# Patient Record
Sex: MALE | Race: ASIAN | NOT HISPANIC OR LATINO | Employment: UNEMPLOYED | URBAN - METROPOLITAN AREA
[De-identification: names, ages, dates, MRNs, and addresses within clinical notes are randomized per-mention and may not be internally consistent; named-entity substitution may affect disease eponyms.]

---

## 2017-01-20 ENCOUNTER — OFFICE VISIT - HEALTHEAST (OUTPATIENT)
Dept: FAMILY MEDICINE | Facility: CLINIC | Age: 67
End: 2017-01-20

## 2017-01-20 DIAGNOSIS — K59.00 CONSTIPATION, UNSPECIFIED CONSTIPATION TYPE: ICD-10-CM

## 2017-01-20 DIAGNOSIS — M54.16 RADICULOPATHY, LUMBAR REGION: ICD-10-CM

## 2017-01-20 DIAGNOSIS — M48.061 SPINAL STENOSIS, LUMBAR REGION, WITHOUT NEUROGENIC CLAUDICATION: ICD-10-CM

## 2017-01-20 DIAGNOSIS — K59.00 UNSPECIFIED CONSTIPATION: ICD-10-CM

## 2017-01-20 DIAGNOSIS — M10.9 GOUT, UNSPECIFIED: ICD-10-CM

## 2017-02-16 ENCOUNTER — COMMUNICATION - HEALTHEAST (OUTPATIENT)
Dept: FAMILY MEDICINE | Facility: CLINIC | Age: 67
End: 2017-02-16

## 2017-02-16 DIAGNOSIS — F33.1 MAJOR DEPRESSIVE DISORDER, RECURRENT EPISODE, MODERATE (H): ICD-10-CM

## 2017-03-03 ENCOUNTER — OFFICE VISIT - HEALTHEAST (OUTPATIENT)
Dept: FAMILY MEDICINE | Facility: CLINIC | Age: 67
End: 2017-03-03

## 2017-03-03 DIAGNOSIS — R05.9 COUGH: ICD-10-CM

## 2017-03-03 DIAGNOSIS — M54.2 NECK PAIN: ICD-10-CM

## 2017-03-03 DIAGNOSIS — Z83.6 FAMILY HISTORY OF INFLUENZA: ICD-10-CM

## 2017-04-04 ENCOUNTER — OFFICE VISIT - HEALTHEAST (OUTPATIENT)
Dept: FAMILY MEDICINE | Facility: CLINIC | Age: 67
End: 2017-04-04

## 2017-04-04 ENCOUNTER — RECORDS - HEALTHEAST (OUTPATIENT)
Dept: ADMINISTRATIVE | Facility: OTHER | Age: 67
End: 2017-04-04

## 2017-04-04 DIAGNOSIS — M79.2 NEURALGIA OF UPPER EXTREMITY: ICD-10-CM

## 2017-04-04 DIAGNOSIS — L29.9 PRURITIC DISORDER: ICD-10-CM

## 2017-04-04 DIAGNOSIS — F33.1 MAJOR DEPRESSIVE DISORDER, RECURRENT EPISODE, MODERATE (H): ICD-10-CM

## 2017-04-04 DIAGNOSIS — L30.9 ECZEMA: ICD-10-CM

## 2017-04-04 DIAGNOSIS — M54.16 RADICULOPATHY, LUMBAR REGION: ICD-10-CM

## 2017-04-04 DIAGNOSIS — K59.00 UNSPECIFIED CONSTIPATION: ICD-10-CM

## 2017-04-04 DIAGNOSIS — M54.41 LOW BACK PAIN WITH RIGHT-SIDED SCIATICA: ICD-10-CM

## 2017-04-27 ENCOUNTER — HOSPITAL ENCOUNTER (OUTPATIENT)
Dept: INTERVENTIONAL RADIOLOGY/VASCULAR | Facility: CLINIC | Age: 67
Discharge: HOME | End: 2017-04-27
Attending: RADIOLOGY

## 2017-04-27 DIAGNOSIS — I63.9 STROKE (H): ICD-10-CM

## 2017-04-30 ENCOUNTER — COMMUNICATION - HEALTHEAST (OUTPATIENT)
Dept: FAMILY MEDICINE | Facility: CLINIC | Age: 67
End: 2017-04-30

## 2017-04-30 DIAGNOSIS — M10.9 GOUT, UNSPECIFIED: ICD-10-CM

## 2017-05-04 ENCOUNTER — OFFICE VISIT - HEALTHEAST (OUTPATIENT)
Dept: GERIATRICS | Facility: CLINIC | Age: 67
End: 2017-05-04

## 2017-05-04 DIAGNOSIS — I63.02 CEREBROVASCULAR ACCIDENT (CVA) DUE TO THROMBOSIS OF BASILAR ARTERY (H): ICD-10-CM

## 2017-05-04 DIAGNOSIS — R09.02 HYPOXIA: ICD-10-CM

## 2017-05-04 DIAGNOSIS — M10.9 GOUT: ICD-10-CM

## 2017-05-04 DIAGNOSIS — D64.9 ANEMIA: ICD-10-CM

## 2017-05-04 DIAGNOSIS — K21.9 GERD (GASTROESOPHAGEAL REFLUX DISEASE): ICD-10-CM

## 2017-05-04 DIAGNOSIS — I72.9 PSEUDOANEURYSM (H): ICD-10-CM

## 2017-05-08 ENCOUNTER — OFFICE VISIT - HEALTHEAST (OUTPATIENT)
Dept: GERIATRICS | Facility: CLINIC | Age: 67
End: 2017-05-08

## 2017-05-08 ENCOUNTER — HOME CARE/HOSPICE - HEALTHEAST (OUTPATIENT)
Dept: HOME HEALTH SERVICES | Facility: HOME HEALTH | Age: 67
End: 2017-05-08

## 2017-05-08 ENCOUNTER — COMMUNICATION - HEALTHEAST (OUTPATIENT)
Dept: FAMILY MEDICINE | Facility: CLINIC | Age: 67
End: 2017-05-08

## 2017-05-08 DIAGNOSIS — I72.9 PSEUDOANEURYSM (H): ICD-10-CM

## 2017-05-08 DIAGNOSIS — D64.9 ANEMIA: ICD-10-CM

## 2017-05-08 DIAGNOSIS — K59.00 CONSTIPATION: ICD-10-CM

## 2017-05-08 DIAGNOSIS — I63.02 CEREBROVASCULAR ACCIDENT (CVA) DUE TO THROMBOSIS OF BASILAR ARTERY (H): ICD-10-CM

## 2017-05-08 DIAGNOSIS — K21.9 GERD (GASTROESOPHAGEAL REFLUX DISEASE): ICD-10-CM

## 2017-05-09 ENCOUNTER — COMMUNICATION - HEALTHEAST (OUTPATIENT)
Dept: GERIATRICS | Facility: CLINIC | Age: 67
End: 2017-05-09

## 2017-05-09 ENCOUNTER — AMBULATORY - HEALTHEAST (OUTPATIENT)
Dept: GERIATRICS | Facility: CLINIC | Age: 67
End: 2017-05-09

## 2017-05-09 ENCOUNTER — COMMUNICATION - HEALTHEAST (OUTPATIENT)
Dept: HOME HEALTH SERVICES | Facility: HOME HEALTH | Age: 67
End: 2017-05-09

## 2017-05-09 ENCOUNTER — COMMUNICATION - HEALTHEAST (OUTPATIENT)
Dept: FAMILY MEDICINE | Facility: CLINIC | Age: 67
End: 2017-05-09

## 2017-05-11 ENCOUNTER — OFFICE VISIT - HEALTHEAST (OUTPATIENT)
Dept: FAMILY MEDICINE | Facility: CLINIC | Age: 67
End: 2017-05-11

## 2017-05-11 DIAGNOSIS — K29.70 GASTRITIS AND GASTRODUODENITIS: ICD-10-CM

## 2017-05-11 DIAGNOSIS — G47.00 INSOMNIA, UNSPECIFIED: ICD-10-CM

## 2017-05-11 DIAGNOSIS — H91.90 HEARING LOSS: ICD-10-CM

## 2017-05-11 DIAGNOSIS — K29.90 GASTRITIS AND GASTRODUODENITIS: ICD-10-CM

## 2017-05-11 DIAGNOSIS — I63.9 CVA (CEREBRAL VASCULAR ACCIDENT) (H): ICD-10-CM

## 2017-05-11 DIAGNOSIS — M79.2 NEURALGIA OF UPPER EXTREMITY: ICD-10-CM

## 2017-05-17 ENCOUNTER — RECORDS - HEALTHEAST (OUTPATIENT)
Dept: ADMINISTRATIVE | Facility: OTHER | Age: 67
End: 2017-05-17

## 2017-05-18 ENCOUNTER — RECORDS - HEALTHEAST (OUTPATIENT)
Dept: ADMINISTRATIVE | Facility: OTHER | Age: 67
End: 2017-05-18

## 2017-05-25 ENCOUNTER — COMMUNICATION - HEALTHEAST (OUTPATIENT)
Dept: FAMILY MEDICINE | Facility: CLINIC | Age: 67
End: 2017-05-25

## 2017-05-28 ENCOUNTER — COMMUNICATION - HEALTHEAST (OUTPATIENT)
Dept: FAMILY MEDICINE | Facility: CLINIC | Age: 67
End: 2017-05-28

## 2017-05-28 DIAGNOSIS — I63.02 CEREBROVASCULAR ACCIDENT (CVA) DUE TO THROMBOSIS OF BASILAR ARTERY (H): ICD-10-CM

## 2017-05-30 ENCOUNTER — RECORDS - HEALTHEAST (OUTPATIENT)
Dept: ADMINISTRATIVE | Facility: OTHER | Age: 67
End: 2017-05-30

## 2017-06-01 ENCOUNTER — OFFICE VISIT - HEALTHEAST (OUTPATIENT)
Dept: FAMILY MEDICINE | Facility: CLINIC | Age: 67
End: 2017-06-01

## 2017-06-01 ENCOUNTER — COMMUNICATION - HEALTHEAST (OUTPATIENT)
Dept: FAMILY MEDICINE | Facility: CLINIC | Age: 67
End: 2017-06-01

## 2017-06-01 DIAGNOSIS — K59.00 UNSPECIFIED CONSTIPATION: ICD-10-CM

## 2017-06-01 DIAGNOSIS — I63.421 CEREBRAL INFARCTION DUE TO EMBOLISM OF RIGHT ANTERIOR CEREBRAL ARTERY (H): ICD-10-CM

## 2017-06-01 DIAGNOSIS — I63.02 CEREBROVASCULAR ACCIDENT (CVA) DUE TO THROMBOSIS OF BASILAR ARTERY (H): ICD-10-CM

## 2017-06-01 DIAGNOSIS — M54.16 RADICULOPATHY, LUMBAR REGION: ICD-10-CM

## 2017-06-01 DIAGNOSIS — M10.9 GOUT, UNSPECIFIED: ICD-10-CM

## 2017-06-01 DIAGNOSIS — G47.00 INSOMNIA: ICD-10-CM

## 2017-06-01 DIAGNOSIS — M54.50 LUMBAGO: ICD-10-CM

## 2017-06-01 DIAGNOSIS — M48.061 SPINAL STENOSIS, LUMBAR REGION, WITHOUT NEUROGENIC CLAUDICATION: ICD-10-CM

## 2017-06-04 ENCOUNTER — COMMUNICATION - HEALTHEAST (OUTPATIENT)
Dept: FAMILY MEDICINE | Facility: CLINIC | Age: 67
End: 2017-06-04

## 2017-06-04 DIAGNOSIS — M10.9 GOUT: ICD-10-CM

## 2017-06-11 ENCOUNTER — COMMUNICATION - HEALTHEAST (OUTPATIENT)
Dept: FAMILY MEDICINE | Facility: CLINIC | Age: 67
End: 2017-06-11

## 2017-06-13 ENCOUNTER — OFFICE VISIT - HEALTHEAST (OUTPATIENT)
Dept: AUDIOLOGY | Facility: CLINIC | Age: 67
End: 2017-06-13

## 2017-06-13 DIAGNOSIS — H90.3 SENSORINEURAL HEARING LOSS, ASYMMETRICAL: ICD-10-CM

## 2017-06-15 ENCOUNTER — RECORDS - HEALTHEAST (OUTPATIENT)
Dept: ADMINISTRATIVE | Facility: OTHER | Age: 67
End: 2017-06-15

## 2017-06-20 ENCOUNTER — COMMUNICATION - HEALTHEAST (OUTPATIENT)
Dept: FAMILY MEDICINE | Facility: CLINIC | Age: 67
End: 2017-06-20

## 2017-06-26 ENCOUNTER — COMMUNICATION - HEALTHEAST (OUTPATIENT)
Dept: FAMILY MEDICINE | Facility: CLINIC | Age: 67
End: 2017-06-26

## 2017-06-27 ENCOUNTER — COMMUNICATION - HEALTHEAST (OUTPATIENT)
Dept: FAMILY MEDICINE | Facility: CLINIC | Age: 67
End: 2017-06-27

## 2017-06-29 ENCOUNTER — COMMUNICATION - HEALTHEAST (OUTPATIENT)
Dept: FAMILY MEDICINE | Facility: CLINIC | Age: 67
End: 2017-06-29

## 2017-06-29 ENCOUNTER — RECORDS - HEALTHEAST (OUTPATIENT)
Dept: ADMINISTRATIVE | Facility: OTHER | Age: 67
End: 2017-06-29

## 2017-06-29 DIAGNOSIS — M10.9 GOUT: ICD-10-CM

## 2017-07-07 ENCOUNTER — COMMUNICATION - HEALTHEAST (OUTPATIENT)
Dept: FAMILY MEDICINE | Facility: CLINIC | Age: 67
End: 2017-07-07

## 2017-07-07 DIAGNOSIS — M10.9 GOUT: ICD-10-CM

## 2017-07-11 ENCOUNTER — COMMUNICATION - HEALTHEAST (OUTPATIENT)
Dept: FAMILY MEDICINE | Facility: CLINIC | Age: 67
End: 2017-07-11

## 2017-07-19 ENCOUNTER — RECORDS - HEALTHEAST (OUTPATIENT)
Dept: ADMINISTRATIVE | Facility: OTHER | Age: 67
End: 2017-07-19

## 2017-07-20 ENCOUNTER — COMMUNICATION - HEALTHEAST (OUTPATIENT)
Dept: FAMILY MEDICINE | Facility: CLINIC | Age: 67
End: 2017-07-20

## 2017-07-20 DIAGNOSIS — M10.9 GOUT: ICD-10-CM

## 2017-07-30 ENCOUNTER — COMMUNICATION - HEALTHEAST (OUTPATIENT)
Dept: FAMILY MEDICINE | Facility: CLINIC | Age: 67
End: 2017-07-30

## 2017-08-01 ENCOUNTER — OFFICE VISIT - HEALTHEAST (OUTPATIENT)
Dept: FAMILY MEDICINE | Facility: CLINIC | Age: 67
End: 2017-08-01

## 2017-08-01 DIAGNOSIS — R41.3 MEMORY LOSS: ICD-10-CM

## 2017-08-01 DIAGNOSIS — K59.00 UNSPECIFIED CONSTIPATION: ICD-10-CM

## 2017-08-01 DIAGNOSIS — I63.02 CEREBROVASCULAR ACCIDENT (CVA) DUE TO THROMBOSIS OF BASILAR ARTERY (H): ICD-10-CM

## 2017-08-01 DIAGNOSIS — F33.1 MAJOR DEPRESSIVE DISORDER, RECURRENT EPISODE, MODERATE (H): ICD-10-CM

## 2017-08-01 DIAGNOSIS — L30.9 DERMATITIS: ICD-10-CM

## 2017-08-01 DIAGNOSIS — F34.1 DYSTHYMIC DISORDER: ICD-10-CM

## 2017-08-01 DIAGNOSIS — M54.16 RADICULOPATHY, LUMBAR REGION: ICD-10-CM

## 2017-08-08 ENCOUNTER — RECORDS - HEALTHEAST (OUTPATIENT)
Dept: ADMINISTRATIVE | Facility: OTHER | Age: 67
End: 2017-08-08

## 2017-08-13 ENCOUNTER — COMMUNICATION - HEALTHEAST (OUTPATIENT)
Dept: FAMILY MEDICINE | Facility: CLINIC | Age: 67
End: 2017-08-13

## 2017-08-24 ENCOUNTER — COMMUNICATION - HEALTHEAST (OUTPATIENT)
Dept: FAMILY MEDICINE | Facility: CLINIC | Age: 67
End: 2017-08-24

## 2017-08-24 DIAGNOSIS — M10.9 GOUT: ICD-10-CM

## 2017-08-29 ENCOUNTER — COMMUNICATION - HEALTHEAST (OUTPATIENT)
Dept: FAMILY MEDICINE | Facility: CLINIC | Age: 67
End: 2017-08-29

## 2017-08-30 ENCOUNTER — COMMUNICATION - HEALTHEAST (OUTPATIENT)
Dept: FAMILY MEDICINE | Facility: CLINIC | Age: 67
End: 2017-08-30

## 2017-09-06 ENCOUNTER — RECORDS - HEALTHEAST (OUTPATIENT)
Dept: ADMINISTRATIVE | Facility: OTHER | Age: 67
End: 2017-09-06

## 2017-09-11 ENCOUNTER — COMMUNICATION - HEALTHEAST (OUTPATIENT)
Dept: FAMILY MEDICINE | Facility: CLINIC | Age: 67
End: 2017-09-11

## 2017-09-12 ENCOUNTER — OFFICE VISIT - HEALTHEAST (OUTPATIENT)
Dept: FAMILY MEDICINE | Facility: CLINIC | Age: 67
End: 2017-09-12

## 2017-09-12 DIAGNOSIS — M48.061 SPINAL STENOSIS, LUMBAR REGION, WITHOUT NEUROGENIC CLAUDICATION: ICD-10-CM

## 2017-09-12 DIAGNOSIS — K29.70 GASTRITIS AND GASTRODUODENITIS: ICD-10-CM

## 2017-09-12 DIAGNOSIS — E78.5 HYPERLIPIDEMIA: ICD-10-CM

## 2017-09-12 DIAGNOSIS — L30.9 DERMATITIS: ICD-10-CM

## 2017-09-12 DIAGNOSIS — K29.90 GASTRITIS AND GASTRODUODENITIS: ICD-10-CM

## 2017-09-13 ENCOUNTER — RECORDS - HEALTHEAST (OUTPATIENT)
Dept: ADMINISTRATIVE | Facility: OTHER | Age: 67
End: 2017-09-13

## 2017-09-25 ENCOUNTER — COMMUNICATION - HEALTHEAST (OUTPATIENT)
Dept: FAMILY MEDICINE | Facility: CLINIC | Age: 67
End: 2017-09-25

## 2017-09-26 ENCOUNTER — COMMUNICATION - HEALTHEAST (OUTPATIENT)
Dept: FAMILY MEDICINE | Facility: CLINIC | Age: 67
End: 2017-09-26

## 2017-09-26 ENCOUNTER — RECORDS - HEALTHEAST (OUTPATIENT)
Dept: ADMINISTRATIVE | Facility: OTHER | Age: 67
End: 2017-09-26

## 2017-09-26 DIAGNOSIS — G89.29 CHRONIC PAIN: ICD-10-CM

## 2017-09-26 DIAGNOSIS — F32.A DEPRESSED: ICD-10-CM

## 2017-09-27 ENCOUNTER — COMMUNICATION - HEALTHEAST (OUTPATIENT)
Dept: FAMILY MEDICINE | Facility: CLINIC | Age: 67
End: 2017-09-27

## 2017-09-28 ENCOUNTER — RECORDS - HEALTHEAST (OUTPATIENT)
Dept: ADMINISTRATIVE | Facility: OTHER | Age: 67
End: 2017-09-28

## 2017-10-05 ENCOUNTER — RECORDS - HEALTHEAST (OUTPATIENT)
Dept: ADMINISTRATIVE | Facility: OTHER | Age: 67
End: 2017-10-05

## 2017-10-16 ENCOUNTER — COMMUNICATION - HEALTHEAST (OUTPATIENT)
Dept: FAMILY MEDICINE | Facility: CLINIC | Age: 67
End: 2017-10-16

## 2017-10-23 ENCOUNTER — COMMUNICATION - HEALTHEAST (OUTPATIENT)
Dept: FAMILY MEDICINE | Facility: CLINIC | Age: 67
End: 2017-10-23

## 2017-10-23 DIAGNOSIS — E55.9 VITAMIN D DEFICIENCY: ICD-10-CM

## 2017-10-25 ENCOUNTER — RECORDS - HEALTHEAST (OUTPATIENT)
Dept: ADMINISTRATIVE | Facility: OTHER | Age: 67
End: 2017-10-25

## 2017-10-25 ENCOUNTER — COMMUNICATION - HEALTHEAST (OUTPATIENT)
Dept: FAMILY MEDICINE | Facility: CLINIC | Age: 67
End: 2017-10-25

## 2017-10-25 DIAGNOSIS — K21.9 GASTROESOPHAGEAL REFLUX DISEASE WITHOUT ESOPHAGITIS: ICD-10-CM

## 2017-11-16 ENCOUNTER — RECORDS - HEALTHEAST (OUTPATIENT)
Dept: ADMINISTRATIVE | Facility: OTHER | Age: 67
End: 2017-11-16

## 2017-11-16 ENCOUNTER — COMMUNICATION - HEALTHEAST (OUTPATIENT)
Dept: FAMILY MEDICINE | Facility: CLINIC | Age: 67
End: 2017-11-16

## 2017-12-12 ENCOUNTER — OFFICE VISIT - HEALTHEAST (OUTPATIENT)
Dept: FAMILY MEDICINE | Facility: CLINIC | Age: 67
End: 2017-12-12

## 2017-12-12 DIAGNOSIS — G89.29 CHRONIC PAIN: ICD-10-CM

## 2017-12-12 DIAGNOSIS — L30.9 DERMATITIS: ICD-10-CM

## 2017-12-12 DIAGNOSIS — M48.061 SPINAL STENOSIS, LUMBAR REGION, WITHOUT NEUROGENIC CLAUDICATION: ICD-10-CM

## 2017-12-12 DIAGNOSIS — F32.A DEPRESSED: ICD-10-CM

## 2017-12-12 DIAGNOSIS — K59.00 CONSTIPATION: ICD-10-CM

## 2018-01-11 ENCOUNTER — COMMUNICATION - HEALTHEAST (OUTPATIENT)
Dept: FAMILY MEDICINE | Facility: CLINIC | Age: 68
End: 2018-01-11

## 2018-01-11 DIAGNOSIS — G89.29 CHRONIC PAIN: ICD-10-CM

## 2018-01-15 ENCOUNTER — COMMUNICATION - HEALTHEAST (OUTPATIENT)
Dept: AUDIOLOGY | Facility: CLINIC | Age: 68
End: 2018-01-15

## 2018-01-16 ENCOUNTER — COMMUNICATION - HEALTHEAST (OUTPATIENT)
Dept: ADMINISTRATIVE | Facility: CLINIC | Age: 68
End: 2018-01-16

## 2018-01-16 ENCOUNTER — RECORDS - HEALTHEAST (OUTPATIENT)
Dept: ADMINISTRATIVE | Facility: OTHER | Age: 68
End: 2018-01-16

## 2018-01-17 ENCOUNTER — OFFICE VISIT - HEALTHEAST (OUTPATIENT)
Dept: AUDIOLOGY | Facility: CLINIC | Age: 68
End: 2018-01-17

## 2018-01-17 DIAGNOSIS — H90.3 SENSORINEURAL HEARING LOSS, ASYMMETRICAL: ICD-10-CM

## 2018-01-29 ENCOUNTER — COMMUNICATION - HEALTHEAST (OUTPATIENT)
Dept: FAMILY MEDICINE | Facility: CLINIC | Age: 68
End: 2018-01-29

## 2018-01-29 DIAGNOSIS — E55.9 VITAMIN D DEFICIENCY: ICD-10-CM

## 2018-02-07 ENCOUNTER — COMMUNICATION - HEALTHEAST (OUTPATIENT)
Dept: FAMILY MEDICINE | Facility: CLINIC | Age: 68
End: 2018-02-07

## 2018-02-15 ENCOUNTER — RECORDS - HEALTHEAST (OUTPATIENT)
Dept: ADMINISTRATIVE | Facility: OTHER | Age: 68
End: 2018-02-15

## 2018-02-25 ENCOUNTER — COMMUNICATION - HEALTHEAST (OUTPATIENT)
Dept: FAMILY MEDICINE | Facility: CLINIC | Age: 68
End: 2018-02-25

## 2018-03-11 ENCOUNTER — COMMUNICATION - HEALTHEAST (OUTPATIENT)
Dept: FAMILY MEDICINE | Facility: CLINIC | Age: 68
End: 2018-03-11

## 2018-03-12 ENCOUNTER — COMMUNICATION - HEALTHEAST (OUTPATIENT)
Dept: FAMILY MEDICINE | Facility: CLINIC | Age: 68
End: 2018-03-12

## 2018-03-12 DIAGNOSIS — F33.1 MAJOR DEPRESSIVE DISORDER, RECURRENT EPISODE, MODERATE (H): ICD-10-CM

## 2018-03-12 DIAGNOSIS — E78.5 HYPERLIPIDEMIA: ICD-10-CM

## 2018-03-14 ENCOUNTER — OFFICE VISIT - HEALTHEAST (OUTPATIENT)
Dept: FAMILY MEDICINE | Facility: CLINIC | Age: 68
End: 2018-03-14

## 2018-03-14 DIAGNOSIS — R41.3 MEMORY LOSS: ICD-10-CM

## 2018-03-14 DIAGNOSIS — M48.061 SPINAL STENOSIS, LUMBAR REGION, WITHOUT NEUROGENIC CLAUDICATION: ICD-10-CM

## 2018-03-14 DIAGNOSIS — K59.00 CONSTIPATION: ICD-10-CM

## 2018-03-14 DIAGNOSIS — I65.8 OCCLUSION AND STENOSIS OF MULTIPLE AND BILATERAL PRECEREBRAL ARTERIES: ICD-10-CM

## 2018-03-14 DIAGNOSIS — F33.1 MAJOR DEPRESSIVE DISORDER, RECURRENT EPISODE, MODERATE (H): ICD-10-CM

## 2018-03-14 DIAGNOSIS — M54.16 RADICULOPATHY, LUMBAR REGION: ICD-10-CM

## 2018-03-26 ENCOUNTER — COMMUNICATION - HEALTHEAST (OUTPATIENT)
Dept: FAMILY MEDICINE | Facility: CLINIC | Age: 68
End: 2018-03-26

## 2018-03-27 ENCOUNTER — OFFICE VISIT - HEALTHEAST (OUTPATIENT)
Dept: AUDIOLOGY | Facility: CLINIC | Age: 68
End: 2018-03-27

## 2018-03-27 DIAGNOSIS — H90.3 SENSORINEURAL HEARING LOSS, BILATERAL: ICD-10-CM

## 2018-04-08 ENCOUNTER — RECORDS - HEALTHEAST (OUTPATIENT)
Dept: ADMINISTRATIVE | Facility: OTHER | Age: 68
End: 2018-04-08

## 2018-04-14 ENCOUNTER — COMMUNICATION - HEALTHEAST (OUTPATIENT)
Dept: FAMILY MEDICINE | Facility: CLINIC | Age: 68
End: 2018-04-14

## 2018-04-18 ENCOUNTER — COMMUNICATION - HEALTHEAST (OUTPATIENT)
Dept: FAMILY MEDICINE | Facility: CLINIC | Age: 68
End: 2018-04-18

## 2018-04-18 DIAGNOSIS — F32.A DEPRESSED: ICD-10-CM

## 2018-04-20 ENCOUNTER — OFFICE VISIT - HEALTHEAST (OUTPATIENT)
Dept: OTOLARYNGOLOGY | Facility: CLINIC | Age: 68
End: 2018-04-20

## 2018-04-20 DIAGNOSIS — H90.3 ASNHL (ASYMMETRICAL SENSORINEURAL HEARING LOSS): ICD-10-CM

## 2018-04-24 ENCOUNTER — OFFICE VISIT - HEALTHEAST (OUTPATIENT)
Dept: AUDIOLOGY | Facility: CLINIC | Age: 68
End: 2018-04-24

## 2018-04-24 DIAGNOSIS — H90.3 SENSORINEURAL HEARING LOSS, BILATERAL: ICD-10-CM

## 2018-04-25 ENCOUNTER — OFFICE VISIT - HEALTHEAST (OUTPATIENT)
Dept: FAMILY MEDICINE | Facility: CLINIC | Age: 68
End: 2018-04-25

## 2018-04-25 ENCOUNTER — AMBULATORY - HEALTHEAST (OUTPATIENT)
Dept: FAMILY MEDICINE | Facility: CLINIC | Age: 68
End: 2018-04-25

## 2018-04-25 DIAGNOSIS — F32.A DEPRESSED: ICD-10-CM

## 2018-04-25 DIAGNOSIS — E78.5 HYPERLIPIDEMIA: ICD-10-CM

## 2018-04-25 DIAGNOSIS — K29.90 GASTRITIS AND GASTRODUODENITIS: ICD-10-CM

## 2018-04-25 DIAGNOSIS — M10.9 GOUT: ICD-10-CM

## 2018-04-25 DIAGNOSIS — K29.70 GASTRITIS AND GASTRODUODENITIS: ICD-10-CM

## 2018-04-25 DIAGNOSIS — F33.1 MAJOR DEPRESSIVE DISORDER, RECURRENT EPISODE, MODERATE (H): ICD-10-CM

## 2018-04-25 LAB
ALBUMIN SERPL-MCNC: 3.5 G/DL (ref 3.5–5)
ALP SERPL-CCNC: 125 U/L (ref 45–120)
ALT SERPL W P-5'-P-CCNC: 99 U/L (ref 0–45)
ANION GAP SERPL CALCULATED.3IONS-SCNC: 11 MMOL/L (ref 5–18)
AST SERPL W P-5'-P-CCNC: 64 U/L (ref 0–40)
BILIRUB SERPL-MCNC: 0.3 MG/DL (ref 0–1)
BUN SERPL-MCNC: 12 MG/DL (ref 8–22)
CALCIUM SERPL-MCNC: 9.3 MG/DL (ref 8.5–10.5)
CHLORIDE BLD-SCNC: 106 MMOL/L (ref 98–107)
CHOLEST SERPL-MCNC: 228 MG/DL
CO2 SERPL-SCNC: 23 MMOL/L (ref 22–31)
CREAT SERPL-MCNC: 0.82 MG/DL (ref 0.7–1.3)
FASTING STATUS PATIENT QL REPORTED: YES
GFR SERPL CREATININE-BSD FRML MDRD: >60 ML/MIN/1.73M2
GLUCOSE BLD-MCNC: 90 MG/DL (ref 70–125)
HDLC SERPL-MCNC: 26 MG/DL
LDLC SERPL CALC-MCNC: 139 MG/DL
POTASSIUM BLD-SCNC: 4.6 MMOL/L (ref 3.5–5)
PROT SERPL-MCNC: 7.5 G/DL (ref 6–8)
SODIUM SERPL-SCNC: 140 MMOL/L (ref 136–145)
TRIGL SERPL-MCNC: 314 MG/DL

## 2018-04-25 ASSESSMENT — MIFFLIN-ST. JEOR: SCORE: 1200.2

## 2018-04-27 ENCOUNTER — COMMUNICATION - HEALTHEAST (OUTPATIENT)
Dept: FAMILY MEDICINE | Facility: CLINIC | Age: 68
End: 2018-04-27

## 2018-04-27 DIAGNOSIS — E55.9 VITAMIN D DEFICIENCY: ICD-10-CM

## 2018-05-07 ENCOUNTER — COMMUNICATION - HEALTHEAST (OUTPATIENT)
Dept: FAMILY MEDICINE | Facility: CLINIC | Age: 68
End: 2018-05-07

## 2018-05-10 ENCOUNTER — RECORDS - HEALTHEAST (OUTPATIENT)
Dept: ADMINISTRATIVE | Facility: OTHER | Age: 68
End: 2018-05-10

## 2018-05-14 ENCOUNTER — COMMUNICATION - HEALTHEAST (OUTPATIENT)
Dept: FAMILY MEDICINE | Facility: CLINIC | Age: 68
End: 2018-05-14

## 2018-05-24 ENCOUNTER — OFFICE VISIT - HEALTHEAST (OUTPATIENT)
Dept: FAMILY MEDICINE | Facility: CLINIC | Age: 68
End: 2018-05-24

## 2018-05-24 DIAGNOSIS — E78.5 HYPERLIPIDEMIA: ICD-10-CM

## 2018-05-24 DIAGNOSIS — F33.1 MAJOR DEPRESSIVE DISORDER, RECURRENT EPISODE, MODERATE (H): ICD-10-CM

## 2018-05-24 DIAGNOSIS — R05.9 COUGH: ICD-10-CM

## 2018-05-24 ASSESSMENT — MIFFLIN-ST. JEOR: SCORE: 1195.38

## 2018-06-07 ENCOUNTER — COMMUNICATION - HEALTHEAST (OUTPATIENT)
Dept: SCHEDULING | Facility: CLINIC | Age: 68
End: 2018-06-07

## 2018-06-07 ENCOUNTER — OFFICE VISIT - HEALTHEAST (OUTPATIENT)
Dept: FAMILY MEDICINE | Facility: CLINIC | Age: 68
End: 2018-06-07

## 2018-06-07 DIAGNOSIS — J40 BRONCHITIS: ICD-10-CM

## 2018-06-07 DIAGNOSIS — R07.89 CHEST WALL PAIN: ICD-10-CM

## 2018-06-07 ASSESSMENT — MIFFLIN-ST. JEOR: SCORE: 1179.5

## 2018-06-13 ENCOUNTER — RECORDS - HEALTHEAST (OUTPATIENT)
Dept: ADMINISTRATIVE | Facility: OTHER | Age: 68
End: 2018-06-13

## 2018-06-15 ENCOUNTER — COMMUNICATION - HEALTHEAST (OUTPATIENT)
Dept: FAMILY MEDICINE | Facility: CLINIC | Age: 68
End: 2018-06-15

## 2018-06-15 DIAGNOSIS — J96.01 ACUTE RESPIRATORY FAILURE WITH HYPOXIA (H): ICD-10-CM

## 2018-06-20 ENCOUNTER — COMMUNICATION - HEALTHEAST (OUTPATIENT)
Dept: FAMILY MEDICINE | Facility: CLINIC | Age: 68
End: 2018-06-20

## 2018-06-20 DIAGNOSIS — M10.9 GOUT: ICD-10-CM

## 2018-06-20 DIAGNOSIS — G47.00 INSOMNIA: ICD-10-CM

## 2018-06-20 RX ORDER — LANOLIN ALCOHOL/MO/W.PET/CERES
CREAM (GRAM) TOPICAL
Qty: 100 TABLET | Refills: 2 | Status: SHIPPED | OUTPATIENT
Start: 2018-06-20 | End: 2022-11-25

## 2018-07-03 ENCOUNTER — COMMUNICATION - HEALTHEAST (OUTPATIENT)
Dept: FAMILY MEDICINE | Facility: CLINIC | Age: 68
End: 2018-07-03

## 2018-07-03 ENCOUNTER — RECORDS - HEALTHEAST (OUTPATIENT)
Dept: ADMINISTRATIVE | Facility: OTHER | Age: 68
End: 2018-07-03

## 2018-07-03 DIAGNOSIS — I63.02 CEREBROVASCULAR ACCIDENT (CVA) DUE TO THROMBOSIS OF BASILAR ARTERY (H): ICD-10-CM

## 2018-07-31 ENCOUNTER — COMMUNICATION - HEALTHEAST (OUTPATIENT)
Dept: FAMILY MEDICINE | Facility: CLINIC | Age: 68
End: 2018-07-31

## 2018-07-31 DIAGNOSIS — E55.9 VITAMIN D DEFICIENCY: ICD-10-CM

## 2018-08-14 ENCOUNTER — OFFICE VISIT - HEALTHEAST (OUTPATIENT)
Dept: FAMILY MEDICINE | Facility: CLINIC | Age: 68
End: 2018-08-14

## 2018-08-14 DIAGNOSIS — M54.16 RADICULOPATHY, LUMBAR REGION: ICD-10-CM

## 2018-08-14 DIAGNOSIS — K59.00 CONSTIPATION, UNSPECIFIED CONSTIPATION TYPE: ICD-10-CM

## 2018-08-14 DIAGNOSIS — M48.061 SPINAL STENOSIS, LUMBAR REGION, WITHOUT NEUROGENIC CLAUDICATION: ICD-10-CM

## 2018-08-14 DIAGNOSIS — M54.40 CHRONIC BILATERAL LOW BACK PAIN WITH SCIATICA, SCIATICA LATERALITY UNSPECIFIED: ICD-10-CM

## 2018-08-14 DIAGNOSIS — G89.29 CHRONIC BILATERAL LOW BACK PAIN WITH SCIATICA, SCIATICA LATERALITY UNSPECIFIED: ICD-10-CM

## 2018-08-14 DIAGNOSIS — E78.00 PURE HYPERCHOLESTEROLEMIA: ICD-10-CM

## 2018-08-14 DIAGNOSIS — E78.1 PURE HYPERGLYCERIDEMIA: ICD-10-CM

## 2018-08-14 DIAGNOSIS — M54.2 CERVICALGIA: ICD-10-CM

## 2018-08-14 LAB
CHOLEST SERPL-MCNC: 238 MG/DL
FASTING STATUS PATIENT QL REPORTED: NO
HDLC SERPL-MCNC: 30 MG/DL
LDLC SERPL CALC-MCNC: 134 MG/DL
TRIGL SERPL-MCNC: 368 MG/DL

## 2018-08-14 ASSESSMENT — MIFFLIN-ST. JEOR: SCORE: 1205.58

## 2018-08-28 ENCOUNTER — COMMUNICATION - HEALTHEAST (OUTPATIENT)
Dept: FAMILY MEDICINE | Facility: CLINIC | Age: 68
End: 2018-08-28

## 2018-08-28 DIAGNOSIS — E55.9 VITAMIN D DEFICIENCY: ICD-10-CM

## 2018-09-05 ENCOUNTER — RECORDS - HEALTHEAST (OUTPATIENT)
Dept: ADMINISTRATIVE | Facility: OTHER | Age: 68
End: 2018-09-05

## 2018-09-11 ENCOUNTER — RECORDS - HEALTHEAST (OUTPATIENT)
Dept: ADMINISTRATIVE | Facility: OTHER | Age: 68
End: 2018-09-11

## 2018-09-23 ENCOUNTER — COMMUNICATION - HEALTHEAST (OUTPATIENT)
Dept: FAMILY MEDICINE | Facility: CLINIC | Age: 68
End: 2018-09-23

## 2018-09-23 DIAGNOSIS — E55.9 VITAMIN D DEFICIENCY: ICD-10-CM

## 2018-09-26 ENCOUNTER — COMMUNICATION - HEALTHEAST (OUTPATIENT)
Dept: FAMILY MEDICINE | Facility: CLINIC | Age: 68
End: 2018-09-26

## 2018-09-26 DIAGNOSIS — I63.02 CEREBROVASCULAR ACCIDENT (CVA) DUE TO THROMBOSIS OF BASILAR ARTERY (H): ICD-10-CM

## 2018-10-04 ENCOUNTER — COMMUNICATION - HEALTHEAST (OUTPATIENT)
Dept: FAMILY MEDICINE | Facility: CLINIC | Age: 68
End: 2018-10-04

## 2018-10-04 DIAGNOSIS — J96.01 ACUTE RESPIRATORY FAILURE WITH HYPOXIA (H): ICD-10-CM

## 2018-10-17 ENCOUNTER — OFFICE VISIT - HEALTHEAST (OUTPATIENT)
Dept: FAMILY MEDICINE | Facility: CLINIC | Age: 68
End: 2018-10-17

## 2018-10-17 DIAGNOSIS — R05.9 COUGH: ICD-10-CM

## 2018-10-17 DIAGNOSIS — H92.09 EAR PAIN: ICD-10-CM

## 2018-10-17 DIAGNOSIS — Z23 IMMUNIZATION DUE: ICD-10-CM

## 2018-10-17 DIAGNOSIS — H72.93 RUPTURED TYMPANIC MEMBRANE, BILATERAL: ICD-10-CM

## 2018-10-17 ASSESSMENT — MIFFLIN-ST. JEOR: SCORE: 1215.79

## 2018-10-19 ENCOUNTER — COMMUNICATION - HEALTHEAST (OUTPATIENT)
Dept: FAMILY MEDICINE | Facility: CLINIC | Age: 68
End: 2018-10-19

## 2018-10-19 DIAGNOSIS — E55.9 VITAMIN D DEFICIENCY: ICD-10-CM

## 2018-10-23 ENCOUNTER — COMMUNICATION - HEALTHEAST (OUTPATIENT)
Dept: FAMILY MEDICINE | Facility: CLINIC | Age: 68
End: 2018-10-23

## 2018-10-23 DIAGNOSIS — E78.5 HYPERLIPIDEMIA: ICD-10-CM

## 2018-10-24 ENCOUNTER — OFFICE VISIT - HEALTHEAST (OUTPATIENT)
Dept: FAMILY MEDICINE | Facility: CLINIC | Age: 68
End: 2018-10-24

## 2018-10-24 DIAGNOSIS — K76.0 HEPATIC STEATOSIS: ICD-10-CM

## 2018-10-24 DIAGNOSIS — K21.9 GASTROESOPHAGEAL REFLUX DISEASE WITHOUT ESOPHAGITIS: ICD-10-CM

## 2018-10-24 DIAGNOSIS — K29.70 GASTRITIS AND GASTRODUODENITIS: ICD-10-CM

## 2018-10-24 DIAGNOSIS — F33.1 MAJOR DEPRESSIVE DISORDER, RECURRENT EPISODE, MODERATE (H): ICD-10-CM

## 2018-10-24 DIAGNOSIS — Z46.1 HEARING AID FITTING OR ADJUSTMENT: ICD-10-CM

## 2018-10-24 DIAGNOSIS — M48.061 SPINAL STENOSIS, LUMBAR REGION, WITHOUT NEUROGENIC CLAUDICATION: ICD-10-CM

## 2018-10-24 DIAGNOSIS — K29.90 GASTRITIS AND GASTRODUODENITIS: ICD-10-CM

## 2018-10-24 DIAGNOSIS — R05.9 COUGH: ICD-10-CM

## 2018-10-24 DIAGNOSIS — Z23 NEED FOR IMMUNIZATION AGAINST INFLUENZA: ICD-10-CM

## 2018-10-24 DIAGNOSIS — M54.16 RADICULOPATHY, LUMBAR REGION: ICD-10-CM

## 2018-10-24 DIAGNOSIS — R91.8 PULMONARY NODULES: ICD-10-CM

## 2018-10-24 ASSESSMENT — MIFFLIN-ST. JEOR: SCORE: 1192.26

## 2018-10-29 ENCOUNTER — OFFICE VISIT - HEALTHEAST (OUTPATIENT)
Dept: AUDIOLOGY | Facility: CLINIC | Age: 68
End: 2018-10-29

## 2018-10-29 DIAGNOSIS — H90.A21 SENSORINEURAL HEARING LOSS (SNHL) OF RIGHT EAR WITH RESTRICTED HEARING OF LEFT EAR: ICD-10-CM

## 2018-10-30 ENCOUNTER — AMBULATORY - HEALTHEAST (OUTPATIENT)
Dept: AUDIOLOGY | Facility: CLINIC | Age: 68
End: 2018-10-30

## 2018-10-30 ENCOUNTER — OFFICE VISIT - HEALTHEAST (OUTPATIENT)
Dept: FAMILY MEDICINE | Facility: CLINIC | Age: 68
End: 2018-10-30

## 2018-10-30 DIAGNOSIS — H72.90 PERFORATED TYMPANIC MEMBRANE: ICD-10-CM

## 2018-10-30 DIAGNOSIS — M48.061 SPINAL STENOSIS, LUMBAR REGION, WITHOUT NEUROGENIC CLAUDICATION: ICD-10-CM

## 2018-10-30 DIAGNOSIS — H92.01 OTALGIA, RIGHT EAR: ICD-10-CM

## 2018-10-30 DIAGNOSIS — H66.91 RIGHT OTITIS MEDIA: ICD-10-CM

## 2018-10-30 DIAGNOSIS — K21.9 GASTROESOPHAGEAL REFLUX DISEASE WITHOUT ESOPHAGITIS: ICD-10-CM

## 2018-10-30 ASSESSMENT — MIFFLIN-ST. JEOR: SCORE: 1200.82

## 2018-11-13 ENCOUNTER — COMMUNICATION - HEALTHEAST (OUTPATIENT)
Dept: FAMILY MEDICINE | Facility: CLINIC | Age: 68
End: 2018-11-13

## 2018-11-13 DIAGNOSIS — E55.9 VITAMIN D DEFICIENCY: ICD-10-CM

## 2018-11-14 ENCOUNTER — COMMUNICATION - HEALTHEAST (OUTPATIENT)
Dept: FAMILY MEDICINE | Facility: CLINIC | Age: 68
End: 2018-11-14

## 2018-11-14 ENCOUNTER — COMMUNICATION - HEALTHEAST (OUTPATIENT)
Dept: ADMINISTRATIVE | Facility: CLINIC | Age: 68
End: 2018-11-14

## 2018-11-14 DIAGNOSIS — I63.02 CEREBROVASCULAR ACCIDENT (CVA) DUE TO THROMBOSIS OF BASILAR ARTERY (H): ICD-10-CM

## 2018-12-14 ENCOUNTER — COMMUNICATION - HEALTHEAST (OUTPATIENT)
Dept: FAMILY MEDICINE | Facility: CLINIC | Age: 68
End: 2018-12-14

## 2018-12-14 DIAGNOSIS — E55.9 VITAMIN D DEFICIENCY: ICD-10-CM

## 2018-12-17 ENCOUNTER — COMMUNICATION - HEALTHEAST (OUTPATIENT)
Dept: FAMILY MEDICINE | Facility: CLINIC | Age: 68
End: 2018-12-17

## 2018-12-17 DIAGNOSIS — F33.1 MAJOR DEPRESSIVE DISORDER, RECURRENT EPISODE, MODERATE (H): ICD-10-CM

## 2019-01-10 ENCOUNTER — COMMUNICATION - HEALTHEAST (OUTPATIENT)
Dept: FAMILY MEDICINE | Facility: CLINIC | Age: 69
End: 2019-01-10

## 2019-01-10 DIAGNOSIS — G47.9 SLEEP DISORDER: ICD-10-CM

## 2019-01-22 ENCOUNTER — RECORDS - HEALTHEAST (OUTPATIENT)
Dept: ADMINISTRATIVE | Facility: OTHER | Age: 69
End: 2019-01-22

## 2019-01-24 ENCOUNTER — OFFICE VISIT - HEALTHEAST (OUTPATIENT)
Dept: FAMILY MEDICINE | Facility: CLINIC | Age: 69
End: 2019-01-24

## 2019-01-24 DIAGNOSIS — R05.9 COUGH: ICD-10-CM

## 2019-01-24 DIAGNOSIS — G47.9 SLEEP DISORDER: ICD-10-CM

## 2019-01-24 DIAGNOSIS — I63.02 CEREBROVASCULAR ACCIDENT (CVA) DUE TO THROMBOSIS OF BASILAR ARTERY (H): ICD-10-CM

## 2019-01-24 DIAGNOSIS — J02.9 SORE THROAT: ICD-10-CM

## 2019-01-24 DIAGNOSIS — L30.9 DERMATITIS: ICD-10-CM

## 2019-01-24 LAB — DEPRECATED S PYO AG THROAT QL EIA: NORMAL

## 2019-01-24 ASSESSMENT — MIFFLIN-ST. JEOR: SCORE: 1198.55

## 2019-01-25 LAB — GROUP A STREP BY PCR: NORMAL

## 2019-02-06 ENCOUNTER — COMMUNICATION - HEALTHEAST (OUTPATIENT)
Dept: FAMILY MEDICINE | Facility: CLINIC | Age: 69
End: 2019-02-06

## 2019-02-06 DIAGNOSIS — G89.29 CHRONIC PAIN: ICD-10-CM

## 2019-02-21 ENCOUNTER — OFFICE VISIT - HEALTHEAST (OUTPATIENT)
Dept: FAMILY MEDICINE | Facility: CLINIC | Age: 69
End: 2019-02-21

## 2019-02-21 DIAGNOSIS — M48.061 SPINAL STENOSIS, LUMBAR REGION, WITHOUT NEUROGENIC CLAUDICATION: ICD-10-CM

## 2019-02-21 DIAGNOSIS — R03.0 ELEVATED BLOOD PRESSURE READING WITHOUT DIAGNOSIS OF HYPERTENSION: ICD-10-CM

## 2019-02-21 DIAGNOSIS — R05.9 COUGH: ICD-10-CM

## 2019-02-21 DIAGNOSIS — G47.9 SLEEP DISORDER: ICD-10-CM

## 2019-02-21 LAB
ANION GAP SERPL CALCULATED.3IONS-SCNC: 12 MMOL/L (ref 5–18)
BASOPHILS # BLD AUTO: 0 THOU/UL (ref 0–0.2)
BASOPHILS NFR BLD AUTO: 1 % (ref 0–2)
BUN SERPL-MCNC: 12 MG/DL (ref 8–22)
CALCIUM SERPL-MCNC: 9.5 MG/DL (ref 8.5–10.5)
CHLORIDE BLD-SCNC: 103 MMOL/L (ref 98–107)
CO2 SERPL-SCNC: 24 MMOL/L (ref 22–31)
CREAT SERPL-MCNC: 0.85 MG/DL (ref 0.7–1.3)
EOSINOPHIL # BLD AUTO: 0.4 THOU/UL (ref 0–0.4)
EOSINOPHIL NFR BLD AUTO: 6 % (ref 0–6)
ERYTHROCYTE [DISTWIDTH] IN BLOOD BY AUTOMATED COUNT: 13.4 % (ref 11–14.5)
GFR SERPL CREATININE-BSD FRML MDRD: >60 ML/MIN/1.73M2
GLUCOSE BLD-MCNC: 114 MG/DL (ref 70–125)
HCT VFR BLD AUTO: 46.5 % (ref 40–54)
HGB BLD-MCNC: 14.7 G/DL (ref 14–18)
LYMPHOCYTES # BLD AUTO: 1.9 THOU/UL (ref 0.8–4.4)
LYMPHOCYTES NFR BLD AUTO: 30 % (ref 20–40)
MCH RBC QN AUTO: 26.1 PG (ref 27–34)
MCHC RBC AUTO-ENTMCNC: 31.6 G/DL (ref 32–36)
MCV RBC AUTO: 83 FL (ref 80–100)
MONOCYTES # BLD AUTO: 0.8 THOU/UL (ref 0–0.9)
MONOCYTES NFR BLD AUTO: 13 % (ref 2–10)
NEUTROPHILS # BLD AUTO: 3.2 THOU/UL (ref 2–7.7)
NEUTROPHILS NFR BLD AUTO: 51 % (ref 50–70)
PLATELET # BLD AUTO: 291 THOU/UL (ref 140–440)
PMV BLD AUTO: 10 FL (ref 8.5–12.5)
POTASSIUM BLD-SCNC: 3.8 MMOL/L (ref 3.5–5)
RBC # BLD AUTO: 5.63 MILL/UL (ref 4.4–6.2)
SODIUM SERPL-SCNC: 139 MMOL/L (ref 136–145)
WBC: 6.3 THOU/UL (ref 4–11)

## 2019-02-21 ASSESSMENT — MIFFLIN-ST. JEOR: SCORE: 1191.97

## 2019-02-28 ENCOUNTER — OFFICE VISIT - HEALTHEAST (OUTPATIENT)
Dept: ALLERGY | Facility: CLINIC | Age: 69
End: 2019-02-28

## 2019-02-28 DIAGNOSIS — J30.89 ALLERGIC RHINITIS DUE TO DUST MITE: ICD-10-CM

## 2019-02-28 DIAGNOSIS — R05.9 COUGH: ICD-10-CM

## 2019-02-28 ASSESSMENT — MIFFLIN-ST. JEOR: SCORE: 1191.97

## 2019-03-13 ENCOUNTER — RECORDS - HEALTHEAST (OUTPATIENT)
Dept: ADMINISTRATIVE | Facility: OTHER | Age: 69
End: 2019-03-13

## 2019-03-14 ENCOUNTER — RECORDS - HEALTHEAST (OUTPATIENT)
Dept: ADMINISTRATIVE | Facility: OTHER | Age: 69
End: 2019-03-14

## 2019-04-23 ENCOUNTER — COMMUNICATION - HEALTHEAST (OUTPATIENT)
Dept: GERIATRIC MEDICINE | Facility: CLINIC | Age: 69
End: 2019-04-23

## 2019-04-29 ENCOUNTER — COMMUNICATION - HEALTHEAST (OUTPATIENT)
Dept: FAMILY MEDICINE | Facility: CLINIC | Age: 69
End: 2019-04-29

## 2019-04-29 DIAGNOSIS — M48.061 SPINAL STENOSIS, LUMBAR REGION, WITHOUT NEUROGENIC CLAUDICATION: ICD-10-CM

## 2019-04-29 DIAGNOSIS — M10.9 GOUT: ICD-10-CM

## 2019-04-29 DIAGNOSIS — F32.A DEPRESSED: ICD-10-CM

## 2019-05-09 ENCOUNTER — COMMUNICATION - HEALTHEAST (OUTPATIENT)
Dept: ALLERGY | Facility: CLINIC | Age: 69
End: 2019-05-09

## 2019-05-09 DIAGNOSIS — R05.9 COUGH: ICD-10-CM

## 2019-05-13 ENCOUNTER — COMMUNICATION - HEALTHEAST (OUTPATIENT)
Dept: ALLERGY | Facility: CLINIC | Age: 69
End: 2019-05-13

## 2019-05-13 DIAGNOSIS — R05.9 COUGH: ICD-10-CM

## 2019-05-20 ENCOUNTER — COMMUNICATION - HEALTHEAST (OUTPATIENT)
Dept: AUDIOLOGY | Facility: CLINIC | Age: 69
End: 2019-05-20

## 2019-05-22 ENCOUNTER — AMBULATORY - HEALTHEAST (OUTPATIENT)
Dept: AUDIOLOGY | Facility: CLINIC | Age: 69
End: 2019-05-22

## 2019-05-22 ENCOUNTER — COMMUNICATION - HEALTHEAST (OUTPATIENT)
Dept: ADMINISTRATIVE | Facility: CLINIC | Age: 69
End: 2019-05-22

## 2019-05-22 DIAGNOSIS — Z97.4 WEARS HEARING AID: ICD-10-CM

## 2019-05-23 ENCOUNTER — RECORDS - HEALTHEAST (OUTPATIENT)
Dept: ADMINISTRATIVE | Facility: OTHER | Age: 69
End: 2019-05-23

## 2019-05-31 ENCOUNTER — COMMUNICATION - HEALTHEAST (OUTPATIENT)
Dept: FAMILY MEDICINE | Facility: CLINIC | Age: 69
End: 2019-05-31

## 2019-05-31 DIAGNOSIS — M48.061 SPINAL STENOSIS, LUMBAR REGION, WITHOUT NEUROGENIC CLAUDICATION: ICD-10-CM

## 2019-06-13 ENCOUNTER — RECORDS - HEALTHEAST (OUTPATIENT)
Dept: ADMINISTRATIVE | Facility: OTHER | Age: 69
End: 2019-06-13

## 2019-06-27 ENCOUNTER — COMMUNICATION - HEALTHEAST (OUTPATIENT)
Dept: FAMILY MEDICINE | Facility: CLINIC | Age: 69
End: 2019-06-27

## 2019-07-02 ENCOUNTER — RECORDS - HEALTHEAST (OUTPATIENT)
Dept: ADMINISTRATIVE | Facility: OTHER | Age: 69
End: 2019-07-02

## 2019-07-02 ENCOUNTER — OFFICE VISIT - HEALTHEAST (OUTPATIENT)
Dept: FAMILY MEDICINE | Facility: CLINIC | Age: 69
End: 2019-07-02

## 2019-07-02 DIAGNOSIS — L30.9 DERMATITIS: ICD-10-CM

## 2019-07-02 DIAGNOSIS — M48.061 SPINAL STENOSIS, LUMBAR REGION, WITHOUT NEUROGENIC CLAUDICATION: ICD-10-CM

## 2019-07-02 DIAGNOSIS — E78.00 PURE HYPERCHOLESTEROLEMIA: ICD-10-CM

## 2019-07-02 DIAGNOSIS — M54.16 RADICULOPATHY, LUMBAR REGION: ICD-10-CM

## 2019-07-02 DIAGNOSIS — Z12.11 SCREEN FOR COLON CANCER: ICD-10-CM

## 2019-07-02 DIAGNOSIS — F33.1 MAJOR DEPRESSIVE DISORDER, RECURRENT EPISODE, MODERATE (H): ICD-10-CM

## 2019-07-02 LAB
CHOLEST SERPL-MCNC: 222 MG/DL
FASTING STATUS PATIENT QL REPORTED: YES
HDLC SERPL-MCNC: 33 MG/DL
LDLC SERPL CALC-MCNC: 155 MG/DL
TRIGL SERPL-MCNC: 172 MG/DL

## 2019-07-02 ASSESSMENT — MIFFLIN-ST. JEOR: SCORE: 1204.19

## 2019-07-03 ENCOUNTER — RECORDS - HEALTHEAST (OUTPATIENT)
Dept: ADMINISTRATIVE | Facility: OTHER | Age: 69
End: 2019-07-03

## 2019-07-10 ENCOUNTER — RECORDS - HEALTHEAST (OUTPATIENT)
Dept: ADMINISTRATIVE | Facility: OTHER | Age: 69
End: 2019-07-10

## 2019-07-11 ENCOUNTER — COMMUNICATION - HEALTHEAST (OUTPATIENT)
Dept: FAMILY MEDICINE | Facility: CLINIC | Age: 69
End: 2019-07-11

## 2019-07-11 DIAGNOSIS — I63.02 CEREBROVASCULAR ACCIDENT (CVA) DUE TO THROMBOSIS OF BASILAR ARTERY (H): ICD-10-CM

## 2019-07-14 ENCOUNTER — COMMUNICATION - HEALTHEAST (OUTPATIENT)
Dept: FAMILY MEDICINE | Facility: CLINIC | Age: 69
End: 2019-07-14

## 2019-07-16 ENCOUNTER — RECORDS - HEALTHEAST (OUTPATIENT)
Dept: ADMINISTRATIVE | Facility: OTHER | Age: 69
End: 2019-07-16

## 2019-07-25 ENCOUNTER — COMMUNICATION - HEALTHEAST (OUTPATIENT)
Dept: ADMINISTRATIVE | Facility: CLINIC | Age: 69
End: 2019-07-25

## 2019-07-31 ENCOUNTER — AMBULATORY - HEALTHEAST (OUTPATIENT)
Dept: NURSING | Facility: CLINIC | Age: 69
End: 2019-07-31

## 2019-07-31 ENCOUNTER — COMMUNICATION - HEALTHEAST (OUTPATIENT)
Dept: FAMILY MEDICINE | Facility: CLINIC | Age: 69
End: 2019-07-31

## 2019-08-01 ENCOUNTER — OFFICE VISIT - HEALTHEAST (OUTPATIENT)
Dept: FAMILY MEDICINE | Facility: CLINIC | Age: 69
End: 2019-08-01

## 2019-08-01 DIAGNOSIS — Z23 IMMUNIZATION DUE: ICD-10-CM

## 2019-08-01 DIAGNOSIS — M48.061 SPINAL STENOSIS, LUMBAR REGION, WITHOUT NEUROGENIC CLAUDICATION: ICD-10-CM

## 2019-08-01 DIAGNOSIS — Z00.00 MEDICARE ANNUAL WELLNESS VISIT, SUBSEQUENT: ICD-10-CM

## 2019-08-01 DIAGNOSIS — F32.A DEPRESSED: ICD-10-CM

## 2019-08-01 DIAGNOSIS — M54.16 RADICULOPATHY, LUMBAR REGION: ICD-10-CM

## 2019-08-01 DIAGNOSIS — E78.00 PURE HYPERCHOLESTEROLEMIA: ICD-10-CM

## 2019-08-01 ASSESSMENT — MIFFLIN-ST. JEOR: SCORE: 1161.36

## 2019-08-08 ENCOUNTER — RECORDS - HEALTHEAST (OUTPATIENT)
Dept: ADMINISTRATIVE | Facility: OTHER | Age: 69
End: 2019-08-08

## 2019-08-12 ENCOUNTER — COMMUNICATION - HEALTHEAST (OUTPATIENT)
Dept: FAMILY MEDICINE | Facility: CLINIC | Age: 69
End: 2019-08-12

## 2019-08-12 DIAGNOSIS — E78.5 HYPERLIPIDEMIA: ICD-10-CM

## 2019-08-12 DIAGNOSIS — M48.061 SPINAL STENOSIS, LUMBAR REGION, WITHOUT NEUROGENIC CLAUDICATION: ICD-10-CM

## 2019-09-03 ENCOUNTER — RECORDS - HEALTHEAST (OUTPATIENT)
Dept: ADMINISTRATIVE | Facility: OTHER | Age: 69
End: 2019-09-03

## 2019-09-12 ENCOUNTER — COMMUNICATION - HEALTHEAST (OUTPATIENT)
Dept: FAMILY MEDICINE | Facility: CLINIC | Age: 69
End: 2019-09-12

## 2019-09-12 DIAGNOSIS — I63.02 CEREBROVASCULAR ACCIDENT (CVA) DUE TO THROMBOSIS OF BASILAR ARTERY (H): ICD-10-CM

## 2019-09-12 DIAGNOSIS — M48.061 SPINAL STENOSIS, LUMBAR REGION, WITHOUT NEUROGENIC CLAUDICATION: ICD-10-CM

## 2019-09-18 ENCOUNTER — COMMUNICATION - HEALTHEAST (OUTPATIENT)
Dept: FAMILY MEDICINE | Facility: CLINIC | Age: 69
End: 2019-09-18

## 2019-09-18 DIAGNOSIS — I63.02 CEREBROVASCULAR ACCIDENT (CVA) DUE TO THROMBOSIS OF BASILAR ARTERY (H): ICD-10-CM

## 2019-09-24 ENCOUNTER — OFFICE VISIT - HEALTHEAST (OUTPATIENT)
Dept: AUDIOLOGY | Facility: CLINIC | Age: 69
End: 2019-09-24

## 2019-09-24 DIAGNOSIS — H90.A21 SENSORINEURAL HEARING LOSS (SNHL) OF RIGHT EAR WITH RESTRICTED HEARING OF LEFT EAR: ICD-10-CM

## 2019-10-08 ENCOUNTER — OFFICE VISIT - HEALTHEAST (OUTPATIENT)
Dept: AUDIOLOGY | Facility: CLINIC | Age: 69
End: 2019-10-08

## 2019-10-08 DIAGNOSIS — H90.3 SENSORINEURAL HEARING LOSS, ASYMMETRICAL: ICD-10-CM

## 2019-10-14 ENCOUNTER — COMMUNICATION - HEALTHEAST (OUTPATIENT)
Dept: FAMILY MEDICINE | Facility: CLINIC | Age: 69
End: 2019-10-14

## 2019-10-14 DIAGNOSIS — M48.061 SPINAL STENOSIS, LUMBAR REGION, WITHOUT NEUROGENIC CLAUDICATION: ICD-10-CM

## 2019-10-23 ENCOUNTER — COMMUNICATION - HEALTHEAST (OUTPATIENT)
Dept: GERIATRIC MEDICINE | Facility: CLINIC | Age: 69
End: 2019-10-23

## 2019-10-25 ENCOUNTER — COMMUNICATION - HEALTHEAST (OUTPATIENT)
Dept: FAMILY MEDICINE | Facility: CLINIC | Age: 69
End: 2019-10-25

## 2019-10-29 ENCOUNTER — COMMUNICATION - HEALTHEAST (OUTPATIENT)
Dept: FAMILY MEDICINE | Facility: CLINIC | Age: 69
End: 2019-10-29

## 2019-10-29 ENCOUNTER — COMMUNICATION - HEALTHEAST (OUTPATIENT)
Dept: PHARMACY | Facility: CLINIC | Age: 69
End: 2019-10-29

## 2019-10-29 ENCOUNTER — COMMUNICATION - HEALTHEAST (OUTPATIENT)
Dept: CARE COORDINATION | Facility: CLINIC | Age: 69
End: 2019-10-29

## 2019-10-29 ENCOUNTER — AMBULATORY - HEALTHEAST (OUTPATIENT)
Dept: FAMILY MEDICINE | Facility: CLINIC | Age: 69
End: 2019-10-29

## 2019-10-30 ENCOUNTER — COMMUNICATION - HEALTHEAST (OUTPATIENT)
Dept: GERIATRIC MEDICINE | Facility: CLINIC | Age: 69
End: 2019-10-30

## 2019-11-02 ENCOUNTER — COMMUNICATION - HEALTHEAST (OUTPATIENT)
Dept: FAMILY MEDICINE | Facility: CLINIC | Age: 69
End: 2019-11-02

## 2019-11-02 DIAGNOSIS — E55.9 VITAMIN D DEFICIENCY: ICD-10-CM

## 2019-11-08 ENCOUNTER — OFFICE VISIT - HEALTHEAST (OUTPATIENT)
Dept: FAMILY MEDICINE | Facility: CLINIC | Age: 69
End: 2019-11-08

## 2019-11-08 DIAGNOSIS — M10.00 IDIOPATHIC GOUT, UNSPECIFIED CHRONICITY, UNSPECIFIED SITE: ICD-10-CM

## 2019-11-08 DIAGNOSIS — F33.1 MAJOR DEPRESSIVE DISORDER, RECURRENT EPISODE, MODERATE (H): ICD-10-CM

## 2019-11-08 DIAGNOSIS — R91.8 RIGHT MIDDLE LOBE PULMONARY INFILTRATE: ICD-10-CM

## 2019-11-08 DIAGNOSIS — I10 BENIGN ESSENTIAL HYPERTENSION: ICD-10-CM

## 2019-11-08 ASSESSMENT — MIFFLIN-ST. JEOR: SCORE: 1200.76

## 2019-11-13 ENCOUNTER — COMMUNICATION - HEALTHEAST (OUTPATIENT)
Dept: FAMILY MEDICINE | Facility: CLINIC | Age: 69
End: 2019-11-13

## 2019-11-13 DIAGNOSIS — M48.061 SPINAL STENOSIS, LUMBAR REGION, WITHOUT NEUROGENIC CLAUDICATION: ICD-10-CM

## 2019-11-13 DIAGNOSIS — K21.9 GASTROESOPHAGEAL REFLUX DISEASE WITHOUT ESOPHAGITIS: ICD-10-CM

## 2019-12-02 ENCOUNTER — COMMUNICATION - HEALTHEAST (OUTPATIENT)
Dept: FAMILY MEDICINE | Facility: CLINIC | Age: 69
End: 2019-12-02

## 2019-12-02 DIAGNOSIS — E55.9 VITAMIN D DEFICIENCY: ICD-10-CM

## 2019-12-03 ENCOUNTER — OFFICE VISIT - HEALTHEAST (OUTPATIENT)
Dept: FAMILY MEDICINE | Facility: CLINIC | Age: 69
End: 2019-12-03

## 2019-12-03 DIAGNOSIS — M10.00 IDIOPATHIC GOUT, UNSPECIFIED CHRONICITY, UNSPECIFIED SITE: ICD-10-CM

## 2019-12-03 DIAGNOSIS — F51.01 PRIMARY INSOMNIA: ICD-10-CM

## 2019-12-03 DIAGNOSIS — M48.061 SPINAL STENOSIS, LUMBAR REGION, WITHOUT NEUROGENIC CLAUDICATION: ICD-10-CM

## 2019-12-03 DIAGNOSIS — F33.1 MAJOR DEPRESSIVE DISORDER, RECURRENT EPISODE, MODERATE (H): ICD-10-CM

## 2019-12-03 ASSESSMENT — MIFFLIN-ST. JEOR: SCORE: 1204.45

## 2019-12-03 ASSESSMENT — PATIENT HEALTH QUESTIONNAIRE - PHQ9: SUM OF ALL RESPONSES TO PHQ QUESTIONS 1-9: 0

## 2019-12-13 ENCOUNTER — COMMUNICATION - HEALTHEAST (OUTPATIENT)
Dept: FAMILY MEDICINE | Facility: CLINIC | Age: 69
End: 2019-12-13

## 2019-12-13 DIAGNOSIS — M48.061 SPINAL STENOSIS, LUMBAR REGION, WITHOUT NEUROGENIC CLAUDICATION: ICD-10-CM

## 2019-12-17 ENCOUNTER — COMMUNICATION - HEALTHEAST (OUTPATIENT)
Dept: GERIATRIC MEDICINE | Facility: CLINIC | Age: 69
End: 2019-12-17

## 2019-12-18 ENCOUNTER — COMMUNICATION - HEALTHEAST (OUTPATIENT)
Dept: AUDIOLOGY | Facility: CLINIC | Age: 69
End: 2019-12-18

## 2019-12-19 ENCOUNTER — COMMUNICATION - HEALTHEAST (OUTPATIENT)
Dept: FAMILY MEDICINE | Facility: CLINIC | Age: 69
End: 2019-12-19

## 2019-12-19 DIAGNOSIS — M10.9 GOUT: ICD-10-CM

## 2020-01-10 ENCOUNTER — COMMUNICATION - HEALTHEAST (OUTPATIENT)
Dept: FAMILY MEDICINE | Facility: CLINIC | Age: 70
End: 2020-01-10

## 2020-01-10 DIAGNOSIS — M48.061 SPINAL STENOSIS, LUMBAR REGION, WITHOUT NEUROGENIC CLAUDICATION: ICD-10-CM

## 2020-01-10 DIAGNOSIS — F33.1 MAJOR DEPRESSIVE DISORDER, RECURRENT EPISODE, MODERATE (H): ICD-10-CM

## 2020-01-15 ENCOUNTER — COMMUNICATION - HEALTHEAST (OUTPATIENT)
Dept: FAMILY MEDICINE | Facility: CLINIC | Age: 70
End: 2020-01-15

## 2020-01-15 DIAGNOSIS — K59.00 CONSTIPATION, UNSPECIFIED CONSTIPATION TYPE: ICD-10-CM

## 2020-01-15 DIAGNOSIS — E55.9 VITAMIN D DEFICIENCY: ICD-10-CM

## 2020-01-15 DIAGNOSIS — I63.02 CEREBROVASCULAR ACCIDENT (CVA) DUE TO THROMBOSIS OF BASILAR ARTERY (H): ICD-10-CM

## 2020-01-16 ENCOUNTER — COMMUNICATION - HEALTHEAST (OUTPATIENT)
Dept: MEDSURG UNIT | Facility: HOSPITAL | Age: 70
End: 2020-01-16

## 2020-01-16 DIAGNOSIS — I10 BENIGN ESSENTIAL HYPERTENSION: ICD-10-CM

## 2020-01-16 RX ORDER — ERGOCALCIFEROL 1.25 MG/1
CAPSULE ORAL
Qty: 12 CAPSULE | Refills: 3 | Status: SHIPPED | OUTPATIENT
Start: 2020-01-16 | End: 2021-09-16

## 2020-01-17 ENCOUNTER — AMBULATORY - HEALTHEAST (OUTPATIENT)
Dept: AUDIOLOGY | Facility: CLINIC | Age: 70
End: 2020-01-17

## 2020-01-17 DIAGNOSIS — Z97.4 WEARS HEARING AID: ICD-10-CM

## 2020-01-20 ENCOUNTER — COMMUNICATION - HEALTHEAST (OUTPATIENT)
Dept: ADMINISTRATIVE | Facility: CLINIC | Age: 70
End: 2020-01-20

## 2020-02-09 ENCOUNTER — COMMUNICATION - HEALTHEAST (OUTPATIENT)
Dept: FAMILY MEDICINE | Facility: CLINIC | Age: 70
End: 2020-02-09

## 2020-02-09 DIAGNOSIS — G89.29 CHRONIC PAIN: ICD-10-CM

## 2020-02-09 DIAGNOSIS — M48.061 SPINAL STENOSIS, LUMBAR REGION, WITHOUT NEUROGENIC CLAUDICATION: ICD-10-CM

## 2020-02-28 ENCOUNTER — COMMUNICATION - HEALTHEAST (OUTPATIENT)
Dept: FAMILY MEDICINE | Facility: CLINIC | Age: 70
End: 2020-02-28

## 2020-02-28 DIAGNOSIS — G47.9 SLEEP DISORDER: ICD-10-CM

## 2020-03-02 RX ORDER — HYDROXYZINE PAMOATE 50 MG/1
CAPSULE ORAL
Qty: 30 CAPSULE | Refills: 10 | Status: SHIPPED | OUTPATIENT
Start: 2020-03-02 | End: 2022-11-25

## 2020-03-03 ENCOUNTER — OFFICE VISIT - HEALTHEAST (OUTPATIENT)
Dept: FAMILY MEDICINE | Facility: CLINIC | Age: 70
End: 2020-03-03

## 2020-03-03 DIAGNOSIS — K21.9 GASTROESOPHAGEAL REFLUX DISEASE WITHOUT ESOPHAGITIS: ICD-10-CM

## 2020-03-03 DIAGNOSIS — E78.5 HYPERLIPIDEMIA: ICD-10-CM

## 2020-03-03 DIAGNOSIS — K59.00 CONSTIPATION, UNSPECIFIED CONSTIPATION TYPE: ICD-10-CM

## 2020-03-03 DIAGNOSIS — I63.02 CEREBROVASCULAR ACCIDENT (CVA) DUE TO THROMBOSIS OF BASILAR ARTERY (H): ICD-10-CM

## 2020-03-03 DIAGNOSIS — F33.1 MAJOR DEPRESSIVE DISORDER, RECURRENT EPISODE, MODERATE (H): ICD-10-CM

## 2020-03-03 DIAGNOSIS — I10 BENIGN ESSENTIAL HYPERTENSION: ICD-10-CM

## 2020-03-03 RX ORDER — POLYETHYLENE GLYCOL 3350 17 G/17G
POWDER, FOR SOLUTION ORAL
Qty: 100 EACH | Refills: 3 | Status: SHIPPED | OUTPATIENT
Start: 2020-03-03 | End: 2022-08-19

## 2020-03-03 ASSESSMENT — MIFFLIN-ST. JEOR: SCORE: 1169.01

## 2020-03-07 ENCOUNTER — COMMUNICATION - HEALTHEAST (OUTPATIENT)
Dept: FAMILY MEDICINE | Facility: CLINIC | Age: 70
End: 2020-03-07

## 2020-03-07 DIAGNOSIS — M10.00 IDIOPATHIC GOUT, UNSPECIFIED CHRONICITY, UNSPECIFIED SITE: ICD-10-CM

## 2020-03-26 ENCOUNTER — COMMUNICATION - HEALTHEAST (OUTPATIENT)
Dept: GERIATRIC MEDICINE | Facility: CLINIC | Age: 70
End: 2020-03-26

## 2020-03-26 ASSESSMENT — ACTIVITIES OF DAILY LIVING (ADL)
DEPENDENT_IADLS:: CLEANING;COOKING;LAUNDRY;SHOPPING;MEAL PREPARATION;MEDICATION MANAGEMENT;MONEY MANAGEMENT;TRANSPORTATION;INCONTINENCE

## 2020-03-30 ENCOUNTER — COMMUNICATION - HEALTHEAST (OUTPATIENT)
Dept: GERIATRIC MEDICINE | Facility: CLINIC | Age: 70
End: 2020-03-30

## 2020-04-06 ENCOUNTER — RECORDS - HEALTHEAST (OUTPATIENT)
Dept: ADMINISTRATIVE | Facility: OTHER | Age: 70
End: 2020-04-06

## 2020-04-09 ENCOUNTER — COMMUNICATION - HEALTHEAST (OUTPATIENT)
Dept: MEDSURG UNIT | Facility: HOSPITAL | Age: 70
End: 2020-04-09

## 2020-04-09 DIAGNOSIS — I10 BENIGN ESSENTIAL HYPERTENSION: ICD-10-CM

## 2020-04-21 ENCOUNTER — RECORDS - HEALTHEAST (OUTPATIENT)
Dept: ADMINISTRATIVE | Facility: OTHER | Age: 70
End: 2020-04-21

## 2020-05-04 ENCOUNTER — RECORDS - HEALTHEAST (OUTPATIENT)
Dept: ADMINISTRATIVE | Facility: OTHER | Age: 70
End: 2020-05-04

## 2020-05-06 ENCOUNTER — COMMUNICATION - HEALTHEAST (OUTPATIENT)
Dept: FAMILY MEDICINE | Facility: CLINIC | Age: 70
End: 2020-05-06

## 2020-05-06 DIAGNOSIS — F32.A DEPRESSED: ICD-10-CM

## 2020-05-06 DIAGNOSIS — M10.00 IDIOPATHIC GOUT, UNSPECIFIED CHRONICITY, UNSPECIFIED SITE: ICD-10-CM

## 2020-05-07 RX ORDER — MIRTAZAPINE 15 MG/1
TABLET, FILM COATED ORAL
Qty: 90 TABLET | Refills: 3 | Status: SHIPPED | OUTPATIENT
Start: 2020-05-07 | End: 2022-11-25

## 2020-06-03 ENCOUNTER — OFFICE VISIT - HEALTHEAST (OUTPATIENT)
Dept: FAMILY MEDICINE | Facility: CLINIC | Age: 70
End: 2020-06-03

## 2020-06-03 DIAGNOSIS — M54.16 RADICULOPATHY, LUMBAR REGION: ICD-10-CM

## 2020-06-03 DIAGNOSIS — I63.02 CEREBROVASCULAR ACCIDENT (CVA) DUE TO THROMBOSIS OF BASILAR ARTERY (H): ICD-10-CM

## 2020-06-03 DIAGNOSIS — K59.04 CHRONIC IDIOPATHIC CONSTIPATION: ICD-10-CM

## 2020-06-03 DIAGNOSIS — G89.29 CHRONIC BILATERAL LOW BACK PAIN WITH LEFT-SIDED SCIATICA: ICD-10-CM

## 2020-06-03 DIAGNOSIS — I10 BENIGN ESSENTIAL HYPERTENSION: ICD-10-CM

## 2020-06-03 DIAGNOSIS — E78.5 HYPERLIPIDEMIA: ICD-10-CM

## 2020-06-03 DIAGNOSIS — M54.42 CHRONIC BILATERAL LOW BACK PAIN WITH LEFT-SIDED SCIATICA: ICD-10-CM

## 2020-06-03 DIAGNOSIS — M10.9 GOUT: ICD-10-CM

## 2020-06-03 DIAGNOSIS — I63.421 CEREBRAL INFARCTION DUE TO EMBOLISM OF RIGHT ANTERIOR CEREBRAL ARTERY (H): ICD-10-CM

## 2020-06-03 DIAGNOSIS — F33.1 MAJOR DEPRESSIVE DISORDER, RECURRENT EPISODE, MODERATE (H): ICD-10-CM

## 2020-06-03 DIAGNOSIS — K21.9 GASTROESOPHAGEAL REFLUX DISEASE WITHOUT ESOPHAGITIS: ICD-10-CM

## 2020-06-03 RX ORDER — ALLOPURINOL 300 MG/1
300 TABLET ORAL DAILY
Qty: 90 TABLET | Refills: 3 | Status: SHIPPED | OUTPATIENT
Start: 2020-06-03 | End: 2022-11-25

## 2020-06-03 RX ORDER — METOPROLOL TARTRATE 25 MG/1
TABLET, FILM COATED ORAL
Qty: 180 TABLET | Refills: 3 | Status: SHIPPED | OUTPATIENT
Start: 2020-06-03 | End: 2023-10-03

## 2020-06-18 ENCOUNTER — RECORDS - HEALTHEAST (OUTPATIENT)
Dept: ADMINISTRATIVE | Facility: OTHER | Age: 70
End: 2020-06-18

## 2020-06-23 ENCOUNTER — COMMUNICATION - HEALTHEAST (OUTPATIENT)
Dept: ADMINISTRATIVE | Facility: CLINIC | Age: 70
End: 2020-06-23

## 2020-06-24 ENCOUNTER — AMBULATORY - HEALTHEAST (OUTPATIENT)
Dept: AUDIOLOGY | Facility: CLINIC | Age: 70
End: 2020-06-24

## 2020-06-24 DIAGNOSIS — Z97.4 WEARS HEARING AID: ICD-10-CM

## 2020-07-05 ENCOUNTER — COMMUNICATION - HEALTHEAST (OUTPATIENT)
Dept: FAMILY MEDICINE | Facility: CLINIC | Age: 70
End: 2020-07-05

## 2020-07-05 DIAGNOSIS — M10.00 IDIOPATHIC GOUT, UNSPECIFIED CHRONICITY, UNSPECIFIED SITE: ICD-10-CM

## 2020-07-07 RX ORDER — DICLOFENAC SODIUM 75 MG/1
TABLET, DELAYED RELEASE ORAL
Qty: 60 TABLET | Refills: 0 | Status: SHIPPED | OUTPATIENT
Start: 2020-07-07 | End: 2022-11-25 | Stop reason: ALTCHOICE

## 2020-09-30 ENCOUNTER — COMMUNICATION - HEALTHEAST (OUTPATIENT)
Dept: GERIATRIC MEDICINE | Facility: CLINIC | Age: 70
End: 2020-09-30

## 2020-10-03 ENCOUNTER — COMMUNICATION - HEALTHEAST (OUTPATIENT)
Dept: FAMILY MEDICINE | Facility: CLINIC | Age: 70
End: 2020-10-03

## 2020-10-03 DIAGNOSIS — I63.02 CEREBROVASCULAR ACCIDENT (CVA) DUE TO THROMBOSIS OF BASILAR ARTERY (H): ICD-10-CM

## 2020-10-08 ENCOUNTER — OFFICE VISIT - HEALTHEAST (OUTPATIENT)
Dept: AUDIOLOGY | Facility: CLINIC | Age: 70
End: 2020-10-08

## 2020-10-08 DIAGNOSIS — H90.3 SENSORINEURAL HEARING LOSS, BILATERAL: ICD-10-CM

## 2020-12-02 ENCOUNTER — COMMUNICATION - HEALTHEAST (OUTPATIENT)
Dept: FAMILY MEDICINE | Facility: CLINIC | Age: 70
End: 2020-12-02

## 2020-12-02 DIAGNOSIS — K21.9 GASTROESOPHAGEAL REFLUX DISEASE WITHOUT ESOPHAGITIS: ICD-10-CM

## 2020-12-14 ENCOUNTER — COMMUNICATION - HEALTHEAST (OUTPATIENT)
Dept: GERIATRIC MEDICINE | Facility: CLINIC | Age: 70
End: 2020-12-14

## 2021-01-01 ENCOUNTER — COMMUNICATION - HEALTHEAST (OUTPATIENT)
Dept: FAMILY MEDICINE | Facility: CLINIC | Age: 71
End: 2021-01-01

## 2021-01-01 DIAGNOSIS — I63.02 CEREBROVASCULAR ACCIDENT (CVA) DUE TO THROMBOSIS OF BASILAR ARTERY (H): ICD-10-CM

## 2021-01-31 ENCOUNTER — COMMUNICATION - HEALTHEAST (OUTPATIENT)
Dept: FAMILY MEDICINE | Facility: CLINIC | Age: 71
End: 2021-01-31

## 2021-01-31 DIAGNOSIS — I63.02 CEREBROVASCULAR ACCIDENT (CVA) DUE TO THROMBOSIS OF BASILAR ARTERY (H): ICD-10-CM

## 2021-02-11 ENCOUNTER — COMMUNICATION - HEALTHEAST (OUTPATIENT)
Dept: GERIATRIC MEDICINE | Facility: CLINIC | Age: 71
End: 2021-02-11

## 2021-02-23 ENCOUNTER — COMMUNICATION - HEALTHEAST (OUTPATIENT)
Dept: GERIATRIC MEDICINE | Facility: CLINIC | Age: 71
End: 2021-02-23

## 2021-03-02 ENCOUNTER — COMMUNICATION - HEALTHEAST (OUTPATIENT)
Dept: FAMILY MEDICINE | Facility: CLINIC | Age: 71
End: 2021-03-02

## 2021-03-02 DIAGNOSIS — G89.29 CHRONIC PAIN: ICD-10-CM

## 2021-03-02 RX ORDER — GABAPENTIN 300 MG/1
CAPSULE ORAL
Qty: 270 CAPSULE | Refills: 1 | Status: SHIPPED | OUTPATIENT
Start: 2021-03-02 | End: 2021-09-16

## 2021-04-01 ENCOUNTER — OFFICE VISIT - HEALTHEAST (OUTPATIENT)
Dept: FAMILY MEDICINE | Facility: CLINIC | Age: 71
End: 2021-04-01

## 2021-04-01 DIAGNOSIS — E78.1 PURE HYPERGLYCERIDEMIA: ICD-10-CM

## 2021-04-01 DIAGNOSIS — I10 BENIGN ESSENTIAL HYPERTENSION: ICD-10-CM

## 2021-04-01 DIAGNOSIS — H90.3 ASNHL (ASYMMETRICAL SENSORINEURAL HEARING LOSS): ICD-10-CM

## 2021-04-01 DIAGNOSIS — Z00.00 WELLNESS EXAMINATION: ICD-10-CM

## 2021-04-01 LAB
ANION GAP SERPL CALCULATED.3IONS-SCNC: 8 MMOL/L (ref 5–18)
BUN SERPL-MCNC: 14 MG/DL (ref 8–22)
CALCIUM SERPL-MCNC: 9.3 MG/DL (ref 8.5–10.5)
CHLORIDE BLD-SCNC: 101 MMOL/L (ref 98–107)
CHOLEST SERPL-MCNC: 264 MG/DL
CO2 SERPL-SCNC: 28 MMOL/L (ref 22–31)
CREAT SERPL-MCNC: 0.87 MG/DL (ref 0.7–1.3)
FASTING STATUS PATIENT QL REPORTED: NO
GFR SERPL CREATININE-BSD FRML MDRD: >60 ML/MIN/1.73M2
GLUCOSE BLD-MCNC: 128 MG/DL (ref 70–125)
HDLC SERPL-MCNC: 32 MG/DL
LDLC SERPL CALC-MCNC: 168 MG/DL
POTASSIUM BLD-SCNC: 4 MMOL/L (ref 3.5–5)
SODIUM SERPL-SCNC: 137 MMOL/L (ref 136–145)
TRIGL SERPL-MCNC: 320 MG/DL

## 2021-04-01 ASSESSMENT — MIFFLIN-ST. JEOR: SCORE: 1159.66

## 2021-04-09 ENCOUNTER — COMMUNICATION - HEALTHEAST (OUTPATIENT)
Dept: FAMILY MEDICINE | Facility: CLINIC | Age: 71
End: 2021-04-09

## 2021-04-09 DIAGNOSIS — E78.5 HYPERLIPIDEMIA: ICD-10-CM

## 2021-04-09 RX ORDER — ATORVASTATIN CALCIUM 40 MG/1
TABLET, FILM COATED ORAL
Qty: 90 TABLET | Refills: 3 | Status: SHIPPED | OUTPATIENT
Start: 2021-04-09 | End: 2023-01-25

## 2021-04-13 ENCOUNTER — COMMUNICATION - HEALTHEAST (OUTPATIENT)
Dept: ADMINISTRATIVE | Facility: CLINIC | Age: 71
End: 2021-04-13

## 2021-04-15 ENCOUNTER — COMMUNICATION - HEALTHEAST (OUTPATIENT)
Dept: ADMINISTRATIVE | Facility: CLINIC | Age: 71
End: 2021-04-15

## 2021-04-16 ENCOUNTER — AMBULATORY - HEALTHEAST (OUTPATIENT)
Dept: AUDIOLOGY | Facility: CLINIC | Age: 71
End: 2021-04-16

## 2021-04-16 DIAGNOSIS — Z97.4 WEARS HEARING AID: ICD-10-CM

## 2021-04-29 ENCOUNTER — COMMUNICATION - HEALTHEAST (OUTPATIENT)
Dept: FAMILY MEDICINE | Facility: CLINIC | Age: 71
End: 2021-04-29

## 2021-04-29 DIAGNOSIS — I63.02 CEREBROVASCULAR ACCIDENT (CVA) DUE TO THROMBOSIS OF BASILAR ARTERY (H): ICD-10-CM

## 2021-04-30 RX ORDER — CLOPIDOGREL BISULFATE 75 MG/1
TABLET ORAL
Qty: 90 TABLET | Refills: 1 | Status: SHIPPED | OUTPATIENT
Start: 2021-04-30 | End: 2023-03-01

## 2021-04-30 RX ORDER — MAGNESIUM OXIDE 400 MG/1
TABLET ORAL
Qty: 100 TABLET | Refills: 0 | Status: SHIPPED | OUTPATIENT
Start: 2021-04-30 | End: 2023-02-28

## 2021-05-04 ENCOUNTER — COMMUNICATION - HEALTHEAST (OUTPATIENT)
Dept: FAMILY MEDICINE | Facility: CLINIC | Age: 71
End: 2021-05-04

## 2021-05-04 DIAGNOSIS — F33.1 MAJOR DEPRESSIVE DISORDER, RECURRENT EPISODE, MODERATE (H): ICD-10-CM

## 2021-05-04 RX ORDER — DULOXETIN HYDROCHLORIDE 60 MG/1
CAPSULE, DELAYED RELEASE ORAL
Qty: 90 CAPSULE | Refills: 3 | Status: SHIPPED | OUTPATIENT
Start: 2021-05-04 | End: 2022-04-19

## 2021-05-26 ASSESSMENT — PATIENT HEALTH QUESTIONNAIRE - PHQ9: SUM OF ALL RESPONSES TO PHQ QUESTIONS 1-9: 0

## 2021-05-28 NOTE — PROGRESS NOTES
[facility] Care Coordination Contact    Received after visit chart from care coordinator.  Completed following tasks: Mailed copy of care plan to client and Updated services in access    Van Brenner  Care Management Specialist  [facility]  [phone]

## 2021-05-28 NOTE — TELEPHONE ENCOUNTER
RN cannot approve Refill Request    RN can NOT refill this medication med is not covered by policy/route to provider.       Linnea Arauz, [facility] Triage/Med Refill 4/29/2019    Requested Prescriptions   Pending Prescriptions Disp Refills     diclofenac (VOLTAREN) 75 MG EC tablet [Pharmacy Med Name: DICLOFENAC SODIUM 75MG DR TABLETS] 60 tablet 0     Sig: TAKE 1 TABLET(75 MG) BY MOUTH TWICE DAILY       There is no refill protocol information for this order      Signed Prescriptions Disp Refills    mirtazapine (REMERON) 15 MG tablet 90 tablet 2     Sig: TAKE 1 TABLET(15 MG) BY MOUTH AT BEDTIME       Tricyclics/Misc Antidepressant/Antianxiety Meds Refill Protocol Passed - 4/29/2019  3:50 AM        Passed - PCP or prescribing provider visit in last year     Last office visit with prescriber/PCP: 2/21/2019 David Fang MD OR same dept: 2/21/2019 David Fang MD OR same specialty: 2/21/2019 David Fang MD  Last physical: Visit date not found Last MTM visit: Visit date not found   Next visit within 3 mo: Visit date not found  Next physical within 3 mo: Visit date not found  Prescriber OR PCP: David Fang MD  Last diagnosis associated with med order: 1. Depressed  - mirtazapine (REMERON) 15 MG tablet; TAKE 1 TABLET(15 MG) BY MOUTH AT BEDTIME  Dispense: 90 tablet; Refill: 2    2. Gout  - allopurinol (ZYLOPRIM) 300 MG tablet; TAKE 1 TABLET BY MOUTH DAILY  Dispense: 90 tablet; Refill: 1    If protocol passes may refill for 12 months if within 3 months of last provider visit (or a total of 15 months).            allopurinol (ZYLOPRIM) 300 MG tablet 90 tablet 1     Sig: TAKE 1 TABLET BY MOUTH DAILY       Allopurinol/Febuxostat Refill Protocol  Passed - 4/29/2019  3:50 AM        Passed - LFT or AST or ALT in last 12 months     Albumin   Date Value Ref Range Status   10/19/2018 3.4 (L) 3.5 - 5.0 g/dL Final     Bilirubin, Total   Date Value Ref Range Status   10/19/2018 0.9 0.0 - 1.0 mg/dL Final     Bilirubin, Direct    Date Value Ref Range Status   10/19/2018 0.3 <=0.5 mg/dL Final     Alkaline Phosphatase   Date Value Ref Range Status   10/19/2018 126 (H) 45 - 120 U/L Final     AST   Date Value Ref Range Status   10/19/2018 94 (H) 0 - 40 U/L Final     ALT   Date Value Ref Range Status   10/19/2018 121 (H) 0 - 45 U/L Final     Protein, Total   Date Value Ref Range Status   10/19/2018 8.0 6.0 - 8.0 g/dL Final                Passed - Visit with PCP or prescribing provider visit in past 12 months     Last office visit with prescriber/PCP: 2/21/2019 David Fang MD OR same dept: 2/21/2019 David Fang MD OR same specialty: 2/21/2019 David Fang MD  Last physical: Visit date not found Last MTM visit: Visit date not found   Next visit within 3 mo: Visit date not found  Next physical within 3 mo: Visit date not found  Prescriber OR PCP: David Fang MD  Last diagnosis associated with med order: 1. Depressed  - mirtazapine (REMERON) 15 MG tablet; TAKE 1 TABLET(15 MG) BY MOUTH AT BEDTIME  Dispense: 90 tablet; Refill: 2    2. Gout  - allopurinol (ZYLOPRIM) 300 MG tablet; TAKE 1 TABLET BY MOUTH DAILY  Dispense: 90 tablet; Refill: 1    If protocol passes may refill for 12 months if within 3 months of last provider visit (or a total of 15 months).

## 2021-05-28 NOTE — TELEPHONE ENCOUNTER
Pts Care Provider: Audiology Team    Caller: Collins     Phone Number: [phone]  OK to leave message: Yes    Reason for Call: Pt's daughter requesting hearing aid batteries to be mailed to the patient;    [address] [place] [address]

## 2021-05-29 NOTE — PROGRESS NOTES
A 90 day supply of hearing aid batteries were sent to this patient today. He/She should contact the clinic with concerns. The patient was not seen by a provider/staff member today.  Size 675  # of Packages = 3    Tracking Number  [id number]    Kristie Williamson [facility]/Audiology  [address] [place] [address]  W [phone]  F [phone]

## 2021-05-29 NOTE — TELEPHONE ENCOUNTER
RN cannot approve Refill Request    RN can NOT refill this medication med is not covered by policy/route to provider. Last office visit: 2/21/2019 David Fang MD Last Physical: Visit date not found Last MTM visit: Visit date not found Last visit same specialty: 2/21/2019 David Fang MD.  Next visit within 3 mo: Visit date not found  Next physical within 3 mo: Visit date not found      Arlette Tineo, [facility] Triage/Med Refill 5/31/2019    Requested Prescriptions   Pending Prescriptions Disp Refills     diclofenac (VOLTAREN) 75 MG EC tablet [Pharmacy Med Name: DICLOFENAC SODIUM 75MG DR TABLETS] 60 tablet 0     Sig: TAKE 1 TABLET(75 MG) BY MOUTH TWICE DAILY       There is no refill protocol information for this order

## 2021-05-30 VITALS — WEIGHT: 112.06 LBS | BODY MASS INDEX: 21.17 KG/M2

## 2021-05-30 VITALS — WEIGHT: 114.31 LBS | BODY MASS INDEX: 22.95 KG/M2

## 2021-05-30 VITALS — BODY MASS INDEX: 22.33 KG/M2 | WEIGHT: 118.19 LBS

## 2021-05-30 VITALS — BODY MASS INDEX: 22.52 KG/M2 | WEIGHT: 119.19 LBS

## 2021-05-30 VITALS — WEIGHT: 115.25 LBS | BODY MASS INDEX: 21.78 KG/M2

## 2021-05-30 NOTE — PROGRESS NOTES
ASSESSMENT AND PLAN:  1. Pure hypercholesterolemia  Patient cholesterols been elevated in the past recheck that value the next visit he is not fasting today.  - atorvastatin (LIPITOR) 40 MG tablet; Take 1 tablet (40 mg total) by mouth at bedtime.  Dispense: 30 tablet; Refill: 5  - Lipid Cascade    2. Screen for colon cancer    - Ambulatory referral for Colonoscopy    3. Moderate Recurrent Major Depression    Symptoms controlled    4. Lumbar Canal Stenosis    No back pain noted    5. Radiculopathy, lumbar region      - ranitidine (ZANTAC) 150 MG tablet; Take 1 tablet (150 mg total) by mouth 2 (two) times a day.  Dispense: 180 tablet; Refill: 3    6. Dermatitis    The rash has reoccurred over the right shoulder.  He is responded well to steroid injections I am sending him back to dermatology.  - Ambulatory referral to Dermatology            Orders Placed This Encounter   Procedures     Lipid Cascade     Order Specific Question:   Fasting is required?     Answer:   Yes     Ambulatory referral for Colonoscopy     Referral Priority:   Routine     Referral Type:   Colonoscopy     Referral Reason:   Evaluation and Treatment     Requested Specialty:   Gastroenterology     Number of Visits Requested:   1     Ambulatory referral to Dermatology     Referral Priority:   Routine     Referral Type:   Consultation     Referral Reason:   Evaluation and Treatment     Requested Specialty:   Dermatology     Number of Visits Requested:   1     Medications Discontinued During This Encounter   Medication Reason     ranitidine (ZANTAC) 150 MG tablet Reorder     atorvastatin (LIPITOR) 40 MG tablet Reorder       No follow-ups on file.    CHIEF COMPLAINT:  Chief Complaint   Patient presents with     Hypertension       HISTORY OF PRESENT ILLNESS:  Chasity is a 69 y.o. male with hyperlipidemia, history of CVA with left hemiparesis, lumbar spinal stenosis and radiculopathy, cervicalgia, dysthymia, gout, GERD,  hiatal hernia, constipation,  "asymmetrical sensorineural hearing loss, dermatitis, and vitamin D deficiency, who presents to the clinic today for follow up on elevated blood pressure. Chasity is present with a Jessica . His blood pressure readings have been elevated lately whenever the home nurse checks it.    His lumbar back pain has improved with the warmer weather, and he is able to ambulate more, both indoors and outdoors. There is no pain with sitting at rest.    The patient is able to sleep through the night, but then wakes in the early morning and is unable to fall back asleep.    He follows with [facility] for lichen simplex chronicus, and last received Kenalog injection in March 2018 which worked well. However, it has now flared again with scaling and thickening over his right shoulder.    REVIEW OF SYSTEMS:   Musculoskeletal: Lumbar back pain, improved.  Neuro: Sleep disorder.  Skin: Scaling and thickening over right shoulder.   All other systems are negative.    PFSH:  He is , on disability. Reviewed as below.     TOBACCO USE:  Social History     Tobacco Use   Smoking Status Never Smoker   Smokeless Tobacco Former User       VITALS:  Vitals:    07/02/19 0903   BP: 110/60   Pulse: 81   Resp: 14   Temp: 97.8  F (36.6  C)   TempSrc: Oral   SpO2: 98%   Weight: 127 lb (57.6 kg)   Height: 5' 0.98\" (1.549 m)     Wt Readings from Last 3 Encounters:   07/02/19 127 lb (57.6 kg)   02/28/19 124 lb 4 oz (56.4 kg)   02/21/19 124 lb 4 oz (56.4 kg)     Body mass index is 24.01 kg/m .    QUALITY MEASURES:  The following are part of a depression follow up plan for the patient:  mental health care assessment, mental health care management and patient follow-up to return when and if necessary      PHYSICAL EXAM:  General: Alert, cooperative, no distress, appears stated age  HEENT: Normocephalic, without obvious abnormality, atraumatic, moist mucous membranes  Eyes: PERRL, conjunctiva/cornea clear, EOM's intact  Lungs: Clear to " auscultation bilaterally, respirations unlabored  Heart: Regular rate and rhythm, S1 and S2 normal, no murmur, rub, or gallop  Back: No tenderness to palpation down the lumbar spine or SI joints  Neurologic:  A & O x 3.  No tremor, no focal findings. Uses cane for assistance with ambulation.  Skin: A 2 x 1 cm area of lichenified, pink scaling plaques noted over his right shoulder    DATA REVIEWED:  Additional History from Old Records Summarized (2): Reviewed 02/28/2019 Allergy consult note regarding evaluation for cough and allergic rhinitis.  Decision to Obtain Records (1): none  Radiology Tests Summarized or Ordered (1): none  Labs Reviewed or Ordered (1): Lipid cascade ordered.  Medicine Test Summarized or Ordered (1): none  Independent Review of EKG or X-RAY(2 each): none      Louisa SAUNDERS, am scribing for and in the presence of, Dr. Fang.    I, Dr. Fang, personally performed the services described in this documentation, as scribed by Louisa Otto in my presence, and it is both accurate and complete.      MEDICATIONS:  Current Outpatient Medications   Medication Sig Dispense Refill     acetaminophen 500 mg coapsule Take 2 tablets by mouth 3 (three) times a day as needed for fever or pain.       allopurinol (ZYLOPRIM) 300 MG tablet TAKE 1 TABLET BY MOUTH DAILY 90 tablet 1     atorvastatin (LIPITOR) 40 MG tablet TAKE 1 TABLET(40 MG) BY MOUTH AT BEDTIME 90 tablet 2     clopidogrel (PLAVIX) 75 mg tablet TAKE 1 TABLET BY MOUTH EVERY DAY 30 tablet 10     diclofenac (VOLTAREN) 75 MG EC tablet TAKE 1 TABLET(75 MG) BY MOUTH TWICE DAILY 60 tablet 0     DULoxetine (CYMBALTA) 60 MG capsule TAKE 1 CAPSULE(60 MG) BY MOUTH DAILY 90 capsule 2     ergocalciferol (ERGOCALCIFEROL) 50,000 unit capsule TAKE 1 CAPSULE BY MOUTH WEEKLY 4 capsule 0     gabapentin (NEURONTIN) 300 MG capsule TAKE 1 CAPSULE(300 MG) BY MOUTH THREE TIMES DAILY 270 capsule 3     hydrOXYzine pamoate (VISTARIL) 50 MG capsule takea at bedtime 30 capsule 10      magnesium oxide (MAG-OX) 400 mg (241.3 mg magnesium) tablet TAKE 1 TABLET(400 MG) BY MOUTH DAILY 30 tablet 3     omeprazole (PRILOSEC) 20 MG capsule TAKE 2 CAPSULES(40 MG) BY MOUTH DAILY 60 capsule 11     VENTOLIN HFA 90 mcg/actuation inhaler INHALE 2 PUFFS BY MOUTH FOUR TIMES DAILY 18 g 3     atorvastatin (LIPITOR) 40 MG tablet Take 1 tablet (40 mg total) by mouth at bedtime. 30 tablet 5     clobetasol (TEMOVATE) 0.05 % ointment Apply to affected area twice daily 30 g 2     diclofenac (VOLTAREN) 75 MG EC tablet TAKE 1 TABLET(75 MG) BY MOUTH TWICE DAILY 60 tablet 0     fluticasone (FLOVENT HFA) 220 mcg/actuation inhaler Inhale 1 puff 2 (two) times a day. 1 Inhaler 1     gabapentin (NEURONTIN) 300 MG capsule TAKE 1 CAPSULE(300 MG) BY MOUTH THREE TIMES DAILY 270 capsule 6     guaiFENesin (ROBITUSSIN) 100 mg/5 mL syrup Take 10 mL (200 mg total) by mouth 3 (three) times a day as needed for cough. 200 mL 0     guaiFENesin (ROBITUSSIN) 100 mg/5 mL syrup Take 10 mL (200 mg total) by mouth 3 (three) times a day as needed for cough. 200 mL 0     lidocaine (XYLOCAINE) 5 % ointment Apply to affected area twice daily 35.44 g 1     melatonin 3 mg Tab tablet TAKE 1 TABLET(3 MG) BY MOUTH AT BEDTIME AS NEEDED 100 tablet 2     mirtazapine (REMERON) 15 MG tablet TAKE 1 TABLET(15 MG) BY MOUTH AT BEDTIME 90 tablet 3     mirtazapine (REMERON) 15 MG tablet TAKE 1 TABLET(15 MG) BY MOUTH AT BEDTIME 90 tablet 2     polyethylene glycol 3350 8.5 gram PwPk Take 17 g by mouth daily. Use packet one per day 30 packet 4     probenecid-colchicine 500-0.5 mg per tablet TAKE 1 TABLET BY MOUTH DAILY AS DIRECTED 30 tablet 0     probenecid-colchicine 500-0.5 mg per tablet TAKE 1 TABLET BY MOUTH DAILY AS DIRECTED 30 tablet 0     ranitidine (ZANTAC) 150 MG tablet Take 1 tablet (150 mg total) by mouth 2 (two) times a day. 180 tablet 3     sucralfate (CARAFATE) 1 gram tablet Take 1 tablet (1 g total) by mouth 4 (four) times a day. 40 tablet 0     No  current facility-administered medications for this visit.        Total Data Points: 3    Please note that this clinical encounter uses voice recognition software, there may be typographical errors present

## 2021-05-30 NOTE — TELEPHONE ENCOUNTER
FYI - Status Update  Who is Calling: Neela  - homecare aid  Update: The patient struggles from chronic high blood pressure and his reading last week was 166/112 and today(6/27/19) it was 151/101 (triaged declined due to not being with the patient at the time of the call).  Neela was wondering if the patient should go on a different blood pressure medication due to high blood pressure and headaches.   Please call the patient back concerning this message.  Okay to leave a detailed message?:  Yes

## 2021-05-30 NOTE — TELEPHONE ENCOUNTER
Patient scheduled for follow up elevated blood pressure 07/02/2019.     AWV scheduled 08/01/19 at 1:00 PM.     Patient has own transportation.

## 2021-05-30 NOTE — TELEPHONE ENCOUNTER
Notified daughter that 4 bottles of Oralyte at patients pharmacy.  They will pick them up and bring to her appointment on 07.31 at 11:45am -done    Harleen Morales  07.25.19

## 2021-05-30 NOTE — TELEPHONE ENCOUNTER
Refill refused. Filled on 7/2/19 #180 R-3. Receipt confirmed by pharmacy @ 0922.     Brigitte Greenfield RN Triage Nurse Advisor [facility]

## 2021-05-30 NOTE — TELEPHONE ENCOUNTER
----- Message from Christie Pena MA sent at 7/24/2019  2:37 PM CDT -----  Regarding: RE: 4-32oz bottles of clear Oralyte rx request  Orders called in to [facility] [address].   ----- Message -----  From: Harleen Morales  Sent: 7/24/2019  10:27 AM  To: David Fang MD, Harleen Morales, #  Subject: 4-32oz bottles of clear Oralyte rx request       Dr. Fang    Double Prep Needed    Please send Rx for 4-32 oz bottles of clear Oralyte and 14 oz bottle of Kaley lax to patients pharmacy.     Colon Prep teaching scheduled on: 07/31 at 11:45am   Colonoscopy scheduled on: 08/08 at 10:45am     Thank you,  Harleen Morales

## 2021-05-30 NOTE — TELEPHONE ENCOUNTER
RN cannot approve Refill Request    RN can NOT refill this medication med is not covered by policy/route to provider. Last office visit: 7/2/2019 David Fang MD Last Physical: Visit date not found Last MTM visit: Visit date not found Last visit same specialty: 7/2/2019 David Fang MD.  Next visit within 3 mo: Visit date not found  Next physical within 3 mo: Visit date not found      Olive Gifford, [facility] Triage/Med Refill 7/11/2019    Requested Prescriptions   Pending Prescriptions Disp Refills     magnesium oxide (MAG-OX) 400 mg (241.3 mg magnesium) tablet [Pharmacy Med Name: MAG-OXIDE 400MG TABLETS] 30 tablet 0     Sig: TAKE 1 TABLET(400 MG) BY MOUTH DAILY       There is no refill protocol information for this order

## 2021-05-31 VITALS — BODY MASS INDEX: 23.34 KG/M2 | WEIGHT: 123.5 LBS

## 2021-05-31 VITALS — WEIGHT: 122.56 LBS | BODY MASS INDEX: 23.16 KG/M2

## 2021-05-31 VITALS — WEIGHT: 119.25 LBS | BODY MASS INDEX: 22.53 KG/M2

## 2021-05-31 VITALS — WEIGHT: 124.44 LBS | BODY MASS INDEX: 23.51 KG/M2

## 2021-05-31 NOTE — TELEPHONE ENCOUNTER
Refill Approved    Rx renewed per Medication Renewal Policy. Medication was last renewed on 8.1.19.    Nallely Lynch, [facility] Triage/Med Refill 8/13/2019     Requested Prescriptions   Pending Prescriptions Disp Refills     atorvastatin (LIPITOR) 40 MG tablet [Pharmacy Med Name: ATORVASTATIN 40MG TABLETS] 90 tablet 0     Sig: TAKE 1 TABLET(40 MG) BY MOUTH AT BEDTIME       Statins Refill Protocol (Hmg CoA Reductase Inhibitors) Passed - 8/12/2019  8:54 AM        Passed - PCP or prescribing provider visit in past 12 months      Last office visit with prescriber/PCP: 7/2/2019 David Fang MD OR same dept: 7/2/2019 David Fang MD OR same specialty: 7/2/2019 David Fang MD  Last physical: 8/1/2019 Last MTM visit: Visit date not found   Next visit within 3 mo: Visit date not found  Next physical within 3 mo: Visit date not found  Prescriber OR PCP: David Fang MD  Last diagnosis associated with med order: 1. Hyperlipidemia  - atorvastatin (LIPITOR) 40 MG tablet [Pharmacy Med Name: ATORVASTATIN 40MG TABLETS]; TAKE 1 TABLET(40 MG) BY MOUTH AT BEDTIME  Dispense: 90 tablet; Refill: 0    If protocol passes may refill for 12 months if within 3 months of last provider visit (or a total of 15 months).

## 2021-05-31 NOTE — PROGRESS NOTES
Assessment and Plan:     1. Immunization due Tdap given Td, Preservative Free (green label)   2. Depressed currently no symptoms noted.  PHQ 9 reviewed with patient today. mirtazapine (REMERON) 15 MG tablet   3. Lumbar Canal Stenosis minimal discomfort he is taking duloxetine.  He is taking gabapentin and ambulate without pain    4. Pure hypercholesterolemia cholesterol results discussed no dose change in atorvastatin    5. Radiculopathy, lumbar region     6. Medicare annual wellness visit, subsequent anticipatory guidance discussed follow-up in 5 months.          The patient's current medical problems were reviewed.    The following are part of a depression follow up plan for the patient:  mental health care assessment and mental health care management  The following health maintenance schedule was reviewed with the patient and provided in printed form in the after visit summary:   Health Maintenance   Topic Date Due     MEDICARE ANNUAL WELLNESS VISIT  01/01/1970     ZOSTER VACCINES (1 of 2) 09/29/2008     ADVANCE CARE PLANNING  12/13/2015     TD 18+ HE  05/29/2019     INFLUENZA VACCINE RULE BASED (1) 08/01/2019     COLONOSCOPY  08/25/2019     DEPRESSION FOLLOW UP  01/02/2020     FALL RISK ASSESSMENT  07/02/2020     HEPATITIS C SCREENING  Completed     PNEUMOCOCCAL POLYSACCHARIDE VACCINE AGE 65 AND OVER  Completed     PNEUMOCOCCAL CONJUGATE VACCINE FOR ADULTS (PCV13 OR PREVNAR)  Completed        Subjective:   Chief Complaint: Chasity Gaspar is an 69 y.o. male with hyperlipidemia, history of CVA with left hemiparesis, memory loss, lumbar spinal stenosis and radiculopathy, cervicalgia, dysthymia, gout, GERD,  hiatal hernia, GERD, gastritis, constipation, asymmetrical sensorineural hearing loss, dermatitis, and vitamin D deficiency, here for an Annual Wellness visit.     HPI:  Chasity states to be doing well overall. His breathing has been well-controlled. He has been sleeping well. His appetite has been good. His glasses are  two years old. Eating spicy food causes burning abdominal pain as well as pain with urination and bowel movements. His mood has been good. His back pain today is well-controlled.     His last lipids on 07/02/2019 were significant for elevated LDL and triglycerides.     No visual changes, hearing loss, sinus symptoms, dysphagia, sore throat, cough, shortness of breath, wheezing, chest pain/pressure/discomfort/heaviness, reflux, nausea, vomiting, other abdominal pain, constipation, diarrhea, blood in stools or urine, dysuria, other urinary issues, back/neck pain, muscle/joint pain, numbness, weakness, headaches, dizziness, lightheadedness, rash, skin lesions, or other skin changes.     Review of Systems:  Back pain controlled. Burning abdominal pain, dysuria, and painful bowel movements with eating spicy food. Please see above.  The rest of the review of systems are negative for all systems.    Patient Care Team:  David Fang MD as PCP - General  Savanna Landaverde RN as Lead Care Coordinator (Primary Care - CC)     Patient Active Problem List   Diagnosis     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     Abdominal Pain In The Right Lower Belly (RLQ)     Abdominal Pain In The Right Upper Belly (RUQ)     Memory Lapses Or Loss     Hyperlipidemia     Hearing Loss     Lacunar Stroke     Spinal Stenosis     Radiculopathy, lumbar region     Cervicalgia     Dysthymia (Depressive Neurosis), Primary     Essential Hypertriglyceridemia     Hiatal Hernia     Lumbar Canal Stenosis     Lower Back Pain     Vitamin D Deficiency     Moderate Recurrent Major Depression     Esophageal Reflux     Gastritis     Constipation     Gout     Dermatitis     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)     Acute respiratory failure with hypoxia (H)     Left hemiparesis (H)     ASNHL (asymmetrical sensorineural hearing loss)     Past Medical History:   Diagnosis Date     Cervicalgia      Depression      Dyslipidemia       Dysthymia      Gastritis      GERD (gastroesophageal reflux disease)      Hearing loss      Hiatal hernia      Hypertriglyceridemia      Insomnia      Lacunar stroke (H)      Lumbar canal stenosis      Lumbar radiculopathy      Myalgia and myositis       Past Surgical History:   Procedure Laterality Date     [facility]  4/28/2017          TN APPENDECTOMY      Description: Appendectomy;  Recorded: 07/12/2013;     TN ARTHRODESIS ANT INTERBODY MIN DISCECTOMY,LUMBAR      Description: Lumbar Vertebral Fusion;  Recorded: 07/16/2013;  Comments: 3/17/11 spinal decompression and fusion L4-5, L5-S1 for spinal stenosis, DDD, spondylolysis; Dr. Casillas [facility]     TN ESOPHAGOGASTRODUODENOSCOPY TRANSORAL DIAGNOSTIC      Description: Esophagogastroduodenoscopy;  Proc Date: 04/02/2012;  Comments: biosies:   reactive gastropathy with chronic superimposed nonspecific chronic inflammation (likely secondary to ASA, NSAIDS, EtOH or other irritants), NEG for h. pylori; small hiatal hernia     TN INJECT ANES/STEROID FORAMEN LUMBAR/SACRAL W IMG GUIDE ,1 LEVEL      Description: Nerve Block Transforaminal Epidural Lumbar L3 - L4;  Recorded: 07/10/2012;  Comments: 7/2/2012 for severe low back pain, spinal stenosis and radiculopathy     THROMBECTOMY  04/24/2017    Rt CELIA      No family history on file.   Social History     Socioeconomic History     Marital status:      Spouse name: Not on file     Number of children: Not on file     Years of education: Not on file     Highest education level: Not on file   Occupational History     Not on file   Social Needs     Financial resource strain: Not on file     Food insecurity:     Worry: Not on file     Inability: Not on file     Transportation needs:     Medical: Not on file     Non-medical: Not on file   Tobacco Use     Smoking status: Never Smoker     Smokeless tobacco: Former User   Substance and Sexual Activity     Alcohol use: No     Drug use: No     Sexual activity: Not on file    Lifestyle     Physical activity:     Days per week: Not on file     Minutes per session: Not on file     Stress: Not on file   Relationships     Social connections:     Talks on phone: Not on file     Gets together: Not on file     Attends Mosque service: Not on file     Active member of club or organization: Not on file     Attends meetings of clubs or organizations: Not on file     Relationship status: Not on file     Intimate partner violence:     Fear of current or ex partner: Not on file     Emotionally abused: Not on file     Physically abused: Not on file     Forced sexual activity: Not on file   Other Topics Concern     Not on file   Social History Narrative     Not on file      Current Outpatient Medications   Medication Sig Dispense Refill     allopurinol (ZYLOPRIM) 300 MG tablet TAKE 1 TABLET BY MOUTH DAILY 90 tablet 1     atorvastatin (LIPITOR) 40 MG tablet TAKE 1 TABLET(40 MG) BY MOUTH AT BEDTIME 90 tablet 2     clobetasol (TEMOVATE) 0.05 % ointment Apply to affected area twice daily 30 g 2     clopidogrel (PLAVIX) 75 mg tablet TAKE 1 TABLET BY MOUTH EVERY DAY 30 tablet 10     diclofenac (VOLTAREN) 75 MG EC tablet TAKE 1 TABLET(75 MG) BY MOUTH TWICE DAILY 60 tablet 0     DULoxetine (CYMBALTA) 60 MG capsule TAKE 1 CAPSULE(60 MG) BY MOUTH DAILY 90 capsule 2     ergocalciferol (ERGOCALCIFEROL) 50,000 unit capsule TAKE 1 CAPSULE BY MOUTH WEEKLY 4 capsule 0     gabapentin (NEURONTIN) 300 MG capsule TAKE 1 CAPSULE(300 MG) BY MOUTH THREE TIMES DAILY 270 capsule 3     hydrOXYzine pamoate (VISTARIL) 50 MG capsule takea at bedtime 30 capsule 10     mirtazapine (REMERON) 15 MG tablet Take one tablet   At niight 90 tablet 4     omeprazole (PRILOSEC) 20 MG capsule TAKE 2 CAPSULES(40 MG) BY MOUTH DAILY 60 capsule 11     ranitidine (ZANTAC) 150 MG tablet Take 1 tablet (150 mg total) by mouth 2 (two) times a day. 180 tablet 3     acetaminophen 500 mg coapsule Take 2 tablets by mouth 3 (three) times a day as needed  "for fever or pain.       fluticasone (FLOVENT HFA) 220 mcg/actuation inhaler Inhale 1 puff 2 (two) times a day. 1 Inhaler 1     guaiFENesin (ROBITUSSIN) 100 mg/5 mL syrup Take 10 mL (200 mg total) by mouth 3 (three) times a day as needed for cough. 200 mL 0     lidocaine (XYLOCAINE) 5 % ointment Apply to affected area twice daily 35.44 g 1     magnesium oxide (MAG-OX) 400 mg (241.3 mg magnesium) tablet TAKE 1 TABLET(400 MG) BY MOUTH DAILY 30 tablet 3     melatonin 3 mg Tab tablet TAKE 1 TABLET(3 MG) BY MOUTH AT BEDTIME AS NEEDED 100 tablet 2     polyethylene glycol 3350 8.5 gram PwPk Take 17 g by mouth daily. Use packet one per day 30 packet 4     probenecid-colchicine 500-0.5 mg per tablet TAKE 1 TABLET BY MOUTH DAILY AS DIRECTED 30 tablet 0     sucralfate (CARAFATE) 1 gram tablet Take 1 tablet (1 g total) by mouth 4 (four) times a day. 40 tablet 0     VENTOLIN HFA 90 mcg/actuation inhaler INHALE 2 PUFFS BY MOUTH FOUR TIMES DAILY 18 g 3     No current facility-administered medications for this visit.       Objective:   Vital Signs:   Visit Vitals  /82   Pulse 92   Temp 97.4  F (36.3  C) (Oral)   Resp 20   Ht 4' 11\" (1.499 m)   Wt 124 lb 8 oz (56.5 kg)   SpO2 97%   BMI 25.15 kg/m         VisionScreening:  No exam data present     PHYSICAL EXAM  General: Alert, cooperative, no distress, appears stated age.  Head: Normocephalic, without obvious abnormality, atraumatic.  Eyes: PERRL, conjunctiva/cornea clear, EOM's intact, fundi benign, both eyes.  Ears: Normal TM's and external ear canals, both ears.  Nose: Nares normal, septum midline, mucosa normal, no drainage or sinus tenderness.  Throat: Lips, mucosa, and tongue normal; nine teeth are missing; teeth and gums otherwise normal  Back: Symmetric, no curvature, ROM normal, no CVA tenderness, negative sitting straight leg raises  Lungs: Clear to auscultation bilaterally, respirations unlabored.  Chest wall: No tenderness or deformity.  Heart: Regular rate and " rhythm, S1 and S2 normal, no murmur, rub, or gallop.  Abdomen: Soft, non tender, bowel sounds active all four quadrants, no masses, no organomegaly.  : No penile lesions or discharge, no testicular masses or tenderness, no hernias.  Neurologic:  A & O x 3.  No tremor, no focal findings.   DTRs are normal and symmetric both proximally and distally BUE and BLE.  Strength testing is normal and symetric both proximally and distally BUE and BLE.  Uses cane for assistance with ambulation.     Assessment Results 8/1/2019   Activities of Daily Living No help needed   Instrumental Activities of Daily Living 2-4 - Moderate impairment   Mini Cog Total Score 1   Some recent data might be hidden     A Mini-Cog score of 0-2 suggests the possibility of dementia, score of 3-5 suggests no dementia    Identified Health Risks:

## 2021-05-31 NOTE — PROGRESS NOTES
Patient received instruction in double Miralax coloscopy prep instruction.  He and daughter had questions answered and indicated understanding.  See telephone encounter to pharmacy today.

## 2021-05-31 NOTE — PATIENT INSTRUCTIONS - HE
Patient Education   Personalized Prevention Plan  You are due for the preventive services outlined below.  Your care team is available to assist you in scheduling these services.  If you have already completed any of these items, please share that information with your care team to update in your medical record.  Health Maintenance   Topic Date Due     MEDICARE ANNUAL WELLNESS VISIT  01/01/1970     ZOSTER VACCINES (1 of 2) 09/29/2008     ADVANCE CARE PLANNING  12/13/2015     TD 18+ HE  05/29/2019     INFLUENZA VACCINE RULE BASED (1) 08/01/2019     COLONOSCOPY  08/25/2019     DEPRESSION FOLLOW UP  01/02/2020     FALL RISK ASSESSMENT  07/02/2020     HEPATITIS C SCREENING  Completed     PNEUMOCOCCAL POLYSACCHARIDE VACCINE AGE 65 AND OVER  Completed     PNEUMOCOCCAL CONJUGATE VACCINE FOR ADULTS (PCV13 OR PREVNAR)  Completed

## 2021-05-31 NOTE — TELEPHONE ENCOUNTER
RN cannot approve Refill Request    RN can NOT refill this medication med is not covered by policy/route to provider. Last office visit: 7/2/2019 David Fang MD Last Physical: 8/1/2019 Last MTM visit: Visit date not found Last visit same specialty: 7/2/2019 David Fang MD.  Next visit within 3 mo: Visit date not found  Next physical within 3 mo: Visit date not found      Brigitte Greenfield, [facility] Triage/Med Refill 8/12/2019    Requested Prescriptions   Pending Prescriptions Disp Refills     diclofenac (VOLTAREN) 75 MG EC tablet [Pharmacy Med Name: DICLOFENAC SODIUM 75MG DR TABLETS] 60 tablet 0     Sig: TAKE 1 TABLET(75 MG) BY MOUTH TWICE DAILY       There is no refill protocol information for this order

## 2021-05-31 NOTE — TELEPHONE ENCOUNTER
Called Pharmacy re: Oralyte  Patient received one bottle instead of the four needed for double miralax prep.  Also, the Oralyte is pink.    Instructed patient to return to pharmacy tomorrow  8/1 with unopened bottle.

## 2021-06-01 VITALS — BODY MASS INDEX: 23.8 KG/M2 | WEIGHT: 126.06 LBS | HEIGHT: 61 IN

## 2021-06-01 VITALS — WEIGHT: 121.5 LBS | HEIGHT: 61 IN | BODY MASS INDEX: 22.94 KG/M2

## 2021-06-01 VITALS — BODY MASS INDEX: 24.45 KG/M2 | HEIGHT: 61 IN | WEIGHT: 129.5 LBS

## 2021-06-01 VITALS — WEIGHT: 127.25 LBS | BODY MASS INDEX: 24.02 KG/M2 | HEIGHT: 61 IN

## 2021-06-01 VITALS — WEIGHT: 125 LBS | HEIGHT: 61 IN | BODY MASS INDEX: 23.6 KG/M2

## 2021-06-01 NOTE — TELEPHONE ENCOUNTER
RN cannot approve Refill Request    RN can NOT refill this medication med is not covered by policy/route to provider. Last office visit: Visit date not found Last Physical: Visit date not found Last MTM visit: Visit date not found Last visit same specialty: 7/2/2019 David Fang MD.  Next visit within 3 mo: Visit date not found  Next physical within 3 mo: Visit date not found      Jessica Reinoso, [facility] Triage/Med Refill 9/12/2019    Requested Prescriptions   Pending Prescriptions Disp Refills     diclofenac (VOLTAREN) 75 MG EC tablet [Pharmacy Med Name: DICLOFENAC SODIUM 75MG DR TABLETS] 60 tablet 0     Sig: TAKE 1 TABLET(75 MG) BY MOUTH TWICE DAILY       There is no refill protocol information for this order

## 2021-06-01 NOTE — PROGRESS NOTES
Hearing Aid Check    Jessicaw Chasity returns today for a hearing aid check.  He reports his right Unitron silicone skeleton earmold has broken.     Otoscopy:  clear canals in both ears  Visual inspection:  Earmold is broken in upper helix portion of earmold. Tubing is hard.   Action:  A new impression of the right ear is taken without incident. A new Unitron silicone earmold is ordered for patient.     He will follow up with an earmold fitting on 10/8/19 with Sylvia Mcadams.      Dante Marte, CCC-A  [place] Licensed Audiologist #[id number]

## 2021-06-01 NOTE — TELEPHONE ENCOUNTER
Refill Approved    Rx renewed per Medication Renewal Policy. Medication was last renewed on 9/26/18.    Linnea Arauz, [facility] Triage/Med Refill 9/12/2019     Requested Prescriptions   Pending Prescriptions Disp Refills     clopidogrel (PLAVIX) 75 mg tablet [Pharmacy Med Name: CLOPIDOGREL 75MG TABLETS] 30 tablet 0     Sig: TAKE 1 TABLET BY MOUTH EVERY DAY       Clopidogrel/Prasugrel/Ticagrelor Refill Protocol Passed - 9/12/2019  3:51 AM        Passed - PCP or prescribing provider visit in past 6 months       Last office visit with prescriber/PCP: 7/2/2019 OR same dept: 7/2/2019 David Fang MD OR same specialty: 7/2/2019 David Fang MD Last physical: 8/1/2019 Last MTM visit: Visit date not found     Next appt within 3 mo: Visit date not found  Next physical within 3 mo: Visit date not found  Prescriber OR PCP: David Fang MD  Last diagnosis associated with med order: 1. 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              Passed - Hemoglobin in past 12 months     Hemoglobin   Date Value Ref Range Status   02/21/2019 14.7 14.0 - 18.0 g/dL Final

## 2021-06-01 NOTE — TELEPHONE ENCOUNTER
RN cannot approve Refill Request    RN can NOT refill this medication med is not covered by policy/route to provider     . Last office visit: 7/2/2019 David Fang MD Last Physical: 8/1/2019 Last MTM visit: Visit date not found Last visit same specialty: 7/2/2019 David Fang MD.  Next visit within 3 mo: Visit date not found  Next physical within 3 mo: Visit date not found      Linnea Arauz, [facility] Triage/Med Refill 9/18/2019    Requested Prescriptions   Pending Prescriptions Disp Refills     magnesium oxide (MAG-OX) 400 mg (241.3 mg magnesium) tablet [Pharmacy Med Name: MAG-OXIDE 400MG TABLETS] 100 tablet 3     Sig: TAKE 1 TABLET(400 MG) BY MOUTH DAILY       There is no refill protocol information for this order

## 2021-06-02 ENCOUNTER — RECORDS - HEALTHEAST (OUTPATIENT)
Dept: ADMINISTRATIVE | Facility: CLINIC | Age: 71
End: 2021-06-02

## 2021-06-02 VITALS — HEIGHT: 61 IN | BODY MASS INDEX: 23.47 KG/M2 | WEIGHT: 124.31 LBS

## 2021-06-02 VITALS — HEIGHT: 61 IN | WEIGHT: 125.7 LBS | BODY MASS INDEX: 23.73 KG/M2

## 2021-06-02 VITALS — WEIGHT: 124.25 LBS | BODY MASS INDEX: 23.46 KG/M2 | HEIGHT: 61 IN

## 2021-06-02 VITALS — WEIGHT: 126.2 LBS | HEIGHT: 61 IN | BODY MASS INDEX: 23.83 KG/M2

## 2021-06-02 VITALS — WEIGHT: 124.25 LBS | HEIGHT: 61 IN | BODY MASS INDEX: 23.46 KG/M2

## 2021-06-02 NOTE — TELEPHONE ENCOUNTER
Received MTM referral from [facility]     Patient was not reachable after several attempts, will route to MTM Pharmacist/Provider as an FYI. Left MTM scheduling information on patients voicemail.    Thank you for the referral,    See Reynal[facility] Coordinator

## 2021-06-02 NOTE — TELEPHONE ENCOUNTER
RN cannot approve Refill Request    RN can NOT refill this medication med is not covered by policy/route to provider     . Last office visit: 7/2/2019 David Fang MD Last Physical: 8/1/2019 Last MTM visit: Visit date not found Last visit same specialty: 7/2/2019 David Fang MD.  Next visit within 3 mo: Visit date not found  Next physical within 3 mo: Visit date not found      Linnea Arauz, [facility] Triage/Med Refill 10/15/2019    Requested Prescriptions   Pending Prescriptions Disp Refills     diclofenac (VOLTAREN) 75 MG EC tablet [Pharmacy Med Name: DICLOFENAC SODIUM 75MG DR TABLETS] 60 tablet 0     Sig: TAKE 1 TABLET(75 MG) BY MOUTH TWICE DAILY       There is no refill protocol information for this order

## 2021-06-02 NOTE — PROGRESS NOTES
Hearing Aid Check    Chasity Gaspar returns today for a hearing aid check. He's accompanied by a Jessica speaking . He's here today to receive his new right earmold. The earmold is connected to a new tone hook and secured to his device. He reports good comfort and sound clarity.     Batteries: 3 packs of size 675 batteries are dispensed    He reports subjective improvement with today s changes. He will follow up in 3 months or as needed.    Ward Briceño, [facility], [place]-A  [place] Licensed Audiologist #[id number]

## 2021-06-02 NOTE — PROGRESS NOTES
"TCM DISCHARGE FOLLOW UP CALL    Discharge Date:  10/25/2019  Reason for hospital stay (discharge diagnosis)::  Right elbow swelling and pain  Are you feeling better, the same or worse since your discharge?:  Patient is feeling better (Elbow pain improved.  Denies cough.  States father \"has a little fever, no thermometer, I feel it.\")  Do you feel like you have a plan in the event of a health emergency?: Yes (Daughter, ER)    As part of your discharge plan, were  home care services ordered for you?: No (Daughter reports father has a nurse who comes out once a week to set up medications; in place prior to hospitalization)    Did you receive any new medications, or was there a change to your medications?: Yes    Are you taking those medications, or do you have any established regiment?:  Daughter states she picked up new meds, and makes sure her father takes his medication.  States a nurse comes out once a week to set up her father's medications, the nurse was out yesterday and updated med box.  Do you have any follow up visits scheduled with your PCP or Specialist?:  Yes, with PCP  (RN) Is PCP appt scheduled soon enough (within 14 days of discharge date)?: Yes (Dr. Fang 11/5/19)    RN NOTES::  Advised daughter (who confirmed she will be attending f/u appt w/father 11/5/19) to have her father update the consent to communicate form to include daughter, so we won't need verbal permission to speak w/her in the future.  Recommended buying a thermometer.  Daughter verbalized understanding & agrees w/plan.      Annetta Collado RN Care Manager, Population Health    "

## 2021-06-02 NOTE — TELEPHONE ENCOUNTER
RN cannot approve Refill Request    RN can NOT refill this medication med is not covered by policy/route to provider. Last office visit: 10/11/2016 Wade Hinson MD Last Physical: Visit date not found Last MTM visit: Visit date not found Last visit same specialty: 7/2/2019 David Fang MD.  Next visit within 3 mo: Visit date not found  Next physical within 3 mo: Visit date not found      Annetta Hutton, [facility] Triage/Med Refill 11/2/2019    Requested Prescriptions   Pending Prescriptions Disp Refills     ergocalciferol (ERGOCALCIFEROL) 50,000 unit capsule [Pharmacy Med Name: VITAMIN D2 50,000IU (ERGO) CAP RX] 4 capsule 0     Sig: TAKE 1 CAPSULE BY MOUTH WEEKLY       There is no refill protocol information for this order

## 2021-06-02 NOTE — PROGRESS NOTES
[facility] Care Coordination Contact    CC received notification of Hospital admission.  Hospital admission occurred on 10/22/19 at [facility] with Dx of Pneumonia, Sepsis    CC contacted Hospital /discharge planner, Bessie.  Bessie reported that Chasity would not be discharging today; shared POC information.  Bessie will contact writer when Chasity is being discharged.    CC reached out to adult da RYAN to return call. regarding transition.  Reviewed and updated care plan as needed.    Transition log initiated.   PCP notified of hospitalization via EMR.    TRANSITIONS OF CARE (MINO) LOG   MINO tasks should be completed by the CC within one (1) business day of notification of each transition. Follow up contact with member is required after return to their usual care setting.  Note:  If CC finds out about the transitions fifteen (15) days or more after the member has returned to their usual care setting, no MINO log is needed. However, the CC should check in with the member to discuss the transition process, any changes needed to the care plan and document it in a case note.    Member Name:  Chasity Gaspar [facility] [id number]   Product: [employer]+ Care Coordinator Contact:  Savanna Landaverde RN Agency/[facility]/Care System: [facility]   Transition Communication Actions from Care Management Contact   Transition #1   Notification Date: 10/23/19 Transition Date:   10/22/19 Transition From: Home     Is this the member s usual care setting?               yes Transition To: [facility]   Transition Type:  Unplanned  Reason for Admission/Comments:  Chasity Gaspar was admitted from ED with pneumonia and Sepsis.  Call placed to [facility] to share POC.   Left message with [facility] care management with services Chasity Gaspar is receiving at home.  Asked for CB.  Bessie returned call; shared information; member will not be discharging today.  She will contact writer with updates.      Shared CC contact info, care plan/services with receiving setting--Date completed: 10/23/19    Notified PCP of transition--Date completed:  10/23/19     via  EMR   Transition #2   Transition #3  (if applicable)   Notification Date: 10/28/19        Transition To:  Home  Transition Date: 10/25/19     Transition Type:  Planned   Notified PCP -- Date completed: 10/28/19              Shared CC contact info, care plan/services with receiving setting or, if applicable, home care agency--Date completed:  N/A    *Complete additional tasks below, if this transition is a return to usual care setting.      Comments:   Bessie agreed to contact writer with discharge information.          *Complete tasks below when the member is discharging TO their usual care setting within one (1) business day of notification.  For situations where the Care Coordinator is notified of the discharge prior to the date of discharge, the Care Coordinator must follow up with the member or designated representative to confirm that discharge actually occurred and discuss required MINO tasks as outlined in the MINO Instructions.  (This includes situations where it may be a  new  usual care setting for the member. (i.e., a community member who decides upon permanent nursing home placement following hospitalization and rehab).    Date completed: 10/28/19  Communicated with member or their designated representative about the following:  care transition process; about changes to the member s health status; plan of care updates; education about transitions and how to prevent unplanned transitions/readmissions  Four Pillars for Optimal Transition:    Check  Yes  - if the member, family member and/or SNF/facility staff manages the following:    If  No  provide explanation in the comments section.          [x]  Yes     []  No     Does the member have a follow-up appointment scheduled with primary care or specialist? (Mental health hospitalizations--the appt.  should be w/in 7 days)   [x]  Yes     []  No     Can the member manage their medications or is there a system in place to manage medications (e.g. home care set-up)?         [x]  Yes     []  No     Can the member verbalize warning signs and symptoms to watch for and how to respond?         [x]  Yes     []  No     Does the member use a Personal Health Care Record?  Check  Yes  if visit summary, discharge summary, and/or healthcare summary are being used as a PHR.                                                                                                                                                                                    [x] Yes      [] No      Have you updated the member s care plan?  If  No  provide explanation in comments.   Comments:  He currently has SNV s and complains of burning pain with urination.  Daughter was advised to seek medical treatment if doesn t improve.

## 2021-06-02 NOTE — TELEPHONE ENCOUNTER
New Appointment Needed  What is the reason for the visit:    Inpatient/ED Follow Up Appt Request  At what hospital or facility were you seen?: [facility]  What is the reason you were seen?: swollen elbow  What date were you admitted?: date: 10/23/19  What date were you discharged?: date: 10/25/19  What was the recommended timeframe for your follow up appointment?: 7 days  Provider Preference: Any available  How soon do you need to be seen?: next week  Waitlist offered?: No  Okay to leave a detailed message:  Yes

## 2021-06-02 NOTE — PROGRESS NOTES
[facility] Care Coordination Contact      [facility] Six-Month Telephone Assessment    6 month telephone assessment completed on 10/28/19.    ER visits: Yes -  [facility]  Hospitalizations: Yes -  [facility]  TCU stays: No  Significant health status changes: No  Falls/Injuries: No  ADL/IADL changes: No  Changes in services: No    Caregiver Assessment follow up:  Na    Goals: See POC in chart for goal progress documentation.      Will see member in 6 months for an annual health risk assessment.   Encouraged member to call CC with any questions or concerns in the meantime.     Savanna Landaverde RN  [facility]  [phone]

## 2021-06-03 ENCOUNTER — RECORDS - HEALTHEAST (OUTPATIENT)
Dept: GENERAL RADIOLOGY | Facility: CLINIC | Age: 71
End: 2021-06-03

## 2021-06-03 ENCOUNTER — OFFICE VISIT - HEALTHEAST (OUTPATIENT)
Dept: FAMILY MEDICINE | Facility: CLINIC | Age: 71
End: 2021-06-03

## 2021-06-03 VITALS
TEMPERATURE: 97.5 F | SYSTOLIC BLOOD PRESSURE: 110 MMHG | HEIGHT: 61 IN | OXYGEN SATURATION: 98 % | WEIGHT: 126.19 LBS | DIASTOLIC BLOOD PRESSURE: 74 MMHG | BODY MASS INDEX: 23.83 KG/M2 | HEART RATE: 78 BPM | RESPIRATION RATE: 18 BRPM

## 2021-06-03 VITALS — WEIGHT: 124.5 LBS | BODY MASS INDEX: 24.44 KG/M2 | HEIGHT: 60 IN

## 2021-06-03 VITALS
WEIGHT: 127 LBS | TEMPERATURE: 97.4 F | DIASTOLIC BLOOD PRESSURE: 86 MMHG | BODY MASS INDEX: 23.98 KG/M2 | HEIGHT: 61 IN | SYSTOLIC BLOOD PRESSURE: 124 MMHG | RESPIRATION RATE: 20 BRPM | OXYGEN SATURATION: 99 % | HEART RATE: 76 BPM

## 2021-06-03 VITALS — BODY MASS INDEX: 23.98 KG/M2 | HEIGHT: 61 IN | WEIGHT: 127 LBS

## 2021-06-03 DIAGNOSIS — F33.1 MAJOR DEPRESSIVE DISORDER, RECURRENT EPISODE, MODERATE (H): ICD-10-CM

## 2021-06-03 DIAGNOSIS — E78.1 PURE HYPERGLYCERIDEMIA: ICD-10-CM

## 2021-06-03 DIAGNOSIS — M25.531 RIGHT WRIST PAIN: ICD-10-CM

## 2021-06-03 DIAGNOSIS — M48.061 SPINAL STENOSIS, LUMBAR REGION, WITHOUT NEUROGENIC CLAUDICATION: ICD-10-CM

## 2021-06-03 DIAGNOSIS — M25.531 PAIN IN RIGHT WRIST: ICD-10-CM

## 2021-06-03 ASSESSMENT — PATIENT HEALTH QUESTIONNAIRE - PHQ9: SUM OF ALL RESPONSES TO PHQ QUESTIONS 1-9: 0

## 2021-06-03 ASSESSMENT — MIFFLIN-ST. JEOR: SCORE: 1146.9

## 2021-06-03 NOTE — TELEPHONE ENCOUNTER
RN cannot approve Refill Request    RN can NOT refill this medication med is not covered by policy/route to provider. Last office visit: Visit date not found Last Physical: Visit date not found Last MTM visit: Visit date not found Last visit same specialty: 11/8/2019 David Fang MD.  Next visit within 3 mo: Visit date not found  Next physical within 3 mo: Visit date not found      Annetta Hutton, [facility] Connection Triage/Med Refill 12/2/2019    Requested Prescriptions   Pending Prescriptions Disp Refills     ergocalciferol (ERGOCALCIFEROL) 50,000 unit capsule [Pharmacy Med Name: VITAMIN D2 50,000IU (ERGO) CAP RX] 4 capsule 0     Sig: TAKE 1 CAPSULE BY MOUTH WEEKLY       There is no refill protocol information for this order

## 2021-06-03 NOTE — PROGRESS NOTES
ASSESSMENT AND PLAN:      1. Idiopathic gout, unspecified chronicity, unspecified site no joint pain noted.  No gouty arthritis noted.  Take allopurinol.    2. Moderate Recurrent Major Depression according to the family is doing well he is active.    3. Primary insomnia no sleep disturbance noted at this time taking mirtazapine.    4. Lumbar Canal Stenosis back pains well controlled on gabapentin.  Follow-up in 3 months.              No orders of the defined types were placed in this encounter.    Medications Discontinued During This Encounter   Medication Reason     clopidogrel (PLAVIX) 75 mg tablet Duplicate order     diclofenac (VOLTAREN) 75 MG EC tablet Duplicate order       No follow-ups on file.    CHIEF COMPLAINT:  Chief Complaint   Patient presents with     Follow Up     Neuralgia and HTN       HISTORY OF PRESENT ILLNESS:  Chasity is a 69 y.o. male with newly diagnosed hypertension, hyperlipidemia, history of CVA with left hemiparesis, memory loss, lumbar spinal stenosis and radiculopathy, cervicalgia, dysthymia, gout, GERD,  hiatal hernia, GERD, gastritis, constipation, asymmetrical sensorineural hearing loss, dermatitis, and vitamin D deficiency, who presents to the clinic today for follow up on hypertension and neuralgia. Chasity is present with a Jessica . I last saw him about a month ago for follow up after hospitalization for pneumonia and gout as well as new diagnosis of hypertension. He reports to be doing well without fever, chills, cough, or other breathing difficulty. His back and joint pain have been well-controlled. His mood has been good overall. His PHQ-9 score today is 0. There has been no chest pain. The patient is walking regularly.     REVIEW OF SYSTEMS:   General: No fever or chills.  Respiratory: No cough or difficulty breathing.  CV: No chest pain.   Musculoskeletal: Improved back and joint pain.   Psychiatric: No depression.  All other 10 point review of systems are  "negative.    PFSH:  He walks regularly. Reviewed as below.     TOBACCO USE:  Social History     Tobacco Use   Smoking Status Never Smoker   Smokeless Tobacco Former User       VITALS:  Vitals:    12/03/19 1059   BP: 124/86   Pulse: 76   Resp: 20   Temp: 97.4  F (36.3  C)   TempSrc: Oral   SpO2: 99%   Weight: 127 lb (57.6 kg)   Height: 5' 1\" (1.549 m)     Wt Readings from Last 3 Encounters:   12/03/19 127 lb (57.6 kg)   11/08/19 126 lb 3 oz (57.2 kg)   11/07/19 100 lb (45.4 kg)     Body mass index is 24 kg/m .    QUALITY MEASURES:  The following are part of a depression follow up plan for the patient:  mental health care assessment and mental health care management      PHYSICAL EXAM:  General: Alert, cooperative, no distress, appears stated age  Head: Normocephalic, without obvious abnormality, atraumatic. Moist mucous membranes  Eyes: PERRL, conjunctiva/cornea clear, EOM's intact  Neck: Supple, no cervical lymph node enlargement   Back: Symmetric, no curvature, nontender to palpation down lumbar spine  Lungs: Clear to auscultation bilaterally, respirations unlabored  Heart: Regular rate and rhythm, S1 and S2 normal, no murmur, rub, or gallop  Neurologic:  A & O x 3.  No tremor, no focal findings.   Normal gait.    Psychiatric: Normal affect, good eye contact, well-groomed  Skin: No rash or suspicious lesions noted on exposed skin, non-diaphoretic    DATA REVIEWED:  Additional History from Old Records Summarized (2): none  Decision to Obtain Records (1): none  Radiology Tests Summarized or Ordered (1): none  Labs Reviewed or Ordered (1): none  Medicine Test Summarized or Ordered (1): none  Independent Review of EKG or X-RAY(2 each): none      ILouisa, am scribing for and in the presence of, Dr. Fang.    IDr. Fang, personally performed the services described in this documentation, as scribed by Louisa Otto in my presence, and it is both accurate and complete.      MEDICATIONS:  Current Outpatient " Medications   Medication Sig Dispense Refill     albuterol (PROAIR HFA;PROVENTIL HFA;VENTOLIN HFA) 90 mcg/actuation inhaler Inhale 2 puffs 4 (four) times a day as needed for wheezing.       allopurinol (ZYLOPRIM) 300 MG tablet TAKE 1 TABLET BY MOUTH DAILY 90 tablet 1     atorvastatin (LIPITOR) 40 MG tablet TAKE 1 TABLET(40 MG) BY MOUTH AT BEDTIME 90 tablet 3     diclofenac (VOLTAREN) 75 MG EC tablet TAKE 1 TABLET(75 MG) BY MOUTH TWICE DAILY 60 tablet 0     DULoxetine (CYMBALTA) 60 MG capsule TAKE 1 CAPSULE(60 MG) BY MOUTH DAILY 90 capsule 2     ergocalciferol (ERGOCALCIFEROL) 50,000 unit capsule TAKE 1 CAPSULE BY MOUTH WEEKLY 4 capsule 0     gabapentin (NEURONTIN) 300 MG capsule TAKE 1 CAPSULE(300 MG) BY MOUTH THREE TIMES DAILY 270 capsule 3     hydrOXYzine pamoate (VISTARIL) 50 MG capsule takea at bedtime (Patient taking differently: Take 50 mg by mouth at bedtime. takea at bedtime      ) 30 capsule 10     metoprolol tartrate (LOPRESSOR) 25 MG tablet Take 1 tablet (25 mg total) by mouth 2 (two) times a day. 60 tablet 1     mirtazapine (REMERON) 15 MG tablet Take one tablet   At niight (Patient taking differently: Take 15 mg by mouth at bedtime. Take one tablet   At niight      ) 90 tablet 4     omeprazole (PRILOSEC) 20 MG capsule TAKE 2 CAPSULES(40 MG) BY MOUTH DAILY 180 capsule 3     ranitidine (ZANTAC) 150 MG tablet Take 150 mg by mouth 2 (two) times a day.  3     acetaminophen 500 mg coapsule Take 2 tablets by mouth 3 (three) times a day as needed for fever or pain.       clobetasol (TEMOVATE) 0.05 % ointment Apply 1 application topically 2 (two) times a day as needed.       fluticasone (FLOVENT HFA) 220 mcg/actuation inhaler Inhale 1 puff 2 (two) times a day. 1 Inhaler 1     melatonin 3 mg Tab tablet TAKE 1 TABLET(3 MG) BY MOUTH AT BEDTIME AS NEEDED 100 tablet 2     polyethylene glycol (MIRALAX) 17 gram packet Take 17 g by mouth daily as needed.       No current facility-administered medications for this visit.         Total Data Points: 0    Please note that this clinical encounter uses voice recognition software, there may be typographical errors present

## 2021-06-03 NOTE — TELEPHONE ENCOUNTER
RN cannot approve Refill Request    RN can NOT refill diclofenac because the med is not covered by policy/route to provider. Last office visit: 11/8/2019 David Fang MD Last Physical: 8/1/2019 Last MTM visit: Visit date not found Last visit same specialty: 11/8/2019 David Fang MD.  Next visit within 3 mo: Visit date not found  Next physical within 3 mo: Visit date not found      Lyndsey Aldridge, [facility] Triage/Med Refill 11/13/2019    Requested Prescriptions   Pending Prescriptions Disp Refills     omeprazole (PRILOSEC) 20 MG capsule [Pharmacy Med Name: OMEPRAZOLE 20MG CAPSULES] 60 capsule 0     Sig: TAKE 2 CAPSULES(40 MG) BY MOUTH DAILY       GI Medications Refill Protocol Passed - 11/13/2019  5:02 AM        Passed - PCP or prescribing provider visit in last 12 or next 3 months.     Last office visit with prescriber/PCP: 11/8/2019 David Fang MD OR same dept: 11/8/2019 David Fang MD OR same specialty: 11/8/2019 David Fang MD  Last physical: 8/1/2019 Last MTM visit: Visit date not found   Next visit within 3 mo: Visit date not found  Next physical within 3 mo: Visit date not found  Prescriber OR PCP: David Fang MD  Last diagnosis associated with med order: 1. Gastroesophageal reflux disease without esophagitis  - omeprazole (PRILOSEC) 20 MG capsule [Pharmacy Med Name: OMEPRAZOLE 20MG CAPSULES]; TAKE 2 CAPSULES(40 MG) BY MOUTH DAILY  Dispense: 60 capsule; Refill: 0    2. Lumbar Canal Stenosis  - diclofenac (VOLTAREN) 75 MG EC tablet [Pharmacy Med Name: DICLOFENAC SODIUM 75MG DR TABLETS]; TAKE 1 TABLET(75 MG) BY MOUTH TWICE DAILY  Dispense: 60 tablet; Refill: 0    If protocol passes may refill for 12 months if within 3 months of last provider visit (or a total of 15 months).             diclofenac (VOLTAREN) 75 MG EC tablet [Pharmacy Med Name: DICLOFENAC SODIUM 75MG DR TABLETS] 60 tablet 0     Sig: TAKE 1 TABLET(75 MG) BY MOUTH TWICE DAILY       There is no refill protocol information for this  order

## 2021-06-03 NOTE — PATIENT INSTRUCTIONS - HE
Gouty arthritis:     Finish Medrol Dose pack.    Follow up if left foot pain/swelling does not improve.    Continue scheduled diclofenac for pain.     Newly diagnosed hypertension:     Continue metoprolol 25 mg twice a day.    Follow up:    Return in 4 weeks.

## 2021-06-03 NOTE — PROGRESS NOTES
ASSESSMENT AND PLAN:    1. Moderate Recurrent Major Depression patient has no symptoms suggestive of depression is taking his medications faithfully.  Follow-up in 4 weeks.    2. Idiopathic gout, unspecified chronicity, unspecified site patient was seen in walk-in clinic his right elbow pain is better however he still has pain in the left ankle he is been placed on a burst pack of prednisone    3. Benign essential hypertension patient is taking metoprolol he did not bring it in today to clinic.  I reviewed his lab tests which were done in walk-in clinic labs and repeated in 4 weeks.    4. Right middle lobe pulmonary infiltrate he finished his antibiotics, he has no shortness of breath, no chest pain.  He is afebrile.  No cough is present              No orders of the defined types were placed in this encounter.    There are no discontinued medications.    No follow-ups on file.    CHIEF COMPLAINT:  Chief Complaint   Patient presents with     INF     gout arthritis       HISTORY OF PRESENT ILLNESS:  Chasity is a 69 y.o. male  with newly diagnosed hypertension, hyperlipidemia, history of CVA with left hemiparesis, memory loss, lumbar spinal stenosis and radiculopathy, cervicalgia, dysthymia, gout, GERD,  hiatal hernia, GERD, gastritis, constipation, asymmetrical sensorineural hearing loss, dermatitis, and vitamin D deficiency, who presents to the clinic today for inpatient follow up. Chasity is present with a Jessica . He was admitted to [facility] from 10/22/2019 - 10/25/2019 for 2 day history of right elbow pain, fever, and cough. CT of right elbow showed no fracture, dislocation, or organized fluid collection. On exam, erythema and swelling was noted around the posterior lateral aspect of the right elbow. There was slight heterogenicity of soft tissue on posterior lateral aspect of the distal humerus and elbow region. Uric acid level and CRP were elevated. There was no evidence of leukocytosis on CBC. US  guided aspiration on 10/23/2019 with fluid analysis reporting uric acid crystals, WBC of 22,692, RBC less than 50K, and +4 polymorphonuclear leukocytes. Gram stain reported no organisms seen. His pain was managed with 1 dose of IV steroid and scheduled Motrin. Outpatient follow up was recommended to continue scheduled Diclofenac. In terms of his fever, CT of abdomen/pelvis showed mild bibasilar atelectasis. Chest x-rays reported density overlaying the lower thoracic spine compatiable with infiltrates probably in medial aspect of the right lower lobe. WBC was normal. Procalcitonin was normal. Fever was attributed to acute gout of right elbow versus right upper extremity cellulitis versus viral fever. The patient was started on azithromcyin and Rocephin on admission and was changed to Ancef. This was ultimately discontinued since right elbow fluid aspiration showed no crystals and there was no evidence of microorganisms. During admission, his blood pressure was intermittently elevated. EKG showed sinus tachycardia. He was started on metoprolol 25 mg twice a day was started for new diagnosis of hypertension.     Chasity was then evaluated again in [facility] ED just yesterday for left foot and left leg pain which was attributed to gouty arthritis. BMP and CBC were unremarkable. X-rays showed mild degenerative change first MTP joint with spur formation laterally; no erosions. Medrol dose pack was added with recommendations to follow up as outpatient.    Today, he reports persistent pain, swelling, and redness in his left foot, and still has a bit of a cough. The patient denies any chest pain or shortness of breath. He had fever 2-3 days ago which has since resolved. His appetite and oral intake have been okay. There is no pain in his right elbow. There is a bit of pain in his back. Chasity denies eating a lot of red meat or drinking alcohol.     REVIEW OF SYSTEMS:   General: No fever. Normal appetite.  Respiratory: Cough. No  "shortness of breath.    CV: No chest pain.   Musculoskeletal: Left foot pain, swelling, and erythema. No right elbow pain. Some back pain.   All other 10 point review of systems are negative.    PFSH:  He does not eat much red meat or drink any alcohol. Reviewed as below.     TOBACCO USE:  Social History     Tobacco Use   Smoking Status Never Smoker   Smokeless Tobacco Former User       VITALS:  Vitals:    11/08/19 0925   BP: 110/74   Pulse: 78   Resp: 18   Temp: 97.5  F (36.4  C)   TempSrc: Oral   SpO2: 98%   Weight: 126 lb 3 oz (57.2 kg)   Height: 5' 1\" (1.549 m)     Wt Readings from Last 3 Encounters:   11/08/19 126 lb 3 oz (57.2 kg)   11/07/19 100 lb (45.4 kg)   10/23/19 131 lb 14.4 oz (59.8 kg)     Body mass index is 23.84 kg/m .    PHYSICAL EXAM:  General: Alert, cooperative, no distress, appears stated age  Head: Normocephalic, without obvious abnormality, atraumatic, moist mucous membranes  Eyes: PERRL, conjunctiva/cornea clear, EOM's intact  Lungs: Clear to auscultation bilaterally, respirations unlabored  Heart: Regular rate and rhythm, S1 and S2 normal, no murmur, rub, or gallop  Musculoskeletal: Tenderness and mild erythema over first MTP joint of the left foot.   Neurologic:  A & O x 3.  No tremor, no focal findings.     DATA REVIEWED:  Additional History from Old Records Summarized (2): Reviewed [facility] 10/25/2019 discharge summary regarding admission for acute gouty arthritis in right elbow with fever. Reviewed [facility] 11/07/2019 ED note regarding left foot pain.  Decision to Obtain Records (1): none  Radiology Tests Summarized or Ordered (1): 10/22/2019 CXR showed density overlaying the lower thoracic spine compatible with infiltrate likely in the medial aspect of the RLL. Right elbow XR showed no fracture, dislocation, or organized fluid collection. CT abdomen/pelvis was unremarkable. CT right elbow showed slight heterogeneity of the soft tissues posterolateral aspect of the distal humerus and " elbow region, nonspecific. 11/07/2019 left foot x-rays showed mild degenerative change first MTP joint with spur formation laterally; no erosions.  Labs Reviewed or Ordered (1): Reviewed [facility] labs significant for elevated uric acid, no evidence of leukocytosis, and elevated CRP.  Medicine Test Summarized or Ordered (1): 10/22/2019 EKG showed sinus tachycardia.  Independent Review of EKG or X-RAY(2 each): none    I, Louisa Otto, am scribing for and in the presence of, Dr. Fang.    I, Dr. Fang, personally performed the services described in this documentation, as scribed by Louisa Otto in my presence, and it is both accurate and complete.      MEDICATIONS:  Current Outpatient Medications   Medication Sig Dispense Refill     albuterol (PROAIR HFA;PROVENTIL HFA;VENTOLIN HFA) 90 mcg/actuation inhaler Inhale 2 puffs 4 (four) times a day as needed for wheezing.       clobetasol (TEMOVATE) 0.05 % ointment Apply 1 application topically 2 (two) times a day as needed.       clopidogrel (PLAVIX) 75 mg tablet TAKE 1 TABLET BY MOUTH EVERY DAY 90 tablet 1     diclofenac (VOLTAREN) 75 MG EC tablet TAKE 1 TABLET(75 MG) BY MOUTH TWICE DAILY 60 tablet 0     DULoxetine (CYMBALTA) 60 MG capsule TAKE 1 CAPSULE(60 MG) BY MOUTH DAILY 90 capsule 2     ergocalciferol (ERGOCALCIFEROL) 50,000 unit capsule TAKE 1 CAPSULE BY MOUTH WEEKLY 4 capsule 0     gabapentin (NEURONTIN) 300 MG capsule TAKE 1 CAPSULE(300 MG) BY MOUTH THREE TIMES DAILY 270 capsule 3     hydrOXYzine pamoate (VISTARIL) 50 MG capsule takea at bedtime (Patient taking differently: Take 50 mg by mouth at bedtime. takea at bedtime      ) 30 capsule 10     methylPREDNISolone (MEDROL, CRYSTAL,) 4 mg tablet Follow package directions 21 tablet 0     mirtazapine (REMERON) 15 MG tablet Take one tablet   At niight (Patient taking differently: Take 15 mg by mouth at bedtime. Take one tablet   At niight      ) 90 tablet 4     omeprazole (PRILOSEC) 20 MG capsule TAKE 2  CAPSULES(40 MG) BY MOUTH DAILY 60 capsule 11     ranitidine (ZANTAC) 150 MG tablet Take 150 mg by mouth 2 (two) times a day.  3     acetaminophen 500 mg coapsule Take 2 tablets by mouth 3 (three) times a day as needed for fever or pain.       allopurinol (ZYLOPRIM) 300 MG tablet TAKE 1 TABLET BY MOUTH DAILY 90 tablet 1     atorvastatin (LIPITOR) 40 MG tablet TAKE 1 TABLET(40 MG) BY MOUTH AT BEDTIME 90 tablet 3     fluticasone (FLOVENT HFA) 220 mcg/actuation inhaler Inhale 1 puff 2 (two) times a day. 1 Inhaler 1     melatonin 3 mg Tab tablet TAKE 1 TABLET(3 MG) BY MOUTH AT BEDTIME AS NEEDED 100 tablet 2     metoprolol tartrate (LOPRESSOR) 25 MG tablet Take 1 tablet (25 mg total) by mouth 2 (two) times a day. 60 tablet 1     polyethylene glycol (MIRALAX) 17 gram packet Take 17 g by mouth daily as needed.       No current facility-administered medications for this visit.        Total Data Points: 5    Please note that this clinical encounter uses voice recognition software, there may be typographical errors present

## 2021-06-04 VITALS
TEMPERATURE: 97.4 F | WEIGHT: 127.06 LBS | RESPIRATION RATE: 18 BRPM | DIASTOLIC BLOOD PRESSURE: 66 MMHG | HEIGHT: 60 IN | HEART RATE: 85 BPM | SYSTOLIC BLOOD PRESSURE: 132 MMHG | BODY MASS INDEX: 24.94 KG/M2 | OXYGEN SATURATION: 96 %

## 2021-06-04 NOTE — TELEPHONE ENCOUNTER
RN cannot approve Refill Request    RN can NOT refill this medication med is not covered by policy/route to provider. Last office visit: 12/3/2019 David Fang MD Last Physical: 8/1/2019 Last MTM visit: Visit date not found Last visit same specialty: 12/3/2019 David Fang MD.  Next visit within 3 mo: Visit date not found  Next physical within 3 mo: Visit date not found      Annetta Hutton, [facility] Triage/Med Refill 12/13/2019    Requested Prescriptions   Pending Prescriptions Disp Refills     diclofenac (VOLTAREN) 75 MG EC tablet [Pharmacy Med Name: DICLOFENAC SODIUM 75MG DR TABLETS] 60 tablet 0     Sig: TAKE 1 TABLET(75 MG) BY MOUTH TWICE DAILY       There is no refill protocol information for this order

## 2021-06-04 NOTE — PROGRESS NOTES
[facility] Care Coordination Contact    Phone call to daughter that received notification from [facility] that member has AVS renewals due by 1/20/20.  If county doesn t receive form, may result in MA closure. She reports that unsure if has it, requests for form.   AVS form mail out to member address.    Savanna Landaverde RN  [facility]  [phone]

## 2021-06-04 NOTE — TELEPHONE ENCOUNTER
Letter is in the chart for 2019 but patient just received batteries on 10/8.  Not eligible for more batteries until 1/8/20 and a new letter will be required.

## 2021-06-04 NOTE — TELEPHONE ENCOUNTER
Refill Approved    Rx renewed per Medication Renewal Policy. Medication was last renewed on 4/29/19.    Diamond AYOUB Timmy, [facility] Triage/Med Refill 12/23/2019     Requested Prescriptions   Pending Prescriptions Disp Refills     allopurinol (ZYLOPRIM) 300 MG tablet [Pharmacy Med Name: ALLOPURINOL 300MG TABLETS] 90 tablet 1     Sig: TAKE 1 TABLET BY MOUTH DAILY       Allopurinol/Febuxostat Refill Protocol  Passed - 12/19/2019 11:09 AM        Passed - LFT or AST or ALT in last 12 months     Albumin   Date Value Ref Range Status   10/24/2019 3.2 (L) 3.5 - 5.0 g/dL Final     Bilirubin, Total   Date Value Ref Range Status   10/24/2019 0.4 0.0 - 1.0 mg/dL Final     Bilirubin, Direct   Date Value Ref Range Status   10/22/2019 0.3 <=0.5 mg/dL Final     Alkaline Phosphatase   Date Value Ref Range Status   10/24/2019 130 (H) 45 - 120 U/L Final     AST   Date Value Ref Range Status   10/24/2019 57 (H) 0 - 40 U/L Final     ALT   Date Value Ref Range Status   10/24/2019 102 (H) 0 - 45 U/L Final     Protein, Total   Date Value Ref Range Status   10/24/2019 7.3 6.0 - 8.0 g/dL Final                Passed - Visit with PCP or prescribing provider visit in past 12 months     Last office visit with prescriber/PCP: 12/3/2019 David Fang MD OR same dept: 12/3/2019 David Fang MD OR same specialty: 12/3/2019 David Fang MD  Last physical: 8/1/2019 Last MTM visit: Visit date not found   Next visit within 3 mo: Visit date not found  Next physical within 3 mo: Visit date not found  Prescriber OR PCP: David Fang MD  Last diagnosis associated with med order: 1. Gout  - allopurinol (ZYLOPRIM) 300 MG tablet [Pharmacy Med Name: ALLOPURINOL 300MG TABLETS]; TAKE 1 TABLET BY MOUTH DAILY  Dispense: 90 tablet; Refill: 1    If protocol passes may refill for 12 months if within 3 months of last provider visit (or a total of 15 months).

## 2021-06-04 NOTE — TELEPHONE ENCOUNTER
Pts Care Provider: Audiology Team     Caller: Collins      Phone Number: [phone]   OK to leave message: Yes    Reason for Call: Pt's  is calling to order hearing aid batteries for the pt. They would like them mailed to     [address] [place] [address]

## 2021-06-05 VITALS
HEIGHT: 60 IN | BODY MASS INDEX: 24.54 KG/M2 | TEMPERATURE: 97.8 F | WEIGHT: 125 LBS | HEART RATE: 80 BPM | RESPIRATION RATE: 18 BRPM | SYSTOLIC BLOOD PRESSURE: 150 MMHG | DIASTOLIC BLOOD PRESSURE: 90 MMHG

## 2021-06-05 NOTE — TELEPHONE ENCOUNTER
Refill Approved    Rx renewed per Medication Renewal Policy. Medication was last renewed on 12/18/18.    Linnea Arauz, [facility] Triage/Med Refill 1/13/2020     Requested Prescriptions   Pending Prescriptions Disp Refills     DULoxetine (CYMBALTA) 60 MG capsule [Pharmacy Med Name: DULOXETINE DR 60MG CAPSULES] 90 capsule 2     Sig: TAKE 1 CAPSULE(60 MG) BY MOUTH DAILY       Tricyclics/Misc Antidepressant/Antianxiety Meds Refill Protocol Passed - 1/10/2020  5:03 AM        Passed - PCP or prescribing provider visit in last year     Last office visit with prescriber/PCP: 12/3/2019 David Fang MD OR same dept: 12/3/2019 David Fang MD OR same specialty: 12/3/2019 David Fang MD  Last physical: 8/1/2019 Last MTM visit: Visit date not found   Next visit within 3 mo: Visit date not found  Next physical within 3 mo: Visit date not found  Prescriber OR PCP: David Fang MD  Last diagnosis associated with med order: 1. Moderate Recurrent Major Depression  - DULoxetine (CYMBALTA) 60 MG capsule [Pharmacy Med Name: DULOXETINE DR 60MG CAPSULES]; TAKE 1 CAPSULE(60 MG) BY MOUTH DAILY  Dispense: 90 capsule; Refill: 2    2. Lumbar Canal Stenosis  - diclofenac (VOLTAREN) 75 MG EC tablet [Pharmacy Med Name: DICLOFENAC SODIUM 75MG DR TABLETS]; TAKE 1 TABLET(75 MG) BY MOUTH TWICE DAILY  Dispense: 60 tablet; Refill: 0    If protocol passes may refill for 12 months if within 3 months of last provider visit (or a total of 15 months).             diclofenac (VOLTAREN) 75 MG EC tablet [Pharmacy Med Name: DICLOFENAC SODIUM 75MG DR TABLETS] 60 tablet 0     Sig: TAKE 1 TABLET(75 MG) BY MOUTH TWICE DAILY       There is no refill protocol information for this order

## 2021-06-05 NOTE — PROGRESS NOTES
A 90 day supply of hearing aid batteries were sent to this patient today. He/She should contact the clinic with concerns. The patient was not seen by a provider/staff member today.  Size 675  # of Packages = 3    Tracking Number  [id number]    Kristie Crowe CMA/MEGA  [facility] ENT/Audiology  [address] [place] [id number]  W [phone]  F [phone]

## 2021-06-05 NOTE — TELEPHONE ENCOUNTER
Refill Approved    Rx renewed per Medication Renewal Policy. Medication was last renewed on 10/23/19.    Linnea Arauz, [facility] Triage/Med Refill 1/17/2020     Requested Prescriptions   Pending Prescriptions Disp Refills     magnesium oxide (MAG-OX) 400 mg (241.3 mg magnesium) tablet [Pharmacy Med Name: MAGNESIUM OXIDE 400MG TABLETS] 30 tablet 0     Sig: TAKE 1 TABLET(400 MG) BY MOUTH DAILY       There is no refill protocol information for this order        polyethylene glycol (MIRALAX) 17 gram packet [Pharmacy Med Name: PEG 3350 POWDER PACKETS] 28 each 0     Sig: USE 1 PACKET MIXED AS DIRECTED PER DAY       GI Medications Refill Protocol Passed - 1/15/2020  7:43 PM        Passed - PCP or prescribing provider visit in last 12 or next 3 months.     Last office visit with prescriber/PCP: 12/3/2019 David Fang MD OR same dept: 12/3/2019 aDvid Fang MD OR same specialty: 12/3/2019 David Fang MD  Last physical: 8/1/2019 Last MTM visit: Visit date not found   Next visit within 3 mo: Visit date not found  Next physical within 3 mo: Visit date not found  Prescriber OR PCP: David Fang MD  Last diagnosis associated with med order: 1. 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

## 2021-06-05 NOTE — TELEPHONE ENCOUNTER
RN cannot approve Refill Request    RN can NOT refill this medication med is not covered by policy/route to provider     . Last office visit: Visit date not found Last Physical: Visit date not found Last MTM visit: Visit date not found Last visit same specialty: 12/3/2019 David Fang MD.  Next visit within 3 mo: Visit date not found  Next physical within 3 mo: Visit date not found      Linnea Arauz, [facility] Triage/Med Refill 1/16/2020    Requested Prescriptions   Pending Prescriptions Disp Refills     ergocalciferol (ERGOCALCIFEROL) 1,250 mcg (50,000 unit) capsule [Pharmacy Med Name: VITAMIN D2 50,000IU (ERGO) CAP RX] 12 capsule 3     Sig: TAKE 1 CAPSULE BY MOUTH WEEKLY       There is no refill protocol information for this order

## 2021-06-05 NOTE — TELEPHONE ENCOUNTER
RN cannot approve Refill Request    RN can NOT refill this medication med is not covered by policy/route to provider. Last office visit: 12/3/2019 David Fang MD Last Physical: 8/1/2019 Last MTM visit: Visit date not found Last visit same specialty: Visit date not found.  Next visit within 3 mo: Visit date not found  Next physical within 3 mo: Visit date not found      Brigitte Greenfield, [facility] Triage/Med Refill 1/17/2020    Requested Prescriptions   Pending Prescriptions Disp Refills     metoprolol tartrate (LOPRESSOR) 25 MG tablet [Pharmacy Med Name: METOPROLOL TARTRATE 25MG TABLETS] 60 tablet 1     Sig: TK1 TABLET BY MOUTH TWICE DAILY       There is no refill protocol information for this order

## 2021-06-05 NOTE — TELEPHONE ENCOUNTER
RN cannot approve Refill Request    RN can NOT refill this medication medication not on med list.       Linnea Arauz, [facility] Triage/Med Refill 1/17/2020    Requested Prescriptions   Pending Prescriptions Disp Refills     magnesium oxide (MAG-OX) 400 mg (241.3 mg magnesium) tablet [Pharmacy Med Name: MAGNESIUM OXIDE 400MG TABLETS] 30 tablet 0     Sig: TAKE 1 TABLET(400 MG) BY MOUTH DAILY       There is no refill protocol information for this order      Signed Prescriptions Disp Refills    polyethylene glycol (MIRALAX) 17 gram packet 100 each 3     Sig: USE 1 PACKET MIXED AS DIRECTED PER DAY       GI Medications Refill Protocol Passed - 1/15/2020  7:43 PM        Passed - PCP or prescribing provider visit in last 12 or next 3 months.     Last office visit with prescriber/PCP: 12/3/2019 David Fang MD OR same dept: 12/3/2019 David Fang MD OR same specialty: 12/3/2019 David Fang MD  Last physical: 8/1/2019 Last MTM visit: Visit date not found   Next visit within 3 mo: Visit date not found  Next physical within 3 mo: Visit date not found  Prescriber OR PCP: David Fang MD  Last diagnosis associated with med order: 1. 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 Constipation, unspecified constipation type  - polyethylene glycol (MIRALAX) 17 gram packet; USE 1 PACKET MIXED AS DIRECTED PER DAY  Dispense: 100 each; Refill: 3    If protocol passes may refill for 12 months if within 3 months of last provider visit (or a total of 15 months).

## 2021-06-05 NOTE — TELEPHONE ENCOUNTER
RN cannot approve Refill Request    RN can NOT refill this medication Protocol failed and NO refill given.     Linnea Arauz, [facility] Triage/Med Refill 1/13/2020    Requested Prescriptions   Pending Prescriptions Disp Refills     diclofenac (VOLTAREN) 75 MG EC tablet [Pharmacy Med Name: DICLOFENAC SODIUM 75MG DR TABLETS] 60 tablet 0     Sig: TAKE 1 TABLET(75 MG) BY MOUTH TWICE DAILY       There is no refill protocol information for this order      Signed Prescriptions Disp Refills    DULoxetine (CYMBALTA) 60 MG capsule 90 capsule 3     Sig: TAKE 1 CAPSULE(60 MG) BY MOUTH DAILY       Tricyclics/Misc Antidepressant/Antianxiety Meds Refill Protocol Passed - 1/10/2020  5:03 AM        Passed - PCP or prescribing provider visit in last year     Last office visit with prescriber/PCP: 12/3/2019 David Fang MD OR same dept: 12/3/2019 David Fang MD OR same specialty: 12/3/2019 David Fang MD  Last physical: 8/1/2019 Last MTM visit: Visit date not found   Next visit within 3 mo: Visit date not found  Next physical within 3 mo: Visit date not found  Prescriber OR PCP: David Fang MD  Last diagnosis associated with med order: 1. Moderate Recurrent Major Depression  - DULoxetine (CYMBALTA) 60 MG capsule; TAKE 1 CAPSULE(60 MG) BY MOUTH DAILY  Dispense: 90 capsule; Refill: 3    2. Lumbar Canal Stenosis  - diclofenac (VOLTAREN) 75 MG EC tablet [Pharmacy Med Name: DICLOFENAC SODIUM 75MG DR TABLETS]; TAKE 1 TABLET(75 MG) BY MOUTH TWICE DAILY  Dispense: 60 tablet; Refill: 0    If protocol passes may refill for 12 months if within 3 months of last provider visit (or a total of 15 months).

## 2021-06-06 NOTE — TELEPHONE ENCOUNTER
RN cannot approve Refill Request    RN can NOT refill this medication med is not covered by policy/route to provider. Last office visit: 3/3/2020 David Fang MD Last Physical: 8/1/2019 Last MTM visit: Visit date not found Last visit same specialty: 3/3/2020 David Fang MD.  Next visit within 3 mo: Visit date not found  Next physical within 3 mo: Visit date not found      Jessica Reinoso, [facility] Triage/Med Refill 3/7/2020    Requested Prescriptions   Pending Prescriptions Disp Refills     diclofenac (VOLTAREN) 75 MG EC tablet [Pharmacy Med Name: DICLOFENAC SODIUM 75MG DR TABLETS] 60 tablet 0     Sig: TAKE 1 TABLET(75 MG) BY MOUTH TWICE DAILY       There is no refill protocol information for this order

## 2021-06-06 NOTE — PROGRESS NOTES
ASSESSMENT AND PLAN:  1. Gastroesophageal reflux disease without esophagitis  Discontinue ranitidine and only take omeprazole.  Denies any abdominal burning today.  - omeprazole (PRILOSEC) 20 MG capsule; Take 1 capsule daily in the morning  Dispense: 180 capsule; Refill: 3    2. Benign essential hypertension    Blood pressure well controlled continue Lopressor follow-up in 3 months  - metoprolol tartrate (LOPRESSOR) 25 MG tablet; TK1 TABLET BY MOUTH TWICE DAILY  Dispense: 60 tablet; Refill: 7    3. Hyperlipidemia    Recheck his cholesterol at next visit.  - atorvastatin (LIPITOR) 40 MG tablet; TAKE 1 TABLET(40 MG) BY MOUTH AT BEDTIME  Dispense: 90 tablet; Refill: 3    4. Moderate Recurrent Major Depression    Active at home interactive, no signs or symptoms suggestive of depression  - DULoxetine (CYMBALTA) 60 MG capsule; TAKE 1 CAPSULE(60 MG) BY MOUTH DAILY  Dispense: 90 capsule; Refill: 3    5. 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  - clopidogreL (PLAVIX) 75 mg tablet; Take 1 tablet (75 mg total) by mouth daily.  Dispense: 30 tablet; Refill: 4    6. Constipation, unspecified constipation type    - polyethylene glycol (MIRALAX) 17 gram packet; USE 1 PACKET MIXED AS DIRECTED PER DAY  Dispense: 100 each; Refill: 3            No orders of the defined types were placed in this encounter.    Medications Discontinued During This Encounter   Medication Reason     diclofenac (VOLTAREN) 75 MG EC tablet      omeprazole (PRILOSEC) 20 MG capsule Reorder     albuterol (PROAIR HFA;PROVENTIL HFA;VENTOLIN HFA) 90 mcg/actuation inhaler Alternate therapy     fluticasone (FLOVENT HFA) 220 mcg/actuation inhaler Alternate therapy     ranitidine (ZANTAC) 150 MG tablet Alternate therapy     metoprolol tartrate (LOPRESSOR) 25 MG tablet Reorder     clopidogreL (PLAVIX) 75 mg tablet Reorder     atorvastatin (LIPITOR) 40 MG tablet Reorder     DULoxetine (CYMBALTA) 60 MG capsule Reorder  "    polyethylene glycol (MIRALAX) 17 gram packet Reorder       No follow-ups on file.    CHIEF COMPLAINT:  Chief Complaint   Patient presents with     Gout       HISTORY OF PRESENT ILLNESS:  Chasity is a 70 y.o. male  with hypertension, hyperlipidemia, history of CVA with left hemiparesis, memory loss, lumbar spinal stenosis and radiculopathy, cervicalgia, dysthymia, gout, GERD,  hiatal hernia, GERD, gastritis, constipation, asymmetrical sensorineural hearing loss, dermatitis, and vitamin D deficiency, who presents to the clinic today for follow up on gout. Chasity is present with a Jessica . He reports to be doing well overall with a bit of a cough. There has been no fever. His appetite has been normal. No one else at home has been sick.    His back and joint pain have been well-controlled. The patient has been able to walk.    He continues on MiraLAX for his constipation. His omeprazole has run out so his burning abdominal pain has recurred.    Chasity is only using his Flovent inhaler as needed instead of twice a day as instructed. He has been using it as needed for about a year now.     The patient is taking his metoprolol faithfully.     REVIEW OF SYSTEMS:   General: No fever.   Respiratory: Cough.    GI: Constipation and burning abdominal pain.   Musculoskeletal: Back and joint pain well-controlled.   All other 10 point review of systems are negative.    PFSH:  He is walking for exercise. Reviewed as below.     TOBACCO USE:  Social History     Tobacco Use   Smoking Status Never Smoker   Smokeless Tobacco Former User       VITALS:  Vitals:    03/03/20 1005   BP: 132/66   Pulse: 85   Resp: 18   Temp: 97.4  F (36.3  C)   TempSrc: Oral   SpO2: 96%   Weight: 127 lb 1 oz (57.6 kg)   Height: 4' 10.75\" (1.492 m)     Wt Readings from Last 3 Encounters:   03/03/20 127 lb 1 oz (57.6 kg)   12/03/19 127 lb (57.6 kg)   11/08/19 126 lb 3 oz (57.2 kg)     Body mass index is 25.88 kg/m .      PHYSICAL EXAM:  General: Alert, " cooperative, no distress, appears stated age  Head: Normocephalic, without obvious abnormality, atraumatic, moist mucous membranes  Eyes: PERRL, conjunctiva/cornea clear, EOM's intact  Neck: Supple, no cervical lymph node enlargement   Lungs: Clear to auscultation bilaterally, respirations unlabored  Heart: Regular rate and rhythm, S1 and S2 normal, no murmur, rub, or gallop  Abdomen: Soft, non tender, bowel sounds active all four quadrants, no masses, no organomegaly.  Neurologic:  A & O x 3.  No tremor, no focal findings.  Uses cane for assistance with ambulation.   Psychiatric: Normal affect, good eye contact, well-groomed  Skin: No rash or suspicious lesions noted on exposed skin, non-diaphoretic    DATA REVIEWED:  Additional History from Old Records Summarized (2): none  Decision to Obtain Records (1): none  Radiology Tests Summarized or Ordered (1): none  Labs Reviewed or Ordered (1): none  Medicine Test Summarized or Ordered (1): none  Independent Review of EKG or X-RAY(2 each): none    ILouisa, am scribing for and in the presence of, Dr. Fang.    IDr. Fang, personally performed the services described in this documentation, as scribed by Louisa Otto in my presence, and it is both accurate and complete.      MEDICATIONS:  Current Outpatient Medications   Medication Sig Dispense Refill     acetaminophen 500 mg coapsule Take 2 tablets by mouth 3 (three) times a day as needed for fever or pain.       allopurinol (ZYLOPRIM) 300 MG tablet TAKE 1 TABLET BY MOUTH DAILY 90 tablet 3     atorvastatin (LIPITOR) 40 MG tablet TAKE 1 TABLET(40 MG) BY MOUTH AT BEDTIME 90 tablet 3     clobetasol (TEMOVATE) 0.05 % ointment Apply 1 application topically 2 (two) times a day as needed.       clopidogreL (PLAVIX) 75 mg tablet Take 1 tablet (75 mg total) by mouth daily. 30 tablet 4     diclofenac (VOLTAREN) 75 MG EC tablet TAKE 1 TABLET(75 MG) BY MOUTH TWICE DAILY 60 tablet 0     DULoxetine (CYMBALTA) 60 MG capsule TAKE  1 CAPSULE(60 MG) BY MOUTH DAILY 90 capsule 3     ergocalciferol (ERGOCALCIFEROL) 1,250 mcg (50,000 unit) capsule TAKE 1 CAPSULE BY MOUTH WEEKLY 12 capsule 3     gabapentin (NEURONTIN) 300 MG capsule TAKE 1 CAPSULE(300 MG) BY MOUTH THREE TIMES DAILY 270 capsule 3     hydrOXYzine pamoate (VISTARIL) 50 MG capsule TAKE 1 CAPSULE BY MOUTH AT BEDTIME 30 capsule 10     metoprolol tartrate (LOPRESSOR) 25 MG tablet TK1 TABLET BY MOUTH TWICE DAILY 60 tablet 7     mirtazapine (REMERON) 15 MG tablet Take one tablet   At niight (Patient taking differently: Take 15 mg by mouth at bedtime. Take one tablet   At niight      ) 90 tablet 4     polyethylene glycol (MIRALAX) 17 gram packet USE 1 PACKET MIXED AS DIRECTED PER  each 3     magnesium oxide (MAG-OX) 400 mg (241.3 mg magnesium) tablet TAKE 1 TABLET(400 MG) BY MOUTH DAILY (Patient not taking: Reported on 3/3/2020) 30 tablet 0     melatonin 3 mg Tab tablet TAKE 1 TABLET(3 MG) BY MOUTH AT BEDTIME AS NEEDED (Patient not taking: Reported on 3/3/2020) 100 tablet 2     omeprazole (PRILOSEC) 20 MG capsule Take 1 capsule daily in the morning 180 capsule 3     No current facility-administered medications for this visit.        Total Data Points: 0    Please note that this clinical encounter uses voice recognition software, there may be typographical errors present

## 2021-06-06 NOTE — TELEPHONE ENCOUNTER
RN cannot approve Refill Request    RN can NOT refill this medication med is not covered by policy/route to provider and allergy/contraindication. Last office visit: 12/3/2019 David Fang MD Last Physical: 8/1/2019 Last MTM visit: Visit date not found Last visit same specialty: 12/3/2019 David Fang MD.  Next visit within 3 mo: Visit date not found  Next physical within 3 mo: Visit date not found      Cheli Alejandra, [facility] Triage/Med Refill 2/12/2020    Requested Prescriptions   Pending Prescriptions Disp Refills     gabapentin (NEURONTIN) 300 MG capsule [Pharmacy Med Name: GABAPENTIN 300MG CAPSULES] 270 capsule 3     Sig: TAKE 1 CAPSULE(300 MG) BY MOUTH THREE TIMES DAILY       Gabapentin/Levetiracetam/Tiagabine Refill Protocol  Passed - 2/9/2020  5:03 AM        Passed - PCP or prescribing provider visit in past 12 months or next 3 months     Last office visit with prescriber/PCP: 12/3/2019 David Fang MD OR same dept: 12/3/2019 David Fang MD OR same specialty: 12/3/2019 David Fang MD  Last physical: 8/1/2019 Last MTM visit: Visit date not found   Next visit within 3 mo: Visit date not found  Next physical within 3 mo: Visit date not found  Prescriber OR PCP: David Fang MD  Last diagnosis associated with med order: 1. Chronic pain  - gabapentin (NEURONTIN) 300 MG capsule [Pharmacy Med Name: GABAPENTIN 300MG CAPSULES]; TAKE 1 CAPSULE(300 MG) BY MOUTH THREE TIMES DAILY  Dispense: 270 capsule; Refill: 3    2. Lumbar Canal Stenosis  - diclofenac (VOLTAREN) 75 MG EC tablet [Pharmacy Med Name: DICLOFENAC SODIUM 75MG DR TABLETS]; TAKE 1 TABLET(75 MG) BY MOUTH TWICE DAILY  Dispense: 60 tablet; Refill: 0    If protocol passes may refill for 12 months if within 3 months of last provider visit (or a total of 15 months).             diclofenac (VOLTAREN) 75 MG EC tablet [Pharmacy Med Name: DICLOFENAC SODIUM 75MG DR TABLETS] 60 tablet 0     Sig: TAKE 1 TABLET(75 MG) BY MOUTH TWICE DAILY       There is no  refill protocol information for this order

## 2021-06-06 NOTE — PATIENT INSTRUCTIONS - HE
Gout:     Continue allopurinol.    Lumbar back pain:     Continue current pain medications.     Hypertension:     Continue metoprolol. Refill given.    Hyperlipidemia:     Continue atorvastatin.    History of stroke:     Continue Plavix.    GERD:     Refill given for omeprazole.    Stop ranitidine.     Constipation:     Continue MiraLAX.    Depression:     Continue duloxetine.    History of acute hypoxic respiratory failure:     Try stopping use of Flovent inhaler.

## 2021-06-06 NOTE — TELEPHONE ENCOUNTER
Refill Approved    Rx renewed per Medication Renewal Policy. Medication was last renewed on 2/21/19.    Annetta Hutton, [facility] Triage/Med Refill 2/28/2020     Requested Prescriptions   Pending Prescriptions Disp Refills     hydrOXYzine pamoate (VISTARIL) 50 MG capsule [Pharmacy Med Name: HYDROXYZINE PAMOATE 50MG CAPSULES] 30 capsule 10     Sig: TAKE 1 CAPSULE BY MOUTH AT BEDTIME       Antihistamine Refill Protocol Passed - 2/28/2020  3:10 PM        Passed - Patient has had office visit/physical in last year     Last office visit with prescriber/PCP: 12/3/2019 David Fang MD OR same dept: 12/3/2019 David Fang MD OR same specialty: 12/3/2019 David Fang MD  Last physical: 8/1/2019 Last MTM visit: Visit date not found   Next visit within 3 mo: Visit date not found  Next physical within 3 mo: Visit date not found  Prescriber OR PCP: David Fang MD  Last diagnosis associated with med order: 1. Sleep disorder  - hydrOXYzine pamoate (VISTARIL) 50 MG capsule [Pharmacy Med Name: HYDROXYZINE PAMOATE 50MG CAPSULES]; TAKE 1 CAPSULE BY MOUTH AT BEDTIME  Dispense: 30 capsule; Refill: 10    If protocol passes may refill for 12 months if within 3 months of last provider visit (or a total of 15 months).

## 2021-06-07 NOTE — PROGRESS NOTES
Received: PCA sig, POC sig, SKYE signatures are in member's folder.     Van Brenner  Care Management Specialist  [facility]  [phone]

## 2021-06-07 NOTE — PROGRESS NOTES
[facility] Care Coordination Contact  [facility] Home Visit Assessment     Home visit for Health Risk Assessment with Chasity Gaspar completed on 3/26/2020    Type of residence:: Private home - stairs  Current living arrangement:: I live in a private home     Assessment completed with:: Children, Family    Current Care Plan  Member currently receiving the following home care services:     Member currently receiving the following community resources: PCA      Medication Review  Medication reconciliation completed in Epic: IF NO, PLEASE EXPLAIN COVID-19 Restrictions- telephonic visit  Medication set-up & administration: Family/informal caregiver sets up daily  Family caregiver administers medications  Medication Risk Assessment Medication (1 or more, place referral to MTM):  N/A: No risk factors identified  MTM Referral Placed: No: No risk factors idenified    Mental/Behavioral Health   Depression Screening: See PHQ assessment flowsheet.   Mental health DX:: Yes(F33.1)   Mental health DX how managed:: Medication  Mental Health Diagnosis: Yes:  F33.1  Mental Health Services: None: No further intervention needed at this time.    Falls Assessment:   Fallen 2 or more times in the past year?: No   Any fall with injury in the past year?: No    ADL/IADL Dependencies:   Dependent ADLs:: Ambulation-cane, Ambulation-walker, Wheelchair-with assist, Bathing, Dressing, Eating, Grooming, Incontinence, Positioning, Transfers, Toileting  Dependent IADLs:: Cleaning, Cooking, Laundry, Shopping, Meal Preparation, Medication Management, Money Management, Transportation, Incontinence    [facility] Health Plan sponsored benefits: Shared information re: Silver Sneakers/gym memberships, ASA, Calcium +D.    PCA Assessment completed at visit: Yes    Elderly Waiver Eligibility: Yes, but member declines EW service; will not open to EW    Care Plan & Recommendations:     See CC for detailed assessment information.    Follow-Up Plan: Member  informed of future contact, plan to f/u with member with a 6 month telephone assessment.  Contact information shared with member and family, encouraged member to call with any questions or concerns at any time.    Savanna Landaverde RN  [facility]  [phone]      [facility] care continuum providers: Please refer to Health Care Home on the Epic Problem List to view this patient's [facility] Care Plan Summary

## 2021-06-07 NOTE — TELEPHONE ENCOUNTER
RN cannot approve Refill Request    RN can NOT refill this medication med is not covered by policy/route to provider     . Last office visit: 3/3/2020 David Fang MD Last Physical: 8/1/2019 Last MTM visit: Visit date not found Last visit same specialty: Visit date not found.  Next visit within 3 mo: Visit date not found  Next physical within 3 mo: Visit date not found      Linnea Arauz, [facility] Triage/Med Refill 4/9/2020    Requested Prescriptions   Pending Prescriptions Disp Refills     metoprolol tartrate (LOPRESSOR) 25 MG tablet [Pharmacy Med Name: METOPROLOL TARTRATE 25MG TABLETS] 180 tablet 3     Sig: TAKE 1 TABLET BY MOUTH TWICE DAILY       There is no refill protocol information for this order

## 2021-06-07 NOTE — PROGRESS NOTES
[facility] Care Coordination Contact    [facility]:  Emailed completed PCA assessment to [facility].  Faxed copy of PCA assessment to [facility] and mailed copy to member.  Faxed MD Communication to PCP.     Mailed: PCA sig, POC sign, CEDRICK to member with an enclosed envelope to sign and return.     Van Brenner  Care Management Specialist  [facility]  [phone]

## 2021-06-07 NOTE — PROGRESS NOTES
[facility] Care Coordination Contact    Received after visit chart from care coordinator.  Completed following tasks: Mailed copy of care plan to client.    Van Brenner  Care Management Specialist  [facility]  [phone]

## 2021-06-08 NOTE — TELEPHONE ENCOUNTER
RN cannot approve Refill Request    RN can NOT refill this medication med is not covered by policy/route to provider       Linnea Arauz, [facility] Connection Triage/Med Refill 5/7/2020    Requested Prescriptions   Pending Prescriptions Disp Refills     diclofenac (VOLTAREN) 75 MG EC tablet [Pharmacy Med Name: DICLOFENAC SODIUM 75MG DR TABLETS] 60 tablet 0     Sig: TAKE 1 TABLET(75 MG) BY MOUTH TWICE DAILY       There is no refill protocol information for this order      Signed Prescriptions Disp Refills    mirtazapine (REMERON) 15 MG tablet 90 tablet 3     Sig: TAKE 1 TABLET(15 MG) BY MOUTH AT BEDTIME       Tricyclics/Misc Antidepressant/Antianxiety Meds Refill Protocol Passed - 5/6/2020  5:13 AM        Passed - PCP or prescribing provider visit in last year     Last office visit with prescriber/PCP: 3/3/2020 David Fang MD OR same dept: 3/3/2020 David Fang MD OR same specialty: 3/3/2020 David Fang MD  Last physical: 8/1/2019 Last MTM visit: Visit date not found   Next visit within 3 mo: Visit date not found  Next physical within 3 mo: Visit date not found  Prescriber OR PCP: David Fang MD  Last diagnosis associated with med order: 1. Depressed  - mirtazapine (REMERON) 15 MG tablet; TAKE 1 TABLET(15 MG) BY MOUTH AT BEDTIME  Dispense: 90 tablet; Refill: 3    2. Idiopathic gout, unspecified chronicity, unspecified site  - diclofenac (VOLTAREN) 75 MG EC tablet [Pharmacy Med Name: DICLOFENAC SODIUM 75MG DR TABLETS]; TAKE 1 TABLET(75 MG) BY MOUTH TWICE DAILY  Dispense: 60 tablet; Refill: 0    If protocol passes may refill for 12 months if within 3 months of last provider visit (or a total of 15 months).

## 2021-06-08 NOTE — PROGRESS NOTES
ASSESSMENT AND PLAN:  1. Lumbar Canal Stenosis he continues to have back pain which radiates into his left leg left side.  He's been taking his medications incorrectly I'll see him in approximately 6 weeks.      2. Radiculopathy, lumbar region I doubt whether he's been taking the duloxetine faithfully.  He was under the impression it was a stool softener I refilled the medication.      3. Constipation, unspecified constipation type     4. Constipation  polyethylene glycol 3350 8.5 gram PwPk   5. Gout, unspecified  allopurinol (ZYLOPRIM) 300 MG tablet       CHIEF COMPLAINT:  Chief Complaint   Patient presents with     Follow-up     back pain.     other     Pt complained of omeprazole not helping him like the first med that was prescribed.     Medication Refill       HISTORY OF PRESENT ILLNESS:  Chasity is a 67 y.o. male presenting for a follow-up. Chasity is present with a Jessica  and daughter.     Lumbar Stenosis: He states that he is experiencing back pain that radiates into his legs. He is taking duloxetine and gabapentin but he notes that he is taking the duloxetine at bedtime. Daughter notes that he does not move around the house as much due to the pain. He is currently using a cane to ambulate.     Abdominal Pain: He states that the omeprazole is not helping him anymore. He is experiencing burning epigastric pain.  Of note, he ran out of polyethylene glycol a few days ago.     REVIEW OF SYSTEMS:   He states that he is not sleeping well.    All other 10 point review of systems are negative.    PFSH:  Reviewed as below.     TOBACCO USE:  History   Smoking Status     Never Smoker   Smokeless Tobacco     Former User       VITALS:  Vitals:    01/20/17 1027   BP: 118/72   Patient Site: Right Arm   Patient Position: Sitting   Cuff Size: Adult Regular   Pulse: 84   Resp: 16   Weight: 114 lb 5 oz (51.9 kg)     Wt Readings from Last 3 Encounters:   01/20/17 114 lb 5 oz (51.9 kg)   10/11/16 112 lb 1.9 oz (50.9 kg)    09/26/16 119 lb 11.2 oz (54.3 kg)     Body mass index is 22.95 kg/(m^2).     PHYSICAL EXAM:  General: Alert, cooperative, no distress, appears stated age  Lungs: Clear to auscultation bilaterally, respirations unlabored  Chest wall: No tenderness or deformity  Heart: Regular rate and rhythm, S1 and S2 normal, no murmur, rub, or gallop  Abdomen: Tenderness over epigastric area.   Musculoskeletal: Straight leg test positive.   Neurologic:  A & O x 3.  No tremor, no focal findings.   .     DATA REVIEWED:  Additional History from Old Records Summarized (2): Reviewed Dr. Hinson's note from 10/19/2016 regarding anal fissure.   Decision to Obtain Records (1): None  Radiology Tests Summarized or Ordered (1): None  Labs Reviewed or Ordered (1): UA reviewed from September and October of last year abnormalities not noted  Medicine Test Summarized or Ordered (1): None  Independent Review of EKG or X-RAY(2 each): None    The visit lasted a total of 12 minutes face to face with the patient. Over 50% of the time was spent counseling and educating the patient about abdominal pain.     Maxi SAUNDERS, am scribing for and in the presence of, Dr. Fang.    Dr. Leoncio SAUNDERS, personally performed the services described in this documentation, as scribed by Maxi Hodge in my presence, and it is both accurate and complete.      MEDICATIONS:  Current Outpatient Prescriptions   Medication Sig Dispense Refill     allopurinol (ZYLOPRIM) 300 MG tablet TAKE 1 TABLET BY MOUTH DAILY 90 tablet 0     DULoxetine (CYMBALTA) 60 MG capsule Take 1 capsule (60 mg total) by mouth daily. 30 capsule 11     ergocalciferol (VITAMIN D2) 50,000 unit capsule TAKE 1 CAPSULE BY MOUTH WEEKLY 12 capsule 3     gabapentin (NEURONTIN) 300 MG capsule TAKE 1 CAPSULE BY MOUTH THREE TIMES DAILY 270 capsule 1     acetaminophen 500 mg coapsule Take 2 tablets by mouth 3 (three) times a day as needed for fever or pain.       hydrocortisone valerate (WESTCORT) 0.2 %  ointment Apply to affected area daily 45 g 1     lidocaine (XYLOCAINE) 5 % ointment Apply to affected area twice daily 35.44 g 1     mirtazapine (REMERON) 15 MG tablet Take 1 tablet by mouth bedtime.  3     omeprazole (PRILOSEC) 20 MG capsule Take 2 capsules (40 mg total) by mouth daily. 60 capsule 10     polyethylene glycol 3350 8.5 gram PwPk Use packet one per day 30 packet 12     ranitidine (ZANTAC) 150 MG tablet Take 1 tablet (150 mg total) by mouth 2 (two) times a day. 60 tablet 6     No current facility-administered medications for this visit.        Total Data Points: 3

## 2021-06-08 NOTE — PROGRESS NOTES
"Chasity Gaspar is a 70 y.o. male who is being evaluated via a billable telephone visit.      The patient has been notified of following:     \"This telephone visit will be conducted via a call between you and your physician/provider. We have found that certain health care needs can be provided without the need for a physical exam.  This service lets us provide the care you need with a short phone conversation.  If a prescription is necessary we can send it directly to your pharmacy.  If lab work is needed we can place an order for that and you can then stop by our lab to have the test done at a later time.    Telephone visits are billed at different rates depending on your insurance coverage. During this emergency period, for some insurers they may be billed the same as an in-person visit.  Please reach out to your insurance provider with any questions.    If during the course of the call the physician/provider feels a telephone visit is not appropriate, you will not be charged for this service.\"    Patient has given verbal consent to a Telephone visit? Yes    What phone number would you like to be contacted at? [phone]      Additional provider notes:       Assessment/Plan:Chasity is a 70 y.o. male  with hypertension, hyperlipidemia, history of CVA with left hemiparesis, memory loss, lumbar spinal stenosis and radiculopathy, cervicalgia, dysthymia, gout, GERD,  hiatal hernia, GERD, gastritis, constipation, asymmetrical sensorineural hearing loss, dermatitis, and vitamin D deficiency, who     Is following up today via a phone visit.  His daughter is also present.  He reports that he has been feeling well he has been taking all his medications and currently does not have any abdominal pain.  He does need to use medication daily for his constipation.  His back pain still present but he can ambulate without assistance.  He takes the Cymbalta in the morning and this seems to help him.  He reports that he has not had any " dizziness or any falls at home.  He also reports that currently his appetite is normal and he only uses a sleep medicine as needed.  He is using it at a maximum of 2 tablets/week.      He is taking his Plavix and he does not notice any chest pain or shortness of breath.  He has not noticed any fatigue or cough or fever.  1. Radiculopathy, lumbar region  Currently he has been on gabapentin and is been taking this medication long-term it does give him constipation however his back pain is relatively controlled.    2. Chronic bilateral low back pain with left-sided sciatica  Occasional sciatica still noted but no numbness noted in his left leg and left hip.    3. Gastroesophageal reflux disease without esophagitis  He is continuing to take the omeprazole ranitidine has been removed from his list.    4. Chronic idiopathic constipation  As long as he takes the polyethylene glycol he is not constipated.    5. 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  New memory issues noted    6. 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  Dispense: 30 tablet; Refill: 4    7. Benign essential hypertension  At the next visit she would like to have his blood pressure checked we will try to see him in 3 months.  - metoprolol tartrate (LOPRESSOR) 25 MG tablet; TAKE 1 TABLET BY MOUTH TWICE DAILY  Dispense: 180 tablet; Refill: 3    8. Moderate Recurrent Major Depression  Affect is normal he is engaged with his family.  - DULoxetine (CYMBALTA) 60 MG capsule; TAKE 1 CAPSULE(60 MG) BY MOUTH DAILY  Dispense: 90 capsule; Refill: 3    9. Hyperlipidemia  He has been on atorvastatin over 2 years he notes no side effects from this medication  - atorvastatin (LIPITOR) 40 MG tablet; TAKE 1 TABLET(40 MG) BY MOUTH AT BEDTIME  Dispense: 90 tablet; Refill: 3    10. Gout  He has not noticed any gouty pain  or swelling on his joints at this time  - allopurinoL (ZYLOPRIM) 300 MG tablet; Take 1 tablet (300 mg total) by mouth daily.  Dispense: 90 tablet; Refill: 3        Phone call duration:  25 minutes    Lindsey BHAKTA

## 2021-06-08 NOTE — TELEPHONE ENCOUNTER
Refill Approved    Rx renewed per Medication Renewal Policy. Medication was last renewed on 8/1/19.    Linnea Arauz, [facility] Triage/Med Refill 5/7/2020     Requested Prescriptions   Pending Prescriptions Disp Refills     mirtazapine (REMERON) 15 MG tablet [Pharmacy Med Name: MIRTAZAPINE 15MG TABLETS] 90 tablet 4     Sig: TAKE 1 TABLET(15 MG) BY MOUTH AT BEDTIME       Tricyclics/Misc Antidepressant/Antianxiety Meds Refill Protocol Passed - 5/6/2020  5:13 AM        Passed - PCP or prescribing provider visit in last year     Last office visit with prescriber/PCP: 3/3/2020 David Fang MD OR same dept: 3/3/2020 David Fang MD OR same specialty: 3/3/2020 David Fang MD  Last physical: 8/1/2019 Last MTM visit: Visit date not found   Next visit within 3 mo: Visit date not found  Next physical within 3 mo: Visit date not found  Prescriber OR PCP: David Fang MD  Last diagnosis associated with med order: 1. Depressed  - mirtazapine (REMERON) 15 MG tablet [Pharmacy Med Name: MIRTAZAPINE 15MG TABLETS]; TAKE 1 TABLET(15 MG) BY MOUTH AT BEDTIME  Dispense: 90 tablet; Refill: 4    2. Idiopathic gout, unspecified chronicity, unspecified site  - diclofenac (VOLTAREN) 75 MG EC tablet [Pharmacy Med Name: DICLOFENAC SODIUM 75MG DR TABLETS]; TAKE 1 TABLET(75 MG) BY MOUTH TWICE DAILY  Dispense: 60 tablet; Refill: 0    If protocol passes may refill for 12 months if within 3 months of last provider visit (or a total of 15 months).                diclofenac (VOLTAREN) 75 MG EC tablet [Pharmacy Med Name: DICLOFENAC SODIUM 75MG DR TABLETS] 60 tablet 0     Sig: TAKE 1 TABLET(75 MG) BY MOUTH TWICE DAILY       There is no refill protocol information for this order

## 2021-06-09 NOTE — PROGRESS NOTES
A 90 day supply of hearing aid batteries were sent to this patient today. He/She should contact the clinic with concerns. The patient was not seen by a provider/staff member today.  Size 675  3# of Packages     Tracking #[id number]

## 2021-06-09 NOTE — PROGRESS NOTES
ASSESSMENT AND PLAN:  1. Neuralgia of upper extremity currently pain controlled adequate.  Describes no increased pain from his right upper extremity and right shoulder.      2. Radiculopathy, lumbar region Pain Back Gabapentin Is Currently Dosed at 2 Capsules per Day.  He Is to Sitting Duloxetine in the Morning.      3. Moderate Recurrent Major Depression not depressed active not withdrawn  DULoxetine (CYMBALTA) 60 MG capsule   4. Constipation medication refilled  polyethylene glycol 3350 8.5 gram PwPk   5. Low back pain with right-sided sciatica  gabapentin (NEURONTIN) 300 MG capsule   6. Eczema patch of eczema on his right shoulder measuring 2 x 2 centimeters the areas correlated he  also has a scaly patch on the top of his forehead.  Re-prescribing steroid cream     7. Pruritic disorder I suspect is from scratching both above-mentioned areas frequently Atarax added to regimen follow-up in 4 weeks recommended         CHIEF COMPLAINT:  Chief Complaint   Patient presents with     Follow-up     Medication Refill       HISTORY OF PRESENT ILLNESS:  Chasity is a 67 y.o. male presenting for a follow-up. Chasity is present with a Jessica .     Lumbar Stenosis: He is taking gabapentin 600 mg instead of the prescribed 900 mg. He notes that his pain has not worsened with the dose decrease. He is not very active during the day and is intermittently drowsy during the day.     Constipation: He is taking polyethylene glycol powder faithfully.     REVIEW OF SYSTEMS:  His breathing has improved since last visit.   He states he currently has a headache.   He notes that he is experiencing itching over his right shoulder and back.   All other 10 point review of systems are negative.    PFSH:  Reviewed as below.     TOBACCO USE:  History   Smoking Status     Never Smoker   Smokeless Tobacco     Former User       VITALS:  Vitals:    04/04/17 1312   BP: 100/74   Patient Site: Left Arm   Patient Position: Sitting   Cuff Size: Adult  Regular   Pulse: 64   Resp: 16   Temp: 97.4  F (36.3  C)   TempSrc: Oral   Weight: 118 lb 3 oz (53.6 kg)     Wt Readings from Last 3 Encounters:   04/04/17 118 lb 3 oz (53.6 kg)   03/03/17 119 lb 3 oz (54.1 kg)   02/28/17 100 lb (45.4 kg)     Body mass index is 22.33 kg/(m^2).      PHYSICAL EXAM:  General: Alert, cooperative, no distress, appears stated age  Head: Normocephalic, without obvious abnormality, atraumatic  Throat: Lips, mucosa, and tongue normal; teeth and gums normal  Back: Symmetric, no curvature, ROM normal, no CVA tenderness  Skin: Excoriated-thickened plaque noted on skin in the interscapular area and scalp measuring 2 x 1 cm  Neurologic:  A & O x 3.  No tremor, no focal findings.     Psych appears comfortable not agitated well groomed oriented ×3    DATA REVIEWED:  Additional History from Old Records Summarized (2): None  Decision to Obtain Records (1): none  Radiology Tests Summarized or Ordered (1): None  Labs Reviewed or Ordered (1): None  Medicine Test Summarized or Ordered (1): None  Independent Review of EKG or X-RAY(2 each): None    The visit lasted a total of 12 minutes face to face with the patient. Over 50% of the time was spent counseling and educating the patient about back pain.     IMaxi, am scribing for and in the presence of, Dr. Fang.    I, Dr. Fang, personally performed the services described in this documentation, as scribed by Maxi Hodge in my presence, and it is both accurate and complete.      MEDICATIONS:  Current Outpatient Prescriptions   Medication Sig Dispense Refill     albuterol (PROVENTIL HFA;VENTOLIN HFA) 90 mcg/actuation inhaler Inhale 2 puffs 4 (four) times a day. 1 Inhaler 0     allopurinol (ZYLOPRIM) 300 MG tablet TAKE 1 TABLET BY MOUTH DAILY 90 tablet 0     DICLOFENAC SODIUM ORAL Take 75 mg by mouth 2 (two) times a day.       DULoxetine (CYMBALTA) 60 MG capsule TAKE 1 CAPSULE BY MOUTH DAILY 30 capsule 11     gabapentin (NEURONTIN) 300  MG capsule TAKE 1 CAPSULE BY MOUTH THREE TIMES DAILY 270 capsule 1     hydrocortisone valerate (WESTCORT) 0.2 % ointment Apply to affected area daily 45 g 1     omeprazole (PRILOSEC) 20 MG capsule Take 2 capsules (40 mg total) by mouth daily. 60 capsule 10     ranitidine (ZANTAC) 150 MG tablet Take 1 tablet (150 mg total) by mouth 2 (two) times a day. 60 tablet 6     acetaminophen 500 mg coapsule Take 2 tablets by mouth 3 (three) times a day as needed for fever or pain.       ergocalciferol (VITAMIN D2) 50,000 unit capsule TAKE 1 CAPSULE BY MOUTH WEEKLY 12 capsule 3     lidocaine (XYLOCAINE) 5 % ointment Apply to affected area twice daily 35.44 g 1     mirtazapine (REMERON) 15 MG tablet Take 1 tablet by mouth bedtime.  3     polyethylene glycol 3350 8.5 gram PwPk Use packet one per day 30 packet 12     No current facility-administered medications for this visit.        Total Data Points: 0

## 2021-06-09 NOTE — TELEPHONE ENCOUNTER
RN cannot approve Refill Request    RN can NOT refill this medication med is not covered by policy/route to provider. Last office visit: 3/3/2020 David Fang MD Last Physical: 8/1/2019 Last MTM visit: Visit date not found Last visit same specialty: 3/3/2020 David Fang MD.  Next visit within 3 mo: Visit date not found  Next physical within 3 mo: Visit date not found      Libertad Ritter, [facility] Triage/Med Refill 7/5/2020    Requested Prescriptions   Pending Prescriptions Disp Refills     diclofenac (VOLTAREN) 75 MG EC tablet [Pharmacy Med Name: DICLOFENAC SODIUM 75MG DR TABLETS] 60 tablet 0     Sig: TAKE 1 TABLET(75 MG) BY MOUTH TWICE DAILY       There is no refill protocol information for this order

## 2021-06-09 NOTE — PROGRESS NOTES
ASSESSMENT AND PLAN:  1. Neck pain is neck pain which is located in the right digastric muscle relaxer referred pain from his oral cavity.  Should be noted his oral hygiene is reported suspect he may have a dental abscess.  He needs to see a dentist.      2. Cough he continues to cough when I reviewed his medication she's only taken 2 tablets of the prednisone.  He has not used his Tessalon Perles correctly I've asked his niece to help with dosing     3. Family history of influenza no fever no chills reviewed ED notes from his visit there positive family history of new influenza noted his appetite is normal         Medications Discontinued During This Encounter   Medication Reason     DULoxetine (CYMBALTA) 60 MG capsule Duplicate order       No Follow-up on file.    CHIEF COMPLAINT:  Chief Complaint   Patient presents with     Follow-up     back pain       HISTORY OF PRESENT ILLNESS:  Chasity is a 67 y.o. male presenting for a cough. Chasity is present with a Jessica . He was seen at the [facility] ED on 2/28/2017 for an evaluation of evaluation of cough and sore throat. While in the ED, he noted sick contacts - two granddaughters had the onset of flu. He had a chest x-ray done which was unremarkable. He was diagnosed with influenza and was prescribed prednisone, Tessalon, and albuterol inhaler and Tylenol #3. He has not been taking the medications correctly. He has used 2 tablets of prednisone over the past 5 days. He states his breathing has improved but he is still coughing.    REVIEW OF SYSTEMS:   He states that his appetite is poor.    All other 10 point review of systems are negative.    PFSH:  Reviewed as below.     TOBACCO USE:  History   Smoking Status     Never Smoker   Smokeless Tobacco     Former User       VITALS:  Vitals:    03/03/17 1039   BP: 138/74   Patient Site: Left Arm   Patient Position: Sitting   Cuff Size: Adult Regular   Pulse: 80   Resp: 20   Temp: 97.4  F (36.3  C)   TempSrc: Oral    Weight: 119 lb 3 oz (54.1 kg)     Wt Readings from Last 3 Encounters:   03/03/17 119 lb 3 oz (54.1 kg)   02/28/17 100 lb (45.4 kg)   01/20/17 114 lb 5 oz (51.9 kg)     Body mass index is 22.52 kg/(m^2).    PHYSICAL EXAM:  General: Alert, cooperative, no distress, appears stated age  Head: Normocephalic, without obvious abnormality, atraumatic  Throat: Lips, mucosa, and tongue normal; Poor dental hygiene. Premolar over left side is rotted.   Back: Symmetric, no curvature, ROM normal, no CVA tenderness  Lungs: Clear to auscultation bilaterally, respirations unlabored  Chest wall: No tenderness or deformity  Heart: Regular rate and rhythm, S1 and S2 normal, no murmur, rub, or gallop  Abdomen: Soft, non tender, bowel sounds active all four quadrants, no masses, no organomegaly.  Neurologic:  A & O x 3.  No tremor, no focal findings.       DATA REVIEWED:  Additional History from Old Records Summarized (2): Reviewed Wes Casarez PA-C note from 2/28/2017 regarding influenza.   Decision to Obtain Records (1): None  Radiology Tests Summarized or Ordered (1): Reviewed chest x-ray report from 2/28/2017  Labs Reviewed or Ordered (1): none  Medicine Test Summarized or Ordered (1): None  Independent Review of EKG or X-RAY(2 each): None    The visit lasted a total of 12 minutes face to face with the patient. Over 50% of the time was spent counseling and educating the patient about cough.     IMaxi, am scribing for and in the presence of, Dr. Fang.    IDr. Fang, personally performed the services described in this documentation, as scribed by Maxi Hodge in my presence, and it is both accurate and complete.      MEDICATIONS:  Current Outpatient Prescriptions   Medication Sig Dispense Refill     acetaminophen-codeine (TYLENOL #3) 300-30 mg per tablet Take 1-2 tablets by mouth every 6 (six) hours as needed for pain. 6 tablet 0     albuterol (PROVENTIL HFA;VENTOLIN HFA) 90 mcg/actuation inhaler  Inhale 2 puffs 4 (four) times a day. 1 Inhaler 0     allopurinol (ZYLOPRIM) 300 MG tablet TAKE 1 TABLET BY MOUTH DAILY 90 tablet 0     benzonatate (TESSALON PERLES) 100 MG capsule Take 1 capsule (100 mg total) by mouth 3 (three) times a day as needed for cough. 20 capsule 0     DICLOFENAC SODIUM ORAL Take 75 mg by mouth 2 (two) times a day.       DULoxetine (CYMBALTA) 60 MG capsule TAKE 1 CAPSULE BY MOUTH DAILY 30 capsule 11     ergocalciferol (VITAMIN D2) 50,000 unit capsule TAKE 1 CAPSULE BY MOUTH WEEKLY 12 capsule 3     gabapentin (NEURONTIN) 300 MG capsule TAKE 1 CAPSULE BY MOUTH THREE TIMES DAILY 270 capsule 1     hydrocortisone valerate (WESTCORT) 0.2 % ointment Apply to affected area daily 45 g 1     omeprazole (PRILOSEC) 20 MG capsule Take 2 capsules (40 mg total) by mouth daily. 60 capsule 10     predniSONE (DELTASONE) 20 MG tablet Take 2 tablets (40 mg total) by mouth daily for 4 days. 8 tablet 0     ranitidine (ZANTAC) 150 MG tablet Take 1 tablet (150 mg total) by mouth 2 (two) times a day. 60 tablet 6     acetaminophen 500 mg coapsule Take 2 tablets by mouth 3 (three) times a day as needed for fever or pain.       lidocaine (XYLOCAINE) 5 % ointment Apply to affected area twice daily 35.44 g 1     mirtazapine (REMERON) 15 MG tablet Take 1 tablet by mouth bedtime.  3     polyethylene glycol 3350 8.5 gram PwPk Use packet one per day 30 packet 12     No current facility-administered medications for this visit.        Total Data Points: 3

## 2021-06-10 NOTE — PROGRESS NOTES
[facility] Patient Outreach:     Discharge Date::  5/8/17      Reason for TCU stay (discharge diagnosis)::  CVA, pseudoaneurysm, anemia, GERD      Are you feeling better, the same or worse since your discharge?:  Patient is feeling better          As part of your discharge plan, did they discuss home care with you?: Yes        Have your seen them yet, or are they scheduled to visit?: Yes
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
## 2021-06-10 NOTE — PROGRESS NOTES
[facility] Patient Outreach:     Discharge Date::  5/8/17      Reason for TCU stay (discharge diagnosis)::  CVA, pseudoaneurysm, anemia, GERD      Are you feeling better, the same or worse since your discharge?:  Patient is feeling better          As part of your discharge plan, did they discuss home care with you?: Yes        Have your seen them yet, or are they scheduled to visit?: Yes                Do you have any follow up visits scheduled with your PCP or Specialist?:  Yes, with PCP      (RN) Is it scheduled soon enough (3-5 days)?: No        (RN) Is the patient okay with moving appointment up (if RN feels appropriate)?: Yes            (RN) Patient transferred to [facility]? **If immediate concers (e.g. patient is feeling worse and/or not taking new medictations), send in basket message to PCP with quick summary of concern.: Yes

## 2021-06-10 NOTE — PROGRESS NOTES
ASSESSMENT AND PLAN:  1. Hearing loss his hearing aids are become damaged he can no longer hear properly it is hard for him to interact with the environment I will send him to audiology.  He will be reexamined refitted with new hearing aids it has been 2 years since he obtained his last set.   Ambulatory referral to Audiology   2. CVA (cerebral vascular accident) discharge summary documents reviewed with patient.  He is receiving home care.  Physical therapy and OT will start shortly.  He is received in a wheelchair.      3. Neuralgia of upper extremity on gabapentin, I will not increase the dose because in the past this is caused him to have abdominal discomfort.      4. Insomnia     5. Gastritis no abdominal pain or burning noted yet           I have reconciled all medications.   No orders of the defined types were placed in this encounter.    Medications Discontinued During This Encounter   Medication Reason     allopurinol (ZYLOPRIM) 300 MG tablet Duplicate order       No Follow-up on file.    CHIEF COMPLAINT:  Chief Complaint   Patient presents with     other     Pt not meeting goal on IVD     Follow-up     [facility] CTU 05/08/2017 for stroke       HISTORY OF PRESENT ILLNESS:  Chasity is a 67 y.o. male presenting for a hospital follow-up. Chasity is present with a Jessica  and daughter. He presented to the [facility] ED on 4/27/2017 for an evaluation of altered mental status. While in the ED, the patient s niece reported that just prior to arrival, he began to shake and then he became. He also became nauseous and started dry heaving on arrival to the ED. He became short of breath and was intubated to protect the airway. He had a head and neck CTA done which showed a completely obstructed right carotid. At this time, Dr. Ferguson evaluated the patient and based on the CTA decided to give alteplase. He was then transferred to [facility] for interventional radiology. He underwent endovascular mechanical  thrombectomy of an embolic right anterior cerebral artery occlusion. At the conclusion of the procedure, there w restored flow within the right anterior cerebral artery A2 and A3 segments. The L MCA occlusion appeared chronic and he was transferred to the ICU for ongoing management. His hospital course was complicated by an expanding hematoma of the right groin which he received a thrombin injection for. He was discharged to TCU on 5/3/2017.  He was in the TCU until 5/8/2017. It was noted in the TCU discharge summary that physically he is able to move his left upper and lower extremities but he has having a difficult time communicating.     Currently, he is using a wheelchair to ambulate. Daughter notes that if he does not use the wheelchair, he will need someone to assist him to walk. He is experiencing pain, numbness and weakness over his left leg. He notes that can use his left hand but he will often drop objects out of it. He is able to drink but will spill frequent.     Hearing Loss: The tube on his hearing aids have broken. He is inquiring about receiving a new one.     REVIEW OF SYSTEMS:   He denies difficulty breathing.   All other 10 point review of systems are negative.    PFSH:  Reviewed as below.     TOBACCO USE:  History   Smoking Status     Never Smoker   Smokeless Tobacco     Former User       VITALS:  Vitals:    05/11/17 1402   BP: 98/68   Patient Site: Left Arm   Patient Position: Sitting   Cuff Size: Adult Regular   Pulse: (!) 112   Resp: 20   Temp: 98.1  F (36.7  C)   TempSrc: Oral   Weight: 112 lb 1 oz (50.8 kg)     Wt Readings from Last 3 Encounters:   05/11/17 112 lb 1 oz (50.8 kg)   05/02/17 118 lb 6.4 oz (53.7 kg)   04/27/17 108 lb (49 kg)     Body mass index is 21.17 kg/(m^2).    PHYSICAL EXAM:  General: Alert, cooperative, no distress, appears stated age  Head: Normocephalic, without obvious abnormality, atraumatic  Eyes: PERRL, conjunctiva/cornea clear, EOM's intact, fundi benign, both  eyes  Ears: Normal TM's and external ear canals, both ears  Nose: Nares normal, septum midline, mucosa normal, no drainage or sinus tenderness  Throat: Lips, mucosa, and tongue normal; teeth and gums normal  Back: Symmetric, no curvature, ROM normal, no CVA tenderness  Lungs: Clear to auscultation bilaterally, respirations unlabored  Chest wall: No tenderness or deformity  Heart: Regular rate and rhythm, S1 and S2 normal, no murmur, rub, or gallop  Abdomen: Soft, non tender, bowel sounds active all four quadrants, no masses, no organomegaly.  : (Male) no penile lesions or discharge, no testicular masses or tenderness, no hernias  (Female)- Normal external female genitalia  Neurologic:  A & O x 3.  No tremor, no focal findings.   DTRs are normal and symmetric both proximally and distally BUE and BLE.  Strength testing is normal and symetric both proximally and distally BUE and BLE.  Toes downgoing bilaterally.  Normal gait.               DATA REVIEWED:  Additional History from Old Records Summarized (2): Reviewed Dr. Alvarez s note from 4/27/2017 regarding ED encounter. Reviewed Dr. Arzate s note from 5/3/2017 regarding discharge summary. Reviewed Dr. Hurd s note from 5/8/2017 regarding TCU discharge summary.   Decision to Obtain Records (1): None  Radiology Tests Summarized or Ordered (1): Reviewed Head and neck CTA, Cerebral angiogram and head CT from 4/27/2017. Reviewed Head MRI, Psuedoaneurysm evaluation and Swallow study from 4/28/2017 - 5/2/2017.  Labs Reviewed or Ordered (1): Reviewed labs from 4/27/2017-5/8/2017. Labs ordered.   Medicine Test Summarized or Ordered (1): Reviewed EKG from 4/28/2017.    Independent Review of EKG or X-RAY(2 each): None    The visit lasted a total of 15 minutes face to face with the patient. Over 50% of the time was spent counseling and educating the patient about stroke.     Maxi SAUNDERS, am scribing for and in the presence of, Dr. Fang.    IDr. Fang, personally  performed the services described in this documentation, as scribed by Maxi Hodge in my presence, and it is both accurate and complete.      MEDICATIONS:  Current Outpatient Prescriptions   Medication Sig Dispense Refill     allopurinol (ZYLOPRIM) 300 MG tablet TAKE 1 TABLET BY MOUTH DAILY 90 tablet 3     atorvastatin (LIPITOR) 20 MG tablet Take 20 mg by mouth at bedtime.       clopidogrel (PLAVIX) 75 mg tablet Take 1 tablet (75 mg total) by mouth daily.  0     DULoxetine (CYMBALTA) 60 MG capsule Take 60 mg by mouth daily.       ergocalciferol (VITAMIN D2) 50,000 unit capsule TAKE 1 CAPSULE BY MOUTH WEEKLY (Patient taking differently: Take 50,000 Units by mouth once a week. Day of week unknown at this time) 12 capsule 3     gabapentin (NEURONTIN) 300 MG capsule Take 300 mg by mouth 3 (three) times a day.       hydrOXYzine (ATARAX) 50 MG tablet Take 50 mg by mouth Medrol Dose Pack scheduling ONLY.       magnesium oxide (MAG-OX) 400 mg tablet Take 1 tablet (400 mg total) by mouth daily.  0     melatonin 3 mg Tab tablet Take 3 mg by mouth at bedtime as needed.       mirtazapine (REMERON) 15 MG tablet Take 1 tablet by mouth bedtime.  3     omeprazole (PRILOSEC) 20 MG capsule Take 2 capsules (40 mg total) by mouth daily. 60 capsule 10     ranitidine (ZANTAC) 150 MG tablet Take 1 tablet (150 mg total) by mouth 2 (two) times a day. (Patient taking differently: Take 150 mg by mouth 2 (two) times a day as needed. ) 60 tablet 6     acetaminophen 500 mg coapsule Take 2 tablets by mouth 3 (three) times a day as needed for fever or pain.       albuterol (PROVENTIL HFA;VENTOLIN HFA) 90 mcg/actuation inhaler Inhale 2 puffs 4 (four) times a day. 1 Inhaler 0     lidocaine (XYLOCAINE) 5 % ointment Apply to affected area twice daily 35.44 g 1     polyethylene glycol 3350 8.5 gram PwPk Use packet one per day (Patient taking differently: Take 17 g by mouth daily. Use packet one per day) 30 packet 12     simvastatin (ZOCOR) 40 MG  tablet Take 1 tablet (40 mg total) by mouth daily.  0     No current facility-administered medications for this visit.        Total Data Points: 5

## 2021-06-11 NOTE — PROGRESS NOTES
[facility] Care Coordination Contact      [facility] Six-Month Telephone Assessment    6 month telephone assessment completed on 9/30/20.    ER visits: No  Hospitalizations: No  TCU stays: No  Significant health status changes: na  Falls/Injuries: No  ADL/IADL changes: No  Changes in services: No    Caregiver Assessment follow up:  na    Goals: See POC in chart for goal progress documentation.      Will see member in 6 months for an annual health risk assessment.   Encouraged member to call CC with any questions or concerns in the meantime.     Savanna Landaverde RN  [facility]  [phone]

## 2021-06-11 NOTE — PROGRESS NOTES
ASSESSMENT AND PLAN:  1. Gout refilled allopurinol reinforced dietary restrictions she does not eat pork     2. Lumbago currently he and his daughter happens to pain control I refilled the Cymbalta no side effects no change in dosage recommended he is taking gabapentin he is managing to ambulate without distress.      3. Lumbar Canal Stenosis     4. 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 he has responded well to the physical therapy and occupational therapy at home he is able to walk independently he is only having soft food and not having meals where he has to chew vigorously.  Mental status is at baseline his mood is not checked encouraged him to continue exercising Plavix refilled     5. Radiculopathy, lumbar region     6. 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 Gout, unspecified  allopurinol (ZYLOPRIM) 300 MG tablet   8. Constipation refilled his medications for constipation  polyethylene glycol 3350 8.5 gram PwPk       CHIEF COMPLAINT:  Chief Complaint   Patient presents with     Follow-up     stroke     Medication Refill       HISTORY OF PRESENT ILLNESS:  Chasity is a 67 y.o. male presenting for a follow-up of stroke. Chasity is present with a Jessica  and daughter. He states that he is doing better. He is taking his Plavix faithfully. He is currently eating soft foods and reports no difficulty swallowing or choking. He states that his left leg is swollen and mildly painful when he walks. He is currently using a cane to ambulate but daughter notes that he is assisting him to walk as well. Daughter states that he does not walk frequently. He is doing physical therapy exercises at home.     Gastritis: He states that he is having slight burning epigastric abdominal pain. He is taking his medications faithfully.     Insomnia: He is taking mirtazapine 15 mg faithfully. He notes that  he is sleeping 4-5 hours a night. He is taking naps during the day.     REVIEW OF SYSTEMS:   He denies nausea and vomiting.    All other 10 point review of systems are negative.    PFSH:  Reviewed as below.     TOBACCO USE:  History   Smoking Status     Never Smoker   Smokeless Tobacco     Former User       VITALS:  Vitals:    06/01/17 0937   BP: 120/84   Patient Site: Right Arm   Patient Position: Sitting   Cuff Size: Adult Regular   Pulse: 96   Resp: 20   Temp: 97.8  F (36.6  C)   TempSrc: Oral   Weight: 115 lb 4 oz (52.3 kg)     Wt Readings from Last 3 Encounters:   06/01/17 115 lb 4 oz (52.3 kg)   05/11/17 112 lb 1 oz (50.8 kg)   05/02/17 118 lb 6.4 oz (53.7 kg)     Body mass index is 21.78 kg/(m^2).    PHYSICAL EXAM:  General: Alert, cooperative, no distress, appears stated age  Lungs: Clear to auscultation bilaterally, respirations unlabored  Chest wall: No tenderness or deformity  Heart: Regular rate and rhythm, S1 and S2 normal, no murmur, rub, or gallop  Musculoskeletal: No tenderness over popliteal fossa.    Neurologic:  A & O x 3.  No tremor, no focal findings.       DATA REVIEWED:  Additional History from Old Records Summarized (2): None  Decision to Obtain Records (1): None  Radiology Tests Summarized or Ordered (1): None  Labs Reviewed or Ordered (1): None  Medicine Test Summarized or Ordered (1): None  Independent Review of EKG or X-RAY(2 each): None    The visit lasted a total of 10 minutes face to face with the patient. Over 50% of the time was spent counseling and educating the patient about stroke.     Maxi SAUNDERS, am scribing for and in the presence of, Dr. Fang.    David SAUNDERS personally performed the services described in this documentation, as scribed by Maxi Hodge in my presence, and it is both accurate and complete.      MEDICATIONS:  Current Outpatient Prescriptions   Medication Sig Dispense Refill     albuterol (PROVENTIL HFA;VENTOLIN HFA) 90 mcg/actuation inhaler  Inhale 2 puffs 4 (four) times a day. 1 Inhaler 0     allopurinol (ZYLOPRIM) 300 MG tablet TAKE 1 TABLET BY MOUTH DAILY 90 tablet 3     atorvastatin (LIPITOR) 20 MG tablet Take 1 tablet (20 mg total) by mouth at bedtime. 90 tablet 2     clopidogrel (PLAVIX) 75 mg tablet Take 1 tablet (75 mg total) by mouth daily.  0     ergocalciferol (VITAMIN D2) 50,000 unit capsule TAKE 1 CAPSULE BY MOUTH WEEKLY (Patient taking differently: Take 50,000 Units by mouth once a week. Day of week unknown at this time) 12 capsule 3     gabapentin (NEURONTIN) 300 MG capsule Take 300 mg by mouth 3 (three) times a day.       hydrocortisone valerate (WEST-OJ) 0.2 % ointment Apply topically 2 (two) times a day.       hydrOXYzine (ATARAX) 50 MG tablet Take 50 mg by mouth Medrol Dose Pack scheduling ONLY.       magnesium oxide (MAG-OX) 400 mg tablet Take 1 tablet (400 mg total) by mouth daily. 30 tablet 11     melatonin 3 mg Tab tablet Take 3 mg by mouth at bedtime as needed.       mirtazapine (REMERON) 15 MG tablet Take 1 tablet by mouth bedtime.  3     omeprazole (PRILOSEC) 20 MG capsule Take 2 capsules (40 mg total) by mouth daily. 60 capsule 10     ranitidine (ZANTAC) 150 MG tablet Take 1 tablet (150 mg total) by mouth 2 (two) times a day. 60 tablet 6     acetaminophen 500 mg coapsule Take 2 tablets by mouth 3 (three) times a day as needed for fever or pain.       DULoxetine (CYMBALTA) 60 MG capsule Take 60 mg by mouth daily.       lidocaine (XYLOCAINE) 5 % ointment Apply to affected area twice daily 35.44 g 1     polyethylene glycol 3350 8.5 gram PwPk Use packet one per day (Patient taking differently: Take 17 g by mouth daily. Use packet one per day) 30 packet 12     simvastatin (ZOCOR) 40 MG tablet Take 1 tablet (40 mg total) by mouth daily.  0     No current facility-administered medications for this visit.        Total Data Points: 0

## 2021-06-11 NOTE — PROGRESS NOTES
Hearing Aid Check    Chasity Gaspar returns today for a hearing aid check. He is accompanied by his daughter and a Jessica speaking .  He reports his right hearing is broken and is in need of repair.     Visual inspection:  Tubing on right earmold is cracked.  Action:  Tubing is replaced. Microphone filters are replaced.   Batteries: 90 day supply of size 675 batteries dispensed.     He reports subjective improvement with today s changes. He will follow up in 6 months or as needed.    Ward Briceño, [facility]  [place] Licensed Audiologist #[id number]

## 2021-06-12 NOTE — PROGRESS NOTES
ASSESSMENT AND PLAN:  1. Hyperlipidemia on statin no side effects noted continue current dose of medication he has an area of a flat highly    2. Dermatitis which identified rash which is present on his back approximately 8 cm superior to the intrascapular area and measures 4 x 3 cm there is no central clearing the area looks irritated daughter is concerned that this may be ringworm I tried to convince her that this is a form of dermatitis was chronic scratching and will start him on co-trimoxazole.  The rash noted in the ER is now dissipated with the hydroxyzine Ambulatory referral to Dermatology   3. Lumbar Canal Stenosis pain control with duloxetine and gabapentin no increase in dose warranted no side effects noted presently    4. Gastritis eating well continue omeprazole no abdominal discomfort        CHIEF COMPLAINT:  Chief Complaint   Patient presents with     Follow-up     myalgia     other     Has itchy on back per pt       HISTORY OF PRESENT ILLNESS:  Chasity is a 67 y.o. male presenting for a follow-up. Chasity is present with a Jessica .     Rash: He presented to the [facility] ED on 8/16/2017 for an evaluation of a rash over his entire body. He notes that he tried taking Benadryl with little relief. He was diagnosed with diagnosed with urticaria and prescribed hydroxyzine. Currently, he states that the rash over his entire body has resolved however, there is an area over his back that is dried and itchy. He has been scratching this area frequently. Daughter is inquiring about medication and seeing dermatology.       REVIEW OF SYSTEMS:   He denies poor appetite.   All other 10 point review of systems are negative.    PFSH:  Reviewed as below.     TOBACCO USE:  History   Smoking Status     Never Smoker   Smokeless Tobacco     Former User       VITALS:  Vitals:    09/12/17 1043   BP: 112/56   Patient Site: Left Arm   Patient Position: Sitting   Cuff Size: Adult Regular   Pulse: 85   Temp: 97.5  F (36.4  C)    TempSrc: Oral   SpO2: 97%   Weight: 124 lb 7 oz (56.4 kg)     Wt Readings from Last 3 Encounters:   09/12/17 124 lb 7 oz (56.4 kg)   08/16/17 100 lb (45.4 kg)   08/01/17 119 lb 4 oz (54.1 kg)     Body mass index is 23.51 kg/(m^2).    PHYSICAL EXAM:  General: Alert, cooperative, no distress, appears stated age  Lungs: Clear to auscultation bilaterally, respirations unlabored  Chest wall: No tenderness or deformity  Heart: Regular rate and rhythm, S1 and S2 normal, no murmur, rub, or gallop  Skin: Dried flaky patch with signs of excoriation present over left trapezius.    Neurologic:  A & O x 3.  No tremor, no focal findings.      DATA REVIEWED:  Additional History from Old Records Summarized (2): Reviewed Dr. Powell's note from 8/16/2017 regarding urticaria.   Decision to Obtain Records (1): None  Radiology Tests Summarized or Ordered (1): None  Labs Reviewed or Ordered (1): None  Medicine Test Summarized or Ordered (1): None  Independent Review of EKG or X-RAY(2 each): none    The visit lasted a total of 10 minutes face to face with the patient. Over 50% of the time was spent counseling and educating the patient about rash.     Maxi SAUNDERS, am scribing for and in the presence of, Dr. Fang.    David SAUNDERS personally performed the services described in this documentation, as scribed by Maxi Hodge in my presence, and it is both accurate and complete.      MEDICATIONS:  Current Outpatient Prescriptions   Medication Sig Dispense Refill     albuterol (PROVENTIL HFA;VENTOLIN HFA) 90 mcg/actuation inhaler Inhale 2 puffs 4 (four) times a day. 1 Inhaler 0     allopurinol (ZYLOPRIM) 300 MG tablet TAKE 1 TABLET BY MOUTH DAILY 90 tablet 3     atorvastatin (LIPITOR) 20 MG tablet Take 1 tablet (20 mg total) by mouth at bedtime. 90 tablet 2     diclofenac (VOLTAREN) 75 MG EC tablet TAKE 1 TABLET(75 MG) BY MOUTH TWICE DAILY 30 tablet 0     DULoxetine (CYMBALTA) 60 MG capsule Take 1 capsule (60 mg total) by  mouth daily. 30 capsule 12     ergocalciferol (VITAMIN D2) 50,000 unit capsule TAKE 1 CAPSULE BY MOUTH WEEKLY (Patient taking differently: Take 50,000 Units by mouth once a week. Day of week unknown at this time) 12 capsule 3     gabapentin (NEURONTIN) 300 MG capsule Take 1 capsule (300 mg total) by mouth 3 (three) times a day. 90 capsule 3     hydrOXYzine (ATARAX) 25 MG tablet Take 1 tablet (25 mg total) by mouth every 6 (six) hours. 20 tablet 0     magnesium oxide (MAG-OX) 400 mg tablet Take 1 tablet (400 mg total) by mouth daily. 30 tablet 11     melatonin 3 mg Tab tablet Take 1 tablet (3 mg total) by mouth at bedtime as needed. 100 tablet 3     mirtazapine (REMERON) 15 MG tablet Take 1 tablet (15 mg total) by mouth at bedtime. 30 tablet 3     omeprazole (PRILOSEC) 20 MG capsule Take 2 capsules (40 mg total) by mouth daily. 60 capsule 10     ranitidine (ZANTAC) 150 MG tablet Take 1 tablet (150 mg total) by mouth 2 (two) times a day. 60 tablet 6     acetaminophen 500 mg coapsule Take 2 tablets by mouth 3 (three) times a day as needed for fever or pain.       clopidogrel (PLAVIX) 75 mg tablet Take 1 tablet (75 mg total) by mouth daily. 30 tablet 12     diclofenac (VOLTAREN) 75 MG EC tablet TAKE 1 TABLET(75 MG) BY MOUTH TWICE DAILY 30 tablet 0     hydrocortisone valerate (WEST-OJ) 0.2 % ointment Apply topically 2 (two) times a day. 45 g 0     hydrOXYzine (VISTARIL) 50 MG capsule TAKE 1 CAPSULE(50 MG) BY MOUTH AT BEDTIME 30 capsule 0     lidocaine (XYLOCAINE) 5 % ointment Apply to affected area twice daily 35.44 g 1     polyethylene glycol 3350 8.5 gram PwPk Take 17 g by mouth daily. Use packet one per day 30 packet 4     probenecid-colchicine 500-0.5 mg per tablet TAKE 1 TABLET BY MOUTH DAILY AS DIRECTED 30 tablet 0     probenecid-colchicine 500-0.5 mg per tablet TAKE 1 TABLET BY MOUTH DAILY AS DIRECTED 30 tablet 0     simvastatin (ZOCOR) 40 MG tablet Take 1 tablet (40 mg total) by mouth daily.  0     No current  facility-administered medications for this visit.        Total Data Points: 2

## 2021-06-12 NOTE — TELEPHONE ENCOUNTER
RN cannot approve Refill Request    RN can NOT refill this medication med is not covered by policy/route to provider. Last office visit: 3/3/2020 David Fang MD Last Physical: 8/1/2019 Last MTM visit: Visit date not found Last visit same specialty: 3/3/2020 David Fang MD.  Next visit within 3 mo: Visit date not found  Next physical within 3 mo: Visit date not found      Jessica Reinoso, [facility] Triage/Med Refill 10/3/2020    Requested Prescriptions   Pending Prescriptions Disp Refills     magnesium oxide (MAG-OX) 400 mg (241.3 mg magnesium) tablet [Pharmacy Med Name: MAG-OXIDE 400MG TABLETS] 100 tablet 0     Sig: TAKE 1 TABLET(400 MG) BY MOUTH DAILY       There is no refill protocol information for this order

## 2021-06-12 NOTE — PROGRESS NOTES
ASSESSMENT AND PLAN:  1. Dysthymia (Depressive Neurosis), Primary Currently mood is at baseline.  His daughter does not think he is depressed.  He does participate in activities.    2. Radiculopathy, lumbar region continue with current dose of gabapentin no side effects noted pain control his back is well regulated    3. Moderate Recurrent Major Depression on SSRI    4. Dermatitis continues to scratch an irritated area skin posterior aspect of his neck measuring 2 x 2 centimeters area is excoriated I have given him a prescription for Westcort to be applied to the area also they should take the Atarax to prevent him from scratching at night    5. Memory Lapses Or Loss this is mentioned by his daughter asked her to encourage him to engage in activity especially household tasks to see whether he can remember to do them he does not want to go to a senior care center    6. 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 Constipation refilled medication no current issues noted polyethylene glycol 3350 8.5 gram PwPk       Medications Discontinued During This Encounter   Medication Reason     diclofenac (VOLTAREN) 75 MG EC tablet Duplicate order     hydrOXYzine (VISTARIL) 50 MG capsule Duplicate order     probenecid-colchicine 500-0.5 mg per tablet Duplicate order       No Follow-up on file.    CHIEF COMPLAINT:  Chief Complaint   Patient presents with     Follow-up     stroke       HISTORY OF PRESENT ILLNESS:  Chasity is a 67 y.o. male presenting for a follow-up of stroke. Chasity is present with a Jessica  and daughter.  He is doing physical therapy exercises daily for a short amount of time. The pain over his left side is improving. He notes muscle tightness over his left quadricepts and hamstrings. It appears that the bottom of his lower  left pant leg is frayed as he may not  his left leg fully. He is currently using a walker to ambulate.     Insomnia: He states that he has been sleeping well with the medications.     Abdominal Pain: He notes intermittent abdominal pain. He is having a bowel movement everyday. He denies bloody stools.     Gout: His gout is currently controlled with allopurinol. He notes that he likes to eat pork but knows he cannot.     REVIEW OF SYSTEMS:   He denies vomiting.   All other 10 point review of systems are negative.    PFSH:  Reviewed as below.     TOBACCO USE:  History   Smoking Status     Never Smoker   Smokeless Tobacco     Former User       VITALS:  Vitals:    08/01/17 0913 08/01/17 0916   BP: (!) 138/100 138/80   Patient Site: Left Arm Left Arm   Patient Position: Sitting Sitting   Cuff Size: Adult Regular Adult Regular   Pulse: 76    Resp: 20    Temp: 97.6  F (36.4  C)    TempSrc: Oral    Weight: 119 lb 4 oz (54.1 kg)      Wt Readings from Last 3 Encounters:   08/01/17 119 lb 4 oz (54.1 kg)   06/01/17 115 lb 4 oz (52.3 kg)   05/11/17 112 lb 1 oz (50.8 kg)     Body mass index is 22.53 kg/(m^2).    PHYSICAL EXAM:  General: Alert, cooperative, no distress, appears stated age  Lungs: Clear to auscultation bilaterally, respirations unlabored  Chest wall: No tenderness or deformity  Heart: Regular rate and rhythm, S1 and S2 normal, no murmur, rub, or gallop  Skin: Area of erythema and excoriation present over right side of neck.   Neurologic:  A & O x 3.  No tremor, no focal findings.  Normal gait but not lifting left leg.   Psych oriented ×3 grooming is adequate not agitated fund of knowledge is good  DATA REVIEWED:  Additional History from Old Records Summarized (2): None  Decision to Obtain Records (1): None  Radiology Tests Summarized or Ordered (1): None  Labs Reviewed or Ordered (1): None  Medicine Test Summarized or Ordered (1): None  Independent Review of EKG or X-RAY(2 each): none    The visit lasted a  total of 25minutes face to face with the patient. Over 50% of the time was spent counseling and educating the patient about stroke.     I, Maxi oHdge, am scribing for and in the presence of, Dr. Edouard.    I,yvan edouard, personally performed the services described in this documentation, as scribed by Maxi Hodge in my presence, and it is both accurate and complete.      MEDICATIONS:  Current Outpatient Prescriptions   Medication Sig Dispense Refill     acetaminophen 500 mg coapsule Take 2 tablets by mouth 3 (three) times a day as needed for fever or pain.       albuterol (PROVENTIL HFA;VENTOLIN HFA) 90 mcg/actuation inhaler Inhale 2 puffs 4 (four) times a day. 1 Inhaler 0     atorvastatin (LIPITOR) 20 MG tablet Take 1 tablet (20 mg total) by mouth at bedtime. 90 tablet 2     clopidogrel (PLAVIX) 75 mg tablet Take 1 tablet (75 mg total) by mouth daily. 30 tablet 12     diclofenac (VOLTAREN) 75 MG EC tablet TAKE 1 TABLET(75 MG) BY MOUTH TWICE DAILY 30 tablet 0     DULoxetine (CYMBALTA) 60 MG capsule Take 1 capsule (60 mg total) by mouth daily. 30 capsule 12     ergocalciferol (VITAMIN D2) 50,000 unit capsule TAKE 1 CAPSULE BY MOUTH WEEKLY (Patient taking differently: Take 50,000 Units by mouth once a week. Day of week unknown at this time) 12 capsule 3     gabapentin (NEURONTIN) 300 MG capsule Take 1 capsule (300 mg total) by mouth 3 (three) times a day. 90 capsule 3     hydrOXYzine (ATARAX) 50 MG tablet Take 50 mg by mouth Medrol Dose Pack scheduling ONLY.       magnesium oxide (MAG-OX) 400 mg tablet Take 1 tablet (400 mg total) by mouth daily. 30 tablet 11     melatonin 3 mg Tab tablet Take 1 tablet (3 mg total) by mouth at bedtime as needed. 100 tablet 3     omeprazole (PRILOSEC) 20 MG capsule Take 2 capsules (40 mg total) by mouth daily. 60 capsule 10     probenecid-colchicine 500-0.5 mg per tablet TAKE 1 TABLET BY MOUTH DAILY AS DIRECTED 30 tablet 0     ranitidine (ZANTAC) 150 MG tablet Take 1 tablet  (150 mg total) by mouth 2 (two) times a day. 60 tablet 6     allopurinol (ZYLOPRIM) 300 MG tablet TAKE 1 TABLET BY MOUTH DAILY 90 tablet 3     hydrocortisone valerate (WEST-OJ) 0.2 % ointment Apply topically 2 (two) times a day.       hydrOXYzine (VISTARIL) 50 MG capsule TAKE 1 CAPSULE(50 MG) BY MOUTH AT BEDTIME 30 capsule 0     lidocaine (XYLOCAINE) 5 % ointment Apply to affected area twice daily 35.44 g 1     mirtazapine (REMERON) 15 MG tablet Take 1 tablet (15 mg total) by mouth at bedtime. 30 tablet 3     polyethylene glycol 3350 8.5 gram PwPk Take 17 g by mouth daily. Use packet one per day 30 packet 4     simvastatin (ZOCOR) 40 MG tablet Take 1 tablet (40 mg total) by mouth daily.  0     No current facility-administered medications for this visit.        Total Data Points: 0

## 2021-06-12 NOTE — PROGRESS NOTES
AUDIOLOGY REPORT    SUBJECTIVE: Chasity Gaspar, 70 y.o. year old male, was seen on 10/08/20 for a hearing aid check. He was accompanied by a family member. He has a history of severe to profound sensorineural hearing loss at the right ear and profound immesurable hearing loss at the left ear and was fit with a right Phonak Nanci V50 -UP BTE  hearing aid on 4/2018.     Today, Chasity reports that the right hearing aid is not working.     OBJECTIVE:  Tubing was hard. Tubing changed and listening check revealed clear signal. Gain was increased across all frequencies by 4 dB. Naw reported good volume and sound quality.    ASSESSMENT: Hearing aid check completed. 3 month supply of 675 batteries provided.    PLAN: It is recommended that Jessicaw return for a hearing evaluation within the next 6 months, or sooner should concerns arise. Please call our clinic at [phone] with questions or concerns.    Ward Lentz, [facility]  Clinical Audiologist  [place] #[id number]

## 2021-06-13 NOTE — TELEPHONE ENCOUNTER
Refill Approved    Rx renewed per Medication Renewal Policy. Medication was last renewed on 3/3/20.    Linnea Arauz, [facility] Triage/Med Refill 12/3/2020     Requested Prescriptions   Pending Prescriptions Disp Refills     omeprazole (PRILOSEC) 20 MG capsule [Pharmacy Med Name: OMEPRAZOLE 20MG CAPSULES] 180 capsule 3     Sig: TAKE 2 CAPSULES(40 MG) BY MOUTH DAILY       GI Medications Refill Protocol Passed - 12/2/2020  5:13 AM        Passed - PCP or prescribing provider visit in last 12 or next 3 months.     Last office visit with prescriber/PCP: 3/3/2020 David Fang MD OR same dept: 3/3/2020 David Fang MD OR same specialty: 3/3/2020 David Fang MD  Last physical: 8/1/2019 Last MTM visit: Visit date not found   Next visit within 3 mo: Visit date not found  Next physical within 3 mo: Visit date not found  Prescriber OR PCP: David Fang MD  Last diagnosis associated with med order: 1. Gastroesophageal reflux disease without esophagitis  - omeprazole (PRILOSEC) 20 MG capsule [Pharmacy Med Name: OMEPRAZOLE 20MG CAPSULES]; TAKE 2 CAPSULES(40 MG) BY MOUTH DAILY  Dispense: 180 capsule; Refill: 3    If protocol passes may refill for 12 months if within 3 months of last provider visit (or a total of 15 months).

## 2021-06-13 NOTE — PROGRESS NOTES
[facility] Care Coordination Contact  CC received notification of Emergency Room visit.  ER visit occurred on 12/12/20 at [facility] with Dx of emesis. Member risk level of readmission was low at 11%, no contact needed per guidelines.  Plan of care updated.     Savanna Landaverde RN, PHN  [facility]  [phone]

## 2021-06-14 NOTE — PROGRESS NOTES
"ASSESSMENT AND PLAN:  1. Lumbar Canal Stenosis currently he states his back pain is the same but not worse she is able to sleep is not disturbed in the morning and walks she does want to decrease the level of medication continue gabapentin at current dose.    2. Depressed his daughter mentions that his behaviors changed however he is not withdrawn no crying spells.  He is interactive with his environment continue the Loxitane. mirtazapine (REMERON) 15 MG tablet   3. Chronic pain takes gabapentin, duloxetine for chronic pain no longer on any narcotics. gabapentin (NEURONTIN) 300 MG capsule   4. Constipation refilled polyethylene glycol is constipated  but the medication relieves his symptoms patchy excoriation noted over right scapula.  Clobetasol refill polyethylene glycol 3350 8.5 gram PwPk   5. Dermatitis         CHIEF COMPLAINT:  Chief Complaint   Patient presents with     Follow-up       HISTORY OF PRESENT ILLNESS:  Chasity is a 67 y.o. male presenting for a follow-up. Chasity is present with a Jessica .     Back Pain: He is taking his medications for pain faithfully. He states that his back pain remains unchanged since last visit.     History of Stroke: Daughter states that his personality changed and he is \"not normal\" after his stroke. He sometimes does not answer or pay attention when he is being spoken to. Daughter is certain that he is able to hear her.    Rash: He has been scratching at his rash frequently. He has not been using an ointment to improve his itching.       REVIEW OF SYSTEMS:   Daughter states that he has gotten elevated blood pressure readings with his home nurse. His current blood pressure is 126/82.    All other 10 point review of systems are negative.    PFSH:  Pertinent past, family, social and medical history reviewed.     TOBACCO USE:  History   Smoking Status     Never Smoker   Smokeless Tobacco     Former User       VITALS:  Vitals:    12/12/17 1404   BP: 126/82   Patient Site: Left " Arm   Patient Position: Sitting   Cuff Size: Adult Regular   Pulse: 78   SpO2: 96%   Weight: 123 lb 8 oz (56 kg)     Wt Readings from Last 3 Encounters:   12/12/17 123 lb 8 oz (56 kg)   09/12/17 124 lb 7 oz (56.4 kg)   08/16/17 100 lb (45.4 kg)     Body mass index is 23.34 kg/(m^2).    PHYSICAL EXAM:  General: Alert, cooperative, no distress, appears stated age  Head: Normocephalic, without obvious abnormality, atraumatic  Lungs: Clear to auscultation bilaterally, respirations unlabored  Chest wall: No tenderness or deformity  Heart: Regular rate and rhythm, S1 and S2 normal, no murmur, rub, or gallop  CVS: No edema noted.   Abdomen: Soft, non tender, bowel sounds active all four quadrants, no masses, no organomegaly.  Neurologic: No tremor, no focal findings.    Skin: Area of hyperpigmentation and erythema present over right trapezius.   Psych: Oriented x3. Affect normal.     DATA REVIEWED:  Additional History from Old Records Summarized (2): None  Decision to Obtain Records (1): None  Radiology Tests Summarized or Ordered (1): None  Labs Reviewed or Ordered (1): None  Medicine Test Summarized or Ordered (1): None  Independent Review of EKG or X-RAY(2 each): None    The visit lasted a total of 12 minutes face to face with the patient. Over 50% of the time was spent counseling and educating the patient about follow-up.     IMaxi, am scribing for and in the presence of, Dr. Fang.    Iyvan, personally performed the services described in this documentation, as scribed by Maxi Hodge in my presence, and it is both accurate and complete.      MEDICATIONS:  Current Outpatient Prescriptions   Medication Sig Dispense Refill     allopurinol (ZYLOPRIM) 300 MG tablet TAKE 1 TABLET BY MOUTH DAILY 90 tablet 3     atorvastatin (LIPITOR) 20 MG tablet Take 1 tablet (20 mg total) by mouth at bedtime. 90 tablet 2     clopidogrel (PLAVIX) 75 mg tablet Take 1 tablet (75 mg total) by mouth daily. 30 tablet  12     diclofenac (VOLTAREN) 75 MG EC tablet TAKE 1 TABLET(75 MG) BY MOUTH TWICE DAILY 30 tablet 0     diclofenac (VOLTAREN) 75 MG EC tablet TAKE 1 TABLET(75 MG) BY MOUTH TWICE DAILY 30 tablet 0     DULoxetine (CYMBALTA) 60 MG capsule Take 1 capsule (60 mg total) by mouth daily. 30 capsule 12     ergocalciferol (ERGOCALCIFEROL) 50,000 unit capsule TAKE 1 CAPSULE BY MOUTH WEEKLY 12 capsule 0     gabapentin (NEURONTIN) 300 MG capsule TAKE 1 CAPSULE(300 MG) BY MOUTH THREE TIMES DAILY 270 capsule 0     hydrOXYzine (ATARAX) 25 MG tablet TAKE 1 TABLET BY MOUTH EVERY 6 HOURS AS DIRECTED 20 tablet 0     magnesium oxide (MAG-OX) 400 mg tablet Take 1 tablet (400 mg total) by mouth daily. 30 tablet 11     melatonin 3 mg Tab tablet Take 1 tablet (3 mg total) by mouth at bedtime as needed. 100 tablet 3     mirtazapine (REMERON) 15 MG tablet TAKE 1 TABLET(15 MG) BY MOUTH AT BEDTIME 90 tablet 1     omeprazole (PRILOSEC) 20 MG capsule TAKE 2 CAPSULES(40 MG) BY MOUTH DAILY 60 capsule 11     ranitidine (ZANTAC) 150 MG tablet Take 1 tablet (150 mg total) by mouth 2 (two) times a day. 60 tablet 6     acetaminophen 500 mg coapsule Take 2 tablets by mouth 3 (three) times a day as needed for fever or pain.       albuterol (PROVENTIL HFA;VENTOLIN HFA) 90 mcg/actuation inhaler Inhale 2 puffs 4 (four) times a day. 1 Inhaler 0     clobetasol (TEMOVATE) 0.05 % ointment Apply to affected area twice daily 30 g 2     hydrOXYzine (VISTARIL) 50 MG capsule TAKE 1 CAPSULE(50 MG) BY MOUTH AT BEDTIME 30 capsule 0     lidocaine (XYLOCAINE) 5 % ointment Apply to affected area twice daily 35.44 g 1     polyethylene glycol 3350 8.5 gram PwPk Take 17 g by mouth daily. Use packet one per day 30 packet 4     probenecid-colchicine 500-0.5 mg per tablet TAKE 1 TABLET BY MOUTH DAILY AS DIRECTED 30 tablet 0     probenecid-colchicine 500-0.5 mg per tablet TAKE 1 TABLET BY MOUTH DAILY AS DIRECTED 30 tablet 0     simvastatin (ZOCOR) 40 MG tablet Take 1 tablet (40 mg  total) by mouth daily.  0     No current facility-administered medications for this visit.        Total Data Points: 0

## 2021-06-14 NOTE — TELEPHONE ENCOUNTER
RN cannot approve Refill Request    RN can NOT refill this medication Protocol failed and NO refill given. Last office visit: 3/3/2020 David Fang MD Last Physical: 8/1/2019 Last MTM visit: Visit date not found Last visit same specialty: 3/3/2020 David Fang MD.  Next visit within 3 mo: Visit date not found  Next physical within 3 mo: Visit date not found      Libertad Ritter, [facility] Triage/Med Refill 1/1/2021    Requested Prescriptions   Pending Prescriptions Disp Refills     magnesium oxide (MAG-OX) 400 mg (241.3 mg magnesium) tablet [Pharmacy Med Name: MAG-OXIDE 400MG TABLETS] 100 tablet 0     Sig: TAKE 1 TABLET(400 MG) BY MOUTH DAILY       There is no refill protocol information for this order

## 2021-06-14 NOTE — TELEPHONE ENCOUNTER
RN cannot approve Refill Request    RN can NOT refill this medication PCP messaged that patient is overdue for Office Visit. Last office visit: 3/3/2020 David Fang MD Last Physical: 8/1/2019 Last MTM visit: Visit date not found Last visit same specialty: 3/3/2020 David Fang MD.  Next visit within 3 mo: Visit date not found  Next physical within 3 mo: Visit date not found      Libertad Ritter, [facility] Triage/Med Refill 1/31/2021    Requested Prescriptions   Pending Prescriptions Disp Refills     clopidogreL (PLAVIX) 75 mg tablet [Pharmacy Med Name: CLOPIDOGREL 75MG TABLETS] 30 tablet 4     Sig: TAKE 1 TABLET(75 MG) BY MOUTH DAILY       Clopidogrel/Prasugrel/Ticagrelor Refill Protocol Failed - 1/31/2021  6:05 AM        Failed - PCP or prescribing provider visit in past 6 months       Last office visit with prescriber/PCP: Visit date not found OR same dept: 3/3/2020 David Fang MD OR same specialty: 3/3/2020 David Fang MD Last physical: Visit date not found Last MTM visit: Visit date not found     Next appt within 3 mo: Visit date not found  Next physical within 3 mo: Visit date not found  Prescriber OR PCP: David Fang MD  Last diagnosis associated with med order: 1. Cerebrovascular accident (CVA) due to thrombosis of basilar artery (H)  - clopidogreL (PLAVIX) 75 mg tablet [Pharmacy Med Name: CLOPIDOGREL 75MG TABLETS]; TAKE 1 TABLET(75 MG) BY MOUTH DAILY  Dispense: 30 tablet; Refill: 4    If protocol passes may refill for 6 months if within 3 months of last provider visit (or a total of 9 months).              Passed - Hemoglobin in past 12 months     Hemoglobin   Date Value Ref Range Status   12/15/2020 15.9 14.0 - 18.0 g/dL Final

## 2021-06-15 PROBLEM — I63.421: Status: ACTIVE | Noted: 2017-04-27

## 2021-06-15 PROBLEM — G81.94 LEFT HEMIPARESIS (H): Status: ACTIVE | Noted: 2017-04-28

## 2021-06-15 NOTE — PROGRESS NOTES
[facility] Care Coordination  [facility] Home Visit Assessment     Home visit for Health Risk Assessment with Chasity Gaspar completed on 2/11/2021    Type of residence:: Private home - stairs  Current living arrangement:: I live in a private home     Assessment completed with: Children    Current Care Plan  Member currently receiving the following home care services:     Member currently receiving the following community resources: PCA        Medication Review  Medication reconciliation completed in Epic: IF NO, PLEASE EXPLAIN COVID-19 Restricitions-telephonic visit  Medication set-up & administration: Family/informal caregiver sets up weekly and RN set up weekly  Family caregiver administers medications  Medication Risk Assessment Medication (1 or more, place referral to MTM):  N/A: No risk factors identified  MTM Referral Placed: No: No risk factors idenified    Mental/Behavioral Health   Depression Screening:            Mental health DX:: Yes(F33.1)   Mental health DX how managed:: Medication    Falls Assessment:   Fallen 2 or more times in the past year?: No   Any fall with injury in the past year?: No    ADL/IADL Dependencies:   Dependent ADLs:: Ambulation-walker, Bathing, Dressing, Eating, Grooming, Incontinence, Positioning, Transfers, Wheelchair-with assist, Toileting  Dependent IADLs:: Cleaning, Cooking, Laundry, Shopping, Meal Preparation, Medication Management, Money Management, Transportation, Incontinence    [facility] Plan sponsored benefits: Shared information re: Silver Sneakers/gym memberships, ASA, Calcium +D.    PCA Assessment completed at visit: Yes Annual PCA assessment indicated 44 units per day of PCA. This is the same as the previous assessment.     Elderly Waiver Eligibility: Yes, but member declines EW service; will not open to EW    Care Plan & Recommendations:     See [facility] for detailed assessment information.    Follow-Up Plan: Member informed of future contact, plan to  f/u with member with a 6 month telephone assessment.  Contact information shared with member and family, encouraged member to call with any questions or concerns at any time.    [place] care continuum providers: Please refer to Health Care Home on the Epic Problem List to view this patient's [facility] Care Plan Summary.    Savanna Landaverde RN, PHN  [facility]  [phone]

## 2021-06-15 NOTE — PROGRESS NOTES
Hearing Aid Batteries:    Mailed a 90 day supply (3 packages) of size 675 batteries to patient. Patient not seen in office today.     Dante Marte, CCC-A  [place] Licensed Audiologist #[id number]

## 2021-06-15 NOTE — PROGRESS NOTES
[facility] Care Coordination    [facility] Care Coordination    Received after visit chart from care coordinator.  Completed following tasks:    Mailed copy of care plan to client.     [facility]:  Emailed completed PCA assessment to [facility].  Faxed copy of PCA assessment to PCA Agency and mailed copy to member.  Faxed MD Communication to PCP.     Mailed: POC Sig page, PCA Sig page, and CEDRICK form to member with a stamped  return envelope.    Mail Honoring Choices letter in EPIC (ACP Resources or ACP Review of Record  HCD Standard & Goals Worksheet  Educational Materials: general guide,    Van Jordin  Care Management Specialist  [facility]  [phone]

## 2021-06-15 NOTE — TELEPHONE ENCOUNTER
Refill Approved    Rx renewed per Medication Renewal Policy. Medication was last renewed on 2/13/20.    Deniz Arboleda, [facility] Triage/Med Refill 3/2/2021     Requested Prescriptions   Pending Prescriptions Disp Refills     gabapentin (NEURONTIN) 300 MG capsule [Pharmacy Med Name: GABAPENTIN 300MG CAPSULES] 270 capsule 3     Sig: TAKE 1 CAPSULE(300 MG) BY MOUTH THREE TIMES DAILY       Gabapentin/Levetiracetam/Tiagabine Refill Protocol  Passed - 3/2/2021  5:14 AM        Passed - PCP or prescribing provider visit in past 12 months or next 3 months     Last office visit with prescriber/PCP: 3/3/2020 David Fang MD OR same dept: 3/3/2020 David Fang MD OR same specialty: 3/3/2020 David Fang MD  Last physical: 8/1/2019 Last MTM visit: Visit date not found   Next visit within 3 mo: Visit date not found  Next physical within 3 mo: Visit date not found  Prescriber OR PCP: David Fang MD  Last diagnosis associated with med order: 1. Chronic pain  - gabapentin (NEURONTIN) 300 MG capsule [Pharmacy Med Name: GABAPENTIN 300MG CAPSULES]; TAKE 1 CAPSULE(300 MG) BY MOUTH THREE TIMES DAILY  Dispense: 270 capsule; Refill: 3    If protocol passes may refill for 12 months if within 3 months of last provider visit (or a total of 15 months).

## 2021-06-16 PROBLEM — H90.3 ASNHL (ASYMMETRICAL SENSORINEURAL HEARING LOSS): Status: ACTIVE | Noted: 2018-04-20

## 2021-06-16 NOTE — PROGRESS NOTES
ASSESSMENT AND PLAN:  1. Constipation takes polyethylene glycol when needed and cautioned family that he needs to take his medication daily as the gabapentin will cause constipation.  Medications been refilled polyethylene glycol 3350 8.5 gram PwPk   2. Radiculopathy, lumbar region back pain regular with the use of gabapentin in the NSAID no side effects noted    3. Moderate Recurrent Major Depression     4. Memory Lapses Or Loss daughter is concerned about memory changes and personality changes.  Apparently she feels that over the last 2 months she is behaving like a child.  Today he evidenced no abnormal behavior but should be noted that he is lost his hearing aids.  No other cognitive changes noted today    5. Lumbar Canal Stenosis appears to be some discrepancies with the medications and will contact the home nurse to see if there are any issues with dosing.  Follow-up in 6 weeks recommended.      6.  Past history of lacunar stroke no new symptoms noted taking Plavix.  CHIEF COMPLAINT:  Chief Complaint   Patient presents with     Follow-up       HISTORY OF PRESENT ILLNESS:  Chasity is a 68 y.o. male presenting for a follow-up. Chasity is present with a Jessica .     Medication Management: Daughter states that she has a nurse that comes weekly to set up his medications. It appears that the nurse consolidated all of the old prescription tablets into similar bottles as there are different  pills of the same medications. Daughter is not sure what company the nurse is from.     Hearing Loss: He states that his hearing aid has already broken. He has an appointment and audiology on 3/27/2018.     Gastritis: He is taking omeprazole and ranitidine. He notes that his abdominal pain is currently controlled.     Stroke: Daughter is wondering if a stroke can cause mental abnormalities. He often plays with his granddaughter's toys and watches cartoons. Daughter feels that he somewhat acts like a child which he  did not do prior to his stroke. He states he does not know his current age but knows he is a man.     REVIEW OF SYSTEMS:   He notes that he is experiencing slight back pain.   All other 10 point review of systems are negative.    PFSH:  . Pertinent past, family, social and medical history reviewed.     TOBACCO USE:  History   Smoking Status     Never Smoker   Smokeless Tobacco     Former User       VITALS:  Vitals:    03/14/18 0927 03/14/18 0929   BP: (!) 128/98 (!) 120/96   Patient Site: Right Arm Right Arm   Patient Position: Sitting Sitting   Cuff Size: Adult Regular Adult Regular   Pulse: 88    Temp: 97.4  F (36.3  C)    TempSrc: Oral    SpO2: 97%    Weight: 122 lb 9 oz (55.6 kg)      Wt Readings from Last 3 Encounters:   03/14/18 122 lb 9 oz (55.6 kg)   12/12/17 123 lb 8 oz (56 kg)   09/12/17 124 lb 7 oz (56.4 kg)     Body mass index is 23.16 kg/(m^2).    PHYSICAL EXAM:  General: Alert, cooperative, no distress, appears stated age  Head: Normocephalic, without obvious abnormality, atraumatic  Musculoskeletal: Tenderness over lumbar spine.   Lungs: Clear to auscultation bilaterally, respirations unlabored  Chest wall: No tenderness or deformity  Heart: Regular rate and rhythm, S1 and S2 normal, no murmur, rub, or gallop  CVS: No edema noted.   Neurologic: No tremor, no focal findings.     Psych: Oriented x3. Affect normal.     DATA REVIEWED:  Additional History from Old Records Summarized (2): None  Decision to Obtain Records (1): None  Radiology Tests Summarized or Ordered (1): none  Labs Reviewed or Ordered (1): None  Medicine Test Summarized or Ordered (1): None  Independent Review of EKG or X-RAY(2 each): None    The visit lasted a total of 26 minutes face to face with the patient. Over 50% of the time was spent counseling and educating the patient about follow-up.     Maxi SAUNDERS, am scribing for and in the presence of, Dr. Fang.    yvan SAUNDERS, personally performed the services described in  this documentation, as scribed by Maxi Hodge in my presence, and it is both accurate and complete.      MEDICATIONS:  Current Outpatient Prescriptions   Medication Sig Dispense Refill     acetaminophen 500 mg coapsule Take 2 tablets by mouth 3 (three) times a day as needed for fever or pain.       albuterol (PROVENTIL HFA;VENTOLIN HFA) 90 mcg/actuation inhaler Inhale 2 puffs 4 (four) times a day. 1 Inhaler 0     allopurinol (ZYLOPRIM) 300 MG tablet TAKE 1 TABLET BY MOUTH DAILY 90 tablet 3     atorvastatin (LIPITOR) 20 MG tablet Take 1 tablet (20 mg total) by mouth at bedtime. 90 tablet 2     clobetasol (TEMOVATE) 0.05 % ointment Apply to affected area twice daily 30 g 2     clopidogrel (PLAVIX) 75 mg tablet Take 1 tablet (75 mg total) by mouth daily. 30 tablet 12     diclofenac (VOLTAREN) 75 MG EC tablet TAKE 1 TABLET(75 MG) BY MOUTH TWICE DAILY 30 tablet 0     diclofenac (VOLTAREN) 75 MG EC tablet TAKE 1 TABLET(75 MG) BY MOUTH TWICE DAILY 30 tablet 0     DULoxetine (CYMBALTA) 60 MG capsule Take 1 capsule (60 mg total) by mouth daily. 30 capsule 12     DULoxetine (CYMBALTA) 60 MG capsule Take 1 capsule (60 mg total) by mouth daily. 90 capsule 2     ergocalciferol (ERGOCALCIFEROL) 50,000 unit capsule TAKE 1 CAPSULE BY MOUTH WEEKLY 12 capsule 0     gabapentin (NEURONTIN) 300 MG capsule TAKE 1 CAPSULE(300 MG) BY MOUTH THREE TIMES DAILY 270 capsule 6     gabapentin (NEURONTIN) 300 MG capsule TAKE 1 CAPSULE(300 MG) BY MOUTH THREE TIMES DAILY 270 capsule 4     hydrOXYzine (ATARAX) 25 MG tablet TAKE 1 TABLET BY MOUTH EVERY 6 HOURS AS DIRECTED 20 tablet 0     hydrOXYzine (VISTARIL) 50 MG capsule TAKE 1 CAPSULE(50 MG) BY MOUTH AT BEDTIME 30 capsule 6     lidocaine (XYLOCAINE) 5 % ointment Apply to affected area twice daily 35.44 g 1     magnesium oxide (MAG-OX) 400 mg tablet Take 1 tablet (400 mg total) by mouth daily. 30 tablet 11     melatonin 3 mg Tab tablet Take 1 tablet (3 mg total) by mouth at bedtime as  needed. 100 tablet 3     mirtazapine (REMERON) 15 MG tablet TAKE 1 TABLET(15 MG) BY MOUTH AT BEDTIME 90 tablet 5     omeprazole (PRILOSEC) 20 MG capsule TAKE 2 CAPSULES(40 MG) BY MOUTH DAILY 60 capsule 11     polyethylene glycol 3350 8.5 gram PwPk Take 17 g by mouth daily. Use packet one per day 30 packet 4     probenecid-colchicine 500-0.5 mg per tablet TAKE 1 TABLET BY MOUTH DAILY AS DIRECTED 30 tablet 0     probenecid-colchicine 500-0.5 mg per tablet TAKE 1 TABLET BY MOUTH DAILY AS DIRECTED 30 tablet 0     ranitidine (ZANTAC) 150 MG tablet Take 1 tablet (150 mg total) by mouth 2 (two) times a day. 60 tablet 6     simvastatin (ZOCOR) 40 MG tablet Take 1 tablet (40 mg total) by mouth daily.  0     No current facility-administered medications for this visit.        Total Data Points: 0

## 2021-06-16 NOTE — TELEPHONE ENCOUNTER
Batteries were sent out today.     Hiral Riggs CMA ([facility])    [facility]    Ph. [phone]  F. [phone]

## 2021-06-16 NOTE — PROGRESS NOTES
A 90 day supply of hearing aid batteries were sent to this patient today. He should contact the clinic with concerns. The patient was not seen by a provider/staff member today.    Size 675  # of Packages  5    Tracking # [id number]    Hiral Riggs CMA ([facility]    Ph. [phone]  F. [phone]

## 2021-06-16 NOTE — PROGRESS NOTES
Audiology Report:    Referring Provider:  David Fang MD    History:  Chasity Gaspar is seen today for comprehensive hearing evaluation. He has a history of profound hearing loss in his left ear and severe to profound hearing loss in his right ear. He currently uses a BTE hearing aid in his right ear only. He reports his hearing aid isn't working and would like a new hearing aid and earmold. He denies any new concerns with his ears. He does suspect that his hearing has decreased in his right ear since his last evaluation in October 2016. He is accompanied to today's appointment by a male family member and a Jessica .       Results:     Left Ear Right Ear   Otoscopy  clear canals clear canals   Pure Tone Audiometry Profound, possibly sensorineural hearing loss. No response @ limit of audiometer 250-8000 Hz   Severe to profound sensorineural hearing loss.    Word Recognition CNT due to severity of hearing loss CNT: No response @ limit of audiometer   Tympanometry normal (Type A)  normal (Type A)     Transducer: Circumaural headphones    Reliability was good  and there was good  SDT to PTA agreement via Jessica .     The right hearing aid was re-tubed and found to be working. Patient reports he is dependant on his hearing aid and would like to obtain a new device for his right ear. Discussed different styles available, and will proceed with a fitting of a Right Phonak Nanci UP BTE hearing aid coupled to a skeleton silicone earmold through his insurance per the verification letter dated 1/16/2018 in the media tab. An impression was taken of the right ear without incident. Otoscopy revealed a clear canal post impression.     Plan:  Results are discussed in detail.  Recommended that he return for an ENT appointment to obtain updated medical clearance for his new hearing aid. Scheduled patient with an ENT visit and then for a hearing aid fitting appointment.  Patient is in verbal agreement with today's plan.      Please see audiogram under  media  and  audiogram  in the patient s chart.     Dante Marte, CCC-A  [place] Licensed Audiologist #[id number]

## 2021-06-16 NOTE — TELEPHONE ENCOUNTER
Request for Hearing Aid Supplies    Caller's Name: Chasity Gaspar  Caller's Phone Number:[phone]    Supplies Needed:  Battery Size: Caller did not know size      Method of Receiving: Mailed to   [address] [place] [address]    Comments:

## 2021-06-16 NOTE — PROGRESS NOTES
Assessment and Plan:     Patient has been advised of split billing requirements and indicates understanding: Yes  1. Wellness examination    - Lipid Cascade RANDOM  - Basic Metabolic Panel    2. ASNHL (asymmetrical sensorineural hearing loss)      3. Essential Hypertriglyceridemia      4. Benign essential hypertension       The patient's current medical problems were reviewed.    The following high BMI interventions were performed this visit: encouragement to exercise  The following health maintenance schedule was reviewed with the patient and provided in printed form in the after visit summary:   Health Maintenance Due   Topic Date Due     DEPRESSION ACTION PLAN  Never done     COVID-19 Vaccine (1) Never done     HEPATITIS B VACCINES (2 of 3 - Risk 3-dose series) 09/12/2007     ZOSTER VACCINES (1 of 2) Never done     ADVANCE CARE PLANNING  12/13/2015        Subjective:   Chief Complaint: Chasity Gaspar is an 71 y.o. male here for an Annual Wellness visit.   HPI:    71-year-old gentleman here with his son for his annual wellness visit he is not fasting today.    Review of Systems:    HEENT positive for poor hearing otherwise patient states that 10 point review of systems is negative please see above.  The rest of the review of systems are negative for all systems.    Patient Care Team:  David Fang MD as PCP - General  Savanna Landaverde RN as Lead Care Coordinator (Primary Care - CC)  David Fang MD as Assigned PCP     Patient Active Problem List   Diagnosis     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     Abdominal Pain In The Right Lower Belly (RLQ)     Abdominal Pain In The Right Upper Belly (RUQ)     Memory Lapses Or Loss     Hyperlipidemia     Hearing Loss     Lacunar Stroke     Spinal Stenosis     Radiculopathy, lumbar region     Cervicalgia     Dysthymia (Depressive Neurosis), Primary     Essential Hypertriglyceridemia     Hiatal Hernia     Lumbar Canal Stenosis     Lower Back Pain     Vitamin D Deficiency      Moderate Recurrent Major Depression     Esophageal reflux     Gastritis     Constipation     Gout     Dermatitis     Elbow swelling, right     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)     Left hemiparesis (H)     ASNHL (asymmetrical sensorineural hearing loss)     Elevated liver enzymes     Past Medical History:   Diagnosis Date     Cervicalgia      Depression      Dyslipidemia      Dysthymia      Gastritis      GERD (gastroesophageal reflux disease)      Hearing loss      Hiatal hernia      Hypertriglyceridemia      Insomnia      Lacunar stroke (H)      Lumbar canal stenosis      Lumbar radiculopathy      Myalgia and myositis       Past Surgical History:   Procedure Laterality Date     [facility]  4/28/2017          ME APPENDECTOMY      Description: Appendectomy;  Recorded: 07/12/2013;     ME ARTHRODESIS ANT INTERBODY MIN DISCECTOMY,LUMBAR      Description: Lumbar Vertebral Fusion;  Recorded: 07/16/2013;  Comments: 3/17/11 spinal decompression and fusion L4-5, L5-S1 for spinal stenosis, DDD, spondylolysis; Dr. Casillas [facility]     ME ESOPHAGOGASTRODUODENOSCOPY TRANSORAL DIAGNOSTIC      Description: Esophagogastroduodenoscopy;  Proc Date: 04/02/2012;  Comments: biosies:   reactive gastropathy with chronic superimposed nonspecific chronic inflammation (likely secondary to ASA, NSAIDS, EtOH or other irritants), NEG for h. pylori; small hiatal hernia     ME NJX AA&/STRD TFRML EPI LUMBAR/SACRAL 1 LEVEL      Description: Nerve Block Transforaminal Epidural Lumbar L3 - L4;  Recorded: 07/10/2012;  Comments: 7/2/2012 for severe low back pain, spinal stenosis and radiculopathy     THROMBECTOMY  04/24/2017    Rt CELIA     US ASPIRATION OR INJECTION MAJOR JOINT  10/23/2019      No family history on file.   Social History     Socioeconomic History     Marital status:      Spouse name: Not on file     Number of children: Not on file     Years of education: Not on file     Highest education  level: Not on file   Occupational History     Not on file   Social Needs     Financial resource strain: Not on file     Food insecurity     Worry: Not on file     Inability: Not on file     Transportation needs     Medical: Not on file     Non-medical: Not on file   Tobacco Use     Smoking status: Never Smoker     Smokeless tobacco: Former User   Substance and Sexual Activity     Alcohol use: No     Drug use: No     Sexual activity: Not on file   Lifestyle     Physical activity     Days per week: Not on file     Minutes per session: Not on file     Stress: Not on file   Relationships     Social connections     Talks on phone: Not on file     Gets together: Not on file     Attends Confucianist service: Not on file     Active member of club or organization: Not on file     Attends meetings of clubs or organizations: Not on file     Relationship status: Not on file     Intimate partner violence     Fear of current or ex partner: Not on file     Emotionally abused: Not on file     Physically abused: Not on file     Forced sexual activity: Not on file   Other Topics Concern     Not on file   Social History Narrative     Not on file      Current Outpatient Medications   Medication Sig Dispense Refill     allopurinoL (ZYLOPRIM) 300 MG tablet Take 1 tablet (300 mg total) by mouth daily. 90 tablet 3     atorvastatin (LIPITOR) 40 MG tablet TAKE 1 TABLET(40 MG) BY MOUTH AT BEDTIME 90 tablet 3     clopidogreL (PLAVIX) 75 mg tablet TAKE 1 TABLET(75 MG) BY MOUTH DAILY 30 tablet 1     DULoxetine (CYMBALTA) 60 MG capsule TAKE 1 CAPSULE(60 MG) BY MOUTH DAILY 90 capsule 3     gabapentin (NEURONTIN) 300 MG capsule TAKE 1 CAPSULE(300 MG) BY MOUTH THREE TIMES DAILY 270 capsule 1     magnesium oxide (MAG-OX) 400 mg (241.3 mg magnesium) tablet TAKE 1 TABLET(400 MG) BY MOUTH DAILY 100 tablet 0     metoprolol tartrate (LOPRESSOR) 25 MG tablet TAKE 1 TABLET BY MOUTH TWICE DAILY 180 tablet 3     omeprazole (PRILOSEC) 20 MG capsule TAKE 2  "CAPSULES(40 MG) BY MOUTH DAILY 180 capsule 1     acetaminophen 500 mg coapsule Take 2 tablets by mouth 3 (three) times a day as needed for fever or pain.       clobetasol (TEMOVATE) 0.05 % ointment Apply 1 application topically 2 (two) times a day as needed.       diclofenac (VOLTAREN) 75 MG EC tablet TAKE 1 TABLET(75 MG) BY MOUTH TWICE DAILY 60 tablet 0     ergocalciferol (ERGOCALCIFEROL) 1,250 mcg (50,000 unit) capsule TAKE 1 CAPSULE BY MOUTH WEEKLY 12 capsule 3     hydrOXYzine pamoate (VISTARIL) 50 MG capsule TAKE 1 CAPSULE BY MOUTH AT BEDTIME 30 capsule 10     meclizine (ANTIVERT) 25 mg tablet Take 1 tablet (25 mg total) by mouth 3 (three) times a day as needed. 30 tablet 0     melatonin 3 mg Tab tablet TAKE 1 TABLET(3 MG) BY MOUTH AT BEDTIME AS NEEDED (Patient not taking: Reported on 3/3/2020) 100 tablet 2     mirtazapine (REMERON) 15 MG tablet TAKE 1 TABLET(15 MG) BY MOUTH AT BEDTIME 90 tablet 3     polyethylene glycol (MIRALAX) 17 gram packet USE 1 PACKET MIXED AS DIRECTED PER  each 3     No current facility-administered medications for this visit.       Objective:   Vital Signs:   Visit Vitals  /90   Pulse 80   Temp 97.8  F (36.6  C) (Oral)   Resp 18   Ht 4' 10.75\" (1.492 m)   Wt 125 lb (56.7 kg)   BMI 25.46 kg/m           VisionScreening:  No exam data present     PHYSICAL EXAM  /90   Pulse 80   Temp 97.8  F (36.6  C) (Oral)   Resp 18   Ht 4' 10.75\" (1.492 m)   Wt 125 lb (56.7 kg)   BMI 25.46 kg/m      General Appearance:    Alert, cooperative, no distress, appears stated age   Head:    Normocephalic, without obvious abnormality, atraumatic   Eyes:    PERRL, conjunctiva/corneas clear, EOM's intact, fundi     benign, both eyes        Ears:    Normal TM's and external ear canals, both ears   Nose:   Nares normal, septum midline, mucosa normal, no drainage    or sinus tenderness   Throat:   Lips, mucosa, and tongue normal; teeth and gums normal   Neck:   Supple, symmetrical, trachea " midline, no adenopathy;        thyroid:  No enlargement/tenderness/nodules; no carotid    bruit or JVD   Back:     Symmetric, no curvature, ROM normal, no CVA tenderness   Lungs:     Clear to auscultation bilaterally, respirations unlabored   Chest wall:    No tenderness or deformity   Heart:    Regular rate and rhythm, S1 and S2 normal, no murmur, rub    or gallop   Abdomen:     Soft, non-tender, bowel sounds active all four quadrants,     no masses, no organomegaly   Genitalia:    Normal male without lesion, discharge or tenderness   Rectal:    Normal tone, normal prostate, no masses or tenderness;    guaiac negative stool   Extremities:   Extremities normal, atraumatic, no cyanosis or edema   Pulses:   2+ and symmetric all extremities   Skin:   Skin color, texture, turgor normal, no rashes or lesions   Lymph nodes:   Cervical, supraclavicular, and axillary nodes normal   Neurologic:   CNII-XII intact. Normal strength, sensation and reflexes       throughout        Assessment Results 4/1/2021   Activities of Daily Living No help needed   Instrumental Activities of Daily Living No help needed   Mini Cog Total Score 1   Some recent data might be hidden     A Mini-Cog score of 0-2 suggests the possibility of dementia, score of 3-5 suggests no dementia    Identified Health Risks:     He is at risk for lack of exercise and has been provided with information to increase physical activity for the benefit of his well-being.  The patient reports that he does not have all recommended working emergency equipment available. He was provided with information about emergency preparedness, including smoke detectors.  The patient was provided with written information regarding signs of hearing loss.  Information regarding advance directives (living camara), including where he can download the appropriate form, was provided to the patient via the AVS.       The patient was provided with appropriate referrals to address his memory  problem.

## 2021-06-17 NOTE — PROGRESS NOTES
"Hearing Aid Fitting    The patient was seen today for a hearing aid fitting. He is accompanied by a male family member and a Jessica . He has a history of hearing aid use on his right ear only.   He has been medically cleared for devices from Dr. Almaraz. He was fit with a  Right Phonak Nanci V50 UP BTE hearing aid coupled to a silicone skeleton Unitron earmold.  Real ear measurements revealed a fairly good match to NAL-NL 2 targets from 250-2000 Hz with Sound Recover enabled.     Hearing aid:  : Phonak  Devices:  Nanci V50 UP  Style:  BTE  Serial numbers:  [id number]  Batteries:  675. A 90 day supply (3 packages)  of batteries were dispensed today.     Otoscopy reveals no occlusions, bilaterally.  The patient reports a noted distortion when others are talking similar to the \"sss\" and \"shh\" sound. Turned off SoundRecover which resulted in improved sound quality per [place]. Use, care, trial period and realistic expectations were reviewed in detail.  Push button is set to off and the volume control switch is active.  He is able to demonstrate independent manipulation of the instruments with battery care and insertion/removal.  He is given written instruction on use, care, and warranty.  He sets up a follow up appointment in 2 weeks.    The patient verbalized understanding and is in agreement with this plan.    Dante Marte, CCC-A  [place] Licensed Audiologist #[id number]    "

## 2021-06-17 NOTE — PROGRESS NOTES
"ASSESSMENT AND PLAN:  1. Gastritis denies complaints taking omeprazole taking ranitidine appetites normal no burning sensation noted    2. Gout currently asymptomatic no monoarticular joint pain noted    3. Hyperlipidemia rechecking cholesterol today Comprehensive Metabolic Panel    Lipid Profile   4. Moderate Recurrent Major Depression mood normal.  Denies any crying spells.  Sleeping well.  Engaged at home.    5. Depressed  mirtazapine (REMERON) 15 MG tablet       CHIEF COMPLAINT:  Chief Complaint   Patient presents with     Hyperlipidemia       HISTORY OF PRESENT ILLNESS:  Chasity is a 68 y.o. male presenting follow-up. Chasity is present with a Jessica .     Hearing Loss: He had a hearing aid fitting done with Dr. Sibley yesterday. He notes that his hearing has improved with the hearing aid.     Gastritis: He states that his epigastric abdominal pain is currently controlled with medication.     Back Pain: He states that his back pain is somewhat controlled with medication. He is currently using a cane to ambulate.     REVIEW OF SYSTEMS:   He states that since yesterday he has been feeling fatigued. His blood pressure today is 144/100. He has been normotensive at previous visits.   He endorses a cough.   All other 10 point review of systems are negative.    PFSH:  . Pertinent past, family, social and medical history reviewed.     TOBACCO USE:  History   Smoking Status     Never Smoker   Smokeless Tobacco     Former User       VITALS:  Vitals:    04/25/18 0927   BP: (!) 144/100   Patient Site: Left Arm   Patient Position: Sitting   Cuff Size: Adult Regular   Pulse: 82   Resp: 16   Temp: 98  F (36.7  C)   TempSrc: Oral   SpO2: 99%   Weight: 126 lb 1 oz (57.2 kg)   Height: 5' 1\" (1.549 m)     Wt Readings from Last 3 Encounters:   04/25/18 126 lb 1 oz (57.2 kg)   03/14/18 122 lb 9 oz (55.6 kg)   12/12/17 123 lb 8 oz (56 kg)     Body mass index is 23.82 kg/(m^2).    PHYSICAL EXAM:  General: Alert, cooperative, no " distress, appears stated age  Lungs: Clear to auscultation bilaterally, respirations unlabored  Chest wall: No tenderness or deformity  Heart: Regular rate and rhythm, S1 and S2 normal, no murmur, rub, or gallop  CVS: No edema noted.   Abdomen: Soft, non tender, bowel sounds active all four quadrants, no masses, no organomegaly.  Neurologic: No tremor, no focal findings.     Psych: Oriented x3. Affect normal.     DATA REVIEWED:  Additional History from Old Records Summarized (2): Reviewed Dr. Sibley's note from 4/24/2018 regarding hearing aid.   Decision to Obtain Records (1): none  Radiology Tests Summarized or Ordered (1): None  Labs Reviewed or Ordered (1): Labs ordered.   Medicine Test Summarized or Ordered (1): None  Independent Review of EKG or X-RAY(2 each): None    The visit lasted a total of 16 minutes face to face with the patient. Over 50% of the time was spent counseling and educating the patient about follow-up.  Regarding gastritis, depression and gout    Maxi SAUNDERS, am scribing for and in the presence of, Dr. Edouard.    I,yvan edouard, personally performed the services described in this documentation, as scribed by Maxi Hodge in my presence, and it is both accurate and complete.      MEDICATIONS:  Current Outpatient Prescriptions   Medication Sig Dispense Refill     albuterol (PROVENTIL HFA;VENTOLIN HFA) 90 mcg/actuation inhaler Inhale 2 puffs 4 (four) times a day. 1 Inhaler 0     atorvastatin (LIPITOR) 20 MG tablet Take 1 tablet (20 mg total) by mouth at bedtime. 90 tablet 2     clobetasol (TEMOVATE) 0.05 % ointment Apply to affected area twice daily 30 g 2     clopidogrel (PLAVIX) 75 mg tablet Take 1 tablet (75 mg total) by mouth daily. 30 tablet 12     diclofenac (VOLTAREN) 75 MG EC tablet TAKE 1 TABLET(75 MG) BY MOUTH TWICE DAILY 30 tablet 0     diclofenac (VOLTAREN) 75 MG EC tablet Take 1 tablet (75 mg total) by mouth 2 (two) times a day. 30 tablet 0     gabapentin (NEURONTIN)  300 MG capsule TAKE 1 CAPSULE(300 MG) BY MOUTH THREE TIMES DAILY 270 capsule 4     hydrOXYzine (VISTARIL) 50 MG capsule TAKE 1 CAPSULE(50 MG) BY MOUTH AT BEDTIME 30 capsule 6     magnesium oxide (MAG-OX) 400 mg tablet Take 1 tablet (400 mg total) by mouth daily. 30 tablet 11     melatonin 3 mg Tab tablet Take 1 tablet (3 mg total) by mouth at bedtime as needed. 100 tablet 3     mirtazapine (REMERON) 15 MG tablet TAKE 1 TABLET(15 MG) BY MOUTH AT BEDTIME 90 tablet 3     omeprazole (PRILOSEC) 20 MG capsule TAKE 2 CAPSULES(40 MG) BY MOUTH DAILY 60 capsule 11     ranitidine (ZANTAC) 150 MG tablet Take 1 tablet (150 mg total) by mouth 2 (two) times a day. 60 tablet 6     acetaminophen 500 mg coapsule Take 2 tablets by mouth 3 (three) times a day as needed for fever or pain.       allopurinol (ZYLOPRIM) 300 MG tablet TAKE 1 TABLET BY MOUTH DAILY 90 tablet 3     DULoxetine (CYMBALTA) 60 MG capsule Take 1 capsule (60 mg total) by mouth daily. 30 capsule 12     DULoxetine (CYMBALTA) 60 MG capsule Take 1 capsule (60 mg total) by mouth daily. 90 capsule 2     ergocalciferol (ERGOCALCIFEROL) 50,000 unit capsule TAKE 1 CAPSULE BY MOUTH WEEKLY 12 capsule 0     gabapentin (NEURONTIN) 300 MG capsule TAKE 1 CAPSULE(300 MG) BY MOUTH THREE TIMES DAILY 270 capsule 6     hydrOXYzine (ATARAX) 25 MG tablet TAKE 1 TABLET BY MOUTH EVERY 6 HOURS AS DIRECTED 20 tablet 0     lidocaine (XYLOCAINE) 5 % ointment Apply to affected area twice daily 35.44 g 1     mirtazapine (REMERON) 15 MG tablet TAKE 1 TABLET(15 MG) BY MOUTH AT BEDTIME 90 tablet 5     polyethylene glycol 3350 8.5 gram PwPk Take 17 g by mouth daily. Use packet one per day 30 packet 4     probenecid-colchicine 500-0.5 mg per tablet TAKE 1 TABLET BY MOUTH DAILY AS DIRECTED 30 tablet 0     probenecid-colchicine 500-0.5 mg per tablet TAKE 1 TABLET BY MOUTH DAILY AS DIRECTED 30 tablet 0     simvastatin (ZOCOR) 40 MG tablet Take 1 tablet (40 mg total) by mouth daily.  0     No current  facility-administered medications for this visit.        Total Data Points: 3

## 2021-06-17 NOTE — TELEPHONE ENCOUNTER
Refill Approved    Rx renewed per Medication Renewal Policy. Medication was last renewed on 2/1/21.    Deniz Arboleda, [facility] Connection Triage/Med Refill 4/30/2021     Requested Prescriptions   Pending Prescriptions Disp Refills     clopidogreL (PLAVIX) 75 mg tablet [Pharmacy Med Name: CLOPIDOGREL 75MG TABLETS] 30 tablet 1     Sig: TAKE 1 TABLET(75 MG) BY MOUTH DAILY       Clopidogrel/Prasugrel/Ticagrelor Refill Protocol Passed - 4/29/2021  5:13 AM        Passed - PCP or prescribing provider visit in past 6 months       Last office visit with prescriber/PCP: Visit date not found OR same dept: Visit date not found OR same specialty: 3/3/2020 David Fang MD Last physical: Visit date not found Last MTM visit: Visit date not found     Next appt within 3 mo: Visit date not found  Next physical within 3 mo: Visit date not found  Prescriber OR PCP: Heri Willson MD  Last diagnosis associated with med order: 1. 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              Passed - Hemoglobin in past 12 months     Hemoglobin   Date Value Ref Range Status   12/15/2020 15.9 14.0 - 18.0 g/dL Final

## 2021-06-17 NOTE — TELEPHONE ENCOUNTER
RN cannot approve Refill Request    RN can NOT refill this medication Protocol failed and NO refill given. Last office visit: 3/3/2020 David Fang MD Last Physical: 4/1/2021 Last MTM visit: Visit date not found Last visit same specialty: 3/3/2020 David Fang MD.  Next visit within 3 mo: Visit date not found  Next physical within 3 mo: Visit date not found      Libertad Ritter, [facility] Connection Triage/Med Refill 4/29/2021    Requested Prescriptions   Pending Prescriptions Disp Refills     magnesium oxide (MAG-OX) 400 mg (241.3 mg magnesium) tablet [Pharmacy Med Name: MAG-OXIDE 400MG TABLETS] 100 tablet 0     Sig: TAKE 1 TABLET(400 MG) BY MOUTH DAILY       There is no refill protocol information for this order

## 2021-06-17 NOTE — TELEPHONE ENCOUNTER
Refill Request  Did you contact pharmacy: No  Medication name:   Requested Prescriptions     Pending Prescriptions Disp Refills     DULoxetine (CYMBALTA) 60 MG capsule 90 capsule 3     Sig: TAKE 1 CAPSULE(60 MG) BY MOUTH DAILY     Who prescribed the medication: Dr. Fang   Requested Pharmacy: [facility]  Is patient out of medication: UNSPECIFIED  Patient notified refills processed in 3 business days:  no  Okay to leave a detailed message: no

## 2021-06-17 NOTE — PROGRESS NOTES
Chasity Gaspar is a 68 y.o. male seen in consultation at the request of Dr. Fang for hearing loss.  Patient has noticed gradual hearing loss over many years.  Previous sen in 2013 for hearing aid clearrance wth asymmetric hearing.  He had MRI which does not show any IAC issue, most recently in 2017. He has used a right BTE for 10 years.  Denies otologic history of infections or surgeries.    ALLERGY:    Allergies   Allergen Reactions     Aspirin Hives     Oxycodone Itching     Vicodin [Hydrocodone-Acetaminophen]        MEDICATIONS:     Current Outpatient Prescriptions on File Prior to Visit   Medication Sig Dispense Refill     acetaminophen 500 mg coapsule Take 2 tablets by mouth 3 (three) times a day as needed for fever or pain.       albuterol (PROVENTIL HFA;VENTOLIN HFA) 90 mcg/actuation inhaler Inhale 2 puffs 4 (four) times a day. 1 Inhaler 0     allopurinol (ZYLOPRIM) 300 MG tablet TAKE 1 TABLET BY MOUTH DAILY 90 tablet 3     atorvastatin (LIPITOR) 20 MG tablet Take 1 tablet (20 mg total) by mouth at bedtime. 90 tablet 2     clobetasol (TEMOVATE) 0.05 % ointment Apply to affected area twice daily 30 g 2     clopidogrel (PLAVIX) 75 mg tablet Take 1 tablet (75 mg total) by mouth daily. 30 tablet 12     diclofenac (VOLTAREN) 75 MG EC tablet TAKE 1 TABLET(75 MG) BY MOUTH TWICE DAILY 30 tablet 0     diclofenac (VOLTAREN) 75 MG EC tablet Take 1 tablet (75 mg total) by mouth 2 (two) times a day. 30 tablet 0     DULoxetine (CYMBALTA) 60 MG capsule Take 1 capsule (60 mg total) by mouth daily. 30 capsule 12     DULoxetine (CYMBALTA) 60 MG capsule Take 1 capsule (60 mg total) by mouth daily. 90 capsule 2     ergocalciferol (ERGOCALCIFEROL) 50,000 unit capsule TAKE 1 CAPSULE BY MOUTH WEEKLY 12 capsule 0     gabapentin (NEURONTIN) 300 MG capsule TAKE 1 CAPSULE(300 MG) BY MOUTH THREE TIMES DAILY 270 capsule 6     gabapentin (NEURONTIN) 300 MG capsule TAKE 1 CAPSULE(300 MG) BY MOUTH THREE TIMES DAILY 270 capsule 4     hydrOXYzine  (ATARAX) 25 MG tablet TAKE 1 TABLET BY MOUTH EVERY 6 HOURS AS DIRECTED 20 tablet 0     hydrOXYzine (VISTARIL) 50 MG capsule TAKE 1 CAPSULE(50 MG) BY MOUTH AT BEDTIME 30 capsule 6     lidocaine (XYLOCAINE) 5 % ointment Apply to affected area twice daily 35.44 g 1     magnesium oxide (MAG-OX) 400 mg tablet Take 1 tablet (400 mg total) by mouth daily. 30 tablet 11     melatonin 3 mg Tab tablet Take 1 tablet (3 mg total) by mouth at bedtime as needed. 100 tablet 3     mirtazapine (REMERON) 15 MG tablet TAKE 1 TABLET(15 MG) BY MOUTH AT BEDTIME 90 tablet 5     mirtazapine (REMERON) 15 MG tablet TAKE 1 TABLET(15 MG) BY MOUTH AT BEDTIME 90 tablet 3     omeprazole (PRILOSEC) 20 MG capsule TAKE 2 CAPSULES(40 MG) BY MOUTH DAILY 60 capsule 11     polyethylene glycol 3350 8.5 gram PwPk Take 17 g by mouth daily. Use packet one per day 30 packet 4     probenecid-colchicine 500-0.5 mg per tablet TAKE 1 TABLET BY MOUTH DAILY AS DIRECTED 30 tablet 0     probenecid-colchicine 500-0.5 mg per tablet TAKE 1 TABLET BY MOUTH DAILY AS DIRECTED 30 tablet 0     ranitidine (ZANTAC) 150 MG tablet Take 1 tablet (150 mg total) by mouth 2 (two) times a day. 60 tablet 6     simvastatin (ZOCOR) 40 MG tablet Take 1 tablet (40 mg total) by mouth daily.  0     No current facility-administered medications on file prior to visit.        Past Medical/Surgical History, Family History and Social History reviewed in detail and documented separately in the medical record.    Complete Review of Systems:  A 10-point review was performed.  Pertinent positives are noted in the HPI and on a separate scanned document in the chart.    EXAM:  There were no vitals filed for this visit.    Nurse documentation reviewed  and documented separately.    General Appearance: Pleasant, alert, appropriate appearance for age. No acute distress    Head Exam: Normal. Normocephalic, atraumatic.    Eye Exam: Normal external eye, conjunctiva, lids, cornea. Extra-ocular movements are  intact.    Left external ear: normal  Left otoscopic exam: Normal EAC. Normal TM     Right external ear: normal  Right otoscopic exam: Normal EAC. Normal TM    Nose Exam: Normal external nose. Septum midline. Nasal mucosa normal.  Inferior turbinates normal.    OroPharynx Exam: Dental hygiene adequate. Normal tongue. Normal buccal mucosa. Normal palate.  Normal pharynx. Normal tonsils.    Neck Exam: Supple, no masses or nodes. Trachea and larynx midline.    Thyroid Exam: No tenderness, nodules or enlargement.    Salivary Glands: nontender without masses    Neuro: Alert and oriented times 3, CN 2-12 grossly intact, no nystagmus, PERRL, EOMI, normal speech and gait    Chest/Respiratory Exam: Normal chest wall motion and respiratory effort. No audible stridor or wheezing.    Cardiovascular Exam: Regular rate and rhythm.  No cyanosis, clubbing or edema.    Pulses: carotid pulses normal    ASSESSMENT:  1. ASNHL (asymmetrical sensorineural hearing loss)        PLAN: Findings, assessment, and management options were discussed.   He is medically cleared for hearing aid in the right ear.  Recommend annual audiogram.

## 2021-06-18 NOTE — PROGRESS NOTES
ASSESMENT AND PLAN:  Diagnoses and all orders for this visit:    Bronchitis  Cough for 2 months.  X-ray deferred since patient is getting antibiotic today.  He will call if no improvement.  -     azithromycin (ZITHROMAX Z-CRYSTAL) 250 MG tablet; Take 500 mg (2 x 250 mg tablets) on day 1 followed by 250 mg (1 tablet) on days 2-5.  Dispense: 6 tablet; Refill: 0  -     guaiFENesin (ROBITUSSIN) 100 mg/5 mL syrup; Take 10 mL (200 mg total) by mouth 3 (three) times a day as needed for cough.  Dispense: 200 mL; Refill: 0    Chest wall pain  Likely musculoskeletal.  He will take Tylenol as needed.      SUBJECTIVE: Chasity Gaspar is a 68-year-old male came in with cough for 2 months.  He had initial subjective fever, now resolved.  He was seen about 2 weeks ago for the same complaint and was given 5 days course of oral steroid.  States that medication did not help much.  Still coughing, worse at night.  No shortness of breath but complaining of occasional wheezing.  Cough is productive.  Denies in the sputum.  He is also complaining of anterior chest wall pain with the cough.  No radiation to the pain.    Past Medical History:   Diagnosis Date     Cervicalgia      Depression      Dyslipidemia      Dysthymia      Gastritis      GERD (gastroesophageal reflux disease)      Hearing loss      Hiatal hernia      Hypertriglyceridemia      Insomnia      Lacunar stroke      Lumbar canal stenosis      Lumbar radiculopathy      Myalgia and myositis      Patient Active Problem List   Diagnosis     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     Abdominal Pain In The Right Lower Belly (RLQ)     Abdominal Pain In The Right Upper Belly (RUQ)     Memory Lapses Or Loss     Hyperlipidemia     Hearing Loss     Lacunar Stroke     Spinal Stenosis     Radiculopathy, lumbar region     Cervicalgia     Dysthymia (Depressive Neurosis), Primary     Essential Hypertriglyceridemia     Hiatal Hernia     Lumbar Canal Stenosis     Lower Back Pain     Vitamin D  "Deficiency     Moderate Recurrent Major Depression     Esophageal Reflux     Gastritis     Constipation     Gout     Dermatitis     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     Acute respiratory failure with hypoxia     Left hemiparesis     ASNHL (asymmetrical sensorineural hearing loss)       Allergies:    Allergies   Allergen Reactions     Aspirin Hives     Oxycodone Itching     Vicodin [Hydrocodone-Acetaminophen]        History   Smoking Status     Never Smoker   Smokeless Tobacco     Former User       Review of systems otherwise negative except as listed in HPI.   History   Smoking Status     Never Smoker   Smokeless Tobacco     Former User       OBJECTICE: BP 90/66 (Patient Site: Right Arm, Patient Position: Sitting, Cuff Size: Adult Regular)  Pulse 83  Temp 97.9  F (36.6  C) (Oral)   Resp 20  Ht 5' 1\" (1.549 m)  Wt 121 lb 8 oz (55.1 kg)  SpO2 96%  BMI 22.96 kg/m2          GEN-alert,  in no apparent distress.  HEENT-mucous membranes are moist, neck is supple.  CV-regular rate and rhythm with no murmur.   RESP-coarse breath sounds with no crackles or rhonchi.  Upper chest wall tenderness  bilaterally.  ABDOMEN- Soft , not tender.  SKIN-normal    This transcription uses voice recognition software, which may contain typographical errors.        Sreedhar Kapoor   6/7/2018     "

## 2021-06-18 NOTE — PATIENT INSTRUCTIONS - HE
Patient Instructions by David Fang MD at 4/1/2021 12:00 PM     Author: David Fang MD Service: -- Author Type: Physician    Filed: 4/1/2021 12:17 PM Encounter Date: 4/1/2021 Status: Signed    : David Fang MD (Physician)         Patient Education     Exercise for a Healthier Heart  You may wonder how you can improve the health of your heart. If youre thinking about exercise, youre on the right track. You dont need to become an athlete, but you do need a certain amount of brisk exercise to help strengthen your heart. If you have been diagnosed with a heart condition, your doctor may recommend exercise to help stabilize your condition. To help make exercise a habit, choose safe, fun activities.       Be sure to check with your health care provider before starting an exercise program.    Why exercise?  Exercising regularly offers many healthy rewards. It can help you do all of the following:    Improve your blood cholesterol levels to help prevent further heart trouble    Lower your blood pressure to help prevent a stroke or heart attack    Control diabetes, or reduce your risk of getting this disease    Improve your heart and lung function    Reach and maintain a healthy weight    Make your muscles stronger and more limber so you can stay active    Prevent falls and fractures by slowing the loss of bone mass (osteoporosis)    Manage stress better  Exercise tips  Ease into your routine. Set small goals. Then build on them.  Exercise on most days. Aim for a total of 150 or more minutes of moderate to  vigorous intensity activity each week. Consider 40 minutes, 3 to 4 times a week. For best results, activity should last for 40 minutes on average. It is OK to work up to the 40 minute period over time. Examples of moderate-intensity activity is walking one mile in 15 minutes or 30 to 45 minutes of yard work.  Step up your daily activity level. Along with your exercise program, try being more active throughout the  day. Walk instead of drive. Do more household tasks or yard work.  Choose one or more activities you enjoy. Walking is one of the easiest things you can do. You can also try swimming, riding a bike, or taking an exercise class.  Stop exercising and call your doctor if you:    Have chest pain or feel dizzy or lightheaded    Feel burning, tightness, pressure, or heaviness in your chest, neck, shoulders, back, or arms    Have unusual shortness of breath    Have increased joint or muscle pain    Have palpitations or an irregular heartbeat      8498-4882 [employer]. [address] [place] [address]28. All rights reserved. This information is not intended as a substitute for professional medical care. Always follow your healthcare professional's instructions.         Patient Education    Home Fire Safety  Each year, thousands of people, including children, are injured and killed in home fires. Children are often curious about fire, and may not understand the dangers. This makes home fire safety practices especially important. Three important things you can do to keep your home safe from fire are:    Install smoke alarms in your home and make sure they work properly.    Teach children not to play with matches, lighters, and other materials that can be used to start fires. And keep these materials out of childrens reach.    Teach children what to do in case of fire. Create a fire safety action plan and practice it.  Read on for more details about keeping your family and home safe from fire.        Being Prepared for a Fire  A home fire can happen at any time. The following can help you be prepared:    Install smoke alarms on every level of your home, including the basement and outside all sleeping areas. This simple step cuts your familys risk of dying in a fire nearly in half.    Test smoke alarms monthly, and change the batteries once a year or when the alarm chirps.    Dont disable smoke alarms,  even for a short time.    Ask your local fire department for tips on where to place smoke alarms in your home.    Replace all smoke alarms every 10 years.    Consider using voice smoke alarms. These alarms allow you to substitute your own voice for the alarm sound. They are helpful because many children dont wake up to the sound of a regular smoke alarm.    Install carbon monoxide detectors near sleeping areas.    Be aware that carbon monoxide is a byproduct of smoke that can be deadly. Its a gas that you cant see, smell, or taste.    Consider buying a combination smoke alarm / carbon monoxide detector.    Keep fire extinguishers in the home.    Keep them in accessible locations, especially in the kitchen.    Check usage dates to make sure they are not .    Use fire extinguishers only when the fire is in a contained area and is not spreading. (Otherwise, you should focus on getting out of the home.)    Train adults to use fire extinguishers. (Children should focus on getting out of the home during a fire.)    If you live in an apartment, talk to your landlord about where smoke alarms are and how often they are tested. Also ask about fire extinguisher locations and emergency exit routes.  Indoor Fire Safety  Many things in your home are potential fire hazards. Follow these steps to help keep your home safe.    Be careful in the kitchen.    Never leave food thats cooking unattended.    If a fire breaks out in a cooking pan, put a lid on it to smother it. And never throw water on a grease fire. It will make the fire worse.    Conduct a home safety inspection. Look for anything, such as frayed wires and cords, that can cause a fire. Fix or remove any fire safety hazards you find.    Keep all matches and lighters in a secured drawer or cabinet out of the reach of children. Use childproof lighters.    Check to make sure all appliances, including the stove, are turned off before leaving the home.    Know where the  gas main shut-off is located.    Make sure space heaters are stable and have protective covers. Keep them at least 3 feet from anything that can burn, such as curtains. Dont use space heaters in areas where young kids spend time alone.    Keep flammable liquids such as kerosene and gasoline locked up and safely stored away from kids and heat.    Keep all smoking materials out of reach of children. And never smoke in bed. If possible, smoke outdoors only.  Outdoor Fire Safety  Fire can be a hazard outdoors as well as indoors. When outdoors, be sure to do the following:    Always supervise kids near a barbecue grill, campfire, or portable stove.    Dont use fire pits around children. Kids can fall into them, and pits can be hot even after the fire goes out.    Keep a garden hose or fire extinguisher handy when cooking outdoors in case of fire.  Teaching Your Child About Fire Safety  One of the best ways to keep your home safe from fire is education. Make sure everyone in your family knows fire safety rules, including children.    Teach your children the dangers of matches, lighters, and other dangerous items.    Teach them to never touch these and other objects that are hot, such as candles.    Have them tell you right away whenever they find matches or lighters. Explain that these items are tools for grown-ups, not toys. And never amuse children with matches or lighters.    Round up all matches and lighters and store them safely. In case you missed some, ask your children to tell you where any are located throughout your home.    Never leave a child alone in a room with a lit candle. Dont allow teens to have candles in their rooms.    Show children what to do in case of fire.    Be sure your kids know what the fire alarm sounds like and what to do if it goes off.    Teach kids what to do if their clothes catch fire: Stop, Drop to the ground, and Roll until the fire is put out. They should also cover their face with  their hands. Practice these steps with your children. Make sure they understand that running will make the fire burn faster.    Show children how to crawl below smoke during a fire.    Make sure kids know at least two escape routes from each room in the home. These escape routes can be windows.    Teach kids to test doors for nearby fire by feeling for heat with the back of their hand. If the door is warm or hot, they should try their second exit.    Explain to children that they cant hide from a fire. Hiding in a closet or under a bed wont make them safe. Instead, they should try to escape the home. And if they cant escape, they should let others know they are trapped. They can do this by shutting the door to the room, opening a window, and turning on the lights.    Talk to your local fire department.    Introduce your children to a . Let them know that firefighters will look different when in full protective gear. Tell them to never hide from firefighters, and to follow all directions from firefighters during a fire.    Find out if the fire department has a fire safety program for kids.      Create a Fire Safety Plan  Create a plan for your family to follow in case of a fire. Try making it a family project. Important steps for the plan include leaving the home right away and having a designated meeting place.    Make sure your child understands to get out and stay out. He or she should get out of the home immediately and not go back in, even if family members or pets are still inside.    Decide on a safe meeting place away from the home for everyone to gather.    Teach children to call 911 or emergency services from a cell phone or neighbors phone. Make sure they know to do this only after they are safely out of the home.    Teach your children the fire safety plan. Practice it and make sure they understand it.    Have fire drills twice a year to keep your children prepared in case of fire.    Visit the  National Fire Protection Association web site at www.nfpa.org for more information.      9991-2333 [employer]. [address] [place] [address]21. All rights reserved. This information is not intended as a substitute for professional medical care. Always follow your healthcare professional's instructions.         Patient Education   Signs of Hearing Loss  Hearing loss is a problem shared by many people. In fact, it is one of the most common health conditions, particularly as people age. Most people over age 65 have some hearing loss, and by age 80, almost everyone does. Because hearing loss usually occurs slowly over the years, you may not realize your hearing ability has gotten worse.       Have your hearing checked  Contact your [facility] care provider if you:    Have to strain to hear normal conversation.    Have to watch other peoples faces very carefully to follow what theyre saying.    Need to ask people to repeat what theyve said.    Often misunderstand what people are saying.    Turn the volume of the television or radio up so high that others complain.    Feel that people are mumbling when theyre talking to you.    Find that the effort to hear leaves you feeling tired and irritated.    Notice, when using the phone, that you hear better with 1 ear than the other.    4126-2718 [employer]. [address] [place] [address] 18082. All rights reserved. This information is not intended as a substitute for professional medical care. Always follow your healthcare professional's instructions.         Patient Education   Understanding Advance Care Planning  Advance care planning is the process of deciding ones own future medical care. It helps ensure that if you cant speak for yourself, your wishes can still be carried out. The plan is a series of legal documents that note a persons wishes. The documents vary by state. Advance care planning may be done when a person has a serious  illness that is expected to get worse. It may be done before major surgery. And it can help you and your family be prepared in case of a major illness or injury. Advance care planning helps with making decisions at these times.       A health care proxy is a person who acts as the voice of a patient when the patient cant speak for himself or herself. The name of this role varies by state. It may be called a Durable Medical Power of  or Durable Power of  for Healthcare. It may be called an agent, surrogate, or advocate. Or it may be called a representative or decision maker. It is an official duty that is identified by a legal document. The document also varies by state.    Why Is Advance Care Planning Important?  If a person communicates their healthcare wishes:    They will be given medical care that matches their values and goals.    Their family members will not be forced to make decisions in a crisis with no guidance.  Creating a Plan  Making an advance care plan is often done in 3 steps:    Thinking about ones wishes. To create an advance care plan, you should think about what kind of medical treatment you would want if you lose the ability to communicate. Are there any situations in which you would refuse or stop treatment? Are there therapies you would want or not want? And whom do you want to make decisions for you? There are many places to learn more about how to plan for your care. Ask your doctor or  for resources.    Picking a health care proxy. This means choosing a trusted person to speak for you only when you cant speak for yourself. When you cannot make medical decisions, your proxy makes sure the instructions in your advance care plan are followed. A proxy does not make decisions based on his or her own opinions. They must put aside those opinions and values if needed, and carry out your wishes.    Filling out the legal documents. There are several kinds of legal  documents for advance care planning. Each one tells health care providers your wishes. The documents may vary by state. They must be signed and may need to be witnessed or notarized. You can cancel or change them whenever you wish. Depending on your state, the documents may include a Healthcare Proxy form, Living Will, Durable Medical Power of , Advance Directive, or others.  The Familys Role  The best help a family can give is to support their loved ones wishes. Open and honest communication is vital. Family should express any concerns they have about the patients choices while the patient can still make decisions.    0356-9403 [employer]. [address] [place] [address]. All rights reserved. This information is not intended as a substitute for professional medical care. Always follow your healthcare professional's instructions.         Also, [facility] offers a free, downloadable health care directive that allows you to share your treatment choices and personal preferences if you cannot communicate your wishes. It also allows you to appoint another person (called a health care agent) to make health care decisions if you are unable to do so. You can download an advance directive by going here: [email or web address]     Patient Education   Personalized Prevention Plan  You are due for the preventive services outlined below.  Your care team is available to assist you in scheduling these services.  If you have already completed any of these items, please share that information with your care team to update in your medical record.  Health Maintenance Due   Topic Date Due   ? DEPRESSION ACTION PLAN  Never done   ? COVID-19 Vaccine (1) Never done   ? HEPATITIS B VACCINES (2 of 3 - Risk 3-dose series) 09/12/2007   ? ZOSTER VACCINES (1 of 2) Never done   ? ADVANCE CARE PLANNING  12/13/2015

## 2021-06-18 NOTE — PROGRESS NOTES
"ASSESSMENT AND PLAN:  1. Cough patient has a cough for approximately 1 week.  Cuff multiple times during the day.  Has been coughing more at night.  He has been using his albuterol inhaler in an effort to control his cough I have asked him not to do so never given a course of prednisone to help.    2. Hyperlipidemia his cholesterol is still elevated, she had this information with him today.  I put him on atorvastatin he will stop the Zocor.    3. Moderate Recurrent Major Depression PHQ 9 reviewed with the patient his scores 8 denies any symptoms of overt depression symptoms sleeping well despite his cough appetites normal.  Affect is normal.        CHIEF COMPLAINT:  Chief Complaint   Patient presents with     Cough     Cough for a week causing headache and chest pain       HISTORY OF PRESENT ILLNESS:  Chasity is a 68 y.o. male presenting with a cough. Chasity is present with a [employer] . For the past week, he has been experiencing a cough. He notes that he also has headaches and chest pain secondary to his cough. He denies fever, sore throat and emesis. He has been able to eat well. He denies known ill contacts.  He has been using albuterol 2-3 times a day.     REVIEW OF SYSTEMS:   He denies wheezing.    All other 10 point review of systems are negative.    PFSH:  . Pertinent past, family, social and medical history reviewed.     TOBACCO USE:  History   Smoking Status     Never Smoker   Smokeless Tobacco     Former User       VITALS:  Vitals:    05/24/18 1555   BP: 124/80   Patient Site: Left Arm   Patient Position: Sitting   Cuff Size: Adult Regular   Pulse: 80   Resp: 18   Temp: 98  F (36.7  C)   TempSrc: Oral   SpO2: 98%   Weight: 125 lb (56.7 kg)   Height: 5' 1\" (1.549 m)     Wt Readings from Last 3 Encounters:   05/24/18 125 lb (56.7 kg)   04/25/18 126 lb 1 oz (57.2 kg)   03/14/18 122 lb 9 oz (55.6 kg)     Body mass index is 23.62 kg/(m^2).      PHYSICAL EXAM:  General: Alert, cooperative, no distress, appears " stated age  Head: Normocephalic, without obvious abnormality, atraumatic  Musculoskeletal: Back symmetric, no curvature, ROM normal, no CVA tenderness.  Lungs: Clear to auscultation bilaterally, respirations unlabored  Chest wall: No tenderness or deformity  Heart: Regular rate and rhythm, S1 and S2 normal, no murmur, rub, or gallop  CVS: No edema noted.   Abdomen: Soft, non tender, bowel sounds active all four quadrants, no masses, no organomegaly.  Neurologic: No tremor, no focal findings.     Psych: Oriented x3. Affect normal.     DATA REVIEWED:  Additional History from Old Records Summarized (2): None  Decision to Obtain Records (1): None  Radiology Tests Summarized or Ordered (1): None  Labs Reviewed or Ordered (1): None  Medicine Test Summarized or Ordered (1): None  Independent Review of EKG or X-RAY(2 each): None    The visit lasted a total of 25 minutes face to face with the patient. Over 50% of the time was spent counseling and educating the patient about cough.,  Depression, and cholesterol issues    IMaxi, am scribing for and in the presence of, Dr. Edouard.    I,yvan edourad, personally performed the services described in this documentation, as scribed by Maxi Hodge in my presence, and it is both accurate and complete.      MEDICATIONS:  Current Outpatient Prescriptions   Medication Sig Dispense Refill     acetaminophen 500 mg coapsule Take 2 tablets by mouth 3 (three) times a day as needed for fever or pain.       albuterol (PROVENTIL HFA;VENTOLIN HFA) 90 mcg/actuation inhaler Inhale 2 puffs 4 (four) times a day. 1 Inhaler 0     allopurinol (ZYLOPRIM) 300 MG tablet TAKE 1 TABLET BY MOUTH DAILY 90 tablet 3     atorvastatin (LIPITOR) 40 MG tablet Take 1 tablet (40 mg total) by mouth at bedtime. 30 tablet 5     clobetasol (TEMOVATE) 0.05 % ointment Apply to affected area twice daily 30 g 2     clopidogrel (PLAVIX) 75 mg tablet Take 1 tablet (75 mg total) by mouth daily. 30 tablet 12      diclofenac (VOLTAREN) 75 MG EC tablet Take 1 tablet (75 mg total) by mouth 2 (two) times a day. 30 tablet 0     DULoxetine (CYMBALTA) 60 MG capsule Take 1 capsule (60 mg total) by mouth daily. 90 capsule 2     ergocalciferol (ERGOCALCIFEROL) 50,000 unit capsule TAKE 1 CAPSULE BY MOUTH WEEKLY 12 capsule 0     gabapentin (NEURONTIN) 300 MG capsule TAKE 1 CAPSULE(300 MG) BY MOUTH THREE TIMES DAILY 270 capsule 6     gabapentin (NEURONTIN) 300 MG capsule TAKE 1 CAPSULE(300 MG) BY MOUTH THREE TIMES DAILY 270 capsule 4     hydrOXYzine (VISTARIL) 50 MG capsule TAKE 1 CAPSULE(50 MG) BY MOUTH AT BEDTIME 30 capsule 6     lidocaine (XYLOCAINE) 5 % ointment Apply to affected area twice daily 35.44 g 1     magnesium oxide (MAG-OX) 400 mg tablet Take 1 tablet (400 mg total) by mouth daily. 30 tablet 11     melatonin 3 mg Tab tablet Take 1 tablet (3 mg total) by mouth at bedtime as needed. 100 tablet 3     mirtazapine (REMERON) 15 MG tablet TAKE 1 TABLET(15 MG) BY MOUTH AT BEDTIME 90 tablet 5     omeprazole (PRILOSEC) 20 MG capsule TAKE 2 CAPSULES(40 MG) BY MOUTH DAILY 60 capsule 11     polyethylene glycol 3350 8.5 gram PwPk Take 17 g by mouth daily. Use packet one per day 30 packet 4     diclofenac (VOLTAREN) 75 MG EC tablet TAKE 1 TABLET(75 MG) BY MOUTH TWICE DAILY 60 tablet 12     DULoxetine (CYMBALTA) 60 MG capsule Take 1 capsule (60 mg total) by mouth daily. 30 capsule 12     hydrOXYzine (ATARAX) 25 MG tablet TAKE 1 TABLET BY MOUTH EVERY 6 HOURS AS DIRECTED 20 tablet 0     mirtazapine (REMERON) 15 MG tablet TAKE 1 TABLET(15 MG) BY MOUTH AT BEDTIME 90 tablet 3     probenecid-colchicine 500-0.5 mg per tablet TAKE 1 TABLET BY MOUTH DAILY AS DIRECTED 30 tablet 0     probenecid-colchicine 500-0.5 mg per tablet TAKE 1 TABLET BY MOUTH DAILY AS DIRECTED 30 tablet 0     ranitidine (ZANTAC) 150 MG tablet Take 1 tablet (150 mg total) by mouth 2 (two) times a day. 60 tablet 6     simvastatin (ZOCOR) 40 MG tablet Take 1 tablet (40 mg  total) by mouth daily.  0     No current facility-administered medications for this visit.        Total Data Points: 0

## 2021-06-18 NOTE — LETTER
Letter by Nallely Ahn MD at      Author: Nallely Ahn MD Service: -- Author Type: --    Filed:  Encounter Date: 2/28/2019 Status: (Other)       February 28, 2019     David Fang MD  [address] [place] [address]    Patient: Chasity Gaspar   MR Number: [id number]   YOB: 1950   Date of Visit: 2/28/2019     Dear Dr. Leoncio MD:    Thank you for referring Chasity Gaspar to me for evaluation. I think his cough could be secondary to asthma.  He did have positive tests to dust mite and cockroach.  I started him on a controller medication.  I have included my note for your review.      If you have questions, please do not hesitate to call me.    Sincerely,        Nallely Ahn MD        CC  No Recipients    Progress Notes:  Chief complaint: Cough    History of present illness: This is a pleasant 69-year-old gentleman I was requested to see by Dr. Fang for evaluation of cough.  He has had this cough for the past 2 months and feels that it originates in his chest.  It does wake him up from sleep.  He was given an albuterol inhaler and this provides temporary relief.  He has never been diagnosed with asthma previously.  He denies any history of reflux but he is currently on omeprazole.  When I questioned him about this, he was unaware that this medication was for reflux.  He states he does not have symptoms of reflux regularly.  Denies any history of sinus infection.  He does note some nasal congestion and drainage at times.  Does not use nasal sprays or rinses, however.  Denies any shortness of breath or wheezing.  He has never had a cough like this before and has not been evaluated for allergies previously.  He states talking for long periods of time seems to trigger his cough and the cold weather and exercise seem to trigger the cough as well.    Past medical history: Spine surgery, stroke    Social history: He is retired, has a cat at home, lives at home with central air, no exposure to  mold, non-smoker    Family history: Grandchild with asthma    Review of Systems performed as above and the remainder is negative.        Current Outpatient Medications:   ?  acetaminophen 500 mg coapsule, Take 2 tablets by mouth 3 (three) times a day as needed for fever or pain., Disp: , Rfl:   ?  allopurinol (ZYLOPRIM) 300 MG tablet, TAKE 1 TABLET BY MOUTH DAILY, Disp: 90 tablet, Rfl: 2  ?  atorvastatin (LIPITOR) 40 MG tablet, Take 1 tablet (40 mg total) by mouth at bedtime., Disp: 30 tablet, Rfl: 5  ?  atorvastatin (LIPITOR) 40 MG tablet, TAKE 1 TABLET(40 MG) BY MOUTH AT BEDTIME, Disp: 90 tablet, Rfl: 2  ?  clobetasol (TEMOVATE) 0.05 % ointment, Apply to affected area twice daily, Disp: 30 g, Rfl: 2  ?  clopidogrel (PLAVIX) 75 mg tablet, TAKE 1 TABLET BY MOUTH EVERY DAY, Disp: 30 tablet, Rfl: 10  ?  diclofenac (VOLTAREN) 75 MG EC tablet, TAKE 1 TABLET(75 MG) BY MOUTH TWICE DAILY, Disp: 60 tablet, Rfl: 0  ?  DULoxetine (CYMBALTA) 60 MG capsule, TAKE 1 CAPSULE(60 MG) BY MOUTH DAILY, Disp: 90 capsule, Rfl: 2  ?  ergocalciferol (ERGOCALCIFEROL) 50,000 unit capsule, TAKE 1 CAPSULE BY MOUTH WEEKLY, Disp: 4 capsule, Rfl: 0  ?  gabapentin (NEURONTIN) 300 MG capsule, TAKE 1 CAPSULE(300 MG) BY MOUTH THREE TIMES DAILY, Disp: 270 capsule, Rfl: 6  ?  gabapentin (NEURONTIN) 300 MG capsule, TAKE 1 CAPSULE(300 MG) BY MOUTH THREE TIMES DAILY, Disp: 270 capsule, Rfl: 3  ?  guaiFENesin (ROBITUSSIN) 100 mg/5 mL syrup, Take 10 mL (200 mg total) by mouth 3 (three) times a day as needed for cough., Disp: 200 mL, Rfl: 0  ?  guaiFENesin (ROBITUSSIN) 100 mg/5 mL syrup, Take 10 mL (200 mg total) by mouth 3 (three) times a day as needed for cough., Disp: 200 mL, Rfl: 0  ?  hydrOXYzine pamoate (VISTARIL) 50 MG capsule, takea at bedtime, Disp: 30 capsule, Rfl: 10  ?  lidocaine (XYLOCAINE) 5 % ointment, Apply to affected area twice daily, Disp: 35.44 g, Rfl: 1  ?  magnesium oxide (MAG-OX) 400 mg (241.3 mg magnesium) tablet, TAKE 1 TABLET(400  "MG) BY MOUTH DAILY, Disp: 30 tablet, Rfl: 3  ?  melatonin 3 mg Tab tablet, TAKE 1 TABLET(3 MG) BY MOUTH AT BEDTIME AS NEEDED, Disp: 100 tablet, Rfl: 2  ?  mirtazapine (REMERON) 15 MG tablet, TAKE 1 TABLET(15 MG) BY MOUTH AT BEDTIME, Disp: 90 tablet, Rfl: 3  ?  omeprazole (PRILOSEC) 20 MG capsule, TAKE 2 CAPSULES(40 MG) BY MOUTH DAILY, Disp: 60 capsule, Rfl: 11  ?  polyethylene glycol 3350 8.5 gram PwPk, Take 17 g by mouth daily. Use packet one per day, Disp: 30 packet, Rfl: 4  ?  probenecid-colchicine 500-0.5 mg per tablet, TAKE 1 TABLET BY MOUTH DAILY AS DIRECTED, Disp: 30 tablet, Rfl: 0  ?  probenecid-colchicine 500-0.5 mg per tablet, TAKE 1 TABLET BY MOUTH DAILY AS DIRECTED, Disp: 30 tablet, Rfl: 0  ?  ranitidine (ZANTAC) 150 MG tablet, TAKE 1 TABLET(150 MG) BY MOUTH TWICE DAILY, Disp: 60 tablet, Rfl: 11  ?  sucralfate (CARAFATE) 1 gram tablet, Take 1 tablet (1 g total) by mouth 4 (four) times a day., Disp: 40 tablet, Rfl: 0  ?  VENTOLIN HFA 90 mcg/actuation inhaler, INHALE 2 PUFFS BY MOUTH FOUR TIMES DAILY, Disp: 18 g, Rfl: 3  ?  fluticasone (FLOVENT HFA) 220 mcg/actuation inhaler, Inhale 1 puff 2 (two) times a day., Disp: 1 Inhaler, Rfl: 1    Allergies   Allergen Reactions   ? Aspirin Hives   ? Oxycodone Itching   ? Vicodin [Hydrocodone-Acetaminophen]        Pulse 78   Resp 18   Ht 5' 1\" (1.549 m)   Wt 124 lb 4 oz (56.4 kg)   SpO2 98%   BMI 23.48 kg/m     Gen: Pleasant male not in acute distress  HEENT: Eyes no erythema of the bulbar or palpebral conjunctiva, no edema. Ears: Hearing aids in place which were not removed nose: No congestion, mucosa normal. Mouth: Throat clear, no lip or tongue edema.   Cardiac: Regular rate and rhythm, no murmurs, rubs or gallops  Respiratory: Clear to auscultation bilaterally, no adventitious breath sounds  Lymph: No supraclavicular or cervical lymphadenopathy  Skin: No rashes or lesions  Psych: Alert and oriented times 3    Last Percutaneous Allergy Test " Results  Trees  Homero, White  1:20 H  (W/F in mm): 0-0 (02/28/19 1048)  Birch Mix 1:20 H (W/F in mm): 0-0 (02/28/19 1048)  MacArthur, Common 1:20 H (W/F in mm): 0-0 (02/28/19 1048)  Elm, American 1:20 H (W/F in mm): 0-0 (02/28/19 1048)  Palmer, Shagbark 1:20 H (W/F in mm): 0-0 (02/28/19 1048)  Maple, Hard/Sugar 1:20 H (W/F in mm): 0-0 (02/28/19 1048)  Ephrata Mix 1:20 H (W/F in mm): 0-0 (02/28/19 1048)  Oak, Red 1:20 H (W/F in mm): 0-0 (02/28/19 1048)  Keller, American 1:20 H (W/F in mm): 0-0 (02/28/19 1048)  Talpa Tree 1:20 H (W/F in mm): 0-0 (02/28/19 1048)  Dust Mites  D. Pteronyssinus Mite 30,000 AU/ML H (W/F in mm): 5/10 (02/28/19 1048)  D. Farinae Mite 30,000 AU/ML H (W/F in mm: 4/10 (02/28/19 1048)  Grasses  Grass Mix #4 10,000 BAU/ML H: 0-0 (02/28/19 1048)  Lon Grass 1:20 H (W/F in mm): 0-0 (02/28/19 1048)  Cockroach  Cockroach Mix 1:10 H (W/F in mm): 7/15 (02/28/19 1048)  Molds/Fungi  Alternaria Tenuis 1:10 H (W/F in mm): 0-0 (02/28/19 1048)  Aspergillus Fumigatus 1:10 H (W/F in mm): 0-0 (02/28/19 1048)  Homodendrum Cladosporioides 1:10 H (W/F in mm): 0-0 (02/28/19 1048)  Penicillin Notatum 1:10 H (W/F in mm): 0-0 (02/28/19 1048)  Epicoccum 1:10 H (W/F in mm): 0-0 (02/28/19 1048)  Weeds  Ragweed, Short 1:20 H (W/F in mm): 0-0 (02/28/19 1048)  Dock, Thorofare 1:20 H (W/F in mm): 0-0 (02/28/19 1048)  Lamb's Quarter 1:20 H (W/F in mm): 0-0 (02/28/19 1048)  Pigweed, Rough Red Root 1:20 H  (W/F in mm): 0-0 (02/28/19 1048)  Plantain, English 1:20 H  (W/F in mm): 0-0 (02/28/19 1048)  Sagebrush, Mugwort 1:20 H  (W/F in mm): 0-0 (02/28/19 1048)  Animal  Cat 10,000 BAU/ML H (W/F in mm): 0-0 (02/28/19 1048)  Dog 1:10 H (W/F in mm): 0-0 (02/28/19 1048)  Controls  Device Type: QUINTIP (02/28/19 1048)  Neg. control: 50% Glycerine/Saline H (W/F in mm): 0-0 (02/28/19 1048)  Pos. control: Histamine 6mg/ML (W/F in mms): 7/20 (02/28/19 1048)    Spirometry was attempted.  Unable to obtain reproducible matches due to the  patient coughing    Impression report and plan:  1.  Cough  2.  Allergic rhinitis to dust mite and cockroach    Patient denies any cockroach exposure but did go over dust mite control.  I recommended a month trial of Flovent 220 mcg 2 puffs twice daily.  Technique reviewed in detail.  Okay to use albuterol but only as needed.  He is using it scheduled.  I would like him to follow in a month.  At that time if symptoms are not improved, consider methacholine challenge.  Could consider trial of a nasal spray as well.

## 2021-06-19 NOTE — LETTER
Letter by Savanna Landaverde RN at      Author: Savanna Landaverde RN Service: -- Author Type: --    Filed:  Encounter Date: 4/23/2019 Status: (Other)       April 23, 2019    CHASITY TERRAZAS  [address] [place] [address]        Dear Chasity:    At [facility], we are dedicated to improving your health and well-being. Enclosed is the Comprehensive Care Plan that we developed with you on 4/4/19. Please review the Care Plan carefully.    As a reminder, some of the things we discussed at your visit include:    Your physical and mental health    Ways to reduce falls    Health care needs you may have    Dont forget to contact your care coordinator if you:    Have been hospitalized or plan to be hospitalized     Have had a fall     Have experienced a change in physical health    Are experiencing emotional problems     If you do not agree with your Care Plan, have questions about it, or have experienced a change in your needs, please call me at [phone]. If you are hearing impaired, please call the [place] Relay at 807 or [phone] (ipbdvk-nz-tbagcu relay service).    Sincerely,          Savanna Landaverde RN    E-mail: [email or web address]  Phone: [phone]    Care Manager  [facility]+[id number] [id number])     [id number] (11/18)

## 2021-06-19 NOTE — LETTER
Letter by Pauline Renee AuD at      Author: Pauline Renee AuD Service: -- Author Type: --    Filed:  Encounter Date: 5/22/2019 Status: (Other)       [facility]- Audiology Benefit Letter    MK TERRAZAS  1950  [address] [place] [address]    Insurance Company: Payor: [facility] / Plan: [facility] SENIOR CARE PLUS / Product Type: PMAP /      [place] Product: YES - [facility]    ID # :  [id number]    Group#:  [id number]    Estimate of Benefits  Consult Visit Benefits:  ARE [place] SENIOR CARE PLUS 05/19 MISAEL    HAE, HAF, HAC Benefits:  COVERED SERVICE. PATIENT HAS NOT RCVD ANY HEARING AID BENEFITS WITHIN THE LAST 5 YEARS PER [place]ITS.    Batteries/Accessories Coverage:  COVERED SERVICE    Representative name: VIA RTE AND [place]ITS  Call reference:   Date of contact: 05/22/2019    Insurance verified today by DONALDO MURILLO    Additional Information:      The information provided is an estimate of benefits. This does not guarantee coverage; the insurance company will make the final determination of coverage to include patient responsibility of payment by the patient.   [facility] is not responsible for the decisions made by the insurance company regarding coverage.  Any changes to coverage (new plan or new policy) or procedures may void the contents provided in this letter.     Term Definitions  Patient responsibility: Can include but not limited to: co-pays, co-insurance, deductibles, out-of-pocket and non-covered and/or policy exclusions.

## 2021-06-19 NOTE — PROGRESS NOTES
ASSESSMENT and plan  1. Essential Hypertriglyceridemia  His cholesterol is remained high despite being on statin, I will change him to atorvastatin no side effects have been noted.  We will recheck a lipid panel today.    2. Cervicalgia  With the gabapentin his neck pain is under control he denies any problems sleeping or bending or flexing his neck.    3. Lumbar Canal Stenosis  He complains of back pain which is on the central portion of his lumbar spine at L2 and L3.  I discussed the possibility of augmenting his gabapentin but in the past this is led him to have GI side effects especially constipation will continue him at 900 mg per day    4. Radiculopathy, lumbar region  No symptoms noted at today's visit    5. Chronic bilateral low back pain with sciatica, sciatica laterality unspecified  His sciatica is well controlled    6. Constipation, unspecified constipation type  As long as he uses the polyethylene glycol he does not have constipation            There are no Patient Instructions on file for this visit.    No orders of the defined types were placed in this encounter.    Medications Discontinued During This Encounter   Medication Reason     gabapentin (NEURONTIN) 300 MG capsule Duplicate order       No Follow-up on file.    CHIEF COMPLAINT:  Chief Complaint   Patient presents with     Follow-up     back pain       HISTORY OF PRESENT ILLNESS:  Chasity is a 68 y.o. male this medical history significant for lumbar canal stenosis and lumbar radiculopathy.  He is here with his daughter.  He reports that his breathing is now normal after seeing  reports that he does have back pain.  Says the back pains always present and is not worsening it is worse when he sits forward for long period of time denies any recent falls.    REVIEW OF SYSTEMS:   Musculoskeletal complains of central back pain  All other 10 point review of systems are negative.    PFSH:  Lives with his family    TOBACCO USE:  History   Smoking Status  "    Never Smoker   Smokeless Tobacco     Former User       VITALS:  Vitals:    08/14/18 1029   BP: 126/80   Patient Site: Left Arm   Patient Position: Sitting   Cuff Size: Adult Regular   Pulse: 87   Resp: 20   Temp: 97.3  F (36.3  C)   TempSrc: Oral   SpO2: 98%   Weight: 127 lb 4 oz (57.7 kg)   Height: 5' 1\" (1.549 m)     Wt Readings from Last 3 Encounters:   08/14/18 127 lb 4 oz (57.7 kg)   06/07/18 121 lb 8 oz (55.1 kg)   05/24/18 125 lb (56.7 kg)       PHYSICAL EXAM:  Interactive elderly male sitting in exam room in no acute distress  Respiratory system clear to auscultation equal breath sounds no wheeze no crackles  CVS regular rate and rhythm no murmurs rubs gallops appreciated  Musculoskeletal system tenderness elicited when I palpate the lumbar spine at L2.  Forward flexion to 15  causes no discomfort.  Neuro cranial nerves II 12 intact, straight leg raising test is negative.  GI abdomen soft focal tenderness is not present bowel sounds are present    DATA REVIEWED:  Additional History from Old Records Summarized (2): 2 reviewed notes from  regarding bronchitis  Decision to Obtain Records (1):   Radiology Tests Summarized or Ordered (1): 0  Labs Reviewed or Ordered (1): lipid  Medicine Test Summarized or Ordered (1): 0  Independent Review of EKG or X-RAY(2 each): 0    The visit lasted a total of 14 minutes face to face with the patient. Over 50% of the time was spent counseling and educating the patient about hyperlipidemia, lumbar canal stenosis and constipation..    MEDICATIONS:  Current Outpatient Prescriptions   Medication Sig Dispense Refill     acetaminophen 500 mg coapsule Take 2 tablets by mouth 3 (three) times a day as needed for fever or pain.       allopurinol (ZYLOPRIM) 300 MG tablet TAKE 1 TABLET BY MOUTH DAILY 90 tablet 2     atorvastatin (LIPITOR) 40 MG tablet Take 1 tablet (40 mg total) by mouth at bedtime. 30 tablet 5     clobetasol (TEMOVATE) 0.05 % ointment Apply to affected area " twice daily 30 g 2     clopidogrel (PLAVIX) 75 mg tablet TAKE 1 TABLET BY MOUTH EVERY DAY 30 tablet 2     DULoxetine (CYMBALTA) 60 MG capsule Take 1 capsule (60 mg total) by mouth daily. 90 capsule 2     ergocalciferol (ERGOCALCIFEROL) 50,000 unit capsule TAKE 1 CAPSULE BY MOUTH WEEKLY 4 capsule 0     gabapentin (NEURONTIN) 300 MG capsule TAKE 1 CAPSULE(300 MG) BY MOUTH THREE TIMES DAILY 270 capsule 6     hydrOXYzine (VISTARIL) 50 MG capsule TAKE 1 CAPSULE(50 MG) BY MOUTH AT BEDTIME 30 capsule 6     magnesium oxide (MAG-OX) 400 mg tablet TAKE 1 TABLET(400 MG) BY MOUTH DAILY 30 tablet 11     melatonin 3 mg Tab tablet TAKE 1 TABLET(3 MG) BY MOUTH AT BEDTIME AS NEEDED 100 tablet 2     mirtazapine (REMERON) 15 MG tablet TAKE 1 TABLET(15 MG) BY MOUTH AT BEDTIME 90 tablet 3     omeprazole (PRILOSEC) 20 MG capsule TAKE 2 CAPSULES(40 MG) BY MOUTH DAILY 60 capsule 11     ranitidine (ZANTAC) 150 MG tablet TAKE 1 TABLET(150 MG) BY MOUTH TWICE DAILY 60 tablet 11     diclofenac (VOLTAREN) 75 MG EC tablet TAKE 1 TABLET(75 MG) BY MOUTH TWICE DAILY 60 tablet 12     guaiFENesin (ROBITUSSIN) 100 mg/5 mL syrup Take 10 mL (200 mg total) by mouth 3 (three) times a day as needed for cough. 200 mL 0     lidocaine (XYLOCAINE) 5 % ointment Apply to affected area twice daily 35.44 g 1     polyethylene glycol 3350 8.5 gram PwPk Take 17 g by mouth daily. Use packet one per day 30 packet 4     probenecid-colchicine 500-0.5 mg per tablet TAKE 1 TABLET BY MOUTH DAILY AS DIRECTED 30 tablet 0     probenecid-colchicine 500-0.5 mg per tablet TAKE 1 TABLET BY MOUTH DAILY AS DIRECTED 30 tablet 0     VENTOLIN HFA 90 mcg/actuation inhaler INHALE 2 PUFFS BY MOUTH FOUR TIMES DAILY 18 g 3     No current facility-administered medications for this visit.      datapoints 3

## 2021-06-20 NOTE — LETTER
Letter by Savanna Landaverde RN at      Author: Savanna Landaverde RN Service: -- Author Type: --    Filed:  Encounter Date: 3/30/2020 Status: (Other)       April 22, 2020    CHASITY TERRAZAS  [address]  [place] [address]        Dear Chasity:    At [facility], we are dedicated to improving your health and well-being. Enclosed is the Comprehensive Care Plan that we developed with you on 3/26/20. Please review the Care Plan carefully.    As a reminder, some of the things we discussed at your visit include:    Your physical and mental health    Ways to reduce falls    Health care needs you may have    Dont forget to contact your care coordinator if you:    Have been hospitalized or plan to be hospitalized     Have had a fall     Have experienced a change in physical health    Are experiencing emotional problems     If you do not agree with your Care Plan, have questions about it, or have experienced a change in your needs, please call me at [phone]. If you are hearing impaired, please call the [place] Relay at 214 or [phone] (mmrqvm-bd-lmaxmc relay service).    Sincerely,          Savanna Landaverde RN    E-mail: [email or web address]  Phone: [phone]    Care Manager  [facility]+[id number] [phone] [id number])     [id number] (11/18)

## 2021-06-20 NOTE — LETTER
Letter by Savanna Landaverde RN at      Author: Savanna Landaverde RN Service: -- Author Type: --    Filed:  Encounter Date: 3/30/2020 Status: (Other)       [address], [address]  [place] [address]  Phone:  [phone]  Fax:  [phone]        March 30, 2020    CHASITY TERRAZAS  [address] [place] [address]    Dear Chasity,    Enclosed is a copy of your completed PCA Assessment and Service Plan.  This is for your records and no action is required by you.  If you have additional questions regarding your assessment please contact me at [phone]. If you feel that your needs are not being met, please contact the Clinical Supervisor at [phone].    Sincerely,      Savanna Landaverde RN    E-mail: [email or web address]  Phone: [phone]    Care Manager  [facility]            Enclosure:  Completed PCA assessment

## 2021-06-20 NOTE — LETTER
Letter by Kristie Crowe CMA at      Author: Kristie Crowe CMA Service: -- Author Type: --    Filed:  Encounter Date: 6/23/2020 Status: (Other)       [facility]- Audiology Benefit Letter    MK TERRAZAS  1950  [facility] [address] [place] [address]    Insurance Company: Payor: [facility] / Plan: [facility] SENIOR CARE PLUS / Product Type: PMAP /      [place] Product: YES - [facility]    ID # :  [id number]    Group#:  [id number]    Estimate of Benefits  Consult Visit Benefits:  ARE [place] SENIOR CARE PLUS 06/2020 NEHAL ANDERSON HAC Benefits:  COVERED SERVICE. PT LAST RCVD LAST [place] ON 02/02/2009.    Batteries/Accessories Coverage:  COVERED SERVICE    Representative name: VIA RTE AND [place]ITS  Call reference:   Date of contact: 6/23/2020    Insurance verified today by EVELINE LAY    Additional Information:      The information provided is an estimate of benefits. This does not guarantee coverage; the insurance company will make the final determination of coverage to include patient responsibility of payment by the patient.   [facility] is not responsible for the decisions made by the insurance company regarding coverage.  Any changes to coverage (new plan or new policy) or procedures may void the contents provided in this letter.     Term Definitions  Patient responsibility: Can include but not limited to: co-pays, co-insurance, deductibles, out-of-pocket and non-covered and/or policy exclusions.

## 2021-06-20 NOTE — LETTER
Letter by Charline Cabello AuD at      Author: Charline Cabello AuD Service: -- Author Type: --    Filed:  Encounter Date: 1/20/2020 Status: Signed       [facility]- Audiology Benefit Letter    MK TERRAZAS  1950  [facility] [address] [place] [address]    Insurance Company: Payor: [facility] / Plan: [facility] SENIOR CARE PLUS / Product Type: PMAP /      [place] Product: YES    ID # :  [id number]    Group#:  [id number]    Estimate of Benefits  Consult Visit Benefits:  [place]ARE [place] SENIOR CARE [place] PMAP 1/2020 MISAEL    HAE, HAF, HAC Benefits:   COVERED SERVICE PATIENT HAS NOT RCVD AN HEARING AID BENEFITS WITHIN THE LAST 5 YEARS PER MNITS    Batteries/Accessories Coverage:  COVERED/ COVERED    Representative name: RTE/ [place]ITS  Call reference:   Date of contact: 1/20/2020    Insurance verified today by EVELINE LAY    Additional Information:      The information provided is an estimate of benefits. This does not guarantee coverage; the insurance company will make the final determination of coverage to include patient responsibility of payment by the patient.   [facility] is not responsible for the decisions made by the insurance company regarding coverage.  Any changes to coverage (new plan or new policy) or procedures may void the contents provided in this letter.     Term Definitions  Patient responsibility: Can include but not limited to: co-pays, co-insurance, deductibles, out-of-pocket and non-covered and/or policy exclusions.

## 2021-06-21 NOTE — PROGRESS NOTES
Hearing Aid Check    Chasity Gaspar returns today for a hearing aid check. He is accompanied today by a Jessica .  He was fit with a right Phonak Nanci V50-UP in April 2018 and has not been back for any check appointments. He reports he has had some otalgia in his right ear and his doctor recommended he return for a hearing aid check. He reports he finds his hearing aid helpful. He would not like any adjustments made to the hearing aid.      Otoscopy:  clear canals in both ears  Data Logging: appropriate use  Visual inspection:  Good fit of earmold. Some debris in tubing.   Action:  Changed tubing for patient. A listening check revealed a clear signal.  Overall gain set to appropriate volume, 100% of gain. Did not adjust hearing aid settings per patient request. Sent request to verify insurance and mail out additional size 675  hearing aid batteries to address on file should Chasity Gaspar be eligible.     He reports subjective improvement with today s changes. Recommended that he follow-up with his primary care provider should he continue to experience right otalgia.  He will follow up in March 2019 for an annual hearing test, or sooner with concerns.    Dante Marte, CCC-A  [place] Licensed Audiologist #[id number]

## 2021-06-21 NOTE — PROGRESS NOTES
A 90 day supply of hearing aid batteries were sent to this patient today.   He/She should contact the clinic with concerns.   The patient was not seen by a provider/staff member today.  Size 675  # of Packages 3    Tracking Number [id number]

## 2021-06-21 NOTE — LETTER
Letter by Pauline Renee AuD at      Author: Pauline Renee AuD Service: -- Author Type: --    Filed:  Encounter Date: 4/15/2021 Status: (Other)       [facility]- Audiology Benefit Letter    MK TERRAZAS  1950  [address] [place], [place] [address] [place] [address]    Insurance Company: Payor: [facility] / Plan: [facility] SENIOR CARE PLUS / Product Type: PMAP /     [place] Product: YES     ID # :  [id number]    Group#:  [id number]    Estimate of Benefits  Consult Visit Benefits:  [place] 04/2021 MISAEL    HAE, HAF, HAC Benefits:   COVERED SERVICE. PATIENT [address] [place] [facility] BENEFITS ON 2-.     Batteries/Accessories Coverage:  COVERED SERVICE    Representative name: [employer] [place]   Call reference:   Date of contact: 4-    Insurance verified today by DONALDO MURILLO    Additional Information:      The information provided is an estimate of benefits. This does not guarantee coverage; the insurance company will make the final determination of coverage to include patient responsibility of payment by the patient.   [facility] is not responsible for the decisions made by the insurance company regarding coverage.  Any changes to coverage (new plan or new policy) or procedures may void the contents provided in this letter.     Term Definitions  Patient responsibility: Can include but not limited to: co-pays, co-insurance, deductibles, out-of-pocket and non-covered and/or policy exclusions.

## 2021-06-21 NOTE — PROGRESS NOTES
ASSESSMENT  And plan        1. Otalgia, right ear patient claims he has right ear pain now that he is wearing his hearing aid he is able to quantify that this is not ear ringing and can actually hear he just has throbbing pain in his right ear which radiates into his right jaw.    2. Lumbar Canal Stenosis patient does not complain of back pain today.    3. Gastroesophageal reflux disease without esophagitis taking Carafate I have asked him to stop this medication now he no longer has any abdominal burning.    4. Perforated tympanic membrane there is a new right TM perforation noted on examination today it was not present in April when he was last examined by ENT.  This is the source of his pain. Ambulatory referral to ENT   5. Right otitis media effusion with erythema noted at the site of the perforation amoxicillin to be started nephew will  the medication today.        There are no Patient Instructions on file for this visit.    No orders of the defined types were placed in this encounter.    There are no discontinued medications.    No Follow-up on file.    CHIEF COMPLAINT:  Chief Complaint   Patient presents with     Pain     Pt states he has right ear pain for about two weeks.         HISTORY OF PRESENT ILLNESS:  Chasity is a 68 y.o. male who is here for a follow-up    .  He saw me last week.  Today he is here with his nephew.  He did see audiology last week and had his hearing adjusted with the help of his hearing aid he states is now working well for him and he has no pain or ringing from the hearing aid but rather from the ear itself.  He says the pain is constant but sometimes it comes and goes he has had it for about 2 weeks.  Now that he can hear properly he knows that it is nothing to do with the hearing aid.    REVIEW OF SYSTEMS:   HEENT positive for right-sided ear pain.  According to the patient and his family 10 point review of  All other systems are negative.    PFSH:        Medical history  "reviewed        TOBACCO USE:  History   Smoking Status     Never Smoker   Smokeless Tobacco     Former User       VITALS:  Vitals:    10/30/18 0959   BP: 102/72   Patient Site: Right Arm   Patient Position: Sitting   Cuff Size: Adult Regular   Pulse: 80   Resp: 16   Temp: 97.8  F (36.6  C)   TempSrc: Oral   Weight: 126 lb 3.2 oz (57.2 kg)   Height: 5' 1\" (1.549 m)     Wt Readings from Last 3 Encounters:   10/30/18 126 lb 3.2 oz (57.2 kg)   10/24/18 124 lb 5 oz (56.4 kg)   10/19/18 129 lb (58.5 kg)       PHYSICAL EXAM:  Interactive elderly appearing male sitting comfortably exam room no acute distress  HEENT neck supple mucous membranes moist bilateral tympanic membrane examination shows a perfect right tympanic membrane.  CVS regular rate and rhythm no murmurs rubs gallops appreciated  Abdomen soft there is no focal tenderness bowel sounds are present      DATA REVIEWED:  Additional History from Old Records Summarized (2): 2      Reviewed note from audiology, hearing is working well reviewed last note from ENT from Dr. service from April 2018 TM was noted to be intact on the right side   Decision to Obtain Records (1): 0  Radiology Tests Summarized or Ordered (1): 0  Labs Reviewed or Ordered (1): 0  Medicine Test Summarized or Ordered (1): 0  Independent Review of EKG or X-RAY(2 each): 0    The visit lasted a total of 25 minutes face to face with the patient. Over 50% of the time was spent counseling and educating the patient about       Otitis media and tympanic membrane perforations.    MEDICATIONS:  Current Outpatient Prescriptions   Medication Sig Dispense Refill     allopurinol (ZYLOPRIM) 300 MG tablet TAKE 1 TABLET BY MOUTH DAILY 90 tablet 2     atorvastatin (LIPITOR) 40 MG tablet Take 1 tablet (40 mg total) by mouth at bedtime. 30 tablet 5     benzonatate (TESSALON) 100 MG capsule Take 1 capsule (100 mg total) by mouth 3 (three) times a day as needed for cough. 30 capsule 1     clopidogrel (PLAVIX) 75 mg " tablet TAKE 1 TABLET BY MOUTH EVERY DAY 30 tablet 10     diclofenac (VOLTAREN) 75 MG EC tablet TAKE 1 TABLET(75 MG) BY MOUTH TWICE DAILY 60 tablet 0     DULoxetine (CYMBALTA) 60 MG capsule Take 1 capsule (60 mg total) by mouth daily. 90 capsule 2     ergocalciferol (ERGOCALCIFEROL) 50,000 unit capsule TAKE 1 CAPSULE BY MOUTH WEEKLY 4 capsule 0     gabapentin (NEURONTIN) 300 MG capsule TAKE 1 CAPSULE(300 MG) BY MOUTH THREE TIMES DAILY 270 capsule 6     hydrOXYzine (VISTARIL) 50 MG capsule TAKE 1 CAPSULE(50 MG) BY MOUTH AT BEDTIME 30 capsule 6     magnesium oxide (MAG-OX) 400 mg tablet TAKE 1 TABLET(400 MG) BY MOUTH DAILY 30 tablet 11     melatonin 3 mg Tab tablet TAKE 1 TABLET(3 MG) BY MOUTH AT BEDTIME AS NEEDED 100 tablet 2     mirtazapine (REMERON) 15 MG tablet TAKE 1 TABLET(15 MG) BY MOUTH AT BEDTIME 90 tablet 3     omeprazole (PRILOSEC) 20 MG capsule TAKE 2 CAPSULES(40 MG) BY MOUTH DAILY 60 capsule 11     ranitidine (ZANTAC) 150 MG tablet TAKE 1 TABLET(150 MG) BY MOUTH TWICE DAILY 60 tablet 11     sucralfate (CARAFATE) 1 gram tablet Take 1 tablet (1 g total) by mouth 4 (four) times a day. 40 tablet 0     VENTOLIN HFA 90 mcg/actuation inhaler INHALE 2 PUFFS BY MOUTH FOUR TIMES DAILY 18 g 3     acetaminophen 500 mg coapsule Take 2 tablets by mouth 3 (three) times a day as needed for fever or pain.       atorvastatin (LIPITOR) 40 MG tablet TAKE 1 TABLET(40 MG) BY MOUTH AT BEDTIME 90 tablet 2     clobetasol (TEMOVATE) 0.05 % ointment Apply to affected area twice daily 30 g 2     guaiFENesin (ROBITUSSIN) 100 mg/5 mL syrup Take 10 mL (200 mg total) by mouth 3 (three) times a day as needed for cough. 200 mL 0     guaiFENesin (ROBITUSSIN) 100 mg/5 mL syrup Take 10 mL (200 mg total) by mouth 3 (three) times a day as needed for cough. 200 mL 0     lidocaine (XYLOCAINE) 5 % ointment Apply to affected area twice daily 35.44 g 1     polyethylene glycol 3350 8.5 gram PwPk Take 17 g by mouth daily. Use packet one per day 30  packet 4     probenecid-colchicine 500-0.5 mg per tablet TAKE 1 TABLET BY MOUTH DAILY AS DIRECTED 30 tablet 0     probenecid-colchicine 500-0.5 mg per tablet TAKE 1 TABLET BY MOUTH DAILY AS DIRECTED 30 tablet 0     No current facility-administered medications for this visit.      Please note that this clinical encounter uses voice recognition software, there may be typographical errors present

## 2021-06-21 NOTE — PROGRESS NOTES
ASSESSMENT and plan   1. Need for immunization against influenza  Agrees for immunization.    2. Lumbar Canal Stenosis    Today he is not complaining of back pain we will continue his gabapentin.  He is to continue take the Loxitane no side effects noted    3. Radiculopathy, lumbar region    Denies any radicular symptoms today.    4. Gastroesophageal reflux disease without esophagitis    He was seen in the ER complaint of some chest burning he was out of his omeprazole a refill that is been given Carafate he has not been eating regularly he denies any pain today.  - omeprazole (PRILOSEC) 20 MG capsule; TAKE 2 CAPSULES(40 MG) BY MOUTH DAILY  Dispense: 60 capsule; Refill: 11    5. Moderate Recurrent Major Depression    Initially his daughter thought he was depressed but today after we refitted his hearing aid he seemed to be very talkative and denied any mental health issues.    6. Gastritis    Granite on omeprazole and sucralfate.  Recent [facility] ER visit for chest pain proved to be negative.  In terms of a cardiac cause.  Multiple labs showed a negative troponin unremarkable blood chemistries.  EKG was read as normal.    7. Hearing aid fitting or adjustment    He has not been communicating regularly because of his hearing aid.  He had it adjusted 3 months ago but believes it is too loud and hurts his ears I will send him back to audiology.  He does hear well with the appliance.  - Ambulatory referral to Audiology    8. Pulmonary nodules    Discovered on CT done in the hospital based on criteria and no treatment is needed and rescanning may be appropriate in the year I shared this information with his daughter and the patient today    9. Hepatic steatosis    Discovered on CT of the abdomen no abdominal pain.  He denies nausea or vomiting.    10.    Cough    I had her discontinue the over-the-counter cough suppressant I have given him some Tessalon Perles side effects discussed    There are no Patient Instructions  on file for this visit.    Orders Placed This Encounter   Procedures     Ambulatory referral to Audiology     Referral Priority:   Routine     Referral Type:   Audiology     Referral Reason:   Evaluation and Treatment     Requested Specialty:   Audiology     Number of Visits Requested:   1     Medications Discontinued During This Encounter   Medication Reason     omeprazole (PRILOSEC) 20 MG capsule Reorder       No Follow-up on file.    CHIEF COMPLAINT:  Chief Complaint   Patient presents with     ED follow up     Chest pain       HISTORY OF PRESENT ILLNESS:  Chasity is a 68 y.o. male       Who is here for a follow-up after being seen in the emergency room at [facility] for chest pain.  He is accompanied by his daughter who is also his PCA.  He reports he does not have any chest pain with chest burning.  Multiple labs and studies done there showed that he had no identifiable cardiac concerns.  He however was given Carafate for her as well as deemed to be epigastric discomfort.  His daughter is concerned because he has been talking regularly and is been eating less.  She also reports that he is not been using his hearing aid regularly.  She has been giving him all of his medications but is out of several medications he is also worried that he has a cough and is been giving him over-the-counter cough medication.    REVIEW OF SYSTEMS:     Pulmonary complains of cough no shortness of breath no chest discomfort  HEENT complains that his hearing aids are too loud and he has ear pain because of the  According to the patient 10 point review of  All other systems are negative.    PFSH:      Lives with his daughter  Not working    TOBACCO USE:  History   Smoking Status     Never Smoker   Smokeless Tobacco     Former User       VITALS:  Vitals:    10/24/18 0926 10/24/18 0933   BP: 144/86 148/86   Pulse: 76    Resp: 20    Temp: 98.4  F (36.9  C)    TempSrc: Oral    SpO2: 95%    Weight: 124 lb 5 oz (56.4 kg)    Height: 5'  "1\" (1.549 m)      Wt Readings from Last 3 Encounters:   10/24/18 124 lb 5 oz (56.4 kg)   10/19/18 129 lb (58.5 kg)   10/17/18 129 lb 8 oz (58.7 kg)       PHYSICAL EXAM:  Initially quiet but later interactive elderly appearing male sitting in exam room no acute distress  HEENT neck supple mucous membranes moist there is no sinus tenderness.  There is no lymph enlargement noted in the neck  Respiratory system clear to auscultation equal breath sounds no wheezes no crackles  CVS regular rate and rhythm no murmurs rubs gallops appreciated no pedal edema  Abdomen soft there is no focal tenderness bowel sounds are absent  Musculoskeletal system no tenderness elicited when I palpate his neck lumbar spine or hips bilaterally  Psych oriented x3 well-groomed interactive not agitated      DATA REVIEWED:  Additional History from Old Records Summarized (2):   Reviewed notes from ED staff at [facility], multiple tests were done examination showed no abnormalities.  CT of the chest and abdomen showed  Decision to Obtain Records (1): 0  Radiology Tests Summarized or Ordered (1): 1    Presence of hepatic steatosis, he has pulmonary nodules present.  No evidence of atelectasis.  Labs Reviewed or Ordered (1):   CMP, UA, troponin all within normal limits no abnormalities noted EKG was read as normal.  Medicine Test Summarized or Ordered (1): 0  Independent Review of EKG or X-RAY(2 each): 0    The visit lasted a total of 30 minutes face to face with the patient. Over 50% of the time was spent counseling and educating the patient about     Multiple issues.    MEDICATIONS:  Current Outpatient Prescriptions   Medication Sig Dispense Refill     allopurinol (ZYLOPRIM) 300 MG tablet TAKE 1 TABLET BY MOUTH DAILY 90 tablet 2     atorvastatin (LIPITOR) 40 MG tablet Take 1 tablet (40 mg total) by mouth at bedtime. 30 tablet 5     clobetasol (TEMOVATE) 0.05 % ointment Apply to affected area twice daily 30 g 2     clopidogrel (PLAVIX) 75 " mg tablet TAKE 1 TABLET BY MOUTH EVERY DAY 30 tablet 10     diclofenac (VOLTAREN) 75 MG EC tablet TAKE 1 TABLET(75 MG) BY MOUTH TWICE DAILY 60 tablet 0     DULoxetine (CYMBALTA) 60 MG capsule Take 1 capsule (60 mg total) by mouth daily. 90 capsule 2     ergocalciferol (ERGOCALCIFEROL) 50,000 unit capsule TAKE 1 CAPSULE BY MOUTH WEEKLY 4 capsule 0     gabapentin (NEURONTIN) 300 MG capsule TAKE 1 CAPSULE(300 MG) BY MOUTH THREE TIMES DAILY 270 capsule 6     hydrOXYzine (VISTARIL) 50 MG capsule TAKE 1 CAPSULE(50 MG) BY MOUTH AT BEDTIME 30 capsule 6     magnesium oxide (MAG-OX) 400 mg tablet TAKE 1 TABLET(400 MG) BY MOUTH DAILY 30 tablet 11     melatonin 3 mg Tab tablet TAKE 1 TABLET(3 MG) BY MOUTH AT BEDTIME AS NEEDED 100 tablet 2     mirtazapine (REMERON) 15 MG tablet TAKE 1 TABLET(15 MG) BY MOUTH AT BEDTIME 90 tablet 3     omeprazole (PRILOSEC) 20 MG capsule TAKE 2 CAPSULES(40 MG) BY MOUTH DAILY 60 capsule 11     ranitidine (ZANTAC) 150 MG tablet TAKE 1 TABLET(150 MG) BY MOUTH TWICE DAILY 60 tablet 11     sucralfate (CARAFATE) 1 gram tablet Take 1 tablet (1 g total) by mouth 4 (four) times a day. 40 tablet 0     VENTOLIN HFA 90 mcg/actuation inhaler INHALE 2 PUFFS BY MOUTH FOUR TIMES DAILY 18 g 3     acetaminophen 500 mg coapsule Take 2 tablets by mouth 3 (three) times a day as needed for fever or pain.       atorvastatin (LIPITOR) 40 MG tablet TAKE 1 TABLET(40 MG) BY MOUTH AT BEDTIME 90 tablet 2     benzonatate (TESSALON) 100 MG capsule Take 1 capsule (100 mg total) by mouth 3 (three) times a day as needed for cough. 30 capsule 1     guaiFENesin (ROBITUSSIN) 100 mg/5 mL syrup Take 10 mL (200 mg total) by mouth 3 (three) times a day as needed for cough. 200 mL 0     guaiFENesin (ROBITUSSIN) 100 mg/5 mL syrup Take 10 mL (200 mg total) by mouth 3 (three) times a day as needed for cough. 200 mL 0     lidocaine (XYLOCAINE) 5 % ointment Apply to affected area twice daily 35.44 g 1     polyethylene glycol 3350 8.5 gram PwPk  Take 17 g by mouth daily. Use packet one per day 30 packet 4     probenecid-colchicine 500-0.5 mg per tablet TAKE 1 TABLET BY MOUTH DAILY AS DIRECTED 30 tablet 0     probenecid-colchicine 500-0.5 mg per tablet TAKE 1 TABLET BY MOUTH DAILY AS DIRECTED 30 tablet 0     No current facility-administered medications for this visit.      Data points 4      Please note that this clinical encounter uses voice recognition software, there may be typographical errors present

## 2021-06-21 NOTE — LETTER
Letter by Savanna Landaverde RN at      Author: Savanna Landaverde RN Service: -- Author Type: --    Filed:  Encounter Date: 2/23/2021 Status: (Other)       February 23, 2021    CHASITY TERRAZAS  [facility] [address] [place] [address]        Dear Chasity:    At [facility], we are dedicated to improving your health and well-being. Enclosed is the Comprehensive Care Plan that we developed with you on 2/11/21. Please review the Care Plan carefully.    As a reminder, some of the things we discussed at your visit include:    Your physical and mental health    Ways to reduce falls    Health care needs you may have    Dont forget to contact your care coordinator if you:    Have been hospitalized or plan to be hospitalized     Have had a fall     Have experienced a change in physical health    Are experiencing emotional problems     If you do not agree with your Care Plan, have questions about it, or have experienced a change in your needs, please call me at [phone]. If you are hearing impaired, please call the [place] [employer] at 950 or [phone] ([id number] relay service).    Sincerely,          Savanna Landaverde RN    E-mail: [email or web address]  Phone: [phone]    Care Manager  [facility]+[id number] [place] [id number]     [id number] (11/18)

## 2021-06-21 NOTE — PROGRESS NOTES
ASSESMENT AND PLAN:  Diagnoses and all orders for this visit:    Ear pain  No drainage, no signs of infection in the ears.  ??  TMJ.  Will try Voltaren.  ENT reevaluation if no improvement.  PCA will call if no improvement.    Ruptured tympanic membrane, bilateral  Appeared known, chronic.    Cough  Will try Robitussin.    Immunization due  -     Pneumococcal polysaccharide vaccine 23-valent 1 yo or older, subq/IM    Other orders  -     Influenza High Dose, Seasonal 65+ yrs; Future; Expected date: 10/18/18  -     guaiFENesin (ROBITUSSIN) 100 mg/5 mL syrup; Take 10 mL (200 mg total) by mouth 3 (three) times a day as needed for cough.  Dispense: 200 mL; Refill: 0  -     diclofenac (VOLTAREN) 75 MG EC tablet; TAKE 1 TABLET(75 MG) BY MOUTH TWICE DAILY  Dispense: 60 tablet; Refill: 0        SUBJECTIVE: [place] Chasity is a 68-year-old male with history of hearing loss, bilaterally on hearing aid here with the complaint of bilateral ear pain for 3 days.  Pain is worse in the right ear.  PCA states he has been refusing to use his hearing aid in the right.  No fever.  No drainage from the ears.  He is having mild cough, nonproductive.  No wheezing or shortness of breath.    Past Medical History:   Diagnosis Date     Cervicalgia      Depression      Dyslipidemia      Dysthymia      Gastritis      GERD (gastroesophageal reflux disease)      Hearing loss      Hiatal hernia      Hypertriglyceridemia      Insomnia      Lacunar stroke      Lumbar canal stenosis      Lumbar radiculopathy      Myalgia and myositis      Patient Active Problem List   Diagnosis     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     Abdominal Pain In The Right Lower Belly (RLQ)     Abdominal Pain In The Right Upper Belly (RUQ)     Memory Lapses Or Loss     Hyperlipidemia     Hearing Loss     Lacunar Stroke     Spinal Stenosis     Radiculopathy, lumbar region     Cervicalgia     Dysthymia (Depressive Neurosis), Primary     Essential Hypertriglyceridemia      "Hiatal Hernia     Lumbar Canal Stenosis     Lower Back Pain     Vitamin D Deficiency     Moderate Recurrent Major Depression     Esophageal Reflux     Gastritis     Constipation     Gout     Dermatitis     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)     Acute respiratory failure with hypoxia (H)     Left hemiparesis (H)     ASNHL (asymmetrical sensorineural hearing loss)       Allergies:    Allergies   Allergen Reactions     Aspirin Hives     Oxycodone Itching     Vicodin [Hydrocodone-Acetaminophen]        History   Smoking Status     Never Smoker   Smokeless Tobacco     Former User       Review of systems otherwise negative except as listed in HPI.   History   Smoking Status     Never Smoker   Smokeless Tobacco     Former User       OBJECTICE: /86 (Patient Site: Right Arm, Patient Position: Sitting, Cuff Size: Adult Regular)  Pulse 84  Temp 97.9  F (36.6  C) (Oral)   Resp 20  Ht 5' 1\" (1.549 m)  Wt 129 lb 8 oz (58.7 kg)  BMI 24.47 kg/m2          GEN-alert,  in no apparent distress.  HEENT- perforated TM bilaterally, no drainage, normal canal.  Tenderness right posterior mandible.  CV-regular rate and rhythm with no murmur.   RESP-lungs clear to auscultation .  ABDOMEN- Soft , not tender.  EXTREM- No edema.  SKIN-normal    This transcription uses voice recognition software, which may contain typographical errors.      Sreedhar Kapoor   10/17/2018   "

## 2021-06-23 NOTE — TELEPHONE ENCOUNTER
Refill Approved    Rx renewed per Medication Renewal Policy. Medication was last renewed on 12/12/17.    Linnea Arauz, [facility] Triage/Med Refill 1/11/2019     Requested Prescriptions   Pending Prescriptions Disp Refills     hydrOXYzine pamoate (VISTARIL) 50 MG capsule [Pharmacy Med Name: HYDROXYZINE PAMOATE 50MG CAPSULES] 30 capsule 0     Sig: TAKE 1 CAPSULE(50 MG) BY MOUTH AT BEDTIME    Antihistamine Refill Protocol Passed - 1/10/2019  9:27 AM       Passed - Patient has had office visit/physical in last year    Last office visit with prescriber/PCP: 10/30/2018 David Fang MD OR same dept: 10/30/2018 David Fang MD OR same specialty: 10/30/2018 David Fang MD  Last physical: 2/3/2015 Last MTM visit: Visit date not found   Next visit within 3 mo: Visit date not found  Next physical within 3 mo: Visit date not found  Prescriber OR PCP: David Fang MD  Last diagnosis associated with med order: There are no diagnoses linked to this encounter.  If protocol passes may refill for 12 months if within 3 months of last provider visit (or a total of 15 months).

## 2021-06-23 NOTE — PROGRESS NOTES
ASSESSMENT AND PLAN:  1. Sore throat  Patient's been complaining of a sore throat rapid strep negative awaiting culture results no infectious contacts noted at home.  - Rapid Strep A Screen-Throat  - Group A Strep, RNA Direct Detection, Throat    2. Dermatitis  Solitary rash present on the left suprascapular this is a chronic rash to been scratching it represcribed his clobetasol.    3. 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 Cough  Cough been present for a week nonproductive respiratory exams unremarkable Tessalon Perles given if the cough continues or becomes productive or he develops a fever he should be seen again.    5. Sleep disorder  Difficulty with sleep not all medications brought in today hydroxyzine refilled this will also help with his dermatitis.  - hydrOXYzine pamoate (VISTARIL) 50 MG capsule; takea at bedtime  Dispense: 30 capsule; Refill: 10            Orders Placed This Encounter   Procedures     Rapid Strep A Screen-Throat     Group A Strep, RNA Direct Detection, Throat     Medications Discontinued During This Encounter   Medication Reason     magnesium oxide (MAG-OX) 400 mg (241.3 mg magnesium) tablet Reorder     clobetasol (TEMOVATE) 0.05 % ointment Reorder     hydrOXYzine pamoate (VISTARIL) 50 MG capsule Reorder       Return in about 4 weeks (around 2/21/2019).    CHIEF COMPLAINT:  Chief Complaint   Patient presents with     Cough     x1wk, low back pain when coughing     Chest Pain     Sore Throat       HISTORY OF PRESENT ILLNESS:  Chasity is a 69 y.o. male who presents to the clinic for cough, sore throat, and chest pain. Chasity is present with a Partschannel . He has had a cough and sore throat for a week with chest tightness and low back pain with coughing. His sleep has been poor due to cough. The patient has been able to  "swallow his medications. He is able to eat without nausea or emesis. There has been no shortness of breath ,wheezing, or diarrhea. No one else at home is sick, and his grandchildren are not sick. Of note, Chasity continues to have a persistent itchy rash on his left suprascapaular area, and is wondering if he should see a dermatologist for this.    REVIEW OF SYSTEMS:   General: Positive for poor sleep secondary to cough.   ENT: Positive for sore throat.  Respiratory: Positive for cough. No shortness of breath or wheezing.  GI: Negative for nausea, emesis, or diarrhea.  Musculoskeletal: Positive for chest tightness and low back pain with cough.  Skin: Positive for rash.  All other systems are negative.    PFSH:  Reviewed as below.     TOBACCO USE:  Social History     Tobacco Use   Smoking Status Never Smoker   Smokeless Tobacco Former User       VITALS:  Vitals:    01/24/19 1153   BP: 102/60   Pulse: (!) 124   Resp: 12   Temp: (!) 101.1  F (38.4  C)   TempSrc: Oral   SpO2: 95%   Weight: 125 lb 11.2 oz (57 kg)   Height: 5' 1\" (1.549 m)     Wt Readings from Last 3 Encounters:   01/24/19 125 lb 11.2 oz (57 kg)   10/30/18 126 lb 3.2 oz (57.2 kg)   10/24/18 124 lb 5 oz (56.4 kg)     Body mass index is 23.75 kg/m .    PHYSICAL EXAM:  General: Alert, cooperative, no distress, appears stated age  Head: Normocephalic, without obvious abnormality, atraumatic  Ears: Hearing aids, normal TM's and external ear canals, both ears  Nose: Nares normal, septum midline, mucosa normal, no drainage or sinus tenderness  Throat: Lips, mucosa, and tongue normal; teeth and gums normal, postnasal drainage, posterior of pharynx is nonerythematous   Lungs: Clear to auscultation bilaterally, respirations unlabored  Heart: Regular rate and rhythm, S1 and S2 normal, no murmur, rub, or gallop  Neurologic:  A & O x 3.  No tremor, no focal findings.  Normal gait.   Skin: Singular rash on left suprascapaular area measuring 2 x 2 cm    DATA " REVIEWED:  Additional History from Old Records Summarized (2): none  Decision to Obtain Records (1): none  Radiology Tests Summarized or Ordered (1): none  Labs Reviewed or Ordered (1): Rapid Strep A screen today is negative.  Medicine Test Summarized or Ordered (1): none  Independent Review of EKG or X-RAY(2 each): none    Louisa SAUNDERS, am scribing for and in the presence of, Dr. Fang.    I, Dr. Fang, personally performed the services described in this documentation, as scribed by Louisa Otto in my presence, and it is both accurate and complete.       MEDICATIONS:  Current Outpatient Medications   Medication Sig Dispense Refill     allopurinol (ZYLOPRIM) 300 MG tablet TAKE 1 TABLET BY MOUTH DAILY 90 tablet 2     atorvastatin (LIPITOR) 40 MG tablet Take 1 tablet (40 mg total) by mouth at bedtime. 30 tablet 5     atorvastatin (LIPITOR) 40 MG tablet TAKE 1 TABLET(40 MG) BY MOUTH AT BEDTIME 90 tablet 2     clopidogrel (PLAVIX) 75 mg tablet TAKE 1 TABLET BY MOUTH EVERY DAY 30 tablet 10     diclofenac (VOLTAREN) 75 MG EC tablet TAKE 1 TABLET(75 MG) BY MOUTH TWICE DAILY 60 tablet 0     DULoxetine (CYMBALTA) 60 MG capsule TAKE 1 CAPSULE(60 MG) BY MOUTH DAILY 90 capsule 2     ergocalciferol (ERGOCALCIFEROL) 50,000 unit capsule TAKE 1 CAPSULE BY MOUTH WEEKLY 4 capsule 0     gabapentin (NEURONTIN) 300 MG capsule TAKE 1 CAPSULE(300 MG) BY MOUTH THREE TIMES DAILY 270 capsule 6     hydrOXYzine pamoate (VISTARIL) 50 MG capsule takea at bedtime 30 capsule 10     magnesium oxide (MAG-OX) 400 mg (241.3 mg magnesium) tablet TAKE 1 TABLET(400 MG) BY MOUTH DAILY 30 tablet 3     mirtazapine (REMERON) 15 MG tablet TAKE 1 TABLET(15 MG) BY MOUTH AT BEDTIME 90 tablet 3     omeprazole (PRILOSEC) 20 MG capsule TAKE 2 CAPSULES(40 MG) BY MOUTH DAILY 60 capsule 11     ranitidine (ZANTAC) 150 MG tablet TAKE 1 TABLET(150 MG) BY MOUTH TWICE DAILY 60 tablet 11     acetaminophen 500 mg coapsule Take 2 tablets by mouth 3 (three) times a day as  needed for fever or pain.       benzonatate (TESSALON PERLES) 100 MG capsule Take 1 capsule (100 mg total) by mouth 3 (three) times a day as needed for cough. 30 capsule 0     benzonatate (TESSALON) 100 MG capsule Take 1 capsule (100 mg total) by mouth 3 (three) times a day as needed for cough. 30 capsule 1     clobetasol (TEMOVATE) 0.05 % ointment Apply to affected area twice daily 30 g 2     guaiFENesin (ROBITUSSIN) 100 mg/5 mL syrup Take 10 mL (200 mg total) by mouth 3 (three) times a day as needed for cough. 200 mL 0     guaiFENesin (ROBITUSSIN) 100 mg/5 mL syrup Take 10 mL (200 mg total) by mouth 3 (three) times a day as needed for cough. 200 mL 0     lidocaine (XYLOCAINE) 5 % ointment Apply to affected area twice daily 35.44 g 1     melatonin 3 mg Tab tablet TAKE 1 TABLET(3 MG) BY MOUTH AT BEDTIME AS NEEDED 100 tablet 2     polyethylene glycol 3350 8.5 gram PwPk Take 17 g by mouth daily. Use packet one per day 30 packet 4     probenecid-colchicine 500-0.5 mg per tablet TAKE 1 TABLET BY MOUTH DAILY AS DIRECTED 30 tablet 0     probenecid-colchicine 500-0.5 mg per tablet TAKE 1 TABLET BY MOUTH DAILY AS DIRECTED 30 tablet 0     sucralfate (CARAFATE) 1 gram tablet Take 1 tablet (1 g total) by mouth 4 (four) times a day. 40 tablet 0     VENTOLIN HFA 90 mcg/actuation inhaler INHALE 2 PUFFS BY MOUTH FOUR TIMES DAILY 18 g 3     No current facility-administered medications for this visit.    Total amount of time spent discussing patient's problems including cough, dermatitis, sleep issues and pharyngitis was 25 minutes.    Total Data Points: 1    Please note that this clinical encounter uses voice recognition software, there may be typographical errors present

## 2021-06-23 NOTE — TELEPHONE ENCOUNTER
Refill Approved    Rx renewed per Medication Renewal Policy. Medication was last renewed on 12/1217.    Linnea Arauz, [facility] Triage/Med Refill 2/7/2019     Requested Prescriptions   Pending Prescriptions Disp Refills     gabapentin (NEURONTIN) 300 MG capsule [Pharmacy Med Name: GABAPENTIN 300MG CAPSULES] 270 capsule 0     Sig: TAKE 1 CAPSULE(300 MG) BY MOUTH THREE TIMES DAILY    Gabapentin/Levetiracetam/Tiagabine Refill Protocol  Passed - 2/6/2019  3:49 AM       Passed - PCP or prescribing provider visit in past 12 months or next 3 months    Last office visit with prescriber/PCP: 1/24/2019 David Fang MD OR same dept: 1/24/2019 David Fang MD OR same specialty: 1/24/2019 David Fang MD  Last physical: 2/3/2015 Last MTM visit: Visit date not found   Next visit within 3 mo: Visit date not found  Next physical within 3 mo: Visit date not found  Prescriber OR PCP: David Fang MD  Last diagnosis associated with med order: 1. Chronic pain  - gabapentin (NEURONTIN) 300 MG capsule [Pharmacy Med Name: GABAPENTIN 300MG CAPSULES]; TAKE 1 CAPSULE(300 MG) BY MOUTH THREE TIMES DAILY  Dispense: 270 capsule; Refill: 0    If protocol passes may refill for 12 months if within 3 months of last provider visit (or a total of 15 months).

## 2021-06-24 ENCOUNTER — COMMUNICATION - HEALTHEAST (OUTPATIENT)
Dept: FAMILY MEDICINE | Facility: CLINIC | Age: 71
End: 2021-06-24

## 2021-06-24 DIAGNOSIS — E78.5 HYPERLIPIDEMIA: ICD-10-CM

## 2021-06-24 DIAGNOSIS — I63.02 CEREBROVASCULAR ACCIDENT (CVA) DUE TO THROMBOSIS OF BASILAR ARTERY (H): ICD-10-CM

## 2021-06-24 DIAGNOSIS — M10.9 GOUT: ICD-10-CM

## 2021-06-24 RX ORDER — ALLOPURINOL 300 MG/1
TABLET ORAL
Qty: 90 TABLET | Refills: 3 | Status: SHIPPED | OUTPATIENT
Start: 2021-06-24 | End: 2022-11-25

## 2021-06-24 RX ORDER — ATORVASTATIN CALCIUM 40 MG/1
TABLET, FILM COATED ORAL
Qty: 90 TABLET | Refills: 3 | Status: SHIPPED | OUTPATIENT
Start: 2021-06-24 | End: 2023-02-28

## 2021-06-24 RX ORDER — CLOPIDOGREL BISULFATE 75 MG/1
75 TABLET ORAL DAILY
Qty: 90 TABLET | Refills: 3 | Status: SHIPPED | OUTPATIENT
Start: 2021-06-24 | End: 2022-07-26

## 2021-06-24 NOTE — PROGRESS NOTES
ASSESSMENT AND PLAN:  1. Cough  Patient has a persistent cough for more than 1 month.  Cough occurs multiple times during the day.  Associate with minor amount of sputum.  Today I did personally had him undergo a chest x-ray and reviewed the results no evidence of atelectasis no perihilar infiltrates results were shared with the patient today her hemogram shows believe his cough is due to an allergy no new medications started Tessalon Perles given for cough  - XR Chest 2 Views  - HM1(CBC and Differential)  - HM1 (CBC with Diff)  - Ambulatory referral to Allergy    2. Lumbar Canal Stenosis  Continue with duloxetine gabapentin symptoms are slightly worse because he is been coughing frequently.    3. Elevated blood pressure reading without diagnosis of hypertension  Rechecking a BMP today his blood pressure was elevated.  He denies taking any over-the-counter medication.  - Basic Metabolic Panel  - HM1(CBC and Differential)  - HM1 (CBC with Diff)    4. Sleep disorder  I refilled this medication for him I am not going to increase the dosage because it will make him excessively somnolent.  - hydrOXYzine pamoate (VISTARIL) 50 MG capsule; takea at bedtime  Dispense: 30 capsule; Refill: 10            Orders Placed This Encounter   Procedures     XR Chest 2 Views     Order Specific Question:   Reason for Exam (Describe Symptoms):     Answer:   cough     Order Specific Question:   Can the procedure be changed per Radiologist protocol?     Answer:   Yes     Basic Metabolic Panel     HM1 (CBC with Diff)     Ambulatory referral to Allergy     Referral Priority:   Routine     Referral Type:   Allergy, Asthma, and Immunology     Referral Reason:   Evaluation and Treatment     Requested Specialty:   Allergy     Number of Visits Requested:   1     Medications Discontinued During This Encounter   Medication Reason     benzonatate (TESSALON) 100 MG capsule      benzonatate (TESSALON PERLES) 100 MG capsule      benzonatate (TESSALON)  "200 MG capsule      hydrOXYzine pamoate (VISTARIL) 50 MG capsule Reorder       No Follow-up on file.    CHIEF COMPLAINT:  Chief Complaint   Patient presents with     Back Pain       HISTORY OF PRESENT ILLNESS:  Chasity is a 69 y.o. male with hypertension, hyperlipidemia, history of CVA, dermatitis, and lumbar spinal stenosis, who presents to the clinic today with his daughter for follow up on back pain. Chasity is present with a Jessica . I last saw him on 01/24/2019 for sore throat and dermatitis. Strep screening was negative. He still has a cough at this point, ongoing for more than a month now. The Tessalon Perles have not helped. His cough is triggered with talking, but not swallowing. His cough interferes with sleep, and is worse in the morning. It is not productive. The patient continues on hydroxyzine for his dermatitis. No on else is sick at home.    Chasity continues to have ongoing back pain. His blood pressure is elevated today. He is not taking any OTC or herbal medications.    REVIEW OF SYSTEMS:   General: Poor sleep secondary to cough. No fever.  ENT: No sore throat.  Respiratory: Nonproductive cough. No wheezing.  Musculoskeletal: Back pain.  All other systems are negative.    PFSH:  Reviewed as below.     TOBACCO USE:  Social History     Tobacco Use   Smoking Status Never Smoker   Smokeless Tobacco Former User       VITALS:  Vitals:    02/21/19 1102 02/21/19 1207   BP: (!) 144/92 132/84   Patient Site: Left Arm Left Arm   Patient Position: Sitting Sitting   Cuff Size: Adult Regular Adult Regular   Pulse: 76    Resp: 16    Temp: 97.4  F (36.3  C)    TempSrc: Oral    SpO2: 97%    Weight: 124 lb 4 oz (56.4 kg)    Height: 5' 1\" (1.549 m)      Wt Readings from Last 3 Encounters:   02/21/19 124 lb 4 oz (56.4 kg)   01/24/19 125 lb 11.2 oz (57 kg)   10/30/18 126 lb 3.2 oz (57.2 kg)     Body mass index is 23.48 kg/m .    PHYSICAL EXAM:  General: Alert, cooperative, no distress, appears stated age  Head: " Normocephalic, without obvious abnormality, atraumatic  Eyes: PERRL, conjunctiva/cornea clear, EOM's intact  Neck: No cervical lymph node enlargement  Lungs: Cough noted, clear to auscultation bilaterally, no wheezing, respirations unlabored  Heart: Regular rate and rhythm, S1 and S2 normal, no murmur, rub, or gallop  Neurologic:  A & O x 3.  No tremor, no focal findings.  Normal gait.     DATA REVIEWED:  Additional History from Old Records Summarized (2): none  Decision to Obtain Records (1): none  Radiology Tests Summarized or Ordered (1): Chest x-rays ordered today.   Labs Reviewed or Ordered (1): BMP and CBC with differential ordered today.   Medicine Test Summarized or Ordered (1): none  Independent Review of EKG or X-RAY(2 each): On personal review of chest x-rays today, no perihilar opacities, bases are clear, no evidence of atelectasis or infiltrates, normal hilar lymph nodes.    The visit lasted a total of 25 minutes face to face with the patient. Over 50% of the time was spent counseling and educating the patient about possible allergy etiology for his cough.    I, Louisa Otto, am scribing for and in the presence of, Dr. Fang.    I, Dr. Fang, personally performed the services described in this documentation, as scribed by Louisa Otto in my presence, and it is both accurate and complete.      MEDICATIONS:  Current Outpatient Medications   Medication Sig Dispense Refill     allopurinol (ZYLOPRIM) 300 MG tablet TAKE 1 TABLET BY MOUTH DAILY 90 tablet 2     atorvastatin (LIPITOR) 40 MG tablet Take 1 tablet (40 mg total) by mouth at bedtime. 30 tablet 5     clopidogrel (PLAVIX) 75 mg tablet TAKE 1 TABLET BY MOUTH EVERY DAY 30 tablet 10     DULoxetine (CYMBALTA) 60 MG capsule TAKE 1 CAPSULE(60 MG) BY MOUTH DAILY 90 capsule 2     ergocalciferol (ERGOCALCIFEROL) 50,000 unit capsule TAKE 1 CAPSULE BY MOUTH WEEKLY 4 capsule 0     gabapentin (NEURONTIN) 300 MG capsule TAKE 1 CAPSULE(300 MG) BY MOUTH THREE TIMES  DAILY 270 capsule 3     hydrOXYzine pamoate (VISTARIL) 50 MG capsule takea at bedtime 30 capsule 10     magnesium oxide (MAG-OX) 400 mg (241.3 mg magnesium) tablet TAKE 1 TABLET(400 MG) BY MOUTH DAILY 30 tablet 3     mirtazapine (REMERON) 15 MG tablet TAKE 1 TABLET(15 MG) BY MOUTH AT BEDTIME 90 tablet 3     omeprazole (PRILOSEC) 20 MG capsule TAKE 2 CAPSULES(40 MG) BY MOUTH DAILY 60 capsule 11     ranitidine (ZANTAC) 150 MG tablet TAKE 1 TABLET(150 MG) BY MOUTH TWICE DAILY 60 tablet 11     acetaminophen 500 mg coapsule Take 2 tablets by mouth 3 (three) times a day as needed for fever or pain.       atorvastatin (LIPITOR) 40 MG tablet TAKE 1 TABLET(40 MG) BY MOUTH AT BEDTIME 90 tablet 2     clobetasol (TEMOVATE) 0.05 % ointment Apply to affected area twice daily 30 g 2     diclofenac (VOLTAREN) 75 MG EC tablet TAKE 1 TABLET(75 MG) BY MOUTH TWICE DAILY 60 tablet 0     gabapentin (NEURONTIN) 300 MG capsule TAKE 1 CAPSULE(300 MG) BY MOUTH THREE TIMES DAILY 270 capsule 6     guaiFENesin (ROBITUSSIN) 100 mg/5 mL syrup Take 10 mL (200 mg total) by mouth 3 (three) times a day as needed for cough. 200 mL 0     guaiFENesin (ROBITUSSIN) 100 mg/5 mL syrup Take 10 mL (200 mg total) by mouth 3 (three) times a day as needed for cough. 200 mL 0     lidocaine (XYLOCAINE) 5 % ointment Apply to affected area twice daily 35.44 g 1     melatonin 3 mg Tab tablet TAKE 1 TABLET(3 MG) BY MOUTH AT BEDTIME AS NEEDED 100 tablet 2     polyethylene glycol 3350 8.5 gram PwPk Take 17 g by mouth daily. Use packet one per day 30 packet 4     probenecid-colchicine 500-0.5 mg per tablet TAKE 1 TABLET BY MOUTH DAILY AS DIRECTED 30 tablet 0     probenecid-colchicine 500-0.5 mg per tablet TAKE 1 TABLET BY MOUTH DAILY AS DIRECTED 30 tablet 0     sucralfate (CARAFATE) 1 gram tablet Take 1 tablet (1 g total) by mouth 4 (four) times a day. 40 tablet 0     VENTOLIN HFA 90 mcg/actuation inhaler INHALE 2 PUFFS BY MOUTH FOUR TIMES DAILY 18 g 3     No current  facility-administered medications for this visit.        Total Data Points: 4    Please note that this clinical encounter uses voice recognition software, there may be typographical errors present

## 2021-06-24 NOTE — PATIENT INSTRUCTIONS - HE
Dust mite control    Wash bedding weekly, covers, keep humidity <50%    Flovent 220 mcg 2 puffs twice daily     Albuterol only as needed (Ventolin)    Follow in 1 month

## 2021-06-24 NOTE — PROGRESS NOTES
Chief complaint: Cough    History of present illness: This is a pleasant 69-year-old gentleman I was requested to see by Dr. Fang for evaluation of cough.  He has had this cough for the past 2 months and feels that it originates in his chest.  It does wake him up from sleep.  He was given an albuterol inhaler and this provides temporary relief.  He has never been diagnosed with asthma previously.  He denies any history of reflux but he is currently on omeprazole.  When I questioned him about this, he was unaware that this medication was for reflux.  He states he does not have symptoms of reflux regularly.  Denies any history of sinus infection.  He does note some nasal congestion and drainage at times.  Does not use nasal sprays or rinses, however.  Denies any shortness of breath or wheezing.  He has never had a cough like this before and has not been evaluated for allergies previously.  He states talking for long periods of time seems to trigger his cough and the cold weather and exercise seem to trigger the cough as well.    Past medical history: Spine surgery, stroke    Social history: He is retired, has a cat at home, lives at home with central air, no exposure to mold, non-smoker    Family history: Grandchild with asthma    Review of Systems performed as above and the remainder is negative.        Current Outpatient Medications:      acetaminophen 500 mg coapsule, Take 2 tablets by mouth 3 (three) times a day as needed for fever or pain., Disp: , Rfl:      allopurinol (ZYLOPRIM) 300 MG tablet, TAKE 1 TABLET BY MOUTH DAILY, Disp: 90 tablet, Rfl: 2     atorvastatin (LIPITOR) 40 MG tablet, Take 1 tablet (40 mg total) by mouth at bedtime., Disp: 30 tablet, Rfl: 5     atorvastatin (LIPITOR) 40 MG tablet, TAKE 1 TABLET(40 MG) BY MOUTH AT BEDTIME, Disp: 90 tablet, Rfl: 2     clobetasol (TEMOVATE) 0.05 % ointment, Apply to affected area twice daily, Disp: 30 g, Rfl: 2     clopidogrel (PLAVIX) 75 mg tablet, TAKE 1  TABLET BY MOUTH EVERY DAY, Disp: 30 tablet, Rfl: 10     diclofenac (VOLTAREN) 75 MG EC tablet, TAKE 1 TABLET(75 MG) BY MOUTH TWICE DAILY, Disp: 60 tablet, Rfl: 0     DULoxetine (CYMBALTA) 60 MG capsule, TAKE 1 CAPSULE(60 MG) BY MOUTH DAILY, Disp: 90 capsule, Rfl: 2     ergocalciferol (ERGOCALCIFEROL) 50,000 unit capsule, TAKE 1 CAPSULE BY MOUTH WEEKLY, Disp: 4 capsule, Rfl: 0     gabapentin (NEURONTIN) 300 MG capsule, TAKE 1 CAPSULE(300 MG) BY MOUTH THREE TIMES DAILY, Disp: 270 capsule, Rfl: 6     gabapentin (NEURONTIN) 300 MG capsule, TAKE 1 CAPSULE(300 MG) BY MOUTH THREE TIMES DAILY, Disp: 270 capsule, Rfl: 3     guaiFENesin (ROBITUSSIN) 100 mg/5 mL syrup, Take 10 mL (200 mg total) by mouth 3 (three) times a day as needed for cough., Disp: 200 mL, Rfl: 0     guaiFENesin (ROBITUSSIN) 100 mg/5 mL syrup, Take 10 mL (200 mg total) by mouth 3 (three) times a day as needed for cough., Disp: 200 mL, Rfl: 0     hydrOXYzine pamoate (VISTARIL) 50 MG capsule, takea at bedtime, Disp: 30 capsule, Rfl: 10     lidocaine (XYLOCAINE) 5 % ointment, Apply to affected area twice daily, Disp: 35.44 g, Rfl: 1     magnesium oxide (MAG-OX) 400 mg (241.3 mg magnesium) tablet, TAKE 1 TABLET(400 MG) BY MOUTH DAILY, Disp: 30 tablet, Rfl: 3     melatonin 3 mg Tab tablet, TAKE 1 TABLET(3 MG) BY MOUTH AT BEDTIME AS NEEDED, Disp: 100 tablet, Rfl: 2     mirtazapine (REMERON) 15 MG tablet, TAKE 1 TABLET(15 MG) BY MOUTH AT BEDTIME, Disp: 90 tablet, Rfl: 3     omeprazole (PRILOSEC) 20 MG capsule, TAKE 2 CAPSULES(40 MG) BY MOUTH DAILY, Disp: 60 capsule, Rfl: 11     polyethylene glycol 3350 8.5 gram PwPk, Take 17 g by mouth daily. Use packet one per day, Disp: 30 packet, Rfl: 4     probenecid-colchicine 500-0.5 mg per tablet, TAKE 1 TABLET BY MOUTH DAILY AS DIRECTED, Disp: 30 tablet, Rfl: 0     probenecid-colchicine 500-0.5 mg per tablet, TAKE 1 TABLET BY MOUTH DAILY AS DIRECTED, Disp: 30 tablet, Rfl: 0     ranitidine (ZANTAC) 150 MG tablet, TAKE 1  "TABLET(150 MG) BY MOUTH TWICE DAILY, Disp: 60 tablet, Rfl: 11     sucralfate (CARAFATE) 1 gram tablet, Take 1 tablet (1 g total) by mouth 4 (four) times a day., Disp: 40 tablet, Rfl: 0     VENTOLIN HFA 90 mcg/actuation inhaler, INHALE 2 PUFFS BY MOUTH FOUR TIMES DAILY, Disp: 18 g, Rfl: 3     fluticasone (FLOVENT HFA) 220 mcg/actuation inhaler, Inhale 1 puff 2 (two) times a day., Disp: 1 Inhaler, Rfl: 1    Allergies   Allergen Reactions     Aspirin Hives     Oxycodone Itching     Vicodin [Hydrocodone-Acetaminophen]        Pulse 78   Resp 18   Ht 5' 1\" (1.549 m)   Wt 124 lb 4 oz (56.4 kg)   SpO2 98%   BMI 23.48 kg/m    Gen: Pleasant male not in acute distress  HEENT: Eyes no erythema of the bulbar or palpebral conjunctiva, no edema. Ears: Hearing aids in place which were not removed nose: No congestion, mucosa normal. Mouth: Throat clear, no lip or tongue edema.   Cardiac: Regular rate and rhythm, no murmurs, rubs or gallops  Respiratory: Clear to auscultation bilaterally, no adventitious breath sounds  Lymph: No supraclavicular or cervical lymphadenopathy  Skin: No rashes or lesions  Psych: Alert and oriented times 3    Last Percutaneous Allergy Test Results  Trees  Homero, White  1:20 H  (W/F in mm): 0-0 (02/28/19 1048)  Birch Mix 1:20 H (W/F in mm): 0-0 (02/28/19 1048)  Cotter, Common 1:20 H (W/F in mm): 0-0 (02/28/19 1048)  Elm, American 1:20 H (W/F in mm): 0-0 (02/28/19 1048)  [place], Shagbark 1:20 H (W/F in mm): 0-0 (02/28/19 1048)  Maple, Hard/Sugar 1:20 H (W/F in mm): 0-0 (02/28/19 1048)  [place] Mix 1:20 H (W/F in mm): 0-0 (02/28/19 1048)  Oak, Red 1:20 H (W/F in mm): 0-0 (02/28/19 1048)  [place], American 1:20 H (W/F in mm): 0-0 (02/28/19 1048)  [place] Tree 1:20 H (W/F in mm): 0-0 (02/28/19 1048)  Dust Mites  D. Pteronyssinus Mite 30,000 AU/ML H (W/F in mm): 5/10 (02/28/19 1048)  D. Farinae Mite 30,000 AU/ML H (W/F in mm: 4/10 (02/28/19 1048)  Grasses  Grass Mix #4 10,000 BAU/ML H: 0-0 (02/28/19 " 1048)  Lon Grass 1:20 H (W/F in mm): 0-0 (02/28/19 1048)  Cockroach  Cockroach Mix 1:10 H (W/F in mm): 7/15 (02/28/19 1048)  Molds/Fungi  Alternaria Tenuis 1:10 H (W/F in mm): 0-0 (02/28/19 1048)  Aspergillus Fumigatus 1:10 H (W/F in mm): 0-0 (02/28/19 1048)  Homodendrum Cladosporioides 1:10 H (W/F in mm): 0-0 (02/28/19 1048)  Penicillin Notatum 1:10 H (W/F in mm): 0-0 (02/28/19 1048)  Epicoccum 1:10 H (W/F in mm): 0-0 (02/28/19 1048)  Weeds  Ragweed, Short 1:20 H (W/F in mm): 0-0 (02/28/19 1048)  Dock, [place] 1:20 H (W/F in mm): 0-0 (02/28/19 1048)  Lamb's Quarter 1:20 H (W/F in mm): 0-0 (02/28/19 1048)  Pigweed, Rough Red Root 1:20 H  (W/F in mm): 0-0 (02/28/19 1048)  Plantain, English 1:20 H  (W/F in mm): 0-0 (02/28/19 1048)  Sagebrush, Mugwort 1:20 H  (W/F in mm): 0-0 (02/28/19 1048)  Animal  Cat 10,000 BAU/ML H (W/F in mm): 0-0 (02/28/19 1048)  Dog 1:10 H (W/F in mm): 0-0 (02/28/19 1048)  Controls  Device Type: QUINTIP (02/28/19 1048)  Neg. control: 50% Glycerine/Saline H (W/F in mm): 0-0 (02/28/19 1048)  Pos. control: Histamine 6mg/ML (W/F in mms): 7/20 (02/28/19 1048)    Spirometry was attempted.  Unable to obtain reproducible matches due to the patient coughing    Impression report and plan:  1.  Cough  2.  Allergic rhinitis to dust mite and cockroach    Patient denies any cockroach exposure but did go over dust mite control.  I recommended a month trial of Flovent 220 mcg 2 puffs twice daily.  Technique reviewed in detail.  Okay to use albuterol but only as needed.  He is using it scheduled.  I would like him to follow in a month.  At that time if symptoms are not improved, consider methacholine challenge.  Could consider trial of a nasal spray as well.

## 2021-06-25 NOTE — PROGRESS NOTES
ASSESSMENT and plan   1. Right wrist pain  Patient had door slamming right wrist has been able to flex it or extend it.  States that the pain is difficult for him to sleep on.  Denies lifting any heavy objects with his right wrist.  Plain radiograph today revealed no evidence of an fracture.  Personally reviewed.  And results shared was patient.  - XR Wrist Right 2 VWS; Future    2. Lumbar Canal Stenosis  Duloxetine on gabapentin no back pain noted today.    3. Essential Hypertriglyceridemia    Has not been watching diet will recheck cholesterol panel in approximately 3 to 6 months.    4. Moderate Recurrent Major Depression    Depression we will's been well controlled has been animated has been interacting with family has been getting out.  There are no Patient Instructions on file for this visit.    Orders Placed This Encounter   Procedures     XR Wrist Right 2 VWS     Standing Status:   Future     Number of Occurrences:   1     Standing Expiration Date:   6/3/2022     Order Specific Question:   Can the procedure be changed per Radiologist protocol?     Answer:   Yes     There are no discontinued medications.    No follow-ups on file.    CHIEF COMPLAINT:  Chief Complaint   Patient presents with     Follow-up       HISTORY OF PRESENT ILLNESS:  Chasity is a 71 y.o. male who is here with a family member to discuss his right wrist pain that started approximately 4 days ago when his daughter accidentally slammed a door in his hand.  Since then he has not been able to flex or extend his right wrist.  The pain was very bad few days ago.  He has been able to take his medications.  No numbness has been noted in the right wrist or right hand.  Currently has back pain is not exquisite and well controlled with his medications.    REVIEW OF SYSTEMS:     Musculoskeletal positive for right wrist pain which is throbbing  Positive for chronic back pain  10 point review of  All other systems are negative.    PFSH:  Social history  "reviewed      TOBACCO USE:  Social History     Tobacco Use   Smoking Status Never Smoker   Smokeless Tobacco Former User       VITALS:  Vitals:    06/03/21 1121   BP: 130/84   Pulse: 96   Resp: 20   Temp: 98.2  F (36.8  C)   TempSrc: Oral   Weight: 122 lb 3 oz (55.4 kg)   Height: 4' 10.75\" (1.492 m)     Wt Readings from Last 3 Encounters:   06/03/21 122 lb 3 oz (55.4 kg)   04/01/21 125 lb (56.7 kg)   12/15/20 125 lb (56.7 kg)       PHYSICAL EXAM:    Interactive elderly appearing male sitting in exam room does not appear to be in acute distress  HEENT neck supple mucous members moist there is no lymph enlargement noted neck  Musculoskeletal system no tenderness noted over the cervical spine he has slight tenderness over L3 and L4.  Swelling noted over the dorsal aspect right wrist.  No cyanosis noted.  He is unable to perform handgrips on the right side.  Movement of the palmar and dorsal interossei are intact.    DATA REVIEWED:  Additional History from Old Records Summarized (2): 0  Decision to Obtain Records (1): 0  Radiology Tests Summarized or Ordered (1): 0  Labs Reviewed or Ordered (1): 0  Medicine Test Summarized or Ordered (1): 0  Independent Review of EKG or X-RAY(2 each): 0    The visit lasted a total of 30 minutes     MEDICATIONS:  Current Outpatient Medications   Medication Sig Dispense Refill     allopurinoL (ZYLOPRIM) 300 MG tablet Take 1 tablet (300 mg total) by mouth daily. 90 tablet 3     atorvastatin (LIPITOR) 40 MG tablet TAKE 1 TABLET(40 MG) BY MOUTH AT BEDTIME 90 tablet 3     clopidogreL (PLAVIX) 75 mg tablet TAKE 1 TABLET(75 MG) BY MOUTH DAILY 90 tablet 1     DULoxetine (CYMBALTA) 60 MG capsule TAKE 1 CAPSULE(60 MG) BY MOUTH DAILY 90 capsule 3     hydrOXYzine pamoate (VISTARIL) 50 MG capsule TAKE 1 CAPSULE BY MOUTH AT BEDTIME 30 capsule 10     metoprolol tartrate (LOPRESSOR) 25 MG tablet TAKE 1 TABLET BY MOUTH TWICE DAILY 180 tablet 3     omeprazole (PRILOSEC) 20 MG capsule TAKE 2 CAPSULES(40 " MG) BY MOUTH DAILY 180 capsule 1     acetaminophen 500 mg coapsule Take 2 tablets by mouth 3 (three) times a day as needed for fever or pain.       clobetasol (TEMOVATE) 0.05 % ointment Apply 1 application topically 2 (two) times a day as needed.       diclofenac (VOLTAREN) 75 MG EC tablet TAKE 1 TABLET(75 MG) BY MOUTH TWICE DAILY 60 tablet 0     ergocalciferol (ERGOCALCIFEROL) 1,250 mcg (50,000 unit) capsule TAKE 1 CAPSULE BY MOUTH WEEKLY 12 capsule 3     gabapentin (NEURONTIN) 300 MG capsule TAKE 1 CAPSULE(300 MG) BY MOUTH THREE TIMES DAILY 270 capsule 1     magnesium oxide (MAG-OX) 400 mg (241.3 mg magnesium) tablet TAKE 1 TABLET(400 MG) BY MOUTH DAILY 100 tablet 0     meclizine (ANTIVERT) 25 mg tablet Take 1 tablet (25 mg total) by mouth 3 (three) times a day as needed. 30 tablet 0     melatonin 3 mg Tab tablet TAKE 1 TABLET(3 MG) BY MOUTH AT BEDTIME AS NEEDED (Patient not taking: Reported on 3/3/2020) 100 tablet 2     mirtazapine (REMERON) 15 MG tablet TAKE 1 TABLET(15 MG) BY MOUTH AT BEDTIME 90 tablet 3     polyethylene glycol (MIRALAX) 17 gram packet USE 1 PACKET MIXED AS DIRECTED PER  each 3     No current facility-administered medications for this visit.

## 2021-06-26 NOTE — TELEPHONE ENCOUNTER
Refill Approved    Rx renewed per Medication Renewal Policy. Medication was last renewed on 4/9/21, last OV 6/3/21.    Libertad Ritter, [facility] Triage/Med Refill 6/24/2021     Requested Prescriptions   Pending Prescriptions Disp Refills     atorvastatin (LIPITOR) 40 MG tablet [Pharmacy Med Name: ATORVASTATIN 40MG TABLETS] 90 tablet 3     Sig: TAKE 1 TABLET(40 MG) BY MOUTH AT BEDTIME       Statins Refill Protocol (Hmg CoA Reductase Inhibitors) Passed - 6/23/2021  6:36 PM        Passed - PCP or prescribing provider visit in past 12 months      Last office visit with prescriber/PCP: 6/3/2021 David Fang MD OR same dept: 6/3/2021 David Fang MD OR same specialty: 6/3/2021 Dvaid Fang MD  Last physical: 4/1/2021 Last MTM visit: Visit date not found   Next visit within 3 mo: Visit date not found  Next physical within 3 mo: Visit date not found  Prescriber OR PCP: David Fang MD  Last diagnosis associated with med order: 1. Hyperlipidemia  - atorvastatin (LIPITOR) 40 MG tablet [Pharmacy Med Name: ATORVASTATIN 40MG TABLETS]; TAKE 1 TABLET(40 MG) BY MOUTH AT BEDTIME  Dispense: 90 tablet; Refill: 3    2. Gout  - allopurinoL (ZYLOPRIM) 300 MG tablet [Pharmacy Med Name: ALLOPURINOL 300MG TABLETS]; TAKE 1 TABLET(300 MG) BY MOUTH DAILY  Dispense: 90 tablet; Refill: 3    3. 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                allopurinoL (ZYLOPRIM) 300 MG tablet [Pharmacy Med Name: ALLOPURINOL 300MG TABLETS] 90 tablet 3     Sig: TAKE 1 TABLET(300 MG) BY MOUTH DAILY       Allopurinol/Febuxostat Refill Protocol  Passed - 6/23/2021  6:36 PM        Passed - LFT or AST or ALT in last 12 months     Albumin   Date Value Ref Range  Status   12/12/2020 4.2 3.5 - 5.0 g/dL Final     Bilirubin, Total   Date Value Ref Range Status   12/12/2020 0.4 0.0 - 1.0 mg/dL Final     Bilirubin, Direct   Date Value Ref Range Status   12/12/2020 0.1 <=0.5 mg/dL Final     Alkaline Phosphatase   Date Value Ref Range Status   12/12/2020 134 (H) 45 - 120 U/L Final     AST   Date Value Ref Range Status   12/12/2020 42 (H) 0 - 40 U/L Final     ALT   Date Value Ref Range Status   12/12/2020 75 (H) 0 - 45 U/L Final     Protein, Total   Date Value Ref Range Status   12/12/2020 8.6 (H) 6.0 - 8.0 g/dL Final                Passed - Visit with PCP or prescribing provider visit in past 12 months     Last office visit with prescriber/PCP: 6/3/2021 David Fang MD OR same dept: 6/3/2021 David Fang MD OR same specialty: 6/3/2021 David Fang MD  Last physical: 4/1/2021 Last MTM visit: Visit date not found   Next visit within 3 mo: Visit date not found  Next physical within 3 mo: Visit date not found  Prescriber OR PCP: David Fang MD  Last diagnosis associated with med order: 1. Hyperlipidemia  - atorvastatin (LIPITOR) 40 MG tablet [Pharmacy Med Name: ATORVASTATIN 40MG TABLETS]; TAKE 1 TABLET(40 MG) BY MOUTH AT BEDTIME  Dispense: 90 tablet; Refill: 3    2. Gout  - allopurinoL (ZYLOPRIM) 300 MG tablet [Pharmacy Med Name: ALLOPURINOL 300MG TABLETS]; TAKE 1 TABLET(300 MG) BY MOUTH DAILY  Dispense: 90 tablet; Refill: 3    3. 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                clopidogreL (PLAVIX) 75 mg tablet [Pharmacy Med Name: CLOPIDOGREL 75MG TABLETS] 90 tablet 1     Sig: TAKE 1 TABLET(75 MG) BY MOUTH DAILY       Clopidogrel/Prasugrel/Ticagrelor Refill Protocol Passed - 6/23/2021   6:36 PM        Passed - PCP or prescribing provider visit in past 6 months       Last office visit with prescriber/PCP: 6/3/2021 OR same dept: 6/3/2021 David Fang MD OR same specialty: 6/3/2021 David Fang MD Last physical: 4/1/2021 Last MTM visit: Visit date not found     Next appt within 3 mo: Visit date not found  Next physical within 3 mo: Visit date not found  Prescriber OR PCP: David Fang MD  Last diagnosis associated with med order: 1. Hyperlipidemia  - atorvastatin (LIPITOR) 40 MG tablet [Pharmacy Med Name: ATORVASTATIN 40MG TABLETS]; TAKE 1 TABLET(40 MG) BY MOUTH AT BEDTIME  Dispense: 90 tablet; Refill: 3    2. Gout  - allopurinoL (ZYLOPRIM) 300 MG tablet [Pharmacy Med Name: ALLOPURINOL 300MG TABLETS]; TAKE 1 TABLET(300 MG) BY MOUTH DAILY  Dispense: 90 tablet; Refill: 3    3. 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              Passed - Hemoglobin in past 12 months     Hemoglobin   Date Value Ref Range Status   12/15/2020 15.9 14.0 - 18.0 g/dL Final

## 2021-07-02 ENCOUNTER — COMMUNICATION - HEALTHEAST (OUTPATIENT)
Dept: FAMILY MEDICINE | Facility: CLINIC | Age: 71
End: 2021-07-02

## 2021-07-02 DIAGNOSIS — I10 BENIGN ESSENTIAL HYPERTENSION: ICD-10-CM

## 2021-07-02 DIAGNOSIS — K21.9 GASTROESOPHAGEAL REFLUX DISEASE WITHOUT ESOPHAGITIS: ICD-10-CM

## 2021-07-02 RX ORDER — METOPROLOL TARTRATE 25 MG/1
TABLET, FILM COATED ORAL
Qty: 180 TABLET | Refills: 3 | Status: SHIPPED | OUTPATIENT
Start: 2021-07-02 | End: 2022-08-19 | Stop reason: ALTCHOICE

## 2021-07-04 NOTE — TELEPHONE ENCOUNTER
Telephone Encounter by Deniz Arboleda, RN at 7/2/2021  8:20 AM     Author: Deniz Arboleda RN Service: -- Author Type: Registered Nurse    Filed: 7/2/2021  8:21 AM Encounter Date: 7/2/2021 Status: Signed    : Deniz Arboleda, RN (Registered Nurse)       Refill Approved    Rx renewed per Medication Renewal Policy. Medication was last renewed on 6/3/20, 12/3/20.    Deniz Arboleda, [facility] Triage/Med Refill 7/2/2021     Requested Prescriptions   Pending Prescriptions Disp Refills   ? metoprolol tartrate (LOPRESSOR) 25 MG tablet [Pharmacy Med Name: METOPROLOL TARTRATE 25MG TABLETS] 180 tablet 3     Sig: TAKE 1 TABLET BY MOUTH TWICE DAILY       Beta-Blockers Refill Protocol Passed - 7/2/2021  5:15 AM        Passed - PCP or prescribing provider visit in past 12 months or next 3 months     Last office visit with prescriber/PCP: 6/3/2021 David Fang MD OR same dept: 6/3/2021 David Fang MD OR same specialty: 6/3/2021 David Fang MD  Last physical: 4/1/2021 Last MTM visit: Visit date not found   Next visit within 3 mo: Visit date not found  Next physical within 3 mo: Visit date not found  Prescriber OR PCP: David Fang MD  Last diagnosis associated with med order: 1. Benign essential hypertension  - metoprolol tartrate (LOPRESSOR) 25 MG tablet [Pharmacy Med Name: METOPROLOL TARTRATE 25MG TABLETS]; TAKE 1 TABLET BY MOUTH TWICE DAILY  Dispense: 180 tablet; Refill: 3    2. Gastroesophageal reflux disease without esophagitis  - omeprazole (PRILOSEC) 20 MG capsule [Pharmacy Med Name: OMEPRAZOLE 20MG CAPSULES]; TAKE 2 CAPSULES(40 MG) BY MOUTH DAILY  Dispense: 180 capsule; Refill: 1    If protocol passes may refill for 12 months if within 3 months of last provider visit (or a total of 15 months).             Passed - Blood pressure filed in past 12 months     BP Readings from Last 1 Encounters:   06/03/21 130/84              ? omeprazole (PRILOSEC) 20 MG capsule [Pharmacy Med Name: OMEPRAZOLE 20MG CAPSULES] 180 capsule 1      Sig: TAKE 2 CAPSULES(40 MG) BY MOUTH DAILY       GI Medications Refill Protocol Passed - 7/2/2021  5:15 AM        Passed - PCP or prescribing provider visit in last 12 or next 3 months.     Last office visit with prescriber/PCP: 6/3/2021 David Fang MD OR same dept: 6/3/2021 David Fang MD OR same specialty: 6/3/2021 David Fang MD  Last physical: 4/1/2021 Last MTM visit: Visit date not found   Next visit within 3 mo: Visit date not found  Next physical within 3 mo: Visit date not found  Prescriber OR PCP: David Fang MD  Last diagnosis associated with med order: 1. Benign essential hypertension  - metoprolol tartrate (LOPRESSOR) 25 MG tablet [Pharmacy Med Name: METOPROLOL TARTRATE 25MG TABLETS]; TAKE 1 TABLET BY MOUTH TWICE DAILY  Dispense: 180 tablet; Refill: 3    2. Gastroesophageal reflux disease without esophagitis  - omeprazole (PRILOSEC) 20 MG capsule [Pharmacy Med Name: OMEPRAZOLE 20MG CAPSULES]; TAKE 2 CAPSULES(40 MG) BY MOUTH DAILY  Dispense: 180 capsule; Refill: 1    If protocol passes may refill for 12 months if within 3 months of last provider visit (or a total of 15 months).

## 2021-07-06 VITALS
RESPIRATION RATE: 20 BRPM | DIASTOLIC BLOOD PRESSURE: 84 MMHG | TEMPERATURE: 98.2 F | WEIGHT: 122.19 LBS | HEIGHT: 60 IN | SYSTOLIC BLOOD PRESSURE: 130 MMHG | HEART RATE: 96 BPM | BODY MASS INDEX: 23.99 KG/M2

## 2021-07-06 ASSESSMENT — PATIENT HEALTH QUESTIONNAIRE - PHQ9: SUM OF ALL RESPONSES TO PHQ QUESTIONS 1-9: 0

## 2021-07-13 ENCOUNTER — TELEPHONE (OUTPATIENT)
Dept: AUDIOLOGY | Facility: CLINIC | Age: 71
End: 2021-07-13

## 2021-07-13 NOTE — TELEPHONE ENCOUNTER
Left hearing aid dropped off with wax inside tubing. Tubing and tone hook replaced. Listening check revealed good level and sound quality. No charge for services. Spoke with Chasity's daughter via [employer] phone  to inform the family that Chasity's hearing aid is ready to be picked up at the .    Ward Moran, CCC-A  Clinical Audiologist  [place] #[id number]

## 2021-07-14 PROBLEM — J96.01 ACUTE RESPIRATORY FAILURE WITH HYPOXIA (H): Status: RESOLVED | Noted: 2017-04-28 | Resolved: 2020-06-03

## 2021-07-22 ENCOUNTER — OFFICE VISIT (OUTPATIENT)
Dept: FAMILY MEDICINE | Facility: CLINIC | Age: 71
End: 2021-07-22
Payer: COMMERCIAL

## 2021-07-22 VITALS
RESPIRATION RATE: 16 BRPM | TEMPERATURE: 97.6 F | HEART RATE: 90 BPM | OXYGEN SATURATION: 97 % | BODY MASS INDEX: 24.42 KG/M2 | SYSTOLIC BLOOD PRESSURE: 120 MMHG | HEIGHT: 60 IN | DIASTOLIC BLOOD PRESSURE: 70 MMHG | WEIGHT: 124.38 LBS

## 2021-07-22 DIAGNOSIS — H91.93 BILATERAL HEARING LOSS, UNSPECIFIED HEARING LOSS TYPE: Primary | ICD-10-CM

## 2021-07-22 DIAGNOSIS — H72.93 PERFORATION OF BOTH TYMPANIC MEMBRANES: ICD-10-CM

## 2021-07-22 PROCEDURE — 99213 OFFICE O/P EST LOW 20 MIN: CPT | Performed by: FAMILY MEDICINE

## 2021-07-22 RX ORDER — ALLOPURINOL 300 MG/1
TABLET ORAL
COMMUNITY
End: 2021-08-11

## 2021-07-22 RX ORDER — ATORVASTATIN CALCIUM 40 MG/1
TABLET, FILM COATED ORAL
COMMUNITY
End: 2021-08-11

## 2021-07-22 RX ORDER — MAGNESIUM OXIDE 400 MG/1
TABLET ORAL
Status: CANCELLED | OUTPATIENT
Start: 2021-07-22

## 2021-07-22 RX ORDER — MAGNESIUM OXIDE 400 MG/1
TABLET ORAL
COMMUNITY
Start: 2021-07-02 | End: 2021-08-11

## 2021-07-22 RX ORDER — ERGOCALCIFEROL 1.25 MG/1
CAPSULE ORAL
COMMUNITY
End: 2021-09-16

## 2021-07-22 RX ORDER — METOPROLOL TARTRATE 25 MG/1
25 TABLET, FILM COATED ORAL 2 TIMES DAILY
COMMUNITY
Start: 2021-07-02 | End: 2021-08-11

## 2021-07-22 RX ORDER — GABAPENTIN 300 MG/1
CAPSULE ORAL
COMMUNITY
Start: 2021-07-02 | End: 2023-03-01

## 2021-07-22 ASSESSMENT — MIFFLIN-ST. JEOR: SCORE: 1152.29

## 2021-07-22 NOTE — PROGRESS NOTES
"ASSESMENT AND PLAN:  Diagnoses and all orders for this visit:  Bilateral hearing loss, unspecified hearing loss type  Staff called ENT to schedule appointment.  Appointment scheduled on 8/6/2021.  Instructed to keep that appointment.    Perforation of both tympanic membranes  F/U with ENT.     This transcription uses voice recognition software, which may contain typographical errors.      SUBJECTIVE: Chasity Gaspar is a 71-year-old male here with a complaint of hearing loss and hearing aid not functioning.  I am seeing him for the first time today.  He is here with his daughter.  Daughter states that he received a new hearing aid a week ago, but cannot hear from both ears now.  Daughter states he cleaned his ear with a Q-tip a week ago and she noticed some drainage from the right ear.  He denies any pain in the ear.    History reviewed. No pertinent past medical history.  There is no problem list on file for this patient.      Allergies:  No Known Allergies    History   Smoking Status     Never Smoker   Smokeless Tobacco     Never Used       Review of systems otherwise negative except as listed in HPI.   History   Smoking Status     Never Smoker   Smokeless Tobacco     Never Used       OBJECTICE: /70 (BP Location: Left arm, Patient Position: Sitting, Cuff Size: Adult Regular)   Pulse 90   Temp 97.6  F (36.4  C) (Oral)   Resp 16   Ht 1.485 m (4' 10.47\")   Wt 56.4 kg (124 lb 6 oz)   SpO2 97%   BMI 25.58 kg/m          GEN-alert,  in no apparent distress.  Unable to answer questions, states he cannot hear.  HEENT-perforated tympanic membranes bilaterally.  CV-regular rate and rhythm with no murmur.   RESP-lungs clear to auscultation .          Sreedhar Kapoor MD   7/22/2021   "

## 2021-08-06 ENCOUNTER — OFFICE VISIT (OUTPATIENT)
Dept: AUDIOLOGY | Facility: CLINIC | Age: 71
End: 2021-08-06
Payer: COMMERCIAL

## 2021-08-06 DIAGNOSIS — H90.3 SENSORINEURAL HEARING LOSS, ASYMMETRICAL: Primary | ICD-10-CM

## 2021-08-06 PROCEDURE — V5299 HEARING SERVICE: HCPCS

## 2021-08-06 PROCEDURE — 99207 PR NO CHARGE LOS: CPT

## 2021-08-06 NOTE — PROGRESS NOTES
AUDIOLOGY REPORT    SUBJECTIVE:Chasity Gaspar is a 71 year old male who was seen in the Audiology Clinic at the [facility] on 8/6/2021  for a hearing aid check. He was accompanied by his daughter and an  was present via telephone. The patient's daughter primarily spoke with  on patient's behalf due to his significant amount of hearing loss. Previous results have revealed a severe to profound sensorineural hearing loss in the right and no response at equipment limits in the left ear. The patient has been seen previously and was fit with a right Phonak Nanci V50 -UP behind-the-ear hearing aid on 4/2018.     OBJECTIVE: With help of , it was discovered that patient's had no concerns regarding his hearing aid. Chasity thought that today's appointment was to have his hearing checked and to have his ears looked at by the doctor. He noted he was told there was possibly a hole in his ear.     Otoscopy revealed possible bilateral tympanic membrane perforations. Provider consulted with ENT that was in clinic, who recommended patient return for a hearing test and ENT visit. Assisted patient and patient's daughter in scheduling the correct appointments while in the clinic today.       ASSESSMENT: Upon arrival it was discovered that patient was scheduled for the incorrect appointment. No charge visit today as no services were provided.     PLAN:Chasity will return for a hearing test and ENT visit as scheduled. He should return for follow-up hearing aid services as needed, or at least every 9-12 months for cleaning and assessment of hearing aid.  Please call this clinic with any questions regarding today s appointment.    Dante Guevara. CCC-A  Licensed Audiologist   [place] #[id number]

## 2021-08-11 ENCOUNTER — OFFICE VISIT (OUTPATIENT)
Dept: FAMILY MEDICINE | Facility: CLINIC | Age: 71
End: 2021-08-11
Payer: COMMERCIAL

## 2021-08-11 VITALS
HEIGHT: 60 IN | BODY MASS INDEX: 24.7 KG/M2 | TEMPERATURE: 98 F | DIASTOLIC BLOOD PRESSURE: 82 MMHG | RESPIRATION RATE: 14 BRPM | HEART RATE: 84 BPM | WEIGHT: 125.8 LBS | OXYGEN SATURATION: 98 % | SYSTOLIC BLOOD PRESSURE: 138 MMHG

## 2021-08-11 DIAGNOSIS — M1A.0390 IDIOPATHIC CHRONIC GOUT OF WRIST WITHOUT TOPHUS, UNSPECIFIED LATERALITY: ICD-10-CM

## 2021-08-11 DIAGNOSIS — N40.1 BENIGN PROSTATIC HYPERPLASIA (BPH) WITH STRAINING ON URINATION: ICD-10-CM

## 2021-08-11 DIAGNOSIS — E78.5 HYPERLIPIDEMIA LDL GOAL <100: ICD-10-CM

## 2021-08-11 DIAGNOSIS — R39.16 BENIGN PROSTATIC HYPERPLASIA (BPH) WITH STRAINING ON URINATION: ICD-10-CM

## 2021-08-11 DIAGNOSIS — I10 BENIGN ESSENTIAL HYPERTENSION: ICD-10-CM

## 2021-08-11 DIAGNOSIS — Z00.00 WELLNESS EXAMINATION: ICD-10-CM

## 2021-08-11 LAB
ANION GAP SERPL CALCULATED.3IONS-SCNC: 12 MMOL/L (ref 5–18)
BUN SERPL-MCNC: 11 MG/DL (ref 8–22)
CALCIUM SERPL-MCNC: 10 MG/DL (ref 8.5–10.5)
CHLORIDE BLD-SCNC: 102 MMOL/L (ref 98–107)
CHOLEST SERPL-MCNC: 257 MG/DL
CO2 SERPL-SCNC: 26 MMOL/L (ref 22–31)
CREAT SERPL-MCNC: 0.79 MG/DL (ref 0.7–1.3)
FASTING STATUS PATIENT QL REPORTED: NO
GFR SERPL CREATININE-BSD FRML MDRD: >90 ML/MIN/1.73M2
GLUCOSE BLD-MCNC: 86 MG/DL (ref 70–125)
HDLC SERPL-MCNC: 32 MG/DL
LDLC SERPL CALC-MCNC: 167 MG/DL
POTASSIUM BLD-SCNC: 4.6 MMOL/L (ref 3.5–5)
SODIUM SERPL-SCNC: 140 MMOL/L (ref 136–145)
TRIGL SERPL-MCNC: 289 MG/DL

## 2021-08-11 PROCEDURE — 99397 PER PM REEVAL EST PAT 65+ YR: CPT | Performed by: FAMILY MEDICINE

## 2021-08-11 PROCEDURE — 36415 COLL VENOUS BLD VENIPUNCTURE: CPT | Performed by: FAMILY MEDICINE

## 2021-08-11 PROCEDURE — 80061 LIPID PANEL: CPT | Performed by: FAMILY MEDICINE

## 2021-08-11 PROCEDURE — 80048 BASIC METABOLIC PNL TOTAL CA: CPT | Performed by: FAMILY MEDICINE

## 2021-08-11 RX ORDER — CLOPIDOGREL BISULFATE 75 MG/1
1 TABLET ORAL DAILY
COMMUNITY
Start: 2021-08-01 | End: 2023-03-01

## 2021-08-11 RX ORDER — ATORVASTATIN CALCIUM 40 MG/1
TABLET, FILM COATED ORAL
Qty: 30 TABLET | Refills: 11 | Status: SHIPPED | OUTPATIENT
Start: 2021-08-11 | End: 2022-08-19

## 2021-08-11 RX ORDER — METOPROLOL TARTRATE 25 MG/1
25 TABLET, FILM COATED ORAL 2 TIMES DAILY
Qty: 60 TABLET | Refills: 11 | Status: SHIPPED | OUTPATIENT
Start: 2021-08-11 | End: 2022-07-26

## 2021-08-11 RX ORDER — ALLOPURINOL 300 MG/1
TABLET ORAL
Qty: 30 TABLET | Refills: 11 | Status: SHIPPED | OUTPATIENT
Start: 2021-08-11 | End: 2022-11-25

## 2021-08-11 RX ORDER — DULOXETIN HYDROCHLORIDE 60 MG/1
1 CAPSULE, DELAYED RELEASE ORAL DAILY
COMMUNITY
Start: 2021-08-01 | End: 2022-06-07

## 2021-08-11 RX ORDER — MAGNESIUM OXIDE 400 MG/1
400 TABLET ORAL DAILY
Qty: 30 TABLET | Refills: 11 | Status: SHIPPED | OUTPATIENT
Start: 2021-08-11 | End: 2022-06-07

## 2021-08-11 RX ORDER — DUTASTERIDE 0.5 MG/1
0.5 CAPSULE, LIQUID FILLED ORAL DAILY
Qty: 30 CAPSULE | Refills: 4 | Status: SHIPPED | OUTPATIENT
Start: 2021-08-11 | End: 2023-01-23

## 2021-08-11 RX ORDER — TAMSULOSIN HYDROCHLORIDE 0.4 MG/1
1 CAPSULE ORAL DAILY
COMMUNITY
Start: 2021-06-20 | End: 2022-04-19

## 2021-08-11 ASSESSMENT — ACTIVITIES OF DAILY LIVING (ADL)
CURRENT_FUNCTION: PREPARING MEALS REQUIRES ASSISTANCE
HEARING_DIFFICULTY_OR_DEAF: YES
THE_FOLLOWING_AIDS_WERE_PROVIDED;: PATIENT DECLINED OFFER OF AUXILIARY AIDS
WERE_AUXILIARY_AIDS_OFFERED?: NO
DOING_ERRANDS_INDEPENDENTLY_DIFFICULTY: YES
COMMUNICATION: OTHER (SEE COMMENTS)
CURRENT_FUNCTION: TELEPHONE REQUIRES ASSISTANCE
CURRENT_FUNCTION: LAUNDRY REQUIRES ASSISTANCE
CURRENT_FUNCTION: MONEY MANAGEMENT REQUIRES ASSISTANCE
DIFFICULTY_COMMUNICATING: YES
WALKING_OR_CLIMBING_STAIRS_DIFFICULTY: NO
CURRENT_FUNCTION: TRANSPORTATION REQUIRES ASSISTANCE
CONCENTRATING,_REMEMBERING_OR_MAKING_DECISIONS_DIFFICULTY: YES
CURRENT_FUNCTION: HOUSEWORK REQUIRES ASSISTANCE
CURRENT_FUNCTION: BATHING REQUIRES ASSISTANCE
TOILETING_ISSUES: NO
PATIENT'S_PREFERRED_MEANS_OF_COMMUNICATION: INTERPRETER
FALL_HISTORY_WITHIN_LAST_SIX_MONTHS: NO
WEAR_GLASSES_OR_BLIND: YES
CURRENT_FUNCTION: MEDICATION ADMINISTRATION REQUIRES ASSISTANCE
DRESSING/BATHING_DIFFICULTY: NO
DIFFICULTY_EATING/SWALLOWING: NO
CURRENT_FUNCTION: SHOPPING REQUIRES ASSISTANCE
DESCRIBE_HEARING_LOSS: BILATERAL HEARING LOSS

## 2021-08-11 ASSESSMENT — MIFFLIN-ST. JEOR: SCORE: 1158.76

## 2021-08-11 NOTE — PROGRESS NOTES
"SUBJECTIVE:   Chasity Gaspar is a 71 year old male who presents for Preventive Visit.      Patient has been advised of split billing requirements and indicates understanding: Yes   Are you in the first 12 months of your Medicare coverage?  No    Healthy Habits:     In general, how would you rate your overall health?  Fair    Frequency of exercise:  None    Do you usually eat at least 4 servings of fruit and vegetables a day, include whole grains    & fiber and avoid regularly eating high fat or \"junk\" foods?  Yes    Taking medications regularly:  Yes    Medication side effects:  None    Ability to successfully perform activities of daily living:  Telephone requires assistance, transportation requires assistance, shopping requires assistance, preparing meals requires assistance, housework requires assistance, bathing requires assistance, laundry requires assistance, medication administration requires assistance and money management requires assistance    Home Safety:  No safety concerns identified    Hearing Impairment:  No hearing concerns    In the past 6 months, have you been bothered by leaking of urine?  No    In general, how would you rate your overall mental or emotional health?  Fair      PHQ-2 Total Score: 0    Additional concerns today:  No    Do you feel safe in your environment? YES    Have you ever done Advance Care Planning? (For example, a Health Directive, POLST, or a discussion with a medical provider or your loved ones about you  Fall risk  Fallen 2 or more times in the past year?: No  Any fall with injury in the past year?: No  click delete button to remove this line now  Cognitive Screening   1) Repeat 3 items (Leader, Season, Table)    2) Clock draw: ABNORMAL and cannot understand instructions  3) 3 item recall:   Patient cannot hear instructions  Results:     Mini-CogTM Copyright ALTON Marquez. Licensed by the author for use in [facility]; reprinted with permission (kasie@.[facility]). All rights " "reserved.      Do you have sleep apnea, excessive snoring or daytime drowsiness?: no    Reviewed and updated as needed this visit by clinical staff  Tobacco   Meds              Reviewed and updated as needed this visit by Provider                Social History     Tobacco Use     Smoking status: Never Smoker     Smokeless tobacco: Never Used   Substance Use Topics     Alcohol use: Never         Alcohol Use 8/11/2021   Prescreen: >3 drinks/day or >7 drinks/week? No               Current providers sharing in care for this patient include:   Patient Care Team:  David Fang MD as PCP - General (Family Medicine)  Sreedhar Kapoor MD as Assigned PCP    The following health maintenance items are reviewed in Epic and correct as of today:  Health Maintenance Due   Topic Date Due     ANNUAL REVIEW OF  ORDERS  Never done     ADVANCE CARE PLANNING  Never done     COLORECTAL CANCER SCREENING  Never done     HEPATITIS C SCREENING  Never done     LIPID  Never done     ZOSTER IMMUNIZATION (1 of 2) Never done     MEDICARE ANNUAL WELLNESS VISIT  Never done     FALL RISK ASSESSMENT  Never done     AORTIC ANEURYSM SCREENING (SYSTEM ASSIGNED)  Never done     Lab work is in process          Review of Systems  Constitutional, HEENT, cardiovascular, pulmonary, GI, , musculoskeletal, neuro, skin, endocrine and psych systems are negative, except as otherwise noted.    OBJECTIVE:   /82   Pulse 84   Temp 98  F (36.7  C) (Oral)   Resp 14   Ht 1.485 m (4' 10.47\")   Wt 57.1 kg (125 lb 12.8 oz)   SpO2 98%   BMI 25.88 kg/m   Estimated body mass index is 25.88 kg/m  as calculated from the following:    Height as of this encounter: 1.485 m (4' 10.47\").    Weight as of this encounter: 57.1 kg (125 lb 12.8 oz).  Physical Exam  GENERAL: healthy, alert and no distress  EYES: Eyes grossly normal to inspection, PERRL and conjunctivae and sclerae normal  HENT: ear canals and TM's normal, nose and mouth without ulcers or lesions  NECK: no " "adenopathy, no asymmetry, masses, or scars and thyroid normal to palpation  RESP: lungs clear to auscultation - no rales, rhonchi or wheezes  CV: regular rate and rhythm, normal S1 S2, no S3 or S4, no murmur, click or rub, no peripheral edema and peripheral pulses strong  ABDOMEN: soft, nontender, no hepatosplenomegaly, no masses and bowel sounds normal  MS: no gross musculoskeletal defects noted, no edema  SKIN: no suspicious lesions or rashes  NEURO: Normal strength and tone, mentation intact and speech normal  PSYCH: mentation appears normal, affect normal/bright    Diagnostic Test Results:  Labs reviewed in Epic    ASSESSMENT / PLAN:       ICD-10-CM    1. Hyperlipidemia LDL goal <100  E78.5 atorvastatin (LIPITOR) 40 MG tablet     Lipid panel reflex to direct LDL Non-fasting     Lipid panel reflex to direct LDL Non-fasting   2. 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 Benign prostatic hyperplasia (BPH) with straining on urination patient has a weak stream.  And adding Avodart to his regimen.  He should continue to take the Flomax. N40.1 dutasteride (AVODART) 0.5 MG capsule    R39.16    5. Wellness examination  Z00.00        Patient has been advised of split billing requirements and indicates understanding: Yes  COUNSELING:  Reviewed preventive health counseling, as reflected in patient instructions    Estimated body mass index is 25.88 kg/m  as calculated from the following:    Height as of this encounter: 1.485 m (4' 10.47\").    Weight as of this encounter: 57.1 kg (125 lb 12.8 oz).        He reports that he has never smoked. He has never used smokeless " tobacco.      Appropriate preventive services were discussed with this patient, including applicable screening as appropriate for cardiovascular disease, diabetes, osteopenia/osteoporosis, and glaucoma.  As appropriate for age/gender, discussed screening for colorectal cancer, prostate cancer, breast cancer, and cervical cancer. Checklist reviewing preventive services available has been given to the patient.    Reviewed patients plan of care and provided an AVS. The Basic Care Plan (routine screening as documented in Health Maintenance) for Naw meets the Care Plan requirement. This Care Plan has been established and reviewed with the Patient okay I am covering.    Counseling Resources:  ATP IV Guidelines  Pooled Cohorts Equation Calculator  Breast Cancer Risk Calculator  Breast Cancer: Medication to Reduce Risk  FRAX Risk Assessment  ICSI Preventive Guidelines  Dietary Guidelines for Americans, 2010  [employer]'s MyPlate  ASA Prophylaxis  Lung CA Screening    David Fang MD  [facility]    Identified Health Risks:

## 2021-08-20 ENCOUNTER — OFFICE VISIT (OUTPATIENT)
Dept: AUDIOLOGY | Facility: CLINIC | Age: 71
End: 2021-08-20
Payer: COMMERCIAL

## 2021-08-20 ENCOUNTER — OFFICE VISIT (OUTPATIENT)
Dept: OTOLARYNGOLOGY | Facility: CLINIC | Age: 71
End: 2021-08-20
Payer: COMMERCIAL

## 2021-08-20 DIAGNOSIS — H90.3 SENSORINEURAL HEARING LOSS (SNHL) OF BOTH EARS: Primary | ICD-10-CM

## 2021-08-20 DIAGNOSIS — H90.71 MIXED HEARING LOSS OF RIGHT EAR: Primary | ICD-10-CM

## 2021-08-20 DIAGNOSIS — H72.2X1 TYMPANIC MEMBRANE PERFORATION, MARGINAL, RIGHT: ICD-10-CM

## 2021-08-20 DIAGNOSIS — H90.5 SENSORINEURAL HEARING LOSS OF LEFT EAR: ICD-10-CM

## 2021-08-20 PROCEDURE — 92553 AUDIOMETRY AIR & BONE: CPT | Performed by: AUDIOLOGIST

## 2021-08-20 PROCEDURE — 99203 OFFICE O/P NEW LOW 30 MIN: CPT | Performed by: OTOLARYNGOLOGY

## 2021-08-20 PROCEDURE — 99207 PR NO CHARGE LOS: CPT | Performed by: AUDIOLOGIST

## 2021-08-20 PROCEDURE — 92567 TYMPANOMETRY: CPT | Performed by: AUDIOLOGIST

## 2021-08-20 NOTE — PROGRESS NOTES
HPI: This patient is a 72yo M who presents for evaluation of hearing at the request of Dr. Fang. There has been a gradual loss of hearing over the past many years in both ears. He has been wearing hearing aids for years but states that they are working less for about a month. Denies otalgia, otorrhea, vertigo, and other major medical issues. It seems he was using a qtip about a month ago prior to noticing some discomfort in the right ear and a change in the hearing.     Past medical history, surgical history, social history, family history, medications, and allergies have been reviewed with the patient and are documented above.    Review of Systems: a 10-system review was performed. Pertinent positives are noted in the HPI and on a separate scanned document in the chart.    PHYSICAL EXAMINATION:  GEN: no acute distress, normocephalic  EYES: extraocular movements are intact, pupils are equal and round. Sclera clear.   EARS: auricles are normally formed. The external auditory canals are clear with minimal to no cerumen. Tympanic membrane on the right is noted to have 10% perforation of the posterior superior quadrant that is dry and healthy. The left tympanic membrane is intact. Middle ears are clear.  NECK: soft and supple. No lymphadenopathy or masses. Airway is midline.  NEURO: CN VII and XII symmetric. alert and oriented. No spontaneous nystagmus. Gait is normal.  PULM: breathing comfortably on room air, normal chest expansion with respiration  CARDS: no cyanosis or clubbing, normal carotid pulses    AUDIOGRAM: dead left ear, borderline severe/profound HL (?mixed component, masking dilemma). Large volume type B tymp right, type A tymp left  AUDIOGRAM 2018: dead left ear, severe to profound SNHL in the right. Type A tymps bilaterally.    MEDICAL DECISION-MAKING: This patient is a 72yo M with severe/profound sensorineural hearing loss. It appears that he may have a slight additional mixed component in the right ear  with a small TM perforation, likely from qtip trauma given the location and story. No surgical intervention is indicated and will recheck the ear in 6 months to assess if it is or will heal on its own. He is HA dependent and in the interim, may need to have his aid adjusted..

## 2021-08-20 NOTE — PROGRESS NOTES
AUDIOLOGY REPORT    SUMMARY- Audiology visit completed. See audiogram for results. Abuse screening not completed due to same day appt with ENT clinic, where this is addressed.    PLAN-  Follow-up with ENT.    Dante Rod.  Licensed Audiologist  [place] #[id number]

## 2021-08-20 NOTE — LETTER
8/20/2021         RE: Chasity Gaspar  [address] [place] [address]        Dear Colleague,    Thank you for referring your patient, Chasity Gaspar, to the [facility]. Please see a copy of my visit note below.    HPI: This patient is a 70yo M who presents for evaluation of hearing at the request of Dr. Fang. There has been a gradual loss of hearing over the past many years in both ears. He has been wearing hearing aids for years but states that they are working less for about a month. Denies otalgia, otorrhea, vertigo, and other major medical issues. It seems he was using a qtip about a month ago prior to noticing some discomfort in the right ear and a change in the hearing.     Past medical history, surgical history, social history, family history, medications, and allergies have been reviewed with the patient and are documented above.    Review of Systems: a 10-system review was performed. Pertinent positives are noted in the HPI and on a separate scanned document in the chart.    PHYSICAL EXAMINATION:  GEN: no acute distress, normocephalic  EYES: extraocular movements are intact, pupils are equal and round. Sclera clear.   EARS: auricles are normally formed. The external auditory canals are clear with minimal to no cerumen. Tympanic membrane on the right is noted to have 10% perforation of the posterior superior quadrant that is dry and healthy. The left tympanic membrane is intact. Middle ears are clear.  NECK: soft and supple. No lymphadenopathy or masses. Airway is midline.  NEURO: CN VII and XII symmetric. alert and oriented. No spontaneous nystagmus. Gait is normal.  PULM: breathing comfortably on room air, normal chest expansion with respiration  CARDS: no cyanosis or clubbing, normal carotid pulses    AUDIOGRAM: dead left ear, borderline severe/profound HL (?mixed component, masking dilemma). Large volume type B tymp right, type A tymp left  AUDIOGRAM 2018: dead left ear, severe to  profound SNHL in the right. Type A tymps bilaterally.    MEDICAL DECISION-MAKING: This patient is a 72yo M with severe/profound sensorineural hearing loss. It appears that he may have a slight additional mixed component in the right ear with a small TM perforation, likely from qtip trauma given the location and story. No surgical intervention is indicated and will recheck the ear in 6 months to assess if it is or will heal on its own. He is HA dependent and in the interim, may need to have his aid adjusted..         Again, thank you for allowing me to participate in the care of your patient.        Sincerely,        Annetta Daigle MD

## 2021-08-23 ENCOUNTER — HOSPITAL ENCOUNTER (EMERGENCY)
Facility: HOSPITAL | Age: 71
Discharge: HOME | End: 2021-08-23
Attending: EMERGENCY MEDICINE | Admitting: EMERGENCY MEDICINE
Payer: COMMERCIAL

## 2021-08-23 VITALS
WEIGHT: 120 LBS | SYSTOLIC BLOOD PRESSURE: 179 MMHG | HEIGHT: 61 IN | HEART RATE: 73 BPM | BODY MASS INDEX: 22.66 KG/M2 | TEMPERATURE: 97.7 F | OXYGEN SATURATION: 96 % | DIASTOLIC BLOOD PRESSURE: 97 MMHG | RESPIRATION RATE: 18 BRPM

## 2021-08-23 DIAGNOSIS — M54.16 LUMBAR RADICULOPATHY: ICD-10-CM

## 2021-08-23 PROCEDURE — 250N000013 HC RX MED GY IP 250 OP 250 PS 637: Performed by: EMERGENCY MEDICINE

## 2021-08-23 PROCEDURE — 99283 EMERGENCY DEPT VISIT LOW MDM: CPT

## 2021-08-23 RX ORDER — ACETAMINOPHEN 325 MG/1
975 TABLET ORAL ONCE
Status: COMPLETED | OUTPATIENT
Start: 2021-08-23 | End: 2021-08-23

## 2021-08-23 RX ORDER — METHOCARBAMOL 500 MG/1
500 TABLET, FILM COATED ORAL 3 TIMES DAILY
Qty: 21 TABLET | Refills: 0 | Status: SHIPPED | OUTPATIENT
Start: 2021-08-23 | End: 2022-08-19 | Stop reason: ALTCHOICE

## 2021-08-23 RX ORDER — LIDOCAINE 50 MG/G
1 PATCH TOPICAL EVERY 24 HOURS
Qty: 14 PATCH | Refills: 0 | Status: SHIPPED | OUTPATIENT
Start: 2021-08-23 | End: 2021-09-02

## 2021-08-23 RX ORDER — METHOCARBAMOL 500 MG/1
500 TABLET, FILM COATED ORAL ONCE
Status: COMPLETED | OUTPATIENT
Start: 2021-08-23 | End: 2021-08-23

## 2021-08-23 RX ORDER — METHYLPREDNISOLONE 4 MG
TABLET, DOSE PACK ORAL
Qty: 21 TABLET | Refills: 0 | Status: SHIPPED | OUTPATIENT
Start: 2021-08-23 | End: 2022-11-05

## 2021-08-23 RX ADMIN — ACETAMINOPHEN 975 MG: 325 TABLET ORAL at 11:36

## 2021-08-23 RX ADMIN — METHOCARBAMOL 500 MG: 500 TABLET, FILM COATED ORAL at 11:36

## 2021-08-23 ASSESSMENT — ENCOUNTER SYMPTOMS
DIARRHEA: 0
NAUSEA: 0
VOMITING: 0
SHORTNESS OF BREATH: 0
FEVER: 0
CONFUSION: 0
BACK PAIN: 1
DIZZINESS: 0
SORE THROAT: 0
JOINT SWELLING: 0
HEMATURIA: 0
CHILLS: 0
ABDOMINAL PAIN: 0
DYSURIA: 0

## 2021-08-23 ASSESSMENT — MIFFLIN-ST. JEOR: SCORE: 1172.7

## 2021-08-23 NOTE — DISCHARGE INSTRUCTIONS
Take medrol dosepak as directed until gone. Take robaxin as directed/needed.  Use lidoderm patches as directed/needed. Follow up with spine center - call if you have not heard from them in next 24 hours. Return for new emergency concerns including inability to urinate, numbness to both legs, numbness to anus/penis or other new concerns.

## 2021-08-23 NOTE — ED NOTES
Provider has been at the bedside to evaluate the patient. Pedal pulses intact. + Sensation intact. Left leg pain noted.

## 2021-08-23 NOTE — ED TRIAGE NOTES
The patient presents with left leg pain. The patient denies trauma. The leg started hurting last Friday.

## 2021-08-24 ENCOUNTER — PATIENT OUTREACH (OUTPATIENT)
Dept: GERIATRIC MEDICINE | Facility: CLINIC | Age: 71
End: 2021-08-24

## 2021-08-24 NOTE — PROGRESS NOTES
[facility] Care Coordination Contact  CC received notification of Emergency Room visit.  ER visit occurred on 8/23/21 at [facility] with Dx of leg pain.    CC contacted adult daughter Sunita Castelan and reviewed discharge summary.  Member has a follow-up appointment with PCP: Yes, 8/31/21; Spine specialist; 9/16/21; PCP  Member has had a change in condition: No  New referrals placed: No  Home Visit Needed: No  Care plan reviewed and updated.  PCP notified of ED visit via EMR.    Savanna Landaverde RN, PHN  [facility]  [phone]

## 2021-08-24 NOTE — PROGRESS NOTES
[facility] Care Coordination Contact      [facility] Six-Month Telephone Assessment    6 month telephone assessment completed on 8/24/21.    ER visits: Yes -  [facility]  Hospitalizations: No  TCU stays: No  Significant health status changes: recent l leg pain may be related to spine; will see spine specialist.  Falls/Injuries: No  ADL/IADL changes: No  Changes in services: No    Caregiver Assessment follow up:  na    Goals: See POC in chart for goal progress documentation.      Will see member in 6 months for an annual health risk assessment.   Encouraged member to call CC with any questions or concerns in the meantime.     Savanna Landaverde RN, PHN  [facility]  [phone]

## 2021-08-25 ENCOUNTER — OFFICE VISIT (OUTPATIENT)
Dept: AUDIOLOGY | Facility: CLINIC | Age: 71
End: 2021-08-25
Payer: COMMERCIAL

## 2021-08-25 DIAGNOSIS — H90.A31 MIXED CONDUCTIVE AND SENSORINEURAL HEARING LOSS OF RIGHT EAR WITH RESTRICTED HEARING OF LEFT EAR: Primary | ICD-10-CM

## 2021-08-25 PROCEDURE — 92592 PR HEARING AID CHECK, MONAURAL: CPT | Performed by: AUDIOLOGIST

## 2021-08-25 PROCEDURE — 99207 PR NO CHARGE LOS: CPT | Performed by: AUDIOLOGIST

## 2021-08-25 NOTE — PROGRESS NOTES
AUDIOLOGY REPORT    SUBJECTIVE: Chasity Gaspar, 71 year old year old male, was seen at the [facility] on 08/25/21 for a hearing aid check. He was accompanied by his daughter and a Jessica speaking phone . Due to the patient's significant amount of hearing loss, his daughter primarily spoke with  on patient's behalf and then she wrote notes for her father to read. Chasity's hearing was previously assessed on 8/20/21 and results revealed profound rising to severe likely mixed hearing loss in the right ear and unmeasureable hearing in the left ear. Bone conduction could not be masked at the right ear due to masking dilemmas. The patient was fit with a right Phonak Nanci V50 -UP behind-the-ear hearing aid on 4/2018.     Today, Chasity reports that the right hearing aid is not working and that speech clarity has decreased in the last month.     OBJECTIVE: Otoscopy revealed small perforation in the right tympanic membrane and clear canals bilaterally.     The patient's hearing aid was connected to the software and datalogging revealed 11.8 hours of hearing aid use per day. His programming was reconfigured to reflect his most recent audiogram. Feedback manager was completed. Chasity continually reported that he could hear, but he cannot understand what is being said. Chasity did not report any changes when SoundRecover was activated.    Overall gain was increased to 110% and were increased an additional 5 dB.     Because the patient's daughter speaks a little bit of English, she was agreeable to reading Jessica words. Word recognition testing was performed via monitored live voice. A Jessica wordlist was presented by the patient's daughter at 100 dB HL and he scored 50% (8/16 words correct).     We discussed that due to the small perforation at the right tympanic membrane, I would expect speech clarity to improve upon louder presentation levels. His poor word recognition score and decreased word  recognition score indicate that Chasity may no longer be receiving benefit from the right hearing aid and should consider other options. We discussed that Chasity currently has three options: 1) wait to see if the perforation in the right tympanic membrane heals, then return for hearing aid adjustments; 2) Try a Henri Pen to increase the signal-to-noise ratio, however, clarity may not improve with this option; 3) return for a cochlear implantation evaluation. Chasity was informed that if he is a candidate, a cochlear implant consists of a surgery and several follow-up appointments. It was stressed that he will not be able to hear immediately. Chasity decided that he would like to pursue option 1 and wait to see if the right tympanic membrane will heal.    Length of hearing aid check: 1.5 hours    ASSESSMENT: Hearing aid check completed.    PLAN: Chasity is scheduled to return to ENT in 6 months. If the perforation at the right tympanic membrane has healed, he should return for hearing aid adjustments. Please call our clinic at [phone] with questions or concerns.    Ward Moran, CCC-A  Clinical Audiologist   [place] #[id number]

## 2021-08-30 NOTE — PROGRESS NOTES
ASSESSMENT: Chasity Gaspar is a 71 year old male with past medical history significant for history of hearing loss, CVA with left hemiparesis, vitamin D deficiency, GERD, hyperlipidemia, gout, depression who presents today for new patient evaluation of acute left low back pain with radiation into the left lower extremity.  Pain began 2 weeks ago with no trauma.  Patient has a history of an L4-S1 anterior/posterior fusion in 2011 with Dr. Casillas from [facility].  Patient describes pain in a left L4 or L5 pattern.  I am concerned that there may be some left L4 or L5 nerve root compromise now.  On exam he demonstrated weakness throughout the left lower extremity.  He does have a history of left hemiparesis due to a CVA but daughter states his weakness is worse now.   Strength testing was difficult because patient is deaf and non-English speaking so there were 2 layers of interpretation needed.  He demonstrated at least antigravity strength in all muscle groups.    PLAN:  A shared decision making model was used.  The patient's values and choices were respected.  The following represents what was discussed and decided upon by the physician assistant and the patient.  Patient has hearing loss and is non-English speaking.  Daughter provides much history and interprets for the patient and then a telephone  was used to interpret between the daughter and me.    1.  DIAGNOSTIC TESTS: I reviewed the CT lumbar spine.  I ordered an MRI lumbar spine for further evaluation.  I also ordered an x-ray lumbar spine to look at surgical hardware.  -Patient may need an EMG if MRI does not show etiology for significant weakness.    2.  PHYSICAL THERAPY:  Discussed the importance of core strengthening, ROM, stretching exercises with the patient and how each of these entities is important in decreasing pain.  Explained to the patient that the purpose of physical therapy is to teach the patient a home exercise program.  These  exercises need to be performed every day in order to decrease pain and prevent future occurrences of pain.  I entered a referral to physical therapy.    3.  MEDICATIONS:  -Patient is taking gabapentin 300 mg 3 times daily.  I gave a new prescription and the dosage titration chart so that he can titrate his dose up to 600 mg 3 times daily.  -Patient's daughter feels this medication causes constipation and he is out of MiraLAX so I refilled that as well.  -Lidocaine patches prescribed at the ED were not covered.  I suggested a by over-the-counter pain relieving patches such as Salonpas or icy hot with lidocaine.  -Patient can continue using Robaxin 500 mg 3 times daily.  -Patient will continue Cymbalta 60 mg daily.  -Patient can continue Tylenol as needed.  -Patient just completed a Medrol Dosepak.    4.  INTERVENTIONS: No interventions were ordered.      5.  PATIENT EDUCATION: Patient is in agreement the above plan.  All questions were answered.    6.  FOLLOW-UP:   I will see the patient back in the clinic to review the results of the MRI lumbar spine and recheck his neurologic exam.  They will need a 40-minute visit for all of their visits due to multiple layers of interpretation.      SUBJECTIVE:  Chasity Gaspar  Is a 71 year old male who presents today as a referral from the emergency department (August 23, 2021) for new patient evaluation of low back pain with radiation into the left lower extremity.  Patient has a history of an L4-S1 fusion in 2011 with Dr. Morales from [facility].  It sounds like he had some ongoing pain postoperatively.  He was seen in our clinic in 2015 and underwent bilateral L3-4 facet joint injections which provided some relief of his pain.  He also participated in physical therapy, but he was then lost to follow-up.  Patient's daughter noted that he was doing well with his pain up until 2 weeks ago when pain began.  There was no injury or event to cause the pain.  Daughter feels that the  buttock pain has improved somewhat since pain began but shin pain continues to be severe.    Patient complains of pain which begins in left low back.  Pain extends into left buttock, down the lateral thigh and into the lateral and anterior shin.  He also points to the knee when pointing to the pain.  He states pain does not reach the foot.  There is no numbness or tingling.  He describes the pain as an aching pain.  There is weakness in the left leg.  He has left hemiparesis from a stroke.  Daughter reports that weakness is much worse now.  Prior to the pain beginning 2 weeks ago he was walking with a cane.  He is now requiring a wheelchair.  Patient has chronic bladder and bowel incontinence since his stroke which is intermittent and stable (urinary incontinence once or twice per week at the most, bowel incontinence 2 times per month at the most).  Pain is aggravated with walking and alleviated with rest.    As mentioned above, patient had a lumbar fusion surgery in 2011.  In reviewing records it appears most recent physical therapy was 2015 for the back.  He had bilateral L3-4 facet joint injections March 10, 2015 at the spine center which provided some relief of his pain at that time.  He does not go to a chiropractor.  He completed a Medrol Dosepak which was somewhat helpful.  He has taken gabapentin 300 mg 3 times daily.  He is on this long-term for chronic pain.  He takes Cymbalta 60 mg daily.  He is also using Robaxin 500 mg 3 times daily.  He is taking Tylenol.  Medications are somewhat helpful.    Current Outpatient Medications   Medication     acetaminophen 500 mg coapsule     allopurinol (ZYLOPRIM) 300 MG tablet     allopurinoL (ZYLOPRIM) 300 MG tablet     allopurinoL (ZYLOPRIM) 300 MG tablet     atorvastatin (LIPITOR) 40 MG tablet     atorvastatin (LIPITOR) 40 MG tablet     atorvastatin (LIPITOR) 40 MG tablet     clobetasol (TEMOVATE) 0.05 % ointment     clopidogrel (PLAVIX) 75 MG tablet     clopidogreL  (PLAVIX) 75 mg tablet     clopidogreL (PLAVIX) 75 mg tablet     diclofenac (VOLTAREN) 75 MG EC tablet     DULoxetine (CYMBALTA) 60 MG capsule     DULoxetine (CYMBALTA) 60 MG capsule     dutasteride (AVODART) 0.5 MG capsule     ergocalciferol (ERGOCALCIFEROL) 1,250 mcg (50,000 unit) capsule     ergocalciferol (ERGOCALCIFEROL) 1.25 MG (32620 UT) capsule     gabapentin (NEURONTIN) 300 MG capsule     gabapentin (NEURONTIN) 300 MG capsule     hydrOXYzine pamoate (VISTARIL) 50 MG capsule     lidocaine (LIDODERM) 5 % patch     magnesium oxide (MAG-OX) 400 mg (241.3 mg magnesium) tablet     magnesium oxide (MAG-OX) 400 MG tablet     meclizine (ANTIVERT) 25 mg tablet     melatonin 3 mg Tab tablet     methocarbamol (ROBAXIN) 500 MG tablet     methylPREDNISolone (MEDROL DOSEPAK) 4 MG tablet therapy pack     metoprolol tartrate (LOPRESSOR) 25 MG tablet     metoprolol tartrate (LOPRESSOR) 25 MG tablet     metoprolol tartrate (LOPRESSOR) 25 MG tablet     mirtazapine (REMERON) 15 MG tablet     omeprazole (PRILOSEC) 20 MG capsule     omeprazole (PRILOSEC) 20 MG capsule     omeprazole (PRILOSEC) 20 MG DR capsule     polyethylene glycol (MIRALAX) 17 gram packet     tamsulosin (FLOMAX) 0.4 MG capsule       Allergies   Allergen Reactions     Aspirin Hives     Oxycodone Itching     Vicodin [Hydrocodone-Acetaminophen] Unknown       Past Medical History:   Diagnosis Date     Cervicalgia      Depression      Dyslipidemia      Dysthymia      Gastritis      GERD (gastroesophageal reflux disease)      Hearing loss      Hiatal hernia      Hypertriglyceridemia      Insomnia      Lacunar stroke (H)      Lumbar canal stenosis      Lumbar radiculopathy      Myalgia and myositis         Patient Active Problem List   Diagnosis     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     Abdominal Pain In The Right Lower Belly (RLQ)     Abdominal Pain In The Right Upper Belly (RUQ)     Memory Lapses Or Loss     Hyperlipidemia     Hearing Loss     Lacunar  Stroke     Spinal Stenosis     Radiculopathy, lumbar region     Cervicalgia     Dysthymia (Depressive Neurosis), Primary     Essential Hypertriglyceridemia     Hiatal Hernia     Lumbar Canal Stenosis     Lower Back Pain     Vitamin D Deficiency     Moderate Recurrent Major Depression     Esophageal reflux     Gastritis     Constipation     Gout     Dermatitis     Elbow swelling, right     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)     Left hemiparesis (H)     ASNHL (asymmetrical sensorineural hearing loss)     Elevated liver enzymes       Past Surgical History:   Procedure Laterality Date     C APPENDECTOMY      Description: Appendectomy;  Recorded: 07/12/2013;     IR CEREBRAL ANGIOGRAM  4/27/2017     IR LUMBAR TRANSFORAMINAL EPIDURAL STRD INJ  7/2/2012     PICC  4/28/2017          ND ARTHRODESIS ANT INTERBODY MIN DISCECTOMY,LUMBAR      Description: Lumbar Vertebral Fusion;  Recorded: 07/16/2013;  Comments: 3/17/11 spinal decompression and fusion L4-5, L5-S1 for spinal stenosis, DDD, spondylolysis; Dr. Casillas [facility]     ND ESOPHAGOGASTRODUODENOSCOPY TRANSORAL DIAGNOSTIC      Description: Esophagogastroduodenoscopy;  Proc Date: 04/02/2012;  Comments: biosies:   reactive gastropathy with chronic superimposed nonspecific chronic inflammation (likely secondary to ASA, NSAIDS, EtOH or other irritants), NEG for h. pylori; small hiatal hernia     ND INJECT ANES/STEROID FORAMEN LUMBAR/SACRAL W IMG GUIDE ,1 LEVEL      Description: Nerve Block Transforaminal Epidural Lumbar L3 - L4;  Recorded: 07/10/2012;  Comments: 7/2/2012 for severe low back pain, spinal stenosis and radiculopathy     THROMBECTOMY  04/24/2017    Rt ECLIA     US ASPIRATION OR INJECTION MAJOR JOINT  10/23/2019       Family history: None.  Patient reports family is healthy.    Social history: Patient lives with his children.  He does not work.  He denies tobacco, alcohol, or illicit drug use.      ROS: Positive for joint  pain, itching.  Specifically negative for  fevers,chills, appetite changes, unexplained weight loss.   Otherwise 13 systems reviewed are negative.  Please see the patient's intake questionnaire from today for details.      OBJECTIVE:  PHYSICAL EXAMINATION:    CONSTITUTIONAL:  Vital signs as above.  No acute distress.  The patient is well nourished and well groomed.  Patient is deaf.  PSYCHIATRIC:  The patient is awake, alert, oriented to person, place, time and answering questions appropriately with clear speech.    HEENT: Normocephalic, atraumatic.  Sclera clear.    SKIN:  Skin over the face, bilateral lower extremities, and posterior torso is clean, dry, intact without rashes.    GAIT:  Gait is severely antalgic, favoring the left.  He requires my assistance to walk several steps in the exam room.     STANDING EXAMINATION: Tender to palpation left lower lumbar paraspinous muscles.  MUSCLE STRENGTH:  The patient has 5/5 strength for the right hip flexors, knee flexors/extensors, ankle dorsiflexors/plantar flexors, great toe extensors, ankle evertors/invertors.  Patient demonstrates weakness throughout the left lower extremity which I would grade as 4 -/5 throughout.  NEUROLOGICAL:  2/4 patellar, and achilles reflexes bilaterally.  Negative Babinski's bilaterally.  No ankle clonus bilaterally. Sensation to light touch is intact in the bilateral L4, L5, and S1 dermatomes.  VASCULAR:  2/4 dorsalis pedis pulses bilaterally.  Bilateral lower extremities are warm.  There is no pitting edema of the bilateral lower extremities.  ABDOMINAL:  Soft, non-distended, non-tender throughout all quadrants.  No pulsatile mass palpated in the left lower quadrant.  LYMPH NODES:  No palpable or tender inguinal lymph nodes.      RESULTS: I reviewed a CT abdomen and pelvis from [facility] dated December 12, 2020.  This shows postoperative changes of an L4-S1 anterior/posterior fusion.

## 2021-08-31 ENCOUNTER — OFFICE VISIT (OUTPATIENT)
Dept: PHYSICAL MEDICINE AND REHAB | Facility: CLINIC | Age: 71
End: 2021-08-31
Attending: EMERGENCY MEDICINE
Payer: COMMERCIAL

## 2021-08-31 VITALS
SYSTOLIC BLOOD PRESSURE: 149 MMHG | HEART RATE: 89 BPM | BODY MASS INDEX: 22.66 KG/M2 | WEIGHT: 120 LBS | DIASTOLIC BLOOD PRESSURE: 81 MMHG | HEIGHT: 61 IN

## 2021-08-31 DIAGNOSIS — G81.94 LEFT HEMIPARESIS (H): ICD-10-CM

## 2021-08-31 DIAGNOSIS — M54.16 LUMBAR RADICULOPATHY: ICD-10-CM

## 2021-08-31 DIAGNOSIS — K59.00 CONSTIPATION, UNSPECIFIED CONSTIPATION TYPE: ICD-10-CM

## 2021-08-31 DIAGNOSIS — Z98.1 HISTORY OF LUMBAR FUSION: Primary | ICD-10-CM

## 2021-08-31 PROCEDURE — 99204 OFFICE O/P NEW MOD 45 MIN: CPT | Performed by: PHYSICIAN ASSISTANT

## 2021-08-31 RX ORDER — GABAPENTIN 300 MG/1
CAPSULE ORAL
Qty: 180 CAPSULE | Refills: 0 | Status: SHIPPED | OUTPATIENT
Start: 2021-08-31 | End: 2022-11-25

## 2021-08-31 RX ORDER — POLYETHYLENE GLYCOL 3350 17 G/17G
17 POWDER, FOR SOLUTION ORAL DAILY
Qty: 510 G | Refills: 0 | Status: SHIPPED | OUTPATIENT
Start: 2021-08-31 | End: 2022-04-19

## 2021-08-31 ASSESSMENT — MIFFLIN-ST. JEOR: SCORE: 1172.7

## 2021-08-31 ASSESSMENT — PAIN SCALES - GENERAL: PAINLEVEL: EXTREME PAIN (8)

## 2021-08-31 NOTE — PATIENT INSTRUCTIONS
You can purchase salon pas patches or icyhot with lidocaine patches over the counter at your pharmacy.        Prescribed Gabapentin today, 300 mg tablets, to be titrated up to 3 tablets 3 times a day as tolerated for your nerve pain. Please follow Gabapentin dosing chart below.    Gabapentin 300mg Dosing Chart    DATE  MORNING AFTERNOON BEDTIME                                                                   Day 1 1 1 2    Day 2 1 1 2    Day 3 1 1 2    Day 4 2 1 2    Day 5 2 1 2    Day 6 2 1 2    Day 7 2 2 2    Day 8 2 2 2    Day 9 2 2 2     Continue medication, taking 2 capsules three times daily  Please call if you have any questions regarding how to take your medication

## 2021-08-31 NOTE — LETTER
8/31/2021         RE: Chasity Gaspar  [address] [place] [address]        Dear Colleague,    Thank you for referring your patient, Chasity Gaspar, to the [facility] [place]. Please see a copy of my visit note below.    ASSESSMENT: Chasity Gaspar is a 71 year old male with past medical history significant for history of hearing loss, CVA with left hemiparesis, vitamin D deficiency, GERD, hyperlipidemia, gout, depression who presents today for new patient evaluation of acute left low back pain with radiation into the left lower extremity.  Pain began 2 weeks ago with no trauma.  Patient has a history of an L4-S1 anterior/posterior fusion in 2011 with Dr. Casillas from [facility].  Patient describes pain in a left L4 or L5 pattern.  I am concerned that there may be some left L4 or L5 nerve root compromise now.  On exam he demonstrated weakness throughout the left lower extremity.  He does have a history of left hemiparesis due to a CVA but daughter states his weakness is worse now.   Strength testing was difficult because patient is deaf and non-English speaking so there were 2 layers of interpretation needed.  He demonstrated at least antigravity strength in all muscle groups.    PLAN:  A shared decision making model was used.  The patient's values and choices were respected.  The following represents what was discussed and decided upon by the physician assistant and the patient.  Patient has hearing loss and is non-English speaking.  Daughter provides much history and interprets for the patient and then a telephone  was used to interpret between the daughter and me.    1.  DIAGNOSTIC TESTS: I reviewed the CT lumbar spine.  I ordered an MRI lumbar spine for further evaluation.  I also ordered an x-ray lumbar spine to look at surgical hardware.  -Patient may need an EMG if MRI does not show etiology for significant weakness.    2.  PHYSICAL THERAPY:  Discussed the importance of core  strengthening, ROM, stretching exercises with the patient and how each of these entities is important in decreasing pain.  Explained to the patient that the purpose of physical therapy is to teach the patient a home exercise program.  These exercises need to be performed every day in order to decrease pain and prevent future occurrences of pain.  I entered a referral to physical therapy.    3.  MEDICATIONS:  -Patient is taking gabapentin 300 mg 3 times daily.  I gave a new prescription and the dosage titration chart so that he can titrate his dose up to 600 mg 3 times daily.  -Patient's daughter feels this medication causes constipation and he is out of MiraLAX so I refilled that as well.  -Lidocaine patches prescribed at the ED were not covered.  I suggested a by over-the-counter pain relieving patches such as Salonpas or icy hot with lidocaine.  -Patient can continue using Robaxin 500 mg 3 times daily.  -Patient will continue Cymbalta 60 mg daily.  -Patient can continue Tylenol as needed.  -Patient just completed a Medrol Dosepak.    4.  INTERVENTIONS: No interventions were ordered.      5.  PATIENT EDUCATION: Patient is in agreement the above plan.  All questions were answered.    6.  FOLLOW-UP:   I will see the patient back in the clinic to review the results of the MRI lumbar spine and recheck his neurologic exam.  They will need a 40-minute visit for all of their visits due to multiple layers of interpretation.      SUBJECTIVE:  Chasity Gaspar  Is a 71 year old male who presents today as a referral from the emergency department (August 23, 2021) for new patient evaluation of low back pain with radiation into the left lower extremity.  Patient has a history of an L4-S1 fusion in 2011 with Dr. Morales from [facility].  It sounds like he had some ongoing pain postoperatively.  He was seen in our clinic in 2015 and underwent bilateral L3-4 facet joint injections which provided some relief of his pain.  He also  participated in physical therapy, but he was then lost to follow-up.  Patient's daughter noted that he was doing well with his pain up until 2 weeks ago when pain began.  There was no injury or event to cause the pain.  Daughter feels that the buttock pain has improved somewhat since pain began but shin pain continues to be severe.    Patient complains of pain which begins in left low back.  Pain extends into left buttock, down the lateral thigh and into the lateral and anterior shin.  He also points to the knee when pointing to the pain.  He states pain does not reach the foot.  There is no numbness or tingling.  He describes the pain as an aching pain.  There is weakness in the left leg.  He has left hemiparesis from a stroke.  Daughter reports that weakness is much worse now.  Prior to the pain beginning 2 weeks ago he was walking with a cane.  He is now requiring a wheelchair.  Patient has chronic bladder and bowel incontinence since his stroke which is intermittent and stable (urinary incontinence once or twice per week at the most, bowel incontinence 2 times per month at the most).  Pain is aggravated with walking and alleviated with rest.    As mentioned above, patient had a lumbar fusion surgery in 2011.  In reviewing records it appears most recent physical therapy was 2015 for the back.  He had bilateral L3-4 facet joint injections March 10, 2015 at the spine center which provided some relief of his pain at that time.  He does not go to a chiropractor.  He completed a Medrol Dosepak which was somewhat helpful.  He has taken gabapentin 300 mg 3 times daily.  He is on this long-term for chronic pain.  He takes Cymbalta 60 mg daily.  He is also using Robaxin 500 mg 3 times daily.  He is taking Tylenol.  Medications are somewhat helpful.    Current Outpatient Medications   Medication     acetaminophen 500 mg coapsule     allopurinol (ZYLOPRIM) 300 MG tablet     allopurinoL (ZYLOPRIM) 300 MG tablet      allopurinoL (ZYLOPRIM) 300 MG tablet     atorvastatin (LIPITOR) 40 MG tablet     atorvastatin (LIPITOR) 40 MG tablet     atorvastatin (LIPITOR) 40 MG tablet     clobetasol (TEMOVATE) 0.05 % ointment     clopidogrel (PLAVIX) 75 MG tablet     clopidogreL (PLAVIX) 75 mg tablet     clopidogreL (PLAVIX) 75 mg tablet     diclofenac (VOLTAREN) 75 MG EC tablet     DULoxetine (CYMBALTA) 60 MG capsule     DULoxetine (CYMBALTA) 60 MG capsule     dutasteride (AVODART) 0.5 MG capsule     ergocalciferol (ERGOCALCIFEROL) 1,250 mcg (50,000 unit) capsule     ergocalciferol (ERGOCALCIFEROL) 1.25 MG (26404 UT) capsule     gabapentin (NEURONTIN) 300 MG capsule     gabapentin (NEURONTIN) 300 MG capsule     hydrOXYzine pamoate (VISTARIL) 50 MG capsule     lidocaine (LIDODERM) 5 % patch     magnesium oxide (MAG-OX) 400 mg (241.3 mg magnesium) tablet     magnesium oxide (MAG-OX) 400 MG tablet     meclizine (ANTIVERT) 25 mg tablet     melatonin 3 mg Tab tablet     methocarbamol (ROBAXIN) 500 MG tablet     methylPREDNISolone (MEDROL DOSEPAK) 4 MG tablet therapy pack     metoprolol tartrate (LOPRESSOR) 25 MG tablet     metoprolol tartrate (LOPRESSOR) 25 MG tablet     metoprolol tartrate (LOPRESSOR) 25 MG tablet     mirtazapine (REMERON) 15 MG tablet     omeprazole (PRILOSEC) 20 MG capsule     omeprazole (PRILOSEC) 20 MG capsule     omeprazole (PRILOSEC) 20 MG DR capsule     polyethylene glycol (MIRALAX) 17 gram packet     tamsulosin (FLOMAX) 0.4 MG capsule       Allergies   Allergen Reactions     Aspirin Hives     Oxycodone Itching     Vicodin [Hydrocodone-Acetaminophen] Unknown       Past Medical History:   Diagnosis Date     Cervicalgia      Depression      Dyslipidemia      Dysthymia      Gastritis      GERD (gastroesophageal reflux disease)      Hearing loss      Hiatal hernia      Hypertriglyceridemia      Insomnia      Lacunar stroke (H)      Lumbar canal stenosis      Lumbar radiculopathy      Myalgia and myositis         Patient  Active Problem List   Diagnosis     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     Abdominal Pain In The Right Lower Belly (RLQ)     Abdominal Pain In The Right Upper Belly (RUQ)     Memory Lapses Or Loss     Hyperlipidemia     Hearing Loss     Lacunar Stroke     Spinal Stenosis     Radiculopathy, lumbar region     Cervicalgia     Dysthymia (Depressive Neurosis), Primary     Essential Hypertriglyceridemia     Hiatal Hernia     Lumbar Canal Stenosis     Lower Back Pain     Vitamin D Deficiency     Moderate Recurrent Major Depression     Esophageal reflux     Gastritis     Constipation     Gout     Dermatitis     Elbow swelling, right     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)     Left hemiparesis (H)     ASNHL (asymmetrical sensorineural hearing loss)     Elevated liver enzymes       Past Surgical History:   Procedure Laterality Date     C APPENDECTOMY      Description: Appendectomy;  Recorded: 07/12/2013;     IR CEREBRAL ANGIOGRAM  4/27/2017     IR LUMBAR TRANSFORAMINAL EPIDURAL STRD INJ  7/2/2012     PICC  4/28/2017          RI ARTHRODESIS ANT INTERBODY MIN DISCECTOMY,LUMBAR      Description: Lumbar Vertebral Fusion;  Recorded: 07/16/2013;  Comments: 3/17/11 spinal decompression and fusion L4-5, L5-S1 for spinal stenosis, DDD, spondylolysis; Dr. Casillas [facility]     RI ESOPHAGOGASTRODUODENOSCOPY TRANSORAL DIAGNOSTIC      Description: Esophagogastroduodenoscopy;  Proc Date: 04/02/2012;  Comments: biosies:   reactive gastropathy with chronic superimposed nonspecific chronic inflammation (likely secondary to ASA, NSAIDS, EtOH or other irritants), NEG for h. pylori; small hiatal hernia     RI INJECT ANES/STEROID FORAMEN LUMBAR/SACRAL W IMG GUIDE ,1 LEVEL      Description: Nerve Block Transforaminal Epidural Lumbar L3 - L4;  Recorded: 07/10/2012;  Comments: 7/2/2012 for severe low back pain, spinal stenosis and radiculopathy     THROMBECTOMY  04/24/2017    Rt CELIA     US  ASPIRATION OR INJECTION MAJOR JOINT  10/23/2019       Family history: None.  Patient reports family is healthy.    Social history: Patient lives with his children.  He does not work.  He denies tobacco, alcohol, or illicit drug use.      ROS: Positive for joint pain, itching.  Specifically negative for  fevers,chills, appetite changes, unexplained weight loss.   Otherwise 13 systems reviewed are negative.  Please see the patient's intake questionnaire from today for details.      OBJECTIVE:  PHYSICAL EXAMINATION:    CONSTITUTIONAL:  Vital signs as above.  No acute distress.  The patient is well nourished and well groomed.  Patient is deaf.  PSYCHIATRIC:  The patient is awake, alert, oriented to person, place, time and answering questions appropriately with clear speech.    HEENT: Normocephalic, atraumatic.  Sclera clear.    SKIN:  Skin over the face, bilateral lower extremities, and posterior torso is clean, dry, intact without rashes.    GAIT:  Gait is severely antalgic, favoring the left.  He requires my assistance to walk several steps in the exam room.     STANDING EXAMINATION: Tender to palpation left lower lumbar paraspinous muscles.  MUSCLE STRENGTH:  The patient has 5/5 strength for the right hip flexors, knee flexors/extensors, ankle dorsiflexors/plantar flexors, great toe extensors, ankle evertors/invertors.  Patient demonstrates weakness throughout the left lower extremity which I would grade as 4 -/5 throughout.  NEUROLOGICAL:  2/4 patellar, and achilles reflexes bilaterally.  Negative Babinski's bilaterally.  No ankle clonus bilaterally. Sensation to light touch is intact in the bilateral L4, L5, and S1 dermatomes.  VASCULAR:  2/4 dorsalis pedis pulses bilaterally.  Bilateral lower extremities are warm.  There is no pitting edema of the bilateral lower extremities.  ABDOMINAL:  Soft, non-distended, non-tender throughout all quadrants.  No pulsatile mass palpated in the left lower quadrant.  LYMPH NODES:   No palpable or tender inguinal lymph nodes.      RESULTS: I reviewed a CT abdomen and pelvis from [facility] dated December 12, 2020.  This shows postoperative changes of an L4-S1 anterior/posterior fusion.        Again, thank you for allowing me to participate in the care of your patient.        Sincerely,        Shelbie Rojas PA-C

## 2021-09-03 ENCOUNTER — HOSPITAL ENCOUNTER (OUTPATIENT)
Dept: PHYSICAL THERAPY | Facility: REHABILITATION | Age: 71
End: 2021-09-03
Attending: PHYSICIAN ASSISTANT
Payer: COMMERCIAL

## 2021-09-03 DIAGNOSIS — M54.16 LUMBAR RADICULOPATHY: ICD-10-CM

## 2021-09-03 DIAGNOSIS — Z98.1 HISTORY OF LUMBAR FUSION: ICD-10-CM

## 2021-09-03 PROCEDURE — 97140 MANUAL THERAPY 1/> REGIONS: CPT | Mod: GP

## 2021-09-03 PROCEDURE — 97162 PT EVAL MOD COMPLEX 30 MIN: CPT | Mod: GP

## 2021-09-03 PROCEDURE — 97110 THERAPEUTIC EXERCISES: CPT | Mod: GP

## 2021-09-03 NOTE — PROGRESS NOTES
[facility]          OUTPATIENT PHYSICAL THERAPY ORTHOPEDIC EVALUATION  PLAN OF TREATMENT FOR OUTPATIENT REHABILITATION  (COMPLETE FOR INITIAL CLAIMS ONLY)  Patient's Last Name, First Name, M.I.  YOB: 1950  Chasity Gaspar       Provider s Name:  [facility]   Medical Record No.  [id number]   Start of Care Date:  09/03/21   Onset Date:      Type:     _X__PT   ___OT   ___SLP Medical Diagnosis:  M54.16 (ICD-10-CM) - Lumbar radiculopathy     PT Diagnosis:  Radicular low back pain, decreased activity tolerance, generalized weakness, balance and gait impairment   Visits from SOC:  1      _________________________________________________________________________________  Plan of Treatment/Functional Goals:  ADL retraining, balance training, joint mobilization, manual therapy, neuromuscular re-education, ROM, strengthening, stretching, transfer training, bed mobility training, gait training     Cryotherapy, Electrical stimulation, Hot packs, TENS, Traction, Ultrasound     Goals  Goal Identifier: HEP  Goal Description: Patient will demonstrate independence with home exercise program to facilitate symptom management and improvement in function.   Target Date: 10/01/21    Goal Identifier: Sleeping  Goal Description: Patient will report improved sleeping ability without an increase in pain through the night to return to prior level of function.   Target Date: 10/29/21    Goal Identifier: Standing  Goal Description: Patient will improve standing tolerance to at least 30 minutes to return to participation in house work and self-care activities.   Target Date: 10/29/21    Goal Identifier: Activity tolerance  Goal Description: Patient will demonstrate at least 15 minutes of continuous exercise to facilitate return to prior level of function and improve health.   Target Date: 10/29/21                                                Therapy Frequency:  1  time/week  Predicted Duration of Therapy Intervention:  10 weeks    Scott Parmer, PT                 I CERTIFY THE NEED FOR THESE SERVICES FURNISHED UNDER        THIS PLAN OF TREATMENT AND WHILE UNDER MY CARE     (Physician co-signature of this document indicates review and certification of the therapy plan).                       Certification Date From:  09/03/21   Certification Date To:  11/05/21    Referring Provider:  Shelbie Rojas PA-C    Initial Assessment        See Epic Evaluation Start of Care Date: 09/03/21 09/03/21 0800   General Information   Type of Visit Initial OP Ortho PT Evaluation   Start of Care Date 09/03/21   Referring Physician Shelbie Rojas PA-C   Patient/Family Goals Statement Walk longer, decreased pain   Orders Evaluate and Treat   Date of Order 08/31/21   Certification Required? Yes   Medical Diagnosis M54.16 (ICD-10-CM) - Lumbar radiculopathy   Surgical/Medical history reviewed Yes       Present Yes   Language Other  (Jessica)   Body Part(s)   Body Part(s) Lumbar Spine/SI   Presentation and Etiology   Pertinent history of current problem (include personal factors and/or comorbidities that impact the POC) Patient has been having radiating low back pain which goes down to the left ankle. Pain started about 3-4 weeks ago. Onset of pain was of idiopathic nature. Patient rates the pain currently 7-8/10. Patient is in a wheelchair and is dependent with transfers with help from family. Patient spends most of the day lying down on his bed, and is only up in his wheelchair for meals and leaving the home. Patient also has difficulty sleeping. Patient is able to walk very short distances. Patient also reports they own both a cane and walker at home; when they do walk, they will use a walker.    Pain rating (0-10 point scale) Best (/10);Worst (/10)   Best (/10) 4-5/10   Worst (/10) 7-8/10   Pain quality E. Shooting;C. Aching;B. Dull   Frequency of pain/symptoms A.  Constant   Pain/symptoms are: Worse during the night   Pain/symptoms exacerbated by B. Walking;A. Sitting;C. Lifting;G. Certain positions;I. Bending   Pain/symptoms eased by C. Rest;I. OTC medication(s);E. Changing positions   Progression of symptoms since onset: Worsened   Prior Level of Function   Prior Level of Function-Mobility Able to ambulate and complete ADLs   Current Level of Function   Current Community Support Family/friend caregiver   Patient role/employment history F. Retired   Current equipment-Gait/Locomotion Front wheeled walker;Standard cane   Fall Risk Screen   Fall screen completed by PT   Have you fallen 2 or more times in the past year? No   Have you fallen and had an injury in the past year? No   Is patient a fall risk? Yes   Fall screen comments   (30s Sit to Stand: 5 repetitions)   Abuse Screen (yes response referral indicated)   Feels Unsafe at Home or Work/School no   Feels Threatened by Someone no   Does Anyone Try to Keep You From Having Contact with Others or Doing Things Outside Your Home? no   Physical Signs of Abuse Present no   System Outcome Measures   Outcome Measures Low Back Pain (see Oswestry and Radha)  (82%)   Lumbar Spine/SI Objective Findings   Observation Very slow moving, in lots of pain   Posture Sitting posture: Forward shoulders, thoracic kyphosis,    Gait/Locomotion Mod assist at gait belt when walking, decreased stance time on left, shuffling gait pattern, wide base of support for balance.   Balance/Proprioception (Single Leg Stance) Unable to test objectively; subjectively significantly unsteady with transfers and gait   Flexion ROM Severely limited with pain   Extension ROM Severely limited with pain   Lumbar ROM Comment Globally limited due to pain   Pelvic Screen ASIS level in supine   Hip Screen Hip ROM WFL bilaterally, noted guarding with passive movement on L   Hip Flexion (L2) Strength R: 5/5; L: 3/5   Hip Adduction Strength R: 5/5; L: 3/5   Knee Flexion  Strength R: 5/5; L: 3/5   Knee Extension (L3) Strength R: 5/5; L: 3/5   Ankle Dorsiflexion (L4) Strength R: 5/5; L: 3/5   Ankle Plantar Flexion (S1) Strength R: 5/5; L: 3/5   Hamstring Flexibility 90/90 Position - R: 0 degrees; L: -15 degrees   SLR (+) at 25 degrees L; (-) R   Slump Test (+) L   Segmental Mobility Limited joint play throughout lumbar spine   Sensation Testing Intact L lower extremity   Palpation Increased tenderness and tightness noted to the L QL, L lumbar paraspinals, L gluteals with radiating symptoms   Planned Therapy Interventions   Planned Therapy Interventions ADL retraining;balance training;joint mobilization;manual therapy;neuromuscular re-education;ROM;strengthening;stretching;transfer training;bed mobility training;gait training   Planned Modality Interventions   Planned Modality Interventions Cryotherapy;Electrical stimulation;Hot packs;TENS;Traction;Ultrasound   Clinical Impression   Criteria for Skilled Therapeutic Interventions Met yes, treatment indicated   PT Diagnosis Radicular low back pain, decreased activity tolerance, generalized weakness, balance and gait impairment   Clinical Presentation Evolving/Changing   Clinical Presentation Rationale Back pain with Hx of L hemiparesis following CVA, hard of hearing, patient has been completely sedentary for >1 month   Clinical Decision Making (Complexity) Moderate complexity   Therapy Frequency 1 time/week   Predicted Duration of Therapy Intervention (days/wks) 10 weeks   Risk & Benefits of therapy have been explained Yes   Patient, Family & other staff in agreement with plan of care Yes   Clinical Impression Comments Patient is a 71 year old male presenting to physical therapy following recent onset of radicular left sided low back pain which is signficantly limiting function. Patient also has a history of CVA with L hemiparesis contributing to decreased function and likely this low back pain episode. Patient is currently very  sedentary and spends most of the day lying down due to pain. Patient presents with the following impairments: radicular low back pain, decreased activity tolerance, generalized weakness (L>R), balance and gait impairments, and an inability to complete dressing, bathing, cooking, cleaning, and other activities without assistance. Patient is also hard of hearing and uses family to communicate. Patient's prognosis to decrease pain and improve function is good due to patient's willingness to participate in physical therapy. Patient would benefit from physical therapy services to address noted impairments and improve function.    Education Assessment   Preferred Learning Style Demonstration;Pictures/video;Listening;Reading   Barriers to Learning Language;Hearing   ORTHO GOALS   PT Ortho Eval Goals 1;2;3;4   Ortho Goal 1   Goal Identifier HEP   Goal Description Patient will demonstrate independence with home exercise program to facilitate symptom management and improvement in function.    Target Date 10/01/21   Ortho Goal 2   Goal Identifier Sleeping   Goal Description Patient will report improved sleeping ability without an increase in pain through the night to return to prior level of function.    Target Date 10/29/21   Ortho Goal 3   Goal Identifier Standing   Goal Description Patient will improve standing tolerance to at least 30 minutes to return to participation in house work and self-care activities.    Target Date 10/29/21   Ortho Goal 4   Goal Identifier Activity tolerance   Goal Description Patient will demonstrate at least 15 minutes of continuous exercise to facilitate return to prior level of function and improve health.    Target Date 10/29/21   Total Evaluation Time   PT Eval, Moderate Complexity Minutes (58305) 30   Therapy Certification   Certification date from 09/03/21   Certification date to 11/05/21   Medical Diagnosis M54.16 (ICD-10-CM) - Lumbar radiculopathy     Scott Parmer, TAVO, DPT

## 2021-09-04 ENCOUNTER — APPOINTMENT (OUTPATIENT)
Dept: RADIOLOGY | Facility: HOSPITAL | Age: 71
End: 2021-09-04
Attending: STUDENT IN AN ORGANIZED HEALTH CARE EDUCATION/TRAINING PROGRAM
Payer: COMMERCIAL

## 2021-09-04 ENCOUNTER — HOSPITAL ENCOUNTER (EMERGENCY)
Facility: HOSPITAL | Age: 71
Discharge: HOME | End: 2021-09-04
Attending: STUDENT IN AN ORGANIZED HEALTH CARE EDUCATION/TRAINING PROGRAM | Admitting: STUDENT IN AN ORGANIZED HEALTH CARE EDUCATION/TRAINING PROGRAM
Payer: COMMERCIAL

## 2021-09-04 ENCOUNTER — APPOINTMENT (OUTPATIENT)
Dept: CT IMAGING | Facility: HOSPITAL | Age: 71
End: 2021-09-04
Attending: STUDENT IN AN ORGANIZED HEALTH CARE EDUCATION/TRAINING PROGRAM
Payer: COMMERCIAL

## 2021-09-04 VITALS
BODY MASS INDEX: 22.67 KG/M2 | WEIGHT: 120 LBS | DIASTOLIC BLOOD PRESSURE: 94 MMHG | RESPIRATION RATE: 12 BRPM | SYSTOLIC BLOOD PRESSURE: 157 MMHG | HEART RATE: 87 BPM | OXYGEN SATURATION: 96 % | TEMPERATURE: 97.7 F

## 2021-09-04 DIAGNOSIS — M79.662 PAIN OF LEFT LOWER LEG: ICD-10-CM

## 2021-09-04 DIAGNOSIS — M54.10 RADICULAR SYNDROME OF LEFT LEG: ICD-10-CM

## 2021-09-04 PROCEDURE — 72131 CT LUMBAR SPINE W/O DYE: CPT

## 2021-09-04 PROCEDURE — 250N000013 HC RX MED GY IP 250 OP 250 PS 637: Performed by: STUDENT IN AN ORGANIZED HEALTH CARE EDUCATION/TRAINING PROGRAM

## 2021-09-04 PROCEDURE — 73590 X-RAY EXAM OF LOWER LEG: CPT | Mod: LT

## 2021-09-04 PROCEDURE — 250N000012 HC RX MED GY IP 250 OP 636 PS 637: Performed by: STUDENT IN AN ORGANIZED HEALTH CARE EDUCATION/TRAINING PROGRAM

## 2021-09-04 PROCEDURE — 99283 EMERGENCY DEPT VISIT LOW MDM: CPT

## 2021-09-04 RX ORDER — GABAPENTIN 300 MG/1
300 CAPSULE ORAL ONCE
Status: COMPLETED | OUTPATIENT
Start: 2021-09-04 | End: 2021-09-04

## 2021-09-04 RX ORDER — PREDNISONE 20 MG/1
40 TABLET ORAL DAILY
Qty: 10 TABLET | Refills: 0 | Status: SHIPPED | OUTPATIENT
Start: 2021-09-04 | End: 2021-09-09

## 2021-09-04 RX ADMIN — GABAPENTIN 300 MG: 300 CAPSULE ORAL at 18:47

## 2021-09-04 RX ADMIN — PREDNISONE 50 MG: 10 TABLET ORAL at 18:46

## 2021-09-04 NOTE — ED TRIAGE NOTES
Pt is with his nephew. Pt has had left shin(not calf) pain for the lat 3 weeks. Pt is kept up at night  With the pain.

## 2021-09-04 NOTE — ED PROVIDER NOTES
EMERGENCY DEPARTMENT ENCOUNTER       ED Course & Medical Decision Making     6:03 PM I met with the patient to gather history and to perform my initial exam. We discussed plans for the ED course, including diagnostic testing and treatment. PPE worn: n95 mask, goggles, gloves.     Final Impression  71 year old male presents for evaluation of 3 weeks of left lower leg pain, describes it as being in the lower portion of his left calf, worse with Lanter flexion against resistance or ambulation/bearing weight.  Denies any known injuries, strains, denies falls or trauma.  Does have a history of significant lumbar spinal surgery and documented history of radiculopathy per prior medical records.  Was few weeks ago and placed on Medrol Dosepak which she states helped with things to some degree.  Denies bladder or bowel incontinence.  Endorses some mild paresthesias throughout the lateral portion of his left lower extremity, ongoing for weeks.  CT of the lumbar spine shows chronic L4-S1 360 degree fusion, as well as multilevel spondylosis and some scattered central canal stenosis with neuroforaminal stenoses, including severe L5-S1 neuroforaminal stenosis which is likely contributing to his symptoms.  Pain improving after dose of prednisone.  Patient is prescribed gabapentin already.  Discussed that we will put him on another short steroid burst, though should follow-up with a spine specialist which is the spine clinic, will make referral and provide patient with the phone number for the clinic where they may potentially look at spine injections or other rehab/therapy.  Return precautions discussed.  Will discharge home with family and prescription for prednisone.    Prior to making a final disposition on this patient the results of patient's tests and other diagnostic studies were discussed with the patient. All questions were answered. Patient expressed understanding of the plan and was amenable to it.    Medications    predniSONE (DELTASONE) tablet 50 mg (50 mg Oral Given 9/4/21 1846)   gabapentin (NEURONTIN) capsule 300 mg (300 mg Oral Given 9/4/21 1847)       Final Impression     1. Pain of left lower leg        Chief Complaint     Chief Complaint   Patient presents with     Leg Pain       Pt is with his nephew. Pt has had left shin(not calf) pain for the lat 3 weeks. Pt is kept up at night  With the pain.      HPI     HPI limited: language barrier & patient hard of hearing  Use of : Yes (In-person, family); Language: Jessica    Chasity Gaspar is a 71 year old male with a pertinent past medical history of lumbar canal stenosis and radiculopathy who presents for evaluation of left leg pain.     Patient reports left lower leg pain which has been ongoing for the past 3 weeks (started ~8/14/21). His pain is primarily within the musculature lateral to his left shin but radiates up his leg and into his lower back. His pain is worse with plantar flexion. States he is unable to ambulate secondary to his pain. Reports he had a massage on 9/3 to try and relieve his pain. No recent injury or muscle strain. Patient denies foot pain, incontinence, or changes to bowel or bladder habits.     I, Dillon Vazquez am serving as a scribe to document services personally performed by Dr. Eron Valero MD, based on my observation and the provider's statements to me. I, Dr. Eron Valero MD attest that Dillon Vazquez is acting in a scribe capacity, has observed my performance of the services and has documented them in accordance with my direction.    Past Medical History     Past Medical History:   Diagnosis Date     Cervicalgia      Depression      Dyslipidemia      Dysthymia      Gastritis      GERD (gastroesophageal reflux disease)      Hearing loss      Hiatal hernia      Hypertriglyceridemia      Insomnia      Lacunar stroke (H)      Lumbar canal stenosis      Lumbar radiculopathy      Myalgia and myositis      Past Surgical History:    Procedure Laterality Date     C APPENDECTOMY      Description: Appendectomy;  Recorded: 07/12/2013;     IR CEREBRAL ANGIOGRAM  4/27/2017     IR LUMBAR TRANSFORAMINAL EPIDURAL STRD INJ  7/2/2012     PICC  4/28/2017          GA ARTHRODESIS ANT INTERBODY MIN DISCECTOMY,LUMBAR      Description: Lumbar Vertebral Fusion;  Recorded: 07/16/2013;  Comments: 3/17/11 spinal decompression and fusion L4-5, L5-S1 for spinal stenosis, DDD, spondylolysis; Dr. [facility]     GA ESOPHAGOGASTRODUODENOSCOPY TRANSORAL DIAGNOSTIC      Description: Esophagogastroduodenoscopy;  Proc Date: 04/02/2012;  Comments: biosies:   reactive gastropathy with chronic superimposed nonspecific chronic inflammation (likely secondary to ASA, NSAIDS, EtOH or other irritants), NEG for h. pylori; small hiatal hernia     GA INJECT ANES/STEROID FORAMEN LUMBAR/SACRAL W IMG GUIDE ,1 LEVEL      Description: Nerve Block Transforaminal Epidural Lumbar L3 - L4;  Recorded: 07/10/2012;  Comments: 7/2/2012 for severe low back pain, spinal stenosis and radiculopathy     THROMBECTOMY  04/24/2017    Rt CELIA     US ASPIRATION OR INJECTION MAJOR JOINT  10/23/2019     No family history on file.   Social History     Tobacco Use     Smoking status: Never Smoker     Smokeless tobacco: Never Used   Substance Use Topics     Alcohol use: Never     Drug use: Never       Relevant past medical, surgical, family and social history as documented above, has been reviewed and discussed with patient. No changes or additions, unless otherwise noted in the HPI.    Current Medications     acetaminophen 500 mg coapsule  allopurinol (ZYLOPRIM) 300 MG tablet  allopurinoL (ZYLOPRIM) 300 MG tablet  allopurinoL (ZYLOPRIM) 300 MG tablet  atorvastatin (LIPITOR) 40 MG tablet  atorvastatin (LIPITOR) 40 MG tablet  atorvastatin (LIPITOR) 40 MG tablet  clobetasol (TEMOVATE) 0.05 % ointment  clopidogrel (PLAVIX) 75 MG tablet  clopidogreL (PLAVIX) 75 mg tablet  clopidogreL (PLAVIX) 75 mg  tablet  diclofenac (VOLTAREN) 75 MG EC tablet  DULoxetine (CYMBALTA) 60 MG capsule  DULoxetine (CYMBALTA) 60 MG capsule  dutasteride (AVODART) 0.5 MG capsule  ergocalciferol (ERGOCALCIFEROL) 1,250 mcg (50,000 unit) capsule  ergocalciferol (ERGOCALCIFEROL) 1.25 MG (84119 UT) capsule  gabapentin (NEURONTIN) 300 MG capsule  gabapentin (NEURONTIN) 300 MG capsule  gabapentin (NEURONTIN) 300 MG capsule  hydrOXYzine pamoate (VISTARIL) 50 MG capsule  magnesium oxide (MAG-OX) 400 mg (241.3 mg magnesium) tablet  magnesium oxide (MAG-OX) 400 MG tablet  meclizine (ANTIVERT) 25 mg tablet  melatonin 3 mg Tab tablet  methocarbamol (ROBAXIN) 500 MG tablet  methylPREDNISolone (MEDROL DOSEPAK) 4 MG tablet therapy pack  metoprolol tartrate (LOPRESSOR) 25 MG tablet  metoprolol tartrate (LOPRESSOR) 25 MG tablet  metoprolol tartrate (LOPRESSOR) 25 MG tablet  mirtazapine (REMERON) 15 MG tablet  omeprazole (PRILOSEC) 20 MG capsule  omeprazole (PRILOSEC) 20 MG capsule  omeprazole (PRILOSEC) 20 MG DR capsule  polyethylene glycol (MIRALAX) 17 GM/Dose powder  polyethylene glycol (MIRALAX) 17 gram packet  tamsulosin (FLOMAX) 0.4 MG capsule        Allergies     Allergies   Allergen Reactions     Aspirin Hives     Oxycodone Itching     Vicodin [Hydrocodone-Acetaminophen] Unknown       Review of Systems     Constitutional: Denies fever, chills  HENT: Denies sore throat  Respiratory: Denies cough or shortness of breath    Cardiovascular: Denies chest pain, palpitations  GI: Denies constipation or stool incontinence  : Denies difficulty with urination such as urinary retention or urinary incontinence  Musculoskeletal: Endorses low back pain and left leg pain. Denies foot pain.  Skin: Denies rashes    Remainder of systems reviewed, unless noted in HPI all others negative.    Physical Exam     BP (!) 187/93   Pulse 100   Temp 97.7  F (36.5  C) (Oral)   Resp 12   Wt 54.4 kg (120 lb)   SpO2 95%   BMI 22.67 kg/m    Constitutional: Awake, alert,  in no acute distress  Head: Normocephalic, atraumatic.  ENT: Mucous membranes moist.   Eyes: PERRL, EOMI, Conjunctiva normal  Respiratory: Respirations even, unlabored, in no acute respiratory distress.  Cardiovascular: Regular rate and rhythm. +2 radial pulses, equal bilaterally. No pitting edema.   GI: Abdomen soft, non-tender .   Musculoskeletal: Endorses tenderness to palpation in the musculature of the left lower calf, worse with plantarflexion against resistance.  Some mild pain with straight leg raise on the left.  Grossly normal sensation throughout the left lower extremity.  No deformity, warmth, or erythema throughout the left lower extremity.  No midline back pain.  Negative MSK exam is otherwise unremarkable.  Integument: Warm, dry. No rashes or bruising on his left lower extremity.  Neurologic: Alert & oriented x 3. Grossly normal motor and sensory function.  Hard of hearing, though hearing improved greatly when he placed his hearing aids in his ears.  Psychiatric: Normal mood and affect.    Labs & Imaging     Results for orders placed or performed during the hospital encounter of 09/04/21   XR Tibia & Fibula Left 2 Views    Impression    IMPRESSION: Normal tibia and fibula. No fracture.       EKG     None     Eron Valero MD  09/05/21 7296

## 2021-09-07 ENCOUNTER — HOSPITAL ENCOUNTER (OUTPATIENT)
Dept: PHYSICAL THERAPY | Facility: REHABILITATION | Age: 71
End: 2021-09-07
Payer: COMMERCIAL

## 2021-09-07 DIAGNOSIS — Z98.1 HISTORY OF LUMBAR FUSION: ICD-10-CM

## 2021-09-07 DIAGNOSIS — M54.16 LUMBAR RADICULOPATHY: Primary | ICD-10-CM

## 2021-09-07 DIAGNOSIS — M54.10 RADICULAR SYNDROME OF LEFT LEG: ICD-10-CM

## 2021-09-07 PROCEDURE — 97140 MANUAL THERAPY 1/> REGIONS: CPT | Mod: GP

## 2021-09-07 PROCEDURE — 97110 THERAPEUTIC EXERCISES: CPT | Mod: GP

## 2021-09-16 ENCOUNTER — OFFICE VISIT (OUTPATIENT)
Dept: FAMILY MEDICINE | Facility: CLINIC | Age: 71
End: 2021-09-16
Payer: COMMERCIAL

## 2021-09-16 ENCOUNTER — TELEPHONE (OUTPATIENT)
Dept: FAMILY MEDICINE | Facility: CLINIC | Age: 71
End: 2021-09-16

## 2021-09-16 VITALS
HEIGHT: 61 IN | TEMPERATURE: 98.4 F | BODY MASS INDEX: 23.07 KG/M2 | RESPIRATION RATE: 18 BRPM | DIASTOLIC BLOOD PRESSURE: 84 MMHG | WEIGHT: 122.19 LBS | SYSTOLIC BLOOD PRESSURE: 132 MMHG | HEART RATE: 84 BPM

## 2021-09-16 DIAGNOSIS — M48.061 SPINAL STENOSIS, LUMBAR REGION, WITHOUT NEUROGENIC CLAUDICATION: Primary | ICD-10-CM

## 2021-09-16 DIAGNOSIS — I63.02 CEREBROVASCULAR ACCIDENT (CVA) DUE TO THROMBOSIS OF BASILAR ARTERY (H): ICD-10-CM

## 2021-09-16 DIAGNOSIS — G89.29 OTHER CHRONIC PAIN: ICD-10-CM

## 2021-09-16 DIAGNOSIS — E55.9 VITAMIN D DEFICIENCY: ICD-10-CM

## 2021-09-16 DIAGNOSIS — K59.09 OTHER CONSTIPATION: ICD-10-CM

## 2021-09-16 DIAGNOSIS — M54.16 RADICULOPATHY, LUMBAR REGION: ICD-10-CM

## 2021-09-16 PROCEDURE — 99215 OFFICE O/P EST HI 40 MIN: CPT | Performed by: FAMILY MEDICINE

## 2021-09-16 RX ORDER — GABAPENTIN 300 MG/1
CAPSULE ORAL
Qty: 270 CAPSULE | Refills: 4 | Status: SHIPPED | OUTPATIENT
Start: 2021-09-16 | End: 2022-08-19

## 2021-09-16 RX ORDER — ERGOCALCIFEROL 1.25 MG/1
CAPSULE ORAL
Qty: 12 CAPSULE | Refills: 3 | Status: SHIPPED | OUTPATIENT
Start: 2021-09-16 | End: 2022-11-25

## 2021-09-16 RX ORDER — LIDOCAINE 50 MG/G
OINTMENT TOPICAL 2 TIMES DAILY
Qty: 240 G | Refills: 3 | Status: SHIPPED | OUTPATIENT
Start: 2021-09-16 | End: 2022-11-25 | Stop reason: ALTCHOICE

## 2021-09-16 RX ORDER — ERGOCALCIFEROL 1.25 MG/1
CAPSULE ORAL
Qty: 30 CAPSULE | Refills: 11 | Status: SHIPPED | OUTPATIENT
Start: 2021-09-16 | End: 2022-11-25

## 2021-09-16 ASSESSMENT — MIFFLIN-ST. JEOR: SCORE: 1182.62

## 2021-09-16 NOTE — PROGRESS NOTES
ASSESSMENT and plan   1. Chronic pain  Patient had chronic pain secondary to his lumbar radiculopathy and spinal stenosis.  In the past has been treated with Vicodin.  That treatment left him severely constipated and obtunded at times.  Will not restart the Vicodin.  Daughter agrees with the plan.  I will reassess him in 2 weeks.    2. Spinal stenosis, lumbar region, without neurogenic claudication    2 recent ED visits in the last 3-1/2 weeks.  Gabapentin dose has been rectified he is taking 600 mg 3 times a day after being seen at the spine clinic he developed constipation after that.  Prescribing topical lidocaine and Voltaren to be applied to the back and left hip and right hip areas where his pain is worse.  CT results discussed with the patient and his daughter today with the help of .  He is not interested in any therapeutic injections at this time.  He does not want to proceed for surgery ever again according to the patient  - ergocalciferol (ERGOCALCIFEROL) 1.25 MG (99866 UT) capsule; ergocalciferol (vitamin D2) 1,250 mcg (50,000 unit) capsule  Dispense: 30 capsule; Refill: 11  - diclofenac (VOLTAREN) 1 % topical gel; Apply 2 g topically 4 times daily  Dispense: 350 g; Refill: 3  - lidocaine (XYLOCAINE) 5 % external ointment; Apply topically 2 times daily  Dispense: 240 g; Refill: 3    3. Radiculopathy, lumbar region  Referred pain radiating down the left leg is most immediate concern again they can use Voltaren.  We will continue with the gabapentin at the current dose in the past he has been intolerant of narcotics I did not refill his steroids at this time since he had a course of this medication  - diclofenac (VOLTAREN) 1 % topical gel; Apply 2 g topically 4 times daily  Dispense: 350 g; Refill: 3  - lidocaine (XYLOCAINE) 5 % external ointment; Apply topically 2 times daily  Dispense: 240 g; Refill: 3    4. 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  No new weakness noted    5. Other constipation    They are using the stool softener.  Currently bowel movements are regular    There are no Patient Instructions on file for this visit.    No orders of the defined types were placed in this encounter.    Medications Discontinued During This Encounter   Medication Reason     gabapentin (NEURONTIN) 300 MG capsule Reorder     ergocalciferol (ERGOCALCIFEROL) 1.25 MG (41750 UT) capsule Reorder       Return in about 2 weeks (around 9/30/2021) for neuralgia.    CHIEF COMPLAINT:  chief complaint    HISTORY OF PRESENT ILLNESS:  Chasity is a 71 year old male who is here for an ED follow-up.  He is accompanied by his daughter.  They brought her medications in for review.  Patient has been seen at the ED twice within the last month for back pain and leg pain.  At his most recent evaluation he was given a dose of prednisone again.  He reports that the pain is still very bad especially over his left lower back and left hip.  In between his ED visit he did not follow-up with his spine clinic provider.  His daughter is stating that the prednisone does work but the Robaxin does not seem to do much.  Currently he is not constipated after seeing Shelbie lopez at the spine clinic today using gabapentin 600 mg 3 times daily.  Patient is having difficulty at night because of the pain.  They were also not told about the CT results from the last ED visit.  Patient states that he had difficulty walking he uses a walker but still has discomfort as mentioned above.  They were unable to  a Lidoderm patch as prescribed in the ED because of cost was prohibitive and it was not covered by insurance    REVIEW OF SYSTEMS:   Musculoskeletal positive for low back pain, left hip pain.  Left leg pain.  Neuro positive for numbness and tingling in the left lower hip left lower back and left leg.  12 point review of  All other systems are  "negative.    PFSH:    Social history reviewed    TOBACCO USE:  History   Smoking Status     Never Smoker   Smokeless Tobacco     Never Used       VITALS:  Vitals:    09/16/21 0932   BP: 132/84   Pulse: 84   Resp: 18   Temp: 98.4  F (36.9  C)   TempSrc: Oral   Weight: 55.4 kg (122 lb 3 oz)   Height: 1.549 m (5' 1\")     Wt Readings from Last 3 Encounters:   09/16/21 55.4 kg (122 lb 3 oz)   09/04/21 54.4 kg (120 lb)   08/31/21 54.4 kg (120 lb)       PHYSICAL EXAM:  Quiet interactive elderly male sitting in exam room in no acute distress  HEENT neck supple mucous membranes moist  CVS regular rate and rhythm no murmurs no rubs no gallops  Respiratory system clear to auscultation equal breath sounds no wheeze no crackles  Musculoskeletal system tenderness elicited when I palpate the L4 segment of his spine.  He is tender over the left SI joint he is tender over the left hip he is tender over the left quadriceps muscle  Neuro cranial nerves II to XII intact gait is normal with the help of a walker normal power of his left quadriceps is 4-5 straight leg raise test on the left side is positive  Psych oriented x3 not agitated grooming is adequate  Abdomen soft there is no focal tenderness bowel sounds are present no hepatosplenomegaly      DATA REVIEWED:  Additional History from Old Records Summarized (2): 0  Decision to Obtain Records (1): 0  Radiology Tests Summarized or Ordered (1): 00  Labs Reviewed or Ordered (1): 0  Medicine Test Summarized or Ordered (1): 0  Independent Review of EKG or X-RAY(2 each): 0    The visit lasted a total of 40 minutes we discussed the previous 2 ER visits we discussed the diagnostic studies including the CT scan in detail.  We went over the spine clinic visit side effects of current medications and neuro prescriptions including Robaxin, diclofenac and lidocaine    MEDICATIONS:  Current Outpatient Medications   Medication Sig Dispense Refill     diclofenac (VOLTAREN) 1 % topical gel Apply 2 g " topically 4 times daily 350 g 3     ergocalciferol (ERGOCALCIFEROL) 1.25 MG (27383 UT) capsule ergocalciferol (vitamin D2) 1,250 mcg (50,000 unit) capsule 30 capsule 11     gabapentin (NEURONTIN) 300 MG capsule [GABAPENTIN (NEURONTIN) 300 MG CAPSULE] TAKE 1 CAPSULE(300 MG) BY MOUTH THREE TIMES DAILY 270 capsule 4     lidocaine (XYLOCAINE) 5 % external ointment Apply topically 2 times daily 240 g 3     acetaminophen 500 mg coapsule [ACETAMINOPHEN 500 MG COAPSULE] Take 2 tablets by mouth 3 (three) times a day as needed for fever or pain.       allopurinol (ZYLOPRIM) 300 MG tablet allopurinol 300 mg tablet 30 tablet 11     allopurinoL (ZYLOPRIM) 300 MG tablet [ALLOPURINOL (ZYLOPRIM) 300 MG TABLET] TAKE 1 TABLET(300 MG) BY MOUTH DAILY 90 tablet 3     allopurinoL (ZYLOPRIM) 300 MG tablet [ALLOPURINOL (ZYLOPRIM) 300 MG TABLET] Take 1 tablet (300 mg total) by mouth daily. 90 tablet 3     atorvastatin (LIPITOR) 40 MG tablet atorvastatin 40 mg tablet 30 tablet 11     atorvastatin (LIPITOR) 40 MG tablet [ATORVASTATIN (LIPITOR) 40 MG TABLET] TAKE 1 TABLET(40 MG) BY MOUTH AT BEDTIME 90 tablet 3     atorvastatin (LIPITOR) 40 MG tablet [ATORVASTATIN (LIPITOR) 40 MG TABLET] TAKE 1 TABLET(40 MG) BY MOUTH AT BEDTIME 90 tablet 3     clobetasol (TEMOVATE) 0.05 % ointment [CLOBETASOL (TEMOVATE) 0.05 % OINTMENT] Apply 1 application topically 2 (two) times a day as needed.       clopidogrel (PLAVIX) 75 MG tablet Take 1 tablet by mouth daily       clopidogreL (PLAVIX) 75 mg tablet [CLOPIDOGREL (PLAVIX) 75 MG TABLET] Take 1 tablet (75 mg total) by mouth daily. 90 tablet 3     clopidogreL (PLAVIX) 75 mg tablet [CLOPIDOGREL (PLAVIX) 75 MG TABLET] TAKE 1 TABLET(75 MG) BY MOUTH DAILY 90 tablet 1     diclofenac (VOLTAREN) 75 MG EC tablet [DICLOFENAC (VOLTAREN) 75 MG EC TABLET] TAKE 1 TABLET(75 MG) BY MOUTH TWICE DAILY 60 tablet 0     DULoxetine (CYMBALTA) 60 MG capsule Take 1 capsule by mouth daily       DULoxetine (CYMBALTA) 60 MG capsule  [DULOXETINE (CYMBALTA) 60 MG CAPSULE] TAKE 1 CAPSULE(60 MG) BY MOUTH DAILY 90 capsule 3     dutasteride (AVODART) 0.5 MG capsule Take 1 capsule (0.5 mg) by mouth daily 30 capsule 4     ergocalciferol (ERGOCALCIFEROL) 1.25 MG (48277 UT) capsule [ERGOCALCIFEROL (ERGOCALCIFEROL) 1,250 MCG (50,000 UNIT) CAPSULE] TAKE 1 CAPSULE BY MOUTH WEEKLY 12 capsule 3     gabapentin (NEURONTIN) 300 MG capsule Take 1 cap at bedtime, increase by 1 cap every three days, max dose 2 caps tid 180 capsule 0     gabapentin (NEURONTIN) 300 MG capsule        hydrOXYzine pamoate (VISTARIL) 50 MG capsule [HYDROXYZINE PAMOATE (VISTARIL) 50 MG CAPSULE] TAKE 1 CAPSULE BY MOUTH AT BEDTIME 30 capsule 10     magnesium oxide (MAG-OX) 400 mg (241.3 mg magnesium) tablet [MAGNESIUM OXIDE (MAG-OX) 400 MG (241.3 MG MAGNESIUM) TABLET] TAKE 1 TABLET(400 MG) BY MOUTH DAILY 100 tablet 0     magnesium oxide (MAG-OX) 400 MG tablet Take 1 tablet (400 mg) by mouth daily 30 tablet 11     meclizine (ANTIVERT) 25 mg tablet [MECLIZINE (ANTIVERT) 25 MG TABLET] Take 1 tablet (25 mg total) by mouth 3 (three) times a day as needed. 30 tablet 0     melatonin 3 mg Tab tablet [MELATONIN 3 MG TAB TABLET] TAKE 1 TABLET(3 MG) BY MOUTH AT BEDTIME AS NEEDED 100 tablet 2     methocarbamol (ROBAXIN) 500 MG tablet Take 1 tablet (500 mg) by mouth 3 times daily 21 tablet 0     methylPREDNISolone (MEDROL DOSEPAK) 4 MG tablet therapy pack Follow Package Directions 21 tablet 0     metoprolol tartrate (LOPRESSOR) 25 MG tablet Take 1 tablet (25 mg) by mouth 2 times daily 60 tablet 11     metoprolol tartrate (LOPRESSOR) 25 MG tablet [METOPROLOL TARTRATE (LOPRESSOR) 25 MG TABLET] TAKE 1 TABLET BY MOUTH TWICE DAILY 180 tablet 3     metoprolol tartrate (LOPRESSOR) 25 MG tablet [METOPROLOL TARTRATE (LOPRESSOR) 25 MG TABLET] TAKE 1 TABLET BY MOUTH TWICE DAILY 180 tablet 3     mirtazapine (REMERON) 15 MG tablet [MIRTAZAPINE (REMERON) 15 MG TABLET] TAKE 1 TABLET(15 MG) BY MOUTH AT BEDTIME 90  tablet 3     omeprazole (PRILOSEC) 20 MG capsule [OMEPRAZOLE (PRILOSEC) 20 MG CAPSULE] TAKE 2 CAPSULES(40 MG) BY MOUTH DAILY 180 capsule 3     omeprazole (PRILOSEC) 20 MG capsule [OMEPRAZOLE (PRILOSEC) 20 MG CAPSULE] TAKE 2 CAPSULES(40 MG) BY MOUTH DAILY 180 capsule 1     omeprazole (PRILOSEC) 20 MG DR capsule        polyethylene glycol (MIRALAX) 17 GM/Dose powder Take 17 g by mouth daily 510 g 0     polyethylene glycol (MIRALAX) 17 gram packet [POLYETHYLENE GLYCOL (MIRALAX) 17 GRAM PACKET] USE 1 PACKET MIXED AS DIRECTED PER  each 3     tamsulosin (FLOMAX) 0.4 MG capsule Take 1 capsule by mouth daily

## 2021-09-16 NOTE — TELEPHONE ENCOUNTER
Reason for Call:  Medication     Do you use a [facility]?  No    Name of the pharmacy and phone number for the current request:  [id number] - [place], [place] - [address] AT [address] [facility] [address]  [phone]    Name of the medication requested:   ergocalciferol (ERGOCALCIFEROL) 1.25 MG (01094 UT) capsule 30 capsule 11 9/16/2021  No   Sig: ergocalciferol (vitamin D2) 1,250 mcg (50,000 unit) capsule       Other request: Pharmacy called stating no directions listed for med. Please resend script with directions.    Can we leave a detailed message on this number? YES    Phone number pharmacy can be reached at: Other phone number:  [phone]    Best Time: anytime    Call taken on 9/16/2021 at 10:51 AM by Isidro Jacobs

## 2021-09-21 ENCOUNTER — HOSPITAL ENCOUNTER (OUTPATIENT)
Dept: PHYSICAL THERAPY | Facility: REHABILITATION | Age: 71
End: 2021-09-21
Payer: COMMERCIAL

## 2021-09-21 DIAGNOSIS — Z98.1 HISTORY OF LUMBAR FUSION: ICD-10-CM

## 2021-09-21 DIAGNOSIS — M54.10 RADICULAR SYNDROME OF LEFT LEG: ICD-10-CM

## 2021-09-21 DIAGNOSIS — M54.16 LUMBAR RADICULOPATHY: Primary | ICD-10-CM

## 2021-09-21 PROCEDURE — 97110 THERAPEUTIC EXERCISES: CPT | Mod: GP

## 2021-09-21 PROCEDURE — 97116 GAIT TRAINING THERAPY: CPT | Mod: GP

## 2021-09-21 PROCEDURE — 97140 MANUAL THERAPY 1/> REGIONS: CPT | Mod: GP

## 2021-09-28 ENCOUNTER — HOSPITAL ENCOUNTER (OUTPATIENT)
Dept: PHYSICAL THERAPY | Facility: REHABILITATION | Age: 71
End: 2021-09-28
Payer: COMMERCIAL

## 2021-09-28 DIAGNOSIS — M54.16 LUMBAR RADICULOPATHY: Primary | ICD-10-CM

## 2021-09-28 DIAGNOSIS — M54.10 RADICULAR SYNDROME OF LEFT LEG: ICD-10-CM

## 2021-09-28 DIAGNOSIS — Z98.1 HISTORY OF LUMBAR FUSION: ICD-10-CM

## 2021-09-28 PROCEDURE — 97140 MANUAL THERAPY 1/> REGIONS: CPT | Mod: GP

## 2021-09-28 PROCEDURE — 97116 GAIT TRAINING THERAPY: CPT | Mod: GP

## 2021-09-28 PROCEDURE — 97110 THERAPEUTIC EXERCISES: CPT | Mod: GP

## 2021-09-30 ENCOUNTER — OFFICE VISIT (OUTPATIENT)
Dept: FAMILY MEDICINE | Facility: CLINIC | Age: 71
End: 2021-09-30
Payer: COMMERCIAL

## 2021-09-30 VITALS
HEART RATE: 78 BPM | DIASTOLIC BLOOD PRESSURE: 78 MMHG | RESPIRATION RATE: 16 BRPM | TEMPERATURE: 98 F | BODY MASS INDEX: 24.03 KG/M2 | HEIGHT: 60 IN | OXYGEN SATURATION: 97 % | WEIGHT: 122.38 LBS | SYSTOLIC BLOOD PRESSURE: 130 MMHG

## 2021-09-30 DIAGNOSIS — F33.1 MAJOR DEPRESSIVE DISORDER, RECURRENT EPISODE, MODERATE (H): ICD-10-CM

## 2021-09-30 DIAGNOSIS — G89.29 CHRONIC BILATERAL LOW BACK PAIN WITH LEFT-SIDED SCIATICA: ICD-10-CM

## 2021-09-30 DIAGNOSIS — M54.42 CHRONIC BILATERAL LOW BACK PAIN WITH LEFT-SIDED SCIATICA: ICD-10-CM

## 2021-09-30 DIAGNOSIS — K21.9 GASTROESOPHAGEAL REFLUX DISEASE WITHOUT ESOPHAGITIS: ICD-10-CM

## 2021-09-30 DIAGNOSIS — Z23 NEED FOR IMMUNIZATION AGAINST INFLUENZA: Primary | ICD-10-CM

## 2021-09-30 PROCEDURE — 90471 IMMUNIZATION ADMIN: CPT | Performed by: FAMILY MEDICINE

## 2021-09-30 PROCEDURE — 90662 IIV NO PRSV INCREASED AG IM: CPT | Performed by: FAMILY MEDICINE

## 2021-09-30 PROCEDURE — 99213 OFFICE O/P EST LOW 20 MIN: CPT | Mod: 25 | Performed by: FAMILY MEDICINE

## 2021-09-30 ASSESSMENT — MIFFLIN-ST. JEOR: SCORE: 1145.71

## 2021-09-30 NOTE — PROGRESS NOTES
"ASSESSMENT and plan   1. Need for immunization against influenza  Agrees for vaccination today.  - INFLUENZA, QUAD, HD, PF, 65+ (FLUZONE HD)    2. Moderate Recurrent Major Depression  According to his daughter he is interacting with family.  He is not depressed.    3. Gastroesophageal reflux disease without esophagitis    No abdominal pain noted no burning noted appetite is normal.    4. Chronic bilateral low back pain with left-sided sciatica    With the topical agents his back pain is improved he is able to ambulate without assistance.      There are no Patient Instructions on file for this visit.    Orders Placed This Encounter   Procedures     INFLUENZA, QUAD, HD, PF, 65+ (FLUZONE HD)     There are no discontinued medications.    No follow-ups on file.    CHIEF COMPLAINT:  chief complaint    HISTORY OF PRESENT ILLNESS:  Chasity is a 71 year old male here for a follow-up with his daughter today.  He is following up for depression back pain abdominal discomfort he also wants a flu shot today daughter states that the medication that she applies to his back is really help and is not complaining of pain he can sleep and can bend his spine without difficulty is able to walk outside now.  He thinks because his pain is improved he is talking more with the family he is not complaining of abdominal pain and is eating normally.    REVIEW OF SYSTEMS:     Going to the patient 10 point review  All other systems are negative.    PFSH:    Social history reviewed    TOBACCO USE:  History   Smoking Status     Never Smoker   Smokeless Tobacco     Never Used       VITALS:  Vitals:    09/30/21 0937   BP: 130/78   BP Location: Left arm   Patient Position: Sitting   Cuff Size: Adult Regular   Pulse: 78   Resp: 16   Temp: 98  F (36.7  C)   TempSrc: Oral   SpO2: 97%   Weight: 55.5 kg (122 lb 6 oz)   Height: 1.489 m (4' 10.62\")     Wt Readings from Last 3 Encounters:   09/30/21 55.5 kg (122 lb 6 oz)   09/16/21 55.4 kg (122 lb 3 oz) "   09/04/21 54.4 kg (120 lb)       PHYSICAL EXAM:  Interactive elderly male sitting in exam room no acute distress  Abdomen soft there is no focal tenderness bowel sounds are present no organomegaly  Musculoskeletal system no tenderness elicited when I palpate his lumbar spine he has no tenderness over his SI joints bilaterally.  CVS regular rate and rhythm no murmurs rubs gallops appreciated  Psych appears comfortable at rest not agitated    DATA REVIEWED:  Additional History from Old Records Summarized (2): 0  Decision to Obtain Records (1): 0  Radiology Tests Summarized or Ordered (1): 0  Labs Reviewed or Ordered (1): 0  Medicine Test Summarized or Ordered (1): 0  Independent Review of EKG or X-RAY(2 each): 0    The visit lasted a total of 30 minutes .    MEDICATIONS:  Current Outpatient Medications   Medication Sig Dispense Refill     acetaminophen 500 mg coapsule [ACETAMINOPHEN 500 MG COAPSULE] Take 2 tablets by mouth 3 (three) times a day as needed for fever or pain.       allopurinol (ZYLOPRIM) 300 MG tablet allopurinol 300 mg tablet 30 tablet 11     allopurinoL (ZYLOPRIM) 300 MG tablet [ALLOPURINOL (ZYLOPRIM) 300 MG TABLET] TAKE 1 TABLET(300 MG) BY MOUTH DAILY 90 tablet 3     allopurinoL (ZYLOPRIM) 300 MG tablet [ALLOPURINOL (ZYLOPRIM) 300 MG TABLET] Take 1 tablet (300 mg total) by mouth daily. 90 tablet 3     atorvastatin (LIPITOR) 40 MG tablet atorvastatin 40 mg tablet 30 tablet 11     atorvastatin (LIPITOR) 40 MG tablet [ATORVASTATIN (LIPITOR) 40 MG TABLET] TAKE 1 TABLET(40 MG) BY MOUTH AT BEDTIME 90 tablet 3     atorvastatin (LIPITOR) 40 MG tablet [ATORVASTATIN (LIPITOR) 40 MG TABLET] TAKE 1 TABLET(40 MG) BY MOUTH AT BEDTIME 90 tablet 3     clobetasol (TEMOVATE) 0.05 % ointment [CLOBETASOL (TEMOVATE) 0.05 % OINTMENT] Apply 1 application topically 2 (two) times a day as needed.       clopidogrel (PLAVIX) 75 MG tablet Take 1 tablet by mouth daily       clopidogreL (PLAVIX) 75 mg tablet [CLOPIDOGREL (PLAVIX)  75 MG TABLET] Take 1 tablet (75 mg total) by mouth daily. 90 tablet 3     clopidogreL (PLAVIX) 75 mg tablet [CLOPIDOGREL (PLAVIX) 75 MG TABLET] TAKE 1 TABLET(75 MG) BY MOUTH DAILY 90 tablet 1     diclofenac (VOLTAREN) 1 % topical gel Apply 2 g topically 4 times daily 350 g 3     diclofenac (VOLTAREN) 75 MG EC tablet [DICLOFENAC (VOLTAREN) 75 MG EC TABLET] TAKE 1 TABLET(75 MG) BY MOUTH TWICE DAILY 60 tablet 0     DULoxetine (CYMBALTA) 60 MG capsule Take 1 capsule by mouth daily       DULoxetine (CYMBALTA) 60 MG capsule [DULOXETINE (CYMBALTA) 60 MG CAPSULE] TAKE 1 CAPSULE(60 MG) BY MOUTH DAILY 90 capsule 3     dutasteride (AVODART) 0.5 MG capsule Take 1 capsule (0.5 mg) by mouth daily 30 capsule 4     ergocalciferol (ERGOCALCIFEROL) 1.25 MG (49793 UT) capsule ergocalciferol (vitamin D2) 1,250 mcg (50,000 unit) capsule 30 capsule 11     ergocalciferol (ERGOCALCIFEROL) 1.25 MG (47021 UT) capsule [ERGOCALCIFEROL (ERGOCALCIFEROL) 1,250 MCG (50,000 UNIT) CAPSULE] TAKE 1 CAPSULE BY MOUTH WEEKLY 12 capsule 3     gabapentin (NEURONTIN) 300 MG capsule [GABAPENTIN (NEURONTIN) 300 MG CAPSULE] TAKE 1 CAPSULE(300 MG) BY MOUTH THREE TIMES DAILY 270 capsule 4     gabapentin (NEURONTIN) 300 MG capsule Take 1 cap at bedtime, increase by 1 cap every three days, max dose 2 caps tid 180 capsule 0     gabapentin (NEURONTIN) 300 MG capsule        hydrOXYzine pamoate (VISTARIL) 50 MG capsule [HYDROXYZINE PAMOATE (VISTARIL) 50 MG CAPSULE] TAKE 1 CAPSULE BY MOUTH AT BEDTIME 30 capsule 10     lidocaine (XYLOCAINE) 5 % external ointment Apply topically 2 times daily 240 g 3     magnesium oxide (MAG-OX) 400 mg (241.3 mg magnesium) tablet [MAGNESIUM OXIDE (MAG-OX) 400 MG (241.3 MG MAGNESIUM) TABLET] TAKE 1 TABLET(400 MG) BY MOUTH DAILY 100 tablet 0     magnesium oxide (MAG-OX) 400 MG tablet Take 1 tablet (400 mg) by mouth daily 30 tablet 11     meclizine (ANTIVERT) 25 mg tablet [MECLIZINE (ANTIVERT) 25 MG TABLET] Take 1 tablet (25 mg total) by  mouth 3 (three) times a day as needed. 30 tablet 0     melatonin 3 mg Tab tablet [MELATONIN 3 MG TAB TABLET] TAKE 1 TABLET(3 MG) BY MOUTH AT BEDTIME AS NEEDED 100 tablet 2     methocarbamol (ROBAXIN) 500 MG tablet Take 1 tablet (500 mg) by mouth 3 times daily 21 tablet 0     methylPREDNISolone (MEDROL DOSEPAK) 4 MG tablet therapy pack Follow Package Directions 21 tablet 0     metoprolol tartrate (LOPRESSOR) 25 MG tablet Take 1 tablet (25 mg) by mouth 2 times daily 60 tablet 11     metoprolol tartrate (LOPRESSOR) 25 MG tablet [METOPROLOL TARTRATE (LOPRESSOR) 25 MG TABLET] TAKE 1 TABLET BY MOUTH TWICE DAILY 180 tablet 3     metoprolol tartrate (LOPRESSOR) 25 MG tablet [METOPROLOL TARTRATE (LOPRESSOR) 25 MG TABLET] TAKE 1 TABLET BY MOUTH TWICE DAILY 180 tablet 3     mirtazapine (REMERON) 15 MG tablet [MIRTAZAPINE (REMERON) 15 MG TABLET] TAKE 1 TABLET(15 MG) BY MOUTH AT BEDTIME 90 tablet 3     omeprazole (PRILOSEC) 20 MG capsule [OMEPRAZOLE (PRILOSEC) 20 MG CAPSULE] TAKE 2 CAPSULES(40 MG) BY MOUTH DAILY 180 capsule 3     omeprazole (PRILOSEC) 20 MG capsule [OMEPRAZOLE (PRILOSEC) 20 MG CAPSULE] TAKE 2 CAPSULES(40 MG) BY MOUTH DAILY 180 capsule 1     omeprazole (PRILOSEC) 20 MG DR capsule        polyethylene glycol (MIRALAX) 17 GM/Dose powder Take 17 g by mouth daily 510 g 0     polyethylene glycol (MIRALAX) 17 gram packet [POLYETHYLENE GLYCOL (MIRALAX) 17 GRAM PACKET] USE 1 PACKET MIXED AS DIRECTED PER  each 3     tamsulosin (FLOMAX) 0.4 MG capsule Take 1 capsule by mouth daily          No

## 2021-10-05 ENCOUNTER — HOSPITAL ENCOUNTER (OUTPATIENT)
Dept: PHYSICAL THERAPY | Facility: REHABILITATION | Age: 71
End: 2021-10-05
Payer: COMMERCIAL

## 2021-10-05 DIAGNOSIS — M54.16 LUMBAR RADICULOPATHY: Primary | ICD-10-CM

## 2021-10-05 DIAGNOSIS — M54.10 RADICULAR SYNDROME OF LEFT LEG: ICD-10-CM

## 2021-10-05 DIAGNOSIS — Z98.1 HISTORY OF LUMBAR FUSION: ICD-10-CM

## 2021-10-05 PROCEDURE — 97110 THERAPEUTIC EXERCISES: CPT | Mod: GP

## 2021-10-05 PROCEDURE — 97116 GAIT TRAINING THERAPY: CPT | Mod: GP

## 2021-10-05 PROCEDURE — 97140 MANUAL THERAPY 1/> REGIONS: CPT | Mod: GP

## 2021-10-19 ENCOUNTER — HOSPITAL ENCOUNTER (OUTPATIENT)
Dept: PHYSICAL THERAPY | Facility: REHABILITATION | Age: 71
End: 2021-10-19
Payer: COMMERCIAL

## 2021-10-19 DIAGNOSIS — M54.16 LUMBAR RADICULOPATHY: Primary | ICD-10-CM

## 2021-10-19 DIAGNOSIS — Z98.1 HISTORY OF LUMBAR FUSION: ICD-10-CM

## 2021-10-19 DIAGNOSIS — M54.10 RADICULAR SYNDROME OF LEFT LEG: ICD-10-CM

## 2021-10-19 PROCEDURE — 97140 MANUAL THERAPY 1/> REGIONS: CPT | Mod: GP

## 2021-10-19 PROCEDURE — 97112 NEUROMUSCULAR REEDUCATION: CPT | Mod: GP

## 2021-10-19 PROCEDURE — 97110 THERAPEUTIC EXERCISES: CPT | Mod: GP

## 2021-10-19 NOTE — PROGRESS NOTES
Beginning/End Dates of Progress Note Reporting Period:  9/3/2021 to 10/19/2021    Progress Toward Goals:   Progress this reporting period: Patient progressing towards meeting all goals, see goal progress on daily note below.     Client Self (Subjective) Report for Progress Note Reporting Period: Patient has been doing well overall with less back pain. Patient has been using the cane instead of the walker recently which has been going well. Patient continues to have radiating back pain, however it is not there at all times anymore. Patient states the pain comes and goes throughout the day.     Objective Measurements:   Objective Measure: Pain  Details: 2/10, can get worse throughout the day  Objective Measure: Gait  Details: Walking with the cane; 2 point step through, slight decrease stance on L         10/19/21 0800   Signing Clinician's Name / Credentials   Signing clinician's name / credentials Scott Parmer, PT, DPT   Session Number   Session Number 6   Additional Session Number Out of 10   Progress Note/Recertification   Progress Note Due Date 10/22/21   Progress Note Completed Date 10/19/21   Recertification Due Date 11/05/21   Adult Goals   PT Ortho Eval Goals 1;2;3;4   Ortho Goal 1   Goal Identifier HEP   Goal Description Patient will demonstrate independence with home exercise program to facilitate symptom management and improvement in function.    Goal Progress MET    Target Date 10/01/21   Ortho Goal 2   Goal Identifier Sleeping   Goal Description Patient will report improved sleeping ability without an increase in pain through the night to return to prior level of function.    Goal Progress MET    Target Date 10/29/21   Ortho Goal 3   Goal Identifier Standing   Goal Description Patient will improve standing tolerance to at least 30 minutes to return to participation in house work and self-care activities.    Goal Progress Progressing   Target Date 10/29/21   Ortho Goal 4   Goal Identifier Activity  tolerance   Goal Description Patient will demonstrate at least 15 minutes of continuous exercise to facilitate return to prior level of function and improve health.    Goal Progress MET - 10/19/2021   Target Date 10/29/21       Present Yes   Language Other    Comments iPad interpreting services used this date; daughter was primary communicator   Subjective Report   Subjective Report Patient has been doing well overall with less back pain. Patient has been using the cane instead of the walker recently which has been going well. Patient continues to have radiating back pain, however it is not there at all times anymore. Patient states the pain comes and goes throughout the day.    Objective Measures   Objective Measures Objective Measure 1;Objective Measure 2   Objective Measure 1   Objective Measure Pain   Details 2/10, can get worse throughout the day   Objective Measure 2   Objective Measure Gait   Details Walking with the cane; 2 point step through, slight decrease stance on L    Treatment Interventions   Interventions Therapeutic Procedure/Exercise;Manual Therapy;Neuromuscular Re-education   Therapeutic Procedure/exercise   Therapeutic Procedures: strength, endurance, ROM, flexibillity minutes (51079) 25   Skilled Intervention Supine bridges, cueing to not complete full range of motion and to engage core, 2x5; s/l clamshells, 2x10 B, supine BKFO, x10 L, orange band; Sit<>stand, 2x10, chair height. 2x10; Standing calf raises, x10; Step ups forward and lateral, 2x10 each L    Patient Response Tolerated well overall, improved strength today, noted pain improvement throughout session as well   Neuromuscular Re-education   Neuromuscular re-ed of mvmt, balance, coord, kinesthetic sense, posture, proprioception minutes (47432) 8   Skilled Intervention SLS 3x45s holds B   Patient Response more unsteadiness on L    Manual Therapy   Manual Therapy: Mobilization, MFR, MLD, friction massage  minutes (21513) 20   Skilled Intervention Patient positioned in prone: STM to L lumbar paraspinals, L QL, and L glutes for pain modulation and to increase tissue extensibility; Sacro-lumbar joint distraction offloading, grade II-III sustained; Pt positioned in supine: Passive sciatic n tensioners, 2x12 L    Patient Response Tolerated well   Education   Learner Patient;Family   Readiness Acceptance   Method Demonstration;Explanation;Booklet/handout   Response Needs Reinforcement   Education Comments Patient is hard of hearing and uses family members to help communicate during therapy sessions   Plan   Homework Continue being up out of bed throughout the day and increasing walking   Home program Added SLS to HEP   Plan for next session Continue manual therapy for symptom relief, progress HEP as able, continue NuStep, progress L sided strength and gross strengthening   Comments   Comments 8:30-9:23. Patient continues to present to physical therapy with signs and symptoms of left sided radicular low back pain. Patient also has a history of L sided hemiparesis following CVA. Patient continues to improve in terms of low back pain and mobility since initial evaluation. Patient uses FWW for all ambulation and would like to try to progress to a cane with more therapy. Patient continues to present with radiating low back pain, generalized weakness L>R, decreased activity tolerance, and difficulty performing upright ADLs. Patient would continue to benefit from skilled physical therapy to address noted deficits and improve function. Patient demonstrates functional improvement from initial evaluation and has moved from being in a wheelchair to using a cane and is having less back pain.    Total Session Time   Timed Code Treatment Minutes 53   Total Treatment Time (sum of timed and untimed services) 53   AMBULATORY CLINICS ONLY-MEDICAL AND TREATMENT DIAGNOSIS   Medical Diagnosis M54.16 (ICD-10-CM) - Lumbar radiculopathy   PT  Diagnosis Radicular low back pain, decreased activity tolerance, generalized weakness, balance and gait impairment     Scott Parmer, PT, DPT

## 2021-10-26 ENCOUNTER — HOSPITAL ENCOUNTER (OUTPATIENT)
Dept: PHYSICAL THERAPY | Facility: REHABILITATION | Age: 71
End: 2021-10-26
Payer: COMMERCIAL

## 2021-10-26 DIAGNOSIS — M54.16 LUMBAR RADICULOPATHY: Primary | ICD-10-CM

## 2021-10-26 DIAGNOSIS — M54.10 RADICULAR SYNDROME OF LEFT LEG: ICD-10-CM

## 2021-10-26 DIAGNOSIS — Z98.1 HISTORY OF LUMBAR FUSION: ICD-10-CM

## 2021-10-26 PROCEDURE — 97140 MANUAL THERAPY 1/> REGIONS: CPT | Mod: GP

## 2021-10-26 PROCEDURE — 97110 THERAPEUTIC EXERCISES: CPT | Mod: GP

## 2021-10-26 PROCEDURE — 97112 NEUROMUSCULAR REEDUCATION: CPT | Mod: GP

## 2021-10-26 NOTE — PROGRESS NOTES
Outpatient Physical Therapy Discharge Note     Patient: Chasity Nakelly  : 1950    Beginning/End Dates of Reporting Period:  9/3/2021 to 10/26/2021    Referring Provider: Shelbie Rojas PA-C    Therapy Diagnosis: radiating low back pain, generalized weakness, postural impairment, balance impairment      Client Self Report: Patient has been doing well overall over the last week. Patient no longer gets as much radiating pain into the L LE. Has been compliant with HEP and feels that it is helping.     Objective Measurements:  Objective Measure: Pain  Details: 4-5/10   Objective Measure: Gait  Details: Walking with a single point cane; 2 step through pattern with cane; more upright posture          Goals:  Goal Identifier HEP   Goal Description Patient will demonstrate independence with home exercise program to facilitate symptom management and improvement in function.    Target Date 10/01/21   Date Met      Progress (detail required for progress note): MET      Goal Identifier Sleeping   Goal Description Patient will report improved sleeping ability without an increase in pain through the night to return to prior level of function.    Target Date 10/29/21   Date Met      Progress (detail required for progress note): MET      Goal Identifier Standing   Goal Description Patient will improve standing tolerance to at least 30 minutes to return to participation in house work and self-care activities.    Target Date 10/29/21   Date Met      Progress (detail required for progress note): MET      Goal Identifier Activity tolerance   Goal Description Patient will demonstrate at least 15 minutes of continuous exercise to facilitate return to prior level of function and improve health.    Target Date 10/29/21   Date Met      Progress (detail required for progress note): MET - 10/19/2021         Plan:  Discharge from therapy.    Discharge:    Reason for Discharge: Patient has met all goals.  Patient chooses to discontinue  therapy.    Discharge Plan: Patient to continue home program.       10/26/21 0800   Signing Clinician's Name / Credentials   Signing clinician's name / credentials Scott Parmer, PT, DPT   Session Number   Session Number 7   Additional Session Number Out of 10   Progress Note/Recertification   Progress Note Due Date 10/22/21   Progress Note Completed Date 10/19/21   Recertification Due Date 11/05/21   Adult Goals   PT Ortho Eval Goals 1;2;3;4   Ortho Goal 1   Goal Identifier HEP   Goal Description Patient will demonstrate independence with home exercise program to facilitate symptom management and improvement in function.    Goal Progress MET    Target Date 10/01/21   Ortho Goal 2   Goal Identifier Sleeping   Goal Description Patient will report improved sleeping ability without an increase in pain through the night to return to prior level of function.    Goal Progress MET    Target Date 10/29/21   Ortho Goal 3   Goal Identifier Standing   Goal Description Patient will improve standing tolerance to at least 30 minutes to return to participation in house work and self-care activities.    Goal Progress MET    Target Date 10/29/21   Ortho Goal 4   Goal Identifier Activity tolerance   Goal Description Patient will demonstrate at least 15 minutes of continuous exercise to facilitate return to prior level of function and improve health.    Goal Progress MET - 10/19/2021   Target Date 10/29/21       Present Yes   Language Other  (Jessica)    Comments iPad interpreting services used this date; daughter was primary communicator   Subjective Report   Subjective Report Patient has been doing well overall over the last week. Patient no longer gets as much radiating pain into the L LE. Has been compliant with HEP and feels that it is helping.    Objective Measures   Objective Measures Objective Measure 1   Objective Measure 1   Objective Measure Pain   Details 4-5/10    Objective Measure 2    Objective Measure Gait   Details Walking with a single point cane; 2 step through pattern with cane; more upright posture    Treatment Interventions   Interventions Therapeutic Procedure/Exercise;Manual Therapy;Neuromuscular Re-education   Therapeutic Procedure/exercise   Therapeutic Procedures: strength, endurance, ROM, flexibillity minutes (00548) 25   Skilled Intervention Prone on elbows, x1 min; Prone on hands, x1 min; s/l clamshells, x10 B, orange band; supine bridges, x10, orange band; sit<>stands, x10, orange band around knees; standing hip abduction, x10 B; standing hip ext, x10 B; forward/lateral step ups, x10 each B   Patient Response Tolerated well overall, improved strength today, noted pain improvement throughout session as well   Neuromuscular Re-education   Neuromuscular re-ed of mvmt, balance, coord, kinesthetic sense, posture, proprioception minutes (37056) 15   Skilled Intervention Step-ups onto foam, 2x10 B; SLS on foam, 2x30s holds B; cone step overs with AD (cane), 3x8 B    Patient Response more unsteadiness on L    Manual Therapy   Manual Therapy: Mobilization, MFR, MLD, friction massage minutes (82378) 15   Skilled Intervention Patient positioned in prone: STM to L lumbar paraspinals, L QL, and L glutes for pain modulation and to increase tissue extensibility; Sacro-lumbar joint distraction offloading, grade II-III sustained; Pt positioned in supine: Passive sciatic n tensioners, 2x12 L    Patient Response Tolerated well   Education   Learner Patient;Family   Readiness Acceptance   Method Demonstration;Explanation;Booklet/handout   Response Needs Reinforcement   Education Comments Patient is hard of hearing and uses family members to help communicate during therapy sessions   Plan   Homework Continue being up out of bed throughout the day and increasing walking   Plan for next session d/c from therapy - patient met all goals and will continue home program    Comments   Comments 8:30-9:25.  Patient presented to physical therapy with improved back pain and function overall since the beginning of therapy. Patient began PT having been bed bound and in a wheelchair and is now ambulating with a single point cane without an increase in back symptoms. Patient demonstrates improved gait, balance, and functional strength. All goals have been met at this point and patient is appropriate to d/c from physical therapy services.    Total Session Time   Timed Code Treatment Minutes 55   Total Treatment Time (sum of timed and untimed services) 55   AMBULATORY CLINICS ONLY-MEDICAL AND TREATMENT DIAGNOSIS   Medical Diagnosis M54.16 (ICD-10-CM) - Lumbar radiculopathy   PT Diagnosis Radicular low back pain, decreased activity tolerance, generalized weakness, balance and gait impairment     Scott Parmer, PT, DPT

## 2021-12-17 ENCOUNTER — OFFICE VISIT (OUTPATIENT)
Dept: FAMILY MEDICINE | Facility: CLINIC | Age: 71
End: 2021-12-17
Payer: COMMERCIAL

## 2021-12-17 VITALS
TEMPERATURE: 97.9 F | HEART RATE: 77 BPM | HEIGHT: 60 IN | RESPIRATION RATE: 17 BRPM | DIASTOLIC BLOOD PRESSURE: 82 MMHG | WEIGHT: 126.4 LBS | SYSTOLIC BLOOD PRESSURE: 130 MMHG | OXYGEN SATURATION: 97 % | BODY MASS INDEX: 24.81 KG/M2

## 2021-12-17 DIAGNOSIS — E78.1 PURE HYPERGLYCERIDEMIA: ICD-10-CM

## 2021-12-17 DIAGNOSIS — M48.061 SPINAL STENOSIS, LUMBAR REGION, WITHOUT NEUROGENIC CLAUDICATION: ICD-10-CM

## 2021-12-17 DIAGNOSIS — K21.9 GASTROESOPHAGEAL REFLUX DISEASE WITHOUT ESOPHAGITIS: ICD-10-CM

## 2021-12-17 DIAGNOSIS — L30.9 DERMATITIS: ICD-10-CM

## 2021-12-17 DIAGNOSIS — G89.29 OTHER CHRONIC PAIN: ICD-10-CM

## 2021-12-17 DIAGNOSIS — I10 BENIGN ESSENTIAL HYPERTENSION: ICD-10-CM

## 2021-12-17 DIAGNOSIS — Z23 HIGH PRIORITY FOR 2019 NOVEL CORONAVIRUS VACCINATION: Primary | ICD-10-CM

## 2021-12-17 PROBLEM — K44.9 HIATAL HERNIA: Status: ACTIVE | Noted: 2021-12-17

## 2021-12-17 PROCEDURE — 99214 OFFICE O/P EST MOD 30 MIN: CPT | Performed by: FAMILY MEDICINE

## 2021-12-17 PROCEDURE — 0064A COVID-19,PF,MODERNA (18+ YRS BOOSTER .25ML): CPT | Performed by: FAMILY MEDICINE

## 2021-12-17 PROCEDURE — 91306 COVID-19,PF,MODERNA (18+ YRS BOOSTER .25ML): CPT | Performed by: FAMILY MEDICINE

## 2021-12-17 ASSESSMENT — PATIENT HEALTH QUESTIONNAIRE - PHQ9: SUM OF ALL RESPONSES TO PHQ QUESTIONS 1-9: 0

## 2021-12-17 ASSESSMENT — MIFFLIN-ST. JEOR: SCORE: 1154.1

## 2021-12-17 NOTE — PROGRESS NOTES
ASSESSMENT and plan  1. High priority for 2019 novel coronavirus vaccination  Agrees for immunization  - COVID-19,PF,MODERNA (18+ YRS BOOSTER .25ML)    2. Pure hyperglyceridemia    Recheck cholesterol in 6 months    3. Benign essential hypertension    Taking metoprolol blood pressure well controlled    4. Spinal stenosis, lumbar region, without neurogenic claudication    Currently complains of back pain however with the gabapentin duloxetine and physical therapy he feels his pain is decreased    5. Other chronic pain    Chronic pain decreased he is able to ambulate without a cane    6. Gastroesophageal reflux disease without esophagitis    Continue on omeprazole    7. Dermatitis    Area of itching and scratching below the neck has increased in size is only respond in the past to prednisone shots of refer back to dermatology  - Adult Dermatology Referral; Future      Patient Instructions   Avs, follow-up 4 months      Orders Placed This Encounter   Procedures     COVID-19,PF,MODERNA (18+ YRS BOOSTER .25ML)     Adult Dermatology Referral     There are no discontinued medications.    No follow-ups on file.    CHIEF COMPLAINT:  chief complaint    HISTORY OF PRESENT ILLNESS:  Chasity is a 71 year old male who is here again for a follow-up his daughter is present.  He reports that he has been feeling relatively well with the physical therapy has been able to do some of the exercises at home and his daughter reports that his back pain is decreased.  He is using his medications as specified has been able to sleep at night.  The area of the rash is increased behind his neck and he has been scratching it constantly his daughter states she is like him to but to go to dermatology because when he gave injection the rash cleared for 3 to 4 months.  Patient denies any abdominal pain but is still slightly constipated.    REVIEW OF SYSTEMS:     Musculoskeletal occasional lumbar back pain noted no radiating pain noted  Derm area of rash  "with itching present on the back of his shoulder near his neck  10 point review of  All other systems are negative.    PFSH:    Social history reviewed    TOBACCO USE:  History   Smoking Status     Never Smoker   Smokeless Tobacco     Never Used       VITALS:  Vitals:    12/17/21 1000   BP: 130/82   Pulse: 77   Resp: 17   Temp: 97.9  F (36.6  C)   TempSrc: Oral   SpO2: 97%   Weight: 57.3 kg (126 lb 6.4 oz)   Height: 1.473 m (4' 10\")     Wt Readings from Last 3 Encounters:   12/17/21 57.3 kg (126 lb 6.4 oz)   09/30/21 55.5 kg (122 lb 6 oz)   09/16/21 55.4 kg (122 lb 3 oz)       PHYSICAL EXAM:    Interactive male sitting comfortably in exam room no acute distress  CVS regular rate and rhythm no murmurs rubs gallops appreciated  Respiratory system clear to auscultation equal breath sounds no wheeze no crackles  Skin area of erythema present excoriation on his right upper back approximately 3 cm medial to the right scapula the area in question measures 4 x 3 cm  Musculoskeletal there is no tenderness elicited when I palpate the lumbar or thoracic spine straight leg raise test is negative  Neuro cranial 2-12 intact motor tone is normal lower extremities gait is antalgic  DATA REVIEWED:  Additional History from Old Records Summarized (2): 0  Decision to Obtain Records (1): 0  Radiology Tests Summarized or Ordered (1): 0  Labs Reviewed or Ordered (1): 0  Medicine Test Summarized or Ordered (1): 0  Independent Review of EKG or X-RAY(2 each): 0    The visit lasted a total of 30 minutes .    MEDICATIONS:  Current Outpatient Medications   Medication Sig Dispense Refill     acetaminophen 500 mg coapsule [ACETAMINOPHEN 500 MG COAPSULE] Take 2 tablets by mouth 3 (three) times a day as needed for fever or pain.       allopurinol (ZYLOPRIM) 300 MG tablet allopurinol 300 mg tablet 30 tablet 11     allopurinoL (ZYLOPRIM) 300 MG tablet [ALLOPURINOL (ZYLOPRIM) 300 MG TABLET] TAKE 1 TABLET(300 MG) BY MOUTH DAILY 90 tablet 3     " allopurinoL (ZYLOPRIM) 300 MG tablet [ALLOPURINOL (ZYLOPRIM) 300 MG TABLET] Take 1 tablet (300 mg total) by mouth daily. 90 tablet 3     atorvastatin (LIPITOR) 40 MG tablet atorvastatin 40 mg tablet 30 tablet 11     atorvastatin (LIPITOR) 40 MG tablet [ATORVASTATIN (LIPITOR) 40 MG TABLET] TAKE 1 TABLET(40 MG) BY MOUTH AT BEDTIME 90 tablet 3     atorvastatin (LIPITOR) 40 MG tablet [ATORVASTATIN (LIPITOR) 40 MG TABLET] TAKE 1 TABLET(40 MG) BY MOUTH AT BEDTIME 90 tablet 3     clobetasol (TEMOVATE) 0.05 % ointment [CLOBETASOL (TEMOVATE) 0.05 % OINTMENT] Apply 1 application topically 2 (two) times a day as needed.       clopidogrel (PLAVIX) 75 MG tablet Take 1 tablet by mouth daily       clopidogreL (PLAVIX) 75 mg tablet [CLOPIDOGREL (PLAVIX) 75 MG TABLET] Take 1 tablet (75 mg total) by mouth daily. 90 tablet 3     clopidogreL (PLAVIX) 75 mg tablet [CLOPIDOGREL (PLAVIX) 75 MG TABLET] TAKE 1 TABLET(75 MG) BY MOUTH DAILY 90 tablet 1     diclofenac (VOLTAREN) 1 % topical gel Apply 2 g topically 4 times daily 350 g 3     diclofenac (VOLTAREN) 75 MG EC tablet [DICLOFENAC (VOLTAREN) 75 MG EC TABLET] TAKE 1 TABLET(75 MG) BY MOUTH TWICE DAILY 60 tablet 0     DULoxetine (CYMBALTA) 60 MG capsule Take 1 capsule by mouth daily       DULoxetine (CYMBALTA) 60 MG capsule [DULOXETINE (CYMBALTA) 60 MG CAPSULE] TAKE 1 CAPSULE(60 MG) BY MOUTH DAILY 90 capsule 3     dutasteride (AVODART) 0.5 MG capsule Take 1 capsule (0.5 mg) by mouth daily 30 capsule 4     ergocalciferol (ERGOCALCIFEROL) 1.25 MG (56594 UT) capsule ergocalciferol (vitamin D2) 1,250 mcg (50,000 unit) capsule 30 capsule 11     ergocalciferol (ERGOCALCIFEROL) 1.25 MG (97808 UT) capsule [ERGOCALCIFEROL (ERGOCALCIFEROL) 1,250 MCG (50,000 UNIT) CAPSULE] TAKE 1 CAPSULE BY MOUTH WEEKLY 12 capsule 3     gabapentin (NEURONTIN) 300 MG capsule [GABAPENTIN (NEURONTIN) 300 MG CAPSULE] TAKE 1 CAPSULE(300 MG) BY MOUTH THREE TIMES DAILY 270 capsule 4     gabapentin (NEURONTIN) 300 MG  capsule Take 1 cap at bedtime, increase by 1 cap every three days, max dose 2 caps tid 180 capsule 0     gabapentin (NEURONTIN) 300 MG capsule        hydrOXYzine pamoate (VISTARIL) 50 MG capsule [HYDROXYZINE PAMOATE (VISTARIL) 50 MG CAPSULE] TAKE 1 CAPSULE BY MOUTH AT BEDTIME 30 capsule 10     lidocaine (XYLOCAINE) 5 % external ointment Apply topically 2 times daily 240 g 3     magnesium oxide (MAG-OX) 400 mg (241.3 mg magnesium) tablet [MAGNESIUM OXIDE (MAG-OX) 400 MG (241.3 MG MAGNESIUM) TABLET] TAKE 1 TABLET(400 MG) BY MOUTH DAILY 100 tablet 0     magnesium oxide (MAG-OX) 400 MG tablet Take 1 tablet (400 mg) by mouth daily 30 tablet 11     meclizine (ANTIVERT) 25 mg tablet [MECLIZINE (ANTIVERT) 25 MG TABLET] Take 1 tablet (25 mg total) by mouth 3 (three) times a day as needed. 30 tablet 0     melatonin 3 mg Tab tablet [MELATONIN 3 MG TAB TABLET] TAKE 1 TABLET(3 MG) BY MOUTH AT BEDTIME AS NEEDED 100 tablet 2     methocarbamol (ROBAXIN) 500 MG tablet Take 1 tablet (500 mg) by mouth 3 times daily 21 tablet 0     methylPREDNISolone (MEDROL DOSEPAK) 4 MG tablet therapy pack Follow Package Directions 21 tablet 0     metoprolol tartrate (LOPRESSOR) 25 MG tablet Take 1 tablet (25 mg) by mouth 2 times daily 60 tablet 11     metoprolol tartrate (LOPRESSOR) 25 MG tablet [METOPROLOL TARTRATE (LOPRESSOR) 25 MG TABLET] TAKE 1 TABLET BY MOUTH TWICE DAILY 180 tablet 3     metoprolol tartrate (LOPRESSOR) 25 MG tablet [METOPROLOL TARTRATE (LOPRESSOR) 25 MG TABLET] TAKE 1 TABLET BY MOUTH TWICE DAILY 180 tablet 3     mirtazapine (REMERON) 15 MG tablet [MIRTAZAPINE (REMERON) 15 MG TABLET] TAKE 1 TABLET(15 MG) BY MOUTH AT BEDTIME 90 tablet 3     omeprazole (PRILOSEC) 20 MG capsule [OMEPRAZOLE (PRILOSEC) 20 MG CAPSULE] TAKE 2 CAPSULES(40 MG) BY MOUTH DAILY 180 capsule 3     omeprazole (PRILOSEC) 20 MG capsule [OMEPRAZOLE (PRILOSEC) 20 MG CAPSULE] TAKE 2 CAPSULES(40 MG) BY MOUTH DAILY 180 capsule 1     omeprazole (PRILOSEC) 20 MG DR  capsule        polyethylene glycol (MIRALAX) 17 GM/Dose powder Take 17 g by mouth daily 510 g 0     polyethylene glycol (MIRALAX) 17 gram packet [POLYETHYLENE GLYCOL (MIRALAX) 17 GRAM PACKET] USE 1 PACKET MIXED AS DIRECTED PER  each 3     tamsulosin (FLOMAX) 0.4 MG capsule Take 1 capsule by mouth daily

## 2022-01-21 ENCOUNTER — PATIENT OUTREACH (OUTPATIENT)
Dept: GERIATRIC MEDICINE | Facility: CLINIC | Age: 72
End: 2022-01-21
Payer: COMMERCIAL

## 2022-01-25 ENCOUNTER — PATIENT OUTREACH (OUTPATIENT)
Dept: GERIATRIC MEDICINE | Facility: CLINIC | Age: 72
End: 2022-01-25
Payer: COMMERCIAL

## 2022-01-25 NOTE — PROGRESS NOTES
[facility] Care Coordination Contact    [facility] Home Visit Assessment     Home visit for Health Risk Assessment with Chasity Gaspar completed on January 25, 2022    Type of residence:: Private home - stairs  Current living arrangement:: I live in a private home     Assessment completed with:: Children    Current Care Plan  Member currently receiving the following home care services:     Member currently receiving the following community resources: PCA      Medication Review  Medication reconciliation completed in Epic: If no, please explain COVID-19 Restrictions-telephonic visit.  Medication set-up & administration: Family/informal caregiver sets up weekly.  Family caregiver administers medications.  Medication Risk Assessment Medication (1 or more, place referral to MTM): N/A: No risk factors identified  MTM Referral Placed: No: No risk factors idenified    Mental/Behavioral Health   Depression Screening:           Mental health DX:: Yes (F33.1)   Mental health DX how managed:: Medication    Falls Assessment:   Fallen 2 or more times in the past year?: No   Any fall with injury in the past year?: No    ADL/IADL Dependencies:   Dependent ADLs:: Ambulation-cane,Ambulation-walker,Bathing,Incontinence,Positioning,Transfers,Wheelchair-with assist,Toileting,Dressing,Eating,Grooming  Dependent IADLs:: Cleaning,Cooking,Incontinence,Transportation,Laundry,Shopping,Meal Preparation,Medication Management,Money Management    [facility] Plan sponsored benefits: Shared information re: Silver Sneakers/gym memberships, ASA, Calcium +D.    PCA Assessment completed at visit: Yes Annual PCA assessment indicated 44 units per day of PCA. This is the same as the previous assessment.     Elderly Waiver Eligibility: Yes, but member declines EW services; will not open to EW    Care Plan & Recommendations:     See [facility]CC for detailed assessment information.    Follow-Up Plan: Member informed of future contact, plan to f/u with  member with a 6 month telephone assessment.  Contact information shared with member and family, encouraged member to call with any questions or concerns at any time.    [place] care continuum providers: Please refer to Health Care Home on the Epic Problem List to view this patient's [facility] Care Plan Summary.    Savanna Landaverde RN, PHN  [facility]  [phone]

## 2022-02-01 ENCOUNTER — PATIENT OUTREACH (OUTPATIENT)
Dept: GERIATRIC MEDICINE | Facility: CLINIC | Age: 72
End: 2022-02-01
Payer: COMMERCIAL

## 2022-02-01 NOTE — PROGRESS NOTES
[facility] Care Coordination Contact    Received after visit chart from care coordinator.  Completed following tasks:  Mailed copy of care plan to client.    [facility]:  Emailed completed PCA assessment to [facility].  Faxed copy of PCA assessment to PCA Agency and mailed copy to member.  Faxed MD Communication to PCP.     Mailed: POC Sig page, PCA Sig page, and CEDRICK form to member with a stamped return envelope.    Mailed Medication Disposal Form ([facility] only).     Van Brenner  Care Management Specialist  [facility]  [phone]

## 2022-02-01 NOTE — LETTER
February 1, 2022    CHASITY TERRAZAS  [address] [place] [address]        Dear Chasity:    At [facility], we are dedicated to improving your health and well-being. Enclosed is the Comprehensive Care Plan that we developed with you on 1/25/22. Please review the Care Plan carefully.    As a reminder, some of the things we discussed at your visit include:    Your physical and mental health    Ways to reduce falls    Health care needs you may have    Don t forget to contact your care coordinator if you:    Have been hospitalized or plan to be hospitalized     Have had a fall     Have experienced a change in physical health    Are experiencing emotional problems     If you do not agree with your Care Plan, have questions about it, or have experienced a change in your needs, please call me at [phone]. If you are hearing impaired, please call the [place] [employer]ay at 658 or [phone] (lleohl-pf-mdjoep relay service).    Sincerely,        Savanna Landaverde RN  [phone]  [email or web address]    [facility][id number] [place] ([id number])     [id number] (11/18)

## 2022-02-07 ENCOUNTER — PATIENT OUTREACH (OUTPATIENT)
Dept: GERIATRIC MEDICINE | Facility: CLINIC | Age: 72
End: 2022-02-07
Payer: COMMERCIAL

## 2022-02-07 NOTE — PROGRESS NOTES
[facility] Care Coordination Contact    Phone call from Debora  reports that was contacted by daughter to inquire about questions regarding taxes but is unsure and requests care coordinator reach out to her.      Savanna Landaverde RN, PHN  [facility]  [phone]

## 2022-02-08 NOTE — PROGRESS NOTES
[facility] Care Coordination Contact    Left voice mail message for daughter to return call.  Received call that she has question.  Request that she call care coordinator if needs further assistance.      Savanna Landaverde RN, PHN  [facility]  [phone]

## 2022-02-23 NOTE — PROGRESS NOTES
[facility] Care Coordination Contact    Phone call from daughter, Sunita Castelan inquired about opening to Elderly Waiver program as was told by tax person that would allow member to receive many benefits.  Explained to her purpose of opening to Elderly waiver and process.  She expressed that she understood and didn't really know and thanked me for explaining.  She doesn't want to pursue Elderly Waiver as member has high hours of PCA hours as will have budget limit once is open to the Elderly waiver.      Savanna Landaverde RN, PHN  [facility]  [phone]

## 2022-03-04 ENCOUNTER — OFFICE VISIT (OUTPATIENT)
Dept: OTOLARYNGOLOGY | Facility: CLINIC | Age: 72
End: 2022-03-04
Payer: COMMERCIAL

## 2022-03-04 ENCOUNTER — OFFICE VISIT (OUTPATIENT)
Dept: AUDIOLOGY | Facility: CLINIC | Age: 72
End: 2022-03-04
Payer: COMMERCIAL

## 2022-03-04 DIAGNOSIS — H90.A31 MIXED CONDUCTIVE AND SENSORINEURAL HEARING LOSS OF RIGHT EAR WITH RESTRICTED HEARING OF LEFT EAR: Primary | ICD-10-CM

## 2022-03-04 DIAGNOSIS — H90.3 SENSORINEURAL HEARING LOSS (SNHL) OF BOTH EARS: Primary | ICD-10-CM

## 2022-03-04 PROCEDURE — 99207 PR NO CHARGE LOS: CPT | Performed by: AUDIOLOGIST

## 2022-03-04 PROCEDURE — 92567 TYMPANOMETRY: CPT | Mod: 52 | Performed by: AUDIOLOGIST

## 2022-03-04 PROCEDURE — 92557 COMPREHENSIVE HEARING TEST: CPT | Performed by: AUDIOLOGIST

## 2022-03-04 PROCEDURE — 99213 OFFICE O/P EST LOW 20 MIN: CPT | Performed by: OTOLARYNGOLOGY

## 2022-03-04 NOTE — LETTER
3/4/2022         RE: Chasity Gaspar  [address] [place] [address]        Dear Colleague,    Thank you for referring your patient, Chasity Gaspar, to the [facility]. Please see a copy of my visit note below.    HPI: This patient is a 70yo M who presents for follow-up of hearing. There has been a gradual loss of hearing over the past many years in both ears. He has been wearing hearing aids for years. At his last visit, he was having issues with the HA because of additional mixed component due to a small TM perf. He did not have his HA retuned at that time. Now isn't wearing it because the volume of it hurts him. Denies otalgia, otorrhea, vertigo, and other major medical issues.      Past medical history, surgical history, social history, family history, medications, and allergies have been reviewed with the patient and are documented above.     Review of Systems: a 10-system review was performed. Pertinent positives are noted in the HPI and on a separate scanned document in the chart.     PHYSICAL EXAMINATION:  GEN: no acute distress, normocephalic  EYES: extraocular movements are intact, pupils are equal and round. Sclera clear.   EARS: auricles are normally formed. The external auditory canals are clear with minimal to no cerumen. Tympanic membrane on the right is noted to have 10% perforation of the posterior superior quadrant that is dry and healthy. The left tympanic membrane is intact. Middle ears are clear.  NEURO: CN VII and XII symmetric. alert and oriented. No spontaneous nystagmus. Gait is normal.  PULM: breathing comfortably on room air, normal chest expansion with respiration  CARDS: no cyanosis or clubbing, normal carotid pulses     AUDIOGRAM today: same  AUDIOGRAM 2021: dead left ear, borderline severe/profound HL (?mixed component, masking dilemma). Large volume type B tymp right, type A tymp left  AUDIOGRAM 2018: dead left ear, severe to profound SNHL in the right. Type A tymps  bilaterally.     MEDICAL DECISION-MAKING: This patient is a 72yo M with severe/profound sensorineural hearing loss. It appears that he may have a slight additional mixed component in the right ear with a small TM perforation, likely from qtip trauma given the location and story. No surgical intervention is indicated at this time as the perf is dry and healthy. He should retune the HA to a comfortable, but usable, level. A HA tuning appt was made for him before he left.        Again, thank you for allowing me to participate in the care of your patient.        Sincerely,        Annetta Daigle MD

## 2022-03-04 NOTE — PROGRESS NOTES
AUDIOLOGY REPORT    SUMMARY: Audiology visit completed. See audiogram for results.      RECOMMENDATIONS: Follow-up with ENT.    Ward Moran, CCC-A  Clinical Audiologist  [place] #[id number]

## 2022-03-04 NOTE — PROGRESS NOTES
HPI: This patient is a 70yo M who presents for follow-up of hearing. There has been a gradual loss of hearing over the past many years in both ears. He has been wearing hearing aids for years. At his last visit, he was having issues with the HA because of additional mixed component due to a small TM perf. He did not have his HA retuned at that time. Now isn't wearing it because the volume of it hurts him. Denies otalgia, otorrhea, vertigo, and other major medical issues.      Past medical history, surgical history, social history, family history, medications, and allergies have been reviewed with the patient and are documented above.     Review of Systems: a 10-system review was performed. Pertinent positives are noted in the HPI and on a separate scanned document in the chart.     PHYSICAL EXAMINATION:  GEN: no acute distress, normocephalic  EYES: extraocular movements are intact, pupils are equal and round. Sclera clear.   EARS: auricles are normally formed. The external auditory canals are clear with minimal to no cerumen. Tympanic membrane on the right is noted to have 10% perforation of the posterior superior quadrant that is dry and healthy. The left tympanic membrane is intact. Middle ears are clear.  NEURO: CN VII and XII symmetric. alert and oriented. No spontaneous nystagmus. Gait is normal.  PULM: breathing comfortably on room air, normal chest expansion with respiration  CARDS: no cyanosis or clubbing, normal carotid pulses     AUDIOGRAM today: same  AUDIOGRAM 2021: dead left ear, borderline severe/profound HL (?mixed component, masking dilemma). Large volume type B tymp right, type A tymp left  AUDIOGRAM 2018: dead left ear, severe to profound SNHL in the right. Type A tymps bilaterally.     MEDICAL DECISION-MAKING: This patient is a 70yo M with severe/profound sensorineural hearing loss. It appears that he may have a slight additional mixed component in the right ear with a small TM perforation, likely  from qtip trauma given the location and story. No surgical intervention is indicated at this time as the perf is dry and healthy. He should retune the HA to a comfortable, but usable, level. A HA tuning appt was made for him before he left.

## 2022-03-23 NOTE — PROGRESS NOTES
The PCA sig page, POC sig page and CEDRICK were received and saved in member file.     Van Brenner  Care Management Specialist  [facility]  [phone]

## 2022-03-30 NOTE — PROGRESS NOTES
[facility] Dermatology Note  Encounter Date: Mar 31, 2022  Office Visit     Dermatology Problem List:  1. Rash, right upper back, s/p punch bx 3/31/2022   2. Rash, s/p punch bx 3/31/2022   ____________________________________________    Assessment & Plan:    # Scaly, lichenified plaque, right upper back. New, undiagnosed problem. ddx psoriasis vs atopic dermatitis/ lichen simplex chronicus  - Punch biopsy from right upper back today, see procedure below  - Start augmented betamethasone 0.05% ointment BID prn, will adjust pending biopsy results.    # Dusky, oval shaped plaques on the chest and trunk. New, undiagnosed problem. Ddx favor multifocal fixed drug (though patient reports no recent new medication exposures including over-the-counter medications), sweet's syndrome (though no systemic symptoms or fevers), early erythema multiforme vs less likely granulomatous dermatitis such as interstitial granulomatous dermatitis or palisaded granulomatous neutrophilic dermatitis.  - Punch biopsy from left flank today, see procedure below    Procedures Performed:   - Punch biopsy procedure note, location(s): right upper back and left flank. After discussion of benefits and risks including but not limited to bleeding, infection, scar, incomplete removal, recurrence, and non-diagnostic biopsy, written consent and photographs were obtained. The area was cleaned with isopropyl alcohol. 0.5mL of 1% lidocaine with epinephrine was injected to obtain adequate anesthesia and a 4 mm punch biopsy was performed at site(s). 4-0 Prolene sutures were utilized to approximate the epidermal edges. White petrolatum ointment and a bandage was applied to the wound. Explicit verbal and written wound care instructions were provided. The patient left the dermatology clinic in good condition.     Follow-up: pending path results    Staff and Scribe:     Scribe Disclosure:  IZak, am serving as a scribe to document  services personally performed by Leon Orlando MD based on data collection and the provider's statements to me.     Provider Disclosure:   The documentation recorded by the scribe accurately reflects the services I personally performed and the decisions made by me.    Leon Orlando MD    Department of Dermatology  [facility]: Phone: [phone], Fax:[phone]  [facility]: Phone: [phone] Fax: [phone]  ____________________________________________    CC: Derm Problem (pt states he is here for a rash on back 2 years, lots of itching. )    HPI:  Mr. Chasity Gaspar is a(n) 72 year old male who presents today as a new patient for rash. Patient reports a rash on his back present for 2 years. He has tried treating topically and with an unknown injection, but the rash did not improve. Additionally, he reports new areas of redness that appeared yesterday.    Patient is otherwise feeling well, without additional skin concerns.    Labs Reviewed:  N/A    Physical Exam:  Vitals: There were no vitals taken for this visit.  SKIN: Waist-up skin, which includes the head/face, neck, both arms, chest, back, abdomen, digits and/or nails was examined.  - On the right upper back there is a well demarcated pink scaly minimally excoriated plaque.  - On the upper chest, upper arms, trunk, multiple oval shaped somewhat dusky smooth papules and plaques.   - No other lesions of concern on areas examined.                 Medications:  Current Outpatient Medications   Medication     acetaminophen 500 mg coapsule     allopurinol (ZYLOPRIM) 300 MG tablet     atorvastatin (LIPITOR) 40 MG tablet     clobetasol (TEMOVATE) 0.05 % ointment     clopidogreL (PLAVIX) 75 mg tablet     diclofenac (VOLTAREN) 1 % topical gel     diclofenac (VOLTAREN) 75 MG EC tablet     DULoxetine (CYMBALTA) 60 MG capsule     dutasteride  (AVODART) 0.5 MG capsule     ergocalciferol (ERGOCALCIFEROL) 1.25 MG (97569 UT) capsule     gabapentin (NEURONTIN) 300 MG capsule     hydrOXYzine pamoate (VISTARIL) 50 MG capsule     lidocaine (XYLOCAINE) 5 % external ointment     magnesium oxide (MAG-OX) 400 mg (241.3 mg magnesium) tablet     meclizine (ANTIVERT) 25 mg tablet     melatonin 3 mg Tab tablet     methocarbamol (ROBAXIN) 500 MG tablet     methylPREDNISolone (MEDROL DOSEPAK) 4 MG tablet therapy pack     metoprolol tartrate (LOPRESSOR) 25 MG tablet     metoprolol tartrate (LOPRESSOR) 25 MG tablet     mirtazapine (REMERON) 15 MG tablet     omeprazole (PRILOSEC) 20 MG capsule     polyethylene glycol (MIRALAX) 17 GM/Dose powder     tamsulosin (FLOMAX) 0.4 MG capsule     allopurinoL (ZYLOPRIM) 300 MG tablet     allopurinoL (ZYLOPRIM) 300 MG tablet     atorvastatin (LIPITOR) 40 MG tablet     atorvastatin (LIPITOR) 40 MG tablet     clopidogrel (PLAVIX) 75 MG tablet     clopidogreL (PLAVIX) 75 mg tablet     DULoxetine (CYMBALTA) 60 MG capsule     ergocalciferol (ERGOCALCIFEROL) 1.25 MG (21277 UT) capsule     gabapentin (NEURONTIN) 300 MG capsule     gabapentin (NEURONTIN) 300 MG capsule     magnesium oxide (MAG-OX) 400 MG tablet     metoprolol tartrate (LOPRESSOR) 25 MG tablet     omeprazole (PRILOSEC) 20 MG capsule     omeprazole (PRILOSEC) 20 MG DR capsule     polyethylene glycol (MIRALAX) 17 gram packet     No current facility-administered medications for this visit.      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     Abdominal Pain In The Right Lower Belly (RLQ)     Abdominal Pain In The Right Upper Belly (RUQ)     Memory Lapses Or Loss     Hyperlipidemia     Hearing Loss     Lacunar Stroke     Spinal Stenosis     Radiculopathy, lumbar region     Cervicalgia     Dysthymia (Depressive Neurosis), Primary     Essential Hypertriglyceridemia     Hiatal Hernia     Lumbar Canal Stenosis      Lower Back Pain     Vitamin D Deficiency     Moderate Recurrent Major Depression     Esophageal reflux     Gastritis     Constipation     Gout     Dermatitis     Elbow swelling, right     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

## 2022-03-31 ENCOUNTER — OFFICE VISIT (OUTPATIENT)
Dept: DERMATOLOGY | Facility: CLINIC | Age: 72
End: 2022-03-31
Attending: FAMILY MEDICINE
Payer: COMMERCIAL

## 2022-03-31 DIAGNOSIS — L30.9 DERMATITIS: ICD-10-CM

## 2022-03-31 DIAGNOSIS — R21 RASH: Primary | ICD-10-CM

## 2022-03-31 PROCEDURE — 88312 SPECIAL STAINS GROUP 1: CPT | Mod: 26 | Performed by: DERMATOLOGY

## 2022-03-31 PROCEDURE — 88305 TISSUE EXAM BY PATHOLOGIST: CPT | Mod: 26 | Performed by: DERMATOLOGY

## 2022-03-31 PROCEDURE — 99203 OFFICE O/P NEW LOW 30 MIN: CPT | Mod: 25 | Performed by: DERMATOLOGY

## 2022-03-31 PROCEDURE — 88312 SPECIAL STAINS GROUP 1: CPT | Mod: TC | Performed by: DERMATOLOGY

## 2022-03-31 PROCEDURE — 11105 PUNCH BX SKIN EA SEP/ADDL: CPT | Performed by: DERMATOLOGY

## 2022-03-31 PROCEDURE — 11104 PUNCH BX SKIN SINGLE LESION: CPT | Performed by: DERMATOLOGY

## 2022-03-31 RX ORDER — BETAMETHASONE DIPROPIONATE 0.5 MG/G
OINTMENT, AUGMENTED TOPICAL
Qty: 50 G | Refills: 11 | Status: SHIPPED | OUTPATIENT
Start: 2022-03-31 | End: 2022-11-25

## 2022-03-31 ASSESSMENT — PAIN SCALES - GENERAL: PAINLEVEL: NO PAIN (0)

## 2022-03-31 NOTE — LETTER
3/31/2022       RE: Chasity Gaspar  [address] [place] [address]     Dear Colleague,    Thank you for referring your patient, Chasity Gaspar, to the [facility] [place] at [facility]. Please see a copy of my visit note below.    [facility] Dermatology Note  Encounter Date: Mar 31, 2022  Office Visit     Dermatology Problem List:  1. Rash, right upper back, s/p punch bx 3/31/2022   2. Rash, s/p punch bx 3/31/2022   ____________________________________________    Assessment & Plan:    # Scaly, lichenified plaque, right upper back. New, undiagnosed problem. ddx psoriasis vs atopic dermatitis/ lichen simplex chronicus  - Punch biopsy from right upper back today, see procedure below  - Start augmented betamethasone 0.05% ointment BID prn, will adjust pending biopsy results.    # Dusky, oval shaped plaques on the chest and trunk. New, undiagnosed problem. Ddx favor multifocal fixed drug (though patient reports no recent new medication exposures including over-the-counter medications), sweet's syndrome (though no systemic symptoms or fevers), early erythema multiforme vs less likely granulomatous dermatitis such as interstitial granulomatous dermatitis or palisaded granulomatous neutrophilic dermatitis.  - Punch biopsy from left flank today, see procedure below    Procedures Performed:   - Punch biopsy procedure note, location(s): right upper back and left flank. After discussion of benefits and risks including but not limited to bleeding, infection, scar, incomplete removal, recurrence, and non-diagnostic biopsy, written consent and photographs were obtained. The area was cleaned with isopropyl alcohol. 0.5mL of 1% lidocaine with epinephrine was injected to obtain adequate anesthesia and a 4 mm punch biopsy was performed at site(s). 4-0 Prolene sutures were utilized to approximate the epidermal edges. White petrolatum ointment and a  bandage was applied to the wound. Explicit verbal and written wound care instructions were provided. The patient left the dermatology clinic in good condition.     Follow-up: pending path results    Staff and Scribe:     Scribe Disclosure:  I, Zak Huber, am serving as a scribe to document services personally performed by Leon Orlando MD based on data collection and the provider's statements to me.     Provider Disclosure:   The documentation recorded by the scribe accurately reflects the services I personally performed and the decisions made by me.    Leon Orlando MD    Department of Dermatology  [facility]: Phone: [phone], Fax:[phone]  [facility]: Phone: [phone] Fax: [phone]  ____________________________________________    CC: Derm Problem (pt states he is here for a rash on back 2 years, lots of itching. )    HPI:  Mr. Chasity Gaspar is a(n) 72 year old male who presents today as a new patient for rash. Patient reports a rash on his back present for 2 years. He has tried treating topically and with an unknown injection, but the rash did not improve. Additionally, he reports new areas of redness that appeared yesterday.    Patient is otherwise feeling well, without additional skin concerns.    Labs Reviewed:  N/A    Physical Exam:  Vitals: There were no vitals taken for this visit.  SKIN: Waist-up skin, which includes the head/face, neck, both arms, chest, back, abdomen, digits and/or nails was examined.  - On the right upper back there is a well demarcated pink scaly minimally excoriated plaque.  - On the upper chest, upper arms, trunk, multiple oval shaped somewhat dusky smooth papules and plaques.   - No other lesions of concern on areas examined.                 Medications:  Current Outpatient Medications   Medication     acetaminophen 500 mg coapsule      allopurinol (ZYLOPRIM) 300 MG tablet     atorvastatin (LIPITOR) 40 MG tablet     clobetasol (TEMOVATE) 0.05 % ointment     clopidogreL (PLAVIX) 75 mg tablet     diclofenac (VOLTAREN) 1 % topical gel     diclofenac (VOLTAREN) 75 MG EC tablet     DULoxetine (CYMBALTA) 60 MG capsule     dutasteride (AVODART) 0.5 MG capsule     ergocalciferol (ERGOCALCIFEROL) 1.25 MG (03244 UT) capsule     gabapentin (NEURONTIN) 300 MG capsule     hydrOXYzine pamoate (VISTARIL) 50 MG capsule     lidocaine (XYLOCAINE) 5 % external ointment     magnesium oxide (MAG-OX) 400 mg (241.3 mg magnesium) tablet     meclizine (ANTIVERT) 25 mg tablet     melatonin 3 mg Tab tablet     methocarbamol (ROBAXIN) 500 MG tablet     methylPREDNISolone (MEDROL DOSEPAK) 4 MG tablet therapy pack     metoprolol tartrate (LOPRESSOR) 25 MG tablet     metoprolol tartrate (LOPRESSOR) 25 MG tablet     mirtazapine (REMERON) 15 MG tablet     omeprazole (PRILOSEC) 20 MG capsule     polyethylene glycol (MIRALAX) 17 GM/Dose powder     tamsulosin (FLOMAX) 0.4 MG capsule     allopurinoL (ZYLOPRIM) 300 MG tablet     allopurinoL (ZYLOPRIM) 300 MG tablet     atorvastatin (LIPITOR) 40 MG tablet     atorvastatin (LIPITOR) 40 MG tablet     clopidogrel (PLAVIX) 75 MG tablet     clopidogreL (PLAVIX) 75 mg tablet     DULoxetine (CYMBALTA) 60 MG capsule     ergocalciferol (ERGOCALCIFEROL) 1.25 MG (16702 UT) capsule     gabapentin (NEURONTIN) 300 MG capsule     gabapentin (NEURONTIN) 300 MG capsule     magnesium oxide (MAG-OX) 400 MG tablet     metoprolol tartrate (LOPRESSOR) 25 MG tablet     omeprazole (PRILOSEC) 20 MG capsule     omeprazole (PRILOSEC) 20 MG DR capsule     polyethylene glycol (MIRALAX) 17 gram packet     No current facility-administered medications for this visit.      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     Abdominal Pain In The Right Lower Belly (RLQ)     Abdominal Pain In The Right  Upper Belly (RUQ)     Memory Lapses Or Loss     Hyperlipidemia     Hearing Loss     Lacunar Stroke     Spinal Stenosis     Radiculopathy, lumbar region     Cervicalgia     Dysthymia (Depressive Neurosis), Primary     Essential Hypertriglyceridemia     Hiatal Hernia     Lumbar Canal Stenosis     Lower Back Pain     Vitamin D Deficiency     Moderate Recurrent Major Depression     Esophageal reflux     Gastritis     Constipation     Gout     Dermatitis     Elbow swelling, right     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    Lidocaine-epinephrine 1-1:095161 % injection   1 mL once for one use, starting 3/31/2022 ending 3/31/2022,  2mL disp, R-0, injection  Injected by Van Forrest

## 2022-03-31 NOTE — PROGRESS NOTES
Lidocaine-epinephrine 1-1:299323 % injection   1 mL once for one use, starting 3/31/2022 ending 3/31/2022,  2mL disp, R-0, injection  Injected by Van Forrest

## 2022-03-31 NOTE — PATIENT INSTRUCTIONS
Wound Care After a Biopsy    What is a skin biopsy?  A skin biopsy allows the doctor to examine a very small piece of tissue under the microscope to determine the diagnosis and the best treatment for the skin condition. A local anesthetic (numbing medicine)  is injected with a very small needle into the skin area to be tested. A small piece of skin is taken from the area. Sometimes a suture (stitch) is used.     What are the risks of a skin biopsy?  I will experience scar, bleeding, swelling, pain, crusting and redness. I may experience incomplete removal or recurrence. Risks of this procedure are excessive bleeding, bruising, infection, nerve damage, numbness, thick (hypertrophic or keloidal) scar and non-diagnostic biopsy.    How should I care for my wound for the first 24 hours?    Keep the wound dry and covered for 24 hours    If it bleeds, hold direct pressure on the area for 15 minutes. If bleeding does not stop then go to the emergency room    Avoid strenuous exercise the first 1-2 days or as your doctor instructs you    How should I care for the wound after 24 hours?    After 24 hours, remove the bandage    You may bathe or shower as normal    If you had a scalp biopsy, you can shampoo as usual and can use shower water to clean the biopsy site daily    Clean the wound twice a day with gentle soap and water    Do not scrub, be gentle    Apply white petroleum/Vaseline after cleaning the wound with a cotton swab or a clean finger, and keep the site covered with a Bandaid /bandage. Bandages are not necessary with a scalp biopsy    If you are unable to cover the site with a Bandaid /bandage, re-apply ointment 2-3 times a day to keep the site moist. Moisture will help with healing    Avoid strenuous activity for first 1-2 days    Avoid lakes, rivers, pools, and oceans until the stitches are removed or the site is healed    How do I clean my wound?    Wash hands thoroughly with soap or use hand  before all  wound care    Clean the wound with gentle soap and water    Apply white petroleum/Vaseline  to wound after it is clean    Replace the Bandaid /bandage to keep the wound covered for the first few days or as instructed by your doctor    If you had a scalp biopsy, warm shower water to the area on a daily basis should suffice    What should I use to clean my wound?     Cotton-tipped applicators (Qtips )    White petroleum jelly (Vaseline ). Use a clean new container and use Q-tips to apply.    Bandaids   as needed    Gentle soap     How should I care for my wound long term?    Do not get your wound dirty    Keep up with wound care for one week or until the area is healed.    A small scab will form and fall off by itself when the area is completely healed. The area will be red and will become pink in color as it heals. Sun protection is very important for how your scar will turn out. Sunscreen with an SPF 30 or greater is recommended once the area is healed.    If you have stitches, stitches need to be removed in 10-14 days. You may return to our clinic for this or you may have it done locally at your doctor s office.    You should have some soreness but it should be mild and slowly go away over several days. Talk to your doctor about using tylenol for pain,    When should I call my doctor?  If you have increased:     Pain or swelling    Pus or drainage (clear or slightly yellow drainage is ok)    Temperature over 100F    Spreading redness or warmth around wound    When will I hear about my results?  The biopsy results can take 2 weeks to come back.  Your results will automatically release to [employer] before your provider has even reviewed them.  The clinic will call you with the results, send you a [facility] message, or have you schedule a follow-up clinic or phone time to discuss the results.  Contact our clinics if you do not hear from us in 2 weeks.    Who should I call with questions?    [facility],  Mikala Schwartz: [phone]    [facility]: [phone]    For urgent needs outside of business hours call the [facility] at [phone] and ask for the dermatology resident on call

## 2022-04-14 ENCOUNTER — ALLIED HEALTH/NURSE VISIT (OUTPATIENT)
Dept: DERMATOLOGY | Facility: CLINIC | Age: 72
End: 2022-04-14
Payer: COMMERCIAL

## 2022-04-14 DIAGNOSIS — Z48.02 VISIT FOR SUTURE REMOVAL: Primary | ICD-10-CM

## 2022-04-14 PROCEDURE — 99024 POSTOP FOLLOW-UP VISIT: CPT

## 2022-04-14 NOTE — PROGRESS NOTES
Chasity Gaspar comes into clinic today at the request of Dr Orlando Ordering Provider for suture removal. 2 sutures removed from back and 1 from L side. Areas were prepped with alcohol swab. Vaseline and bandaid applied. Pt did not start topical medication but will do so. Results are still pending.    This service provided today was under the supervising provider of the day Dr Henderson, who was available if needed.    Janett Newell RN

## 2022-04-17 LAB
PATH REPORT.COMMENTS IMP SPEC: NORMAL
PATH REPORT.FINAL DX SPEC: NORMAL
PATH REPORT.GROSS SPEC: NORMAL
PATH REPORT.MICROSCOPIC SPEC OTHER STN: NORMAL
PATH REPORT.RELEVANT HX SPEC: NORMAL

## 2022-04-19 ENCOUNTER — OFFICE VISIT (OUTPATIENT)
Dept: FAMILY MEDICINE | Facility: CLINIC | Age: 72
End: 2022-04-19
Payer: COMMERCIAL

## 2022-04-19 VITALS
SYSTOLIC BLOOD PRESSURE: 126 MMHG | WEIGHT: 128.25 LBS | HEIGHT: 60 IN | BODY MASS INDEX: 25.18 KG/M2 | DIASTOLIC BLOOD PRESSURE: 78 MMHG | HEART RATE: 80 BPM | RESPIRATION RATE: 20 BRPM | TEMPERATURE: 98.1 F

## 2022-04-19 DIAGNOSIS — F33.1 MAJOR DEPRESSIVE DISORDER, RECURRENT EPISODE, MODERATE (H): ICD-10-CM

## 2022-04-19 DIAGNOSIS — G81.94 LEFT HEMIPARESIS (H): ICD-10-CM

## 2022-04-19 DIAGNOSIS — K59.04 CHRONIC IDIOPATHIC CONSTIPATION: ICD-10-CM

## 2022-04-19 DIAGNOSIS — K59.00 CONSTIPATION, UNSPECIFIED CONSTIPATION TYPE: ICD-10-CM

## 2022-04-19 DIAGNOSIS — K21.00 GASTROESOPHAGEAL REFLUX DISEASE WITH ESOPHAGITIS WITHOUT HEMORRHAGE: ICD-10-CM

## 2022-04-19 DIAGNOSIS — M48.061 SPINAL STENOSIS, LUMBAR REGION, WITHOUT NEUROGENIC CLAUDICATION: ICD-10-CM

## 2022-04-19 DIAGNOSIS — M54.16 RADICULOPATHY, LUMBAR REGION: ICD-10-CM

## 2022-04-19 DIAGNOSIS — L40.9 PSORIASIS: Primary | ICD-10-CM

## 2022-04-19 DIAGNOSIS — R39.16 BENIGN PROSTATIC HYPERPLASIA (BPH) WITH STRAINING ON URINATION: ICD-10-CM

## 2022-04-19 DIAGNOSIS — N40.1 BENIGN PROSTATIC HYPERPLASIA (BPH) WITH STRAINING ON URINATION: ICD-10-CM

## 2022-04-19 PROCEDURE — 99214 OFFICE O/P EST MOD 30 MIN: CPT | Performed by: FAMILY MEDICINE

## 2022-04-19 RX ORDER — TAMSULOSIN HYDROCHLORIDE 0.4 MG/1
0.4 CAPSULE ORAL DAILY
Qty: 30 CAPSULE | Refills: 11 | Status: SHIPPED | OUTPATIENT
Start: 2022-04-19 | End: 2022-08-19

## 2022-04-19 RX ORDER — POLYETHYLENE GLYCOL 3350 17 G/17G
17 POWDER, FOR SOLUTION ORAL DAILY
Qty: 510 G | Refills: 11 | Status: SHIPPED | OUTPATIENT
Start: 2022-04-19 | End: 2024-05-22

## 2022-04-19 RX ORDER — DULOXETIN HYDROCHLORIDE 60 MG/1
CAPSULE, DELAYED RELEASE ORAL
Qty: 90 CAPSULE | Refills: 3 | Status: SHIPPED | OUTPATIENT
Start: 2022-04-19 | End: 2022-11-25

## 2022-04-19 NOTE — RESULT ENCOUNTER NOTE
Can we call patient and let him know:    1. The spot on the right upper back was consistent with psoriasis. I would like to make a follow-up visit to discuss additional options for treating. Otherwise, can use the betamethasone 0.05% ointment until next visit.     2. The spot on the trunk was consistent with a fixed drug eruption. I would like to review your medications a bit more thoroughly at our next visit to see if we can find a culprit.     Could we schedule follow-up for sometime in the next few weeks (in-person). Can be Tuesday at 12:45 PM or Wed AM as overbook. Thanks!    Leon Orlando MD  Pronouns: he/him/his    Department of Dermatology  [facility]: Phone: [phone], Fax:[phone]  [facility]: Phone: [phone] Fax: [phone]

## 2022-04-19 NOTE — PROGRESS NOTES
ASSESSMENT and plan   1. Psoriasis    Rash confirmed to be psoriasis by dermatology.  No treatment was given he had a follow-up appointment to have sutures removed but treatment was not discussed at that visit.  We will contact dermatology to see if they want to prescribe medication for him.    2. Left hemiparesis (H)    No weakness noted in the left arm.  Some slight residue changes with strength with repeated movements.    3. Radiculopathy, lumbar region    Currently symptoms are well controlled on gabapentin and duloxetine    4. Gastroesophageal reflux disease with esophagitis without hemorrhage    He is taking the omeprazole no abdominal burning noted.    5. Lumbar Canal Stenosis    He notes no back pain at this time    6. Chronic idiopathic constipation    They ran out of the medication was refilled    7. Constipation, unspecified constipation type    He has been constipated for the last 3 days  - polyethylene glycol (MIRALAX) 17 GM/Dose powder; Take 17 g by mouth daily  Dispense: 510 g; Refill: 11    8. Major depressive disorder, recurrent episode, moderate (H)  Patient denies any symptoms at this time he is animated and engaged at home  - DULoxetine (CYMBALTA) 60 MG capsule; [DULOXETINE (CYMBALTA) 60 MG CAPSULE] TAKE 1 CAPSULE(60 MG) BY MOUTH DAILY  Dispense: 90 capsule; Refill: 3    9. Benign prostatic hyperplasia (BPH) with straining on urination      - tamsulosin (FLOMAX) 0.4 MG capsule; Take 1 capsule (0.4 mg) by mouth daily  Dispense: 30 capsule; Refill: 11              There are no Patient Instructions on file for this visit.    No orders of the defined types were placed in this encounter.    Medications Discontinued During This Encounter   Medication Reason     polyethylene glycol (MIRALAX) 17 GM/Dose powder Reorder     DULoxetine (CYMBALTA) 60 MG capsule Reorder     tamsulosin (FLOMAX) 0.4 MG capsule Reorder       Return in about 4 months (around 8/19/2022) for Routine preventive.    CHIEF  "COMPLAINT:  chief complaint    HISTORY OF PRESENT ILLNESS:  Chasity is a 72 year old male   Is here for a follow-up with his daughter.  He has brought his medications in for review.  He says he is feeling quite well.  He states has been taking his medications and currently he does not have any back pain he is however constipated.  He states that he can move around the home without any assistance.  He also notes that his appetite is quite good and he sleeping well.    REVIEW OF SYSTEMS:     GI positive for constipation  10 point review of  All other systems are negative.    PFSH:    Social history reviewed    TOBACCO USE:  History   Smoking Status     Never Smoker   Smokeless Tobacco     Never Used       VITALS:  Vitals:    04/19/22 1408   BP: 126/78   Pulse: 80   Resp: 20   Temp: 98.1  F (36.7  C)   TempSrc: Oral   Weight: 58.2 kg (128 lb 4 oz)   Height: 1.473 m (4' 10\")     Wt Readings from Last 3 Encounters:   04/19/22 58.2 kg (128 lb 4 oz)   12/17/21 57.3 kg (126 lb 6.4 oz)   09/30/21 55.5 kg (122 lb 6 oz)       PHYSICAL EXAM:    Interactive elderly male sitting comfortably in exam room no acute distress  HEENT neck supple mucous membranes moist oral cavity shows no exudate no erythema there is no lymph enlargement noted in neck  Respiratory system clear to auscultation equal breath sounds no wheeze no crackles  CVS regular rate and rhythm no murmurs no rubs no gallops  Abdomen soft with no focal tenderness no hepatosplenomegaly bowel sounds are not present  Musculoskeletal system no tenderness elicited when I palpate his lumbar spine he has no tenderness over the SI joints bilaterally there is no evidence of tenderness over the distribution of the sciatic nerve bilaterally.    Psych oriented x3 not agitated well-groomed    DATA REVIEWED:  Additional History from Old Records Summarized (2): 0  Decision to Obtain Records (1): 0  Radiology Tests Summarized or Ordered (1): 0  Labs Reviewed or Ordered (1): 0  Medicine " Test Summarized or Ordered (1): 0  Independent Review of EKG or X-RAY(2 each): 0    The visit lasted a total of 30 minutes .    MEDICATIONS:  Current Outpatient Medications   Medication Sig Dispense Refill     acetaminophen 500 mg coapsule [ACETAMINOPHEN 500 MG COAPSULE] Take 2 tablets by mouth 3 (three) times a day as needed for fever or pain.       allopurinol (ZYLOPRIM) 300 MG tablet allopurinol 300 mg tablet 30 tablet 11     allopurinoL (ZYLOPRIM) 300 MG tablet [ALLOPURINOL (ZYLOPRIM) 300 MG TABLET] TAKE 1 TABLET(300 MG) BY MOUTH DAILY 90 tablet 3     allopurinoL (ZYLOPRIM) 300 MG tablet [ALLOPURINOL (ZYLOPRIM) 300 MG TABLET] Take 1 tablet (300 mg total) by mouth daily. 90 tablet 3     atorvastatin (LIPITOR) 40 MG tablet atorvastatin 40 mg tablet 30 tablet 11     atorvastatin (LIPITOR) 40 MG tablet [ATORVASTATIN (LIPITOR) 40 MG TABLET] TAKE 1 TABLET(40 MG) BY MOUTH AT BEDTIME 90 tablet 3     atorvastatin (LIPITOR) 40 MG tablet [ATORVASTATIN (LIPITOR) 40 MG TABLET] TAKE 1 TABLET(40 MG) BY MOUTH AT BEDTIME 90 tablet 3     augmented betamethasone dipropionate (DIPROLENE-AF) 0.05 % external ointment Apply twice daily as needed for rash on the trunk or extremities. Do not apply to face. 50 g 11     clobetasol (TEMOVATE) 0.05 % ointment [CLOBETASOL (TEMOVATE) 0.05 % OINTMENT] Apply 1 application topically 2 (two) times a day as needed.       clopidogrel (PLAVIX) 75 MG tablet Take 1 tablet by mouth daily       clopidogreL (PLAVIX) 75 mg tablet [CLOPIDOGREL (PLAVIX) 75 MG TABLET] Take 1 tablet (75 mg total) by mouth daily. 90 tablet 3     clopidogreL (PLAVIX) 75 mg tablet [CLOPIDOGREL (PLAVIX) 75 MG TABLET] TAKE 1 TABLET(75 MG) BY MOUTH DAILY 90 tablet 1     diclofenac (VOLTAREN) 1 % topical gel Apply 2 g topically 4 times daily 350 g 3     diclofenac (VOLTAREN) 75 MG EC tablet [DICLOFENAC (VOLTAREN) 75 MG EC TABLET] TAKE 1 TABLET(75 MG) BY MOUTH TWICE DAILY 60 tablet 0     DULoxetine (CYMBALTA) 60 MG capsule  [DULOXETINE (CYMBALTA) 60 MG CAPSULE] TAKE 1 CAPSULE(60 MG) BY MOUTH DAILY 90 capsule 3     DULoxetine (CYMBALTA) 60 MG capsule Take 1 capsule by mouth daily       dutasteride (AVODART) 0.5 MG capsule Take 1 capsule (0.5 mg) by mouth daily 30 capsule 4     ergocalciferol (ERGOCALCIFEROL) 1.25 MG (92331 UT) capsule ergocalciferol (vitamin D2) 1,250 mcg (50,000 unit) capsule 30 capsule 11     ergocalciferol (ERGOCALCIFEROL) 1.25 MG (58971 UT) capsule [ERGOCALCIFEROL (ERGOCALCIFEROL) 1,250 MCG (50,000 UNIT) CAPSULE] TAKE 1 CAPSULE BY MOUTH WEEKLY 12 capsule 3     gabapentin (NEURONTIN) 300 MG capsule [GABAPENTIN (NEURONTIN) 300 MG CAPSULE] TAKE 1 CAPSULE(300 MG) BY MOUTH THREE TIMES DAILY 270 capsule 4     gabapentin (NEURONTIN) 300 MG capsule Take 1 cap at bedtime, increase by 1 cap every three days, max dose 2 caps tid 180 capsule 0     gabapentin (NEURONTIN) 300 MG capsule        hydrOXYzine pamoate (VISTARIL) 50 MG capsule [HYDROXYZINE PAMOATE (VISTARIL) 50 MG CAPSULE] TAKE 1 CAPSULE BY MOUTH AT BEDTIME 30 capsule 10     lidocaine (XYLOCAINE) 5 % external ointment Apply topically 2 times daily 240 g 3     magnesium oxide (MAG-OX) 400 mg (241.3 mg magnesium) tablet [MAGNESIUM OXIDE (MAG-OX) 400 MG (241.3 MG MAGNESIUM) TABLET] TAKE 1 TABLET(400 MG) BY MOUTH DAILY 100 tablet 0     magnesium oxide (MAG-OX) 400 MG tablet Take 1 tablet (400 mg) by mouth daily 30 tablet 11     meclizine (ANTIVERT) 25 mg tablet [MECLIZINE (ANTIVERT) 25 MG TABLET] Take 1 tablet (25 mg total) by mouth 3 (three) times a day as needed. 30 tablet 0     melatonin 3 mg Tab tablet [MELATONIN 3 MG TAB TABLET] TAKE 1 TABLET(3 MG) BY MOUTH AT BEDTIME AS NEEDED 100 tablet 2     methocarbamol (ROBAXIN) 500 MG tablet Take 1 tablet (500 mg) by mouth 3 times daily 21 tablet 0     methylPREDNISolone (MEDROL DOSEPAK) 4 MG tablet therapy pack Follow Package Directions 21 tablet 0     metoprolol tartrate (LOPRESSOR) 25 MG tablet Take 1 tablet (25 mg) by mouth  2 times daily 60 tablet 11     metoprolol tartrate (LOPRESSOR) 25 MG tablet [METOPROLOL TARTRATE (LOPRESSOR) 25 MG TABLET] TAKE 1 TABLET BY MOUTH TWICE DAILY 180 tablet 3     metoprolol tartrate (LOPRESSOR) 25 MG tablet [METOPROLOL TARTRATE (LOPRESSOR) 25 MG TABLET] TAKE 1 TABLET BY MOUTH TWICE DAILY 180 tablet 3     mirtazapine (REMERON) 15 MG tablet [MIRTAZAPINE (REMERON) 15 MG TABLET] TAKE 1 TABLET(15 MG) BY MOUTH AT BEDTIME 90 tablet 3     omeprazole (PRILOSEC) 20 MG capsule [OMEPRAZOLE (PRILOSEC) 20 MG CAPSULE] TAKE 2 CAPSULES(40 MG) BY MOUTH DAILY 180 capsule 3     omeprazole (PRILOSEC) 20 MG capsule [OMEPRAZOLE (PRILOSEC) 20 MG CAPSULE] TAKE 2 CAPSULES(40 MG) BY MOUTH DAILY 180 capsule 1     omeprazole (PRILOSEC) 20 MG DR capsule        polyethylene glycol (MIRALAX) 17 GM/Dose powder Take 17 g by mouth daily 510 g 11     polyethylene glycol (MIRALAX) 17 gram packet [POLYETHYLENE GLYCOL (MIRALAX) 17 GRAM PACKET] USE 1 PACKET MIXED AS DIRECTED PER  each 3     tamsulosin (FLOMAX) 0.4 MG capsule Take 1 capsule (0.4 mg) by mouth daily 30 capsule 11             Seth

## 2022-05-02 ENCOUNTER — TELEPHONE (OUTPATIENT)
Dept: DERMATOLOGY | Facility: CLINIC | Age: 72
End: 2022-05-02
Payer: COMMERCIAL

## 2022-05-04 ENCOUNTER — OFFICE VISIT (OUTPATIENT)
Dept: DERMATOLOGY | Facility: CLINIC | Age: 72
End: 2022-05-04
Payer: COMMERCIAL

## 2022-05-04 DIAGNOSIS — L27.1 FIXED DRUG ERUPTION: Primary | ICD-10-CM

## 2022-05-04 DIAGNOSIS — L40.0 PSORIASIS VULGARIS: ICD-10-CM

## 2022-05-04 PROCEDURE — 99213 OFFICE O/P EST LOW 20 MIN: CPT | Mod: GC | Performed by: DERMATOLOGY

## 2022-05-04 ASSESSMENT — PAIN SCALES - GENERAL: PAINLEVEL: NO PAIN (0)

## 2022-05-04 NOTE — NURSING NOTE
Dermatology Rooming Note    Chasity Gaspar's goals for this visit include:   Chief Complaint   Patient presents with     Psoriasis     States it is getting better      Greta Davis, Visit Facilitator

## 2022-05-04 NOTE — PROGRESS NOTES
[facility] Dermatology Note  Encounter Date: May 4, 2022  Office Visit     Dermatology Problem List:  1. Psoriasis   2. Fixed drug eruption     ____________________________________________    Assessment & Plan:    # Psoriasis vulgaris. Chronic, stable/improved.   Scaly, lichenified plaque, right upper back which has been present for 2 years. Biopsy completed consistent with psoriasis. Appearance of plaque has significantly improved with betamethasone 0.05% ointment. Discussed with patient chronic nature of psoriasis lesions (waxing and waning). With significant improvement of lesion would start tapering topical steroid. If he continues to have frequent or bothersome flares of his psoriasis could consider pursuing other treatments (including systemic therapy).   - Taper betamethasone 0.05% ointment (every day 1 week, every other day 1 week, etc)  - Can resume betamethasone 0.05% ointment BID if psoriasis flare occurs  - Follow-up 3 months      # Fixed drug eruption. Acute, uncomplicated.   At last appointment patient with multiple dusky, oval shaped plaques on chest/trunk with punch biopsy consistent with fixed drug eruption. No recent new medications or exposure that were identified as potential triggers. Most of the lesions have resolved, only lesion left is on left flank which is smaller in size and improved in pigmentation. Informed patient of diagnosis and that lesions were likely due to medication (including OTC/vitamin) or exposure. Instructed patient to discontinue use of steroid cream on lesion.   - Discontinue betamethasone ointment on flank lesion   - If lesions/rash recurs then will need to be re-evaluated for potential triggers of eruption     Follow-up: 3 month(s) in-person, or earlier for new or changing lesions    Staff and Resident:   Dr. Orlando (staff dermatologist)  Dr. Cisneros (IM PGY-2)    Staff Physician Comments:   I saw and evaluated the patient with the resident and I agree  "with the assessment and plan.  I was present for the examination.    Leon Orlando MD  Pronouns: he/him/his    Department of Dermatology  [facility]: Phone: [phone], Fax:[phone]  [facility]: Phone: [phone] Fax: [phone]  ____________________________________________    CC: Psoriasis (States it is getting better )    HPI:  Mr. Chasity Gaspar is a(n) 72 year old male who presents today as a return patient for follow-up. Last seen by em on 3/31/2022, at which time patient underwent punch biopsy from the right upper back and was started on augmented betamethasone 0.05% ointment BID prn for treatment of a scaly lichenified plaque. Additionally, patient underwent punch biopsy from the left flank for treatment of dusky, oval shaped plaques on the chest and trunk.    Today, patient is accompanied by nephew and  services were utilized.     He states he has been using the betamethasone ointment twice a day to both this back lesion and trunk lesion. He states that both lesions has significantly improved. He states the lesion on his back is less pruritic and has improved appearance with the cream. We discussed that the biopsy on this lesion shows psoriasis and waxing/waning nature of disease. He states he has had this spot for 2 years and it \"always comes back\"      He also noted that the lesion on his trunk is improved, it is not pruritic or painful.     No new lesions or rashes on his body that his is concerned about.     Patient is otherwise feeling well, without additional skin concerns.    Labs Reviewed:  N/A    Physical Exam:  Vitals: There were no vitals taken for this visit.  SKIN: Focused examination of back/trunk was performed.  - On the right upper back there is a well demarcated pink plaque that does not appear scaly   - Well demarcated dusky plaque on left flank that " is oval in shape  - No other lesions of concern on areas examined.     Medications:  Current Outpatient Medications   Medication     acetaminophen 500 mg coapsule     allopurinol (ZYLOPRIM) 300 MG tablet     allopurinoL (ZYLOPRIM) 300 MG tablet     allopurinoL (ZYLOPRIM) 300 MG tablet     atorvastatin (LIPITOR) 40 MG tablet     atorvastatin (LIPITOR) 40 MG tablet     atorvastatin (LIPITOR) 40 MG tablet     augmented betamethasone dipropionate (DIPROLENE-AF) 0.05 % external ointment     clobetasol (TEMOVATE) 0.05 % ointment     clopidogrel (PLAVIX) 75 MG tablet     clopidogreL (PLAVIX) 75 mg tablet     clopidogreL (PLAVIX) 75 mg tablet     diclofenac (VOLTAREN) 1 % topical gel     diclofenac (VOLTAREN) 75 MG EC tablet     DULoxetine (CYMBALTA) 60 MG capsule     DULoxetine (CYMBALTA) 60 MG capsule     dutasteride (AVODART) 0.5 MG capsule     ergocalciferol (ERGOCALCIFEROL) 1.25 MG (68068 UT) capsule     ergocalciferol (ERGOCALCIFEROL) 1.25 MG (95425 UT) capsule     gabapentin (NEURONTIN) 300 MG capsule     gabapentin (NEURONTIN) 300 MG capsule     gabapentin (NEURONTIN) 300 MG capsule     hydrOXYzine pamoate (VISTARIL) 50 MG capsule     lidocaine (XYLOCAINE) 5 % external ointment     magnesium oxide (MAG-OX) 400 mg (241.3 mg magnesium) tablet     magnesium oxide (MAG-OX) 400 MG tablet     meclizine (ANTIVERT) 25 mg tablet     melatonin 3 mg Tab tablet     methocarbamol (ROBAXIN) 500 MG tablet     methylPREDNISolone (MEDROL DOSEPAK) 4 MG tablet therapy pack     metoprolol tartrate (LOPRESSOR) 25 MG tablet     metoprolol tartrate (LOPRESSOR) 25 MG tablet     metoprolol tartrate (LOPRESSOR) 25 MG tablet     mirtazapine (REMERON) 15 MG tablet     omeprazole (PRILOSEC) 20 MG capsule     omeprazole (PRILOSEC) 20 MG capsule     omeprazole (PRILOSEC) 20 MG DR capsule     polyethylene glycol (MIRALAX) 17 GM/Dose powder     polyethylene glycol (MIRALAX) 17 gram packet     tamsulosin (FLOMAX) 0.4 MG capsule     No current  facility-administered medications for this visit.      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     Abdominal Pain In The Right Lower Belly (RLQ)     Abdominal Pain In The Right Upper Belly (RUQ)     Memory Lapses Or Loss     Hyperlipidemia     Hearing Loss     Lacunar Stroke     Spinal Stenosis     Radiculopathy, lumbar region     Cervicalgia     Dysthymia (Depressive Neurosis), Primary     Essential Hypertriglyceridemia     Hiatal Hernia     Lumbar Canal Stenosis     Lower Back Pain     Vitamin D Deficiency     Moderate Recurrent Major Depression     Esophageal reflux     Gastritis     Constipation     Gout     Dermatitis     Elbow swelling, right     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)     Left hemiparesis (H)     ASNHL (asymmetrical sensorineural hearing loss)     Elevated liver enzymes     Hiatal hernia     Past Medical History:   Diagnosis Date     Cervicalgia      Depression      Dyslipidemia      Dysthymia      Gastritis      GERD (gastroesophageal reflux disease)      Hearing loss      Hiatal hernia      Hypertriglyceridemia      Insomnia      Lacunar stroke (H)      Lumbar canal stenosis      Lumbar radiculopathy      Myalgia and myositis         CC David Fang MD  [address] [facility] [address]  [place] [address] on close of this encounter.

## 2022-05-04 NOTE — LETTER
5/4/2022       RE: Chasity Gaspar  [address] [place] [address]     Dear Colleague,    Thank you for referring your patient, Chasity Gaspar, to the [facility]. Please see a copy of my visit note below.    [facility] Dermatology Note  Encounter Date: May 4, 2022  Office Visit     Dermatology Problem List:  1. Psoriasis   2. Fixed drug eruption     ____________________________________________    Assessment & Plan:    # Psoriasis vulgaris. Chronic, stable/improved.   Scaly, lichenified plaque, right upper back which has been present for 2 years. Biopsy completed consistent with psoriasis. Appearance of plaque has significantly improved with betamethasone 0.05% ointment. Discussed with patient chronic nature of psoriasis lesions (waxing and waning). With significant improvement of lesion would start tapering topical steroid. If he continues to have frequent or bothersome flares of his psoriasis could consider pursuing other treatments (including systemic therapy).   - Taper betamethasone 0.05% ointment (every day 1 week, every other day 1 week, etc)  - Can resume betamethasone 0.05% ointment BID if psoriasis flare occurs  - Follow-up 3 months      # Fixed drug eruption. Acute, uncomplicated.   At last appointment patient with multiple dusky, oval shaped plaques on chest/trunk with punch biopsy consistent with fixed drug eruption. No recent new medications or exposure that were identified as potential triggers. Most of the lesions have resolved, only lesion left is on left flank which is smaller in size and improved in pigmentation. Informed patient of diagnosis and that lesions were likely due to medication (including OTC/vitamin) or exposure. Instructed patient to discontinue use of steroid cream on lesion.   - Discontinue betamethasone ointment on flank lesion   - If lesions/rash recurs then will need to be  "re-evaluated for potential triggers of eruption     Follow-up: 3 month(s) in-person, or earlier for new or changing lesions    Staff and Resident:   Dr. Orlando (staff dermatologist)  Dr. Cisneros (IM PGY-2)    Staff Physician Comments:   I saw and evaluated the patient with the resident and I agree with the assessment and plan.  I was present for the examination.    Leon Oralndo MD  Pronouns: he/him/his    Department of Dermatology  [facility]: Phone: [phone], Fax:[phone]  [facility]: Phone: [phone] Fax: [phone]  ____________________________________________    CC: Psoriasis (States it is getting better )    HPI:  Mr. Chasity Gaspar is a(n) 72 year old male who presents today as a return patient for follow-up. Last seen by em on 3/31/2022, at which time patient underwent punch biopsy from the right upper back and was started on augmented betamethasone 0.05% ointment BID prn for treatment of a scaly lichenified plaque. Additionally, patient underwent punch biopsy from the left flank for treatment of dusky, oval shaped plaques on the chest and trunk.    Today, patient is accompanied by nephew and  services were utilized.     He states he has been using the betamethasone ointment twice a day to both this back lesion and trunk lesion. He states that both lesions has significantly improved. He states the lesion on his back is less pruritic and has improved appearance with the cream. We discussed that the biopsy on this lesion shows psoriasis and waxing/waning nature of disease. He states he has had this spot for 2 years and it \"always comes back\"      He also noted that the lesion on his trunk is improved, it is not pruritic or painful.     No new lesions or rashes on his body that his is concerned about.     Patient is otherwise feeling well, without additional skin " concerns.    Labs Reviewed:  N/A    Physical Exam:  Vitals: There were no vitals taken for this visit.  SKIN: Focused examination of back/trunk was performed.  - On the right upper back there is a well demarcated pink plaque that does not appear scaly   - Well demarcated dusky plaque on left flank that is oval in shape  - No other lesions of concern on areas examined.     Medications:  Current Outpatient Medications   Medication     acetaminophen 500 mg coapsule     allopurinol (ZYLOPRIM) 300 MG tablet     allopurinoL (ZYLOPRIM) 300 MG tablet     allopurinoL (ZYLOPRIM) 300 MG tablet     atorvastatin (LIPITOR) 40 MG tablet     atorvastatin (LIPITOR) 40 MG tablet     atorvastatin (LIPITOR) 40 MG tablet     augmented betamethasone dipropionate (DIPROLENE-AF) 0.05 % external ointment     clobetasol (TEMOVATE) 0.05 % ointment     clopidogrel (PLAVIX) 75 MG tablet     clopidogreL (PLAVIX) 75 mg tablet     clopidogreL (PLAVIX) 75 mg tablet     diclofenac (VOLTAREN) 1 % topical gel     diclofenac (VOLTAREN) 75 MG EC tablet     DULoxetine (CYMBALTA) 60 MG capsule     DULoxetine (CYMBALTA) 60 MG capsule     dutasteride (AVODART) 0.5 MG capsule     ergocalciferol (ERGOCALCIFEROL) 1.25 MG (10971 UT) capsule     ergocalciferol (ERGOCALCIFEROL) 1.25 MG (54736 UT) capsule     gabapentin (NEURONTIN) 300 MG capsule     gabapentin (NEURONTIN) 300 MG capsule     gabapentin (NEURONTIN) 300 MG capsule     hydrOXYzine pamoate (VISTARIL) 50 MG capsule     lidocaine (XYLOCAINE) 5 % external ointment     magnesium oxide (MAG-OX) 400 mg (241.3 mg magnesium) tablet     magnesium oxide (MAG-OX) 400 MG tablet     meclizine (ANTIVERT) 25 mg tablet     melatonin 3 mg Tab tablet     methocarbamol (ROBAXIN) 500 MG tablet     methylPREDNISolone (MEDROL DOSEPAK) 4 MG tablet therapy pack     metoprolol tartrate (LOPRESSOR) 25 MG tablet     metoprolol tartrate (LOPRESSOR) 25 MG tablet     metoprolol tartrate (LOPRESSOR) 25 MG tablet     mirtazapine  (REMERON) 15 MG tablet     omeprazole (PRILOSEC) 20 MG capsule     omeprazole (PRILOSEC) 20 MG capsule     omeprazole (PRILOSEC) 20 MG DR capsule     polyethylene glycol (MIRALAX) 17 GM/Dose powder     polyethylene glycol (MIRALAX) 17 gram packet     tamsulosin (FLOMAX) 0.4 MG capsule     No current facility-administered medications for this visit.      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     Abdominal Pain In The Right Lower Belly (RLQ)     Abdominal Pain In The Right Upper Belly (RUQ)     Memory Lapses Or Loss     Hyperlipidemia     Hearing Loss     Lacunar Stroke     Spinal Stenosis     Radiculopathy, lumbar region     Cervicalgia     Dysthymia (Depressive Neurosis), Primary     Essential Hypertriglyceridemia     Hiatal Hernia     Lumbar Canal Stenosis     Lower Back Pain     Vitamin D Deficiency     Moderate Recurrent Major Depression     Esophageal reflux     Gastritis     Constipation     Gout     Dermatitis     Elbow swelling, right     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)     Left hemiparesis (H)     ASNHL (asymmetrical sensorineural hearing loss)     Elevated liver enzymes     Hiatal hernia     Past Medical History:   Diagnosis Date     Cervicalgia      Depression      Dyslipidemia      Dysthymia      Gastritis      GERD (gastroesophageal reflux disease)      Hearing loss      Hiatal hernia      Hypertriglyceridemia      Insomnia      Lacunar stroke (H)      Lumbar canal stenosis      Lumbar radiculopathy      Myalgia and myositis         CC David Fang MD  [address] [facility] [address]  [place] [address] on close of this encounter.

## 2022-06-07 DIAGNOSIS — I10 BENIGN ESSENTIAL HYPERTENSION: ICD-10-CM

## 2022-06-07 DIAGNOSIS — Z76.0 ENCOUNTER FOR MEDICATION REFILL: Primary | ICD-10-CM

## 2022-06-07 RX ORDER — DULOXETIN HYDROCHLORIDE 60 MG/1
60 CAPSULE, DELAYED RELEASE ORAL DAILY
Qty: 90 CAPSULE | Refills: 3 | Status: SHIPPED | OUTPATIENT
Start: 2022-06-07 | End: 2022-08-19 | Stop reason: ALTCHOICE

## 2022-06-07 RX ORDER — MAGNESIUM OXIDE 400 MG/1
400 TABLET ORAL DAILY
Qty: 90 TABLET | Refills: 3 | Status: SHIPPED | OUTPATIENT
Start: 2022-06-07 | End: 2024-05-22

## 2022-07-12 ENCOUNTER — PATIENT OUTREACH (OUTPATIENT)
Dept: GERIATRIC MEDICINE | Facility: CLINIC | Age: 72
End: 2022-07-12

## 2022-07-12 NOTE — PROGRESS NOTES
[facility] Care Coordination Contact      [facility] Six-Month Telephone Assessment    6 month telephone assessment completed on 7/12/22.    ER visits: No  Hospitalizations: No  TCU stays: No  Significant health status changes: `na  Falls/Injuries: No  ADL/IADL changes: No  Changes in services: No    Caregiver Assessment follow up:  na    Goals: See POC in chart for goal progress documentation.      Will see member in 6 months for an annual health risk assessment.   Encouraged member to call CC with any questions or concerns in the meantime.     Savanna Landaverde RN, PHN  [facility]  [phone]

## 2022-07-26 DIAGNOSIS — K21.9 GASTROESOPHAGEAL REFLUX DISEASE WITHOUT ESOPHAGITIS: ICD-10-CM

## 2022-07-26 DIAGNOSIS — Z76.0 ENCOUNTER FOR MEDICATION REFILL: Primary | ICD-10-CM

## 2022-07-26 DIAGNOSIS — I10 BENIGN ESSENTIAL HYPERTENSION: ICD-10-CM

## 2022-07-26 DIAGNOSIS — I63.02 CEREBROVASCULAR ACCIDENT (CVA) DUE TO THROMBOSIS OF BASILAR ARTERY (H): ICD-10-CM

## 2022-07-26 RX ORDER — CLOPIDOGREL BISULFATE 75 MG/1
75 TABLET ORAL DAILY
Qty: 90 TABLET | Refills: 3 | Status: SHIPPED | OUTPATIENT
Start: 2022-07-26 | End: 2022-11-25

## 2022-07-26 RX ORDER — METOPROLOL TARTRATE 25 MG/1
25 TABLET, FILM COATED ORAL 2 TIMES DAILY
Qty: 180 TABLET | Refills: 3 | Status: SHIPPED | OUTPATIENT
Start: 2022-07-26 | End: 2022-08-19

## 2022-08-09 ENCOUNTER — OFFICE VISIT (OUTPATIENT)
Dept: DERMATOLOGY | Facility: CLINIC | Age: 72
End: 2022-08-09
Payer: COMMERCIAL

## 2022-08-09 DIAGNOSIS — L40.0 PSORIASIS VULGARIS: Primary | ICD-10-CM

## 2022-08-09 PROCEDURE — 99213 OFFICE O/P EST LOW 20 MIN: CPT | Performed by: DERMATOLOGY

## 2022-08-09 RX ORDER — CALCIPOTRIENE 50 UG/G
OINTMENT TOPICAL
Qty: 90 G | Refills: 3 | Status: SHIPPED | OUTPATIENT
Start: 2022-08-09 | End: 2022-12-07

## 2022-08-09 RX ORDER — BETAMETHASONE DIPROPIONATE 0.05 %
OINTMENT (GRAM) TOPICAL
Qty: 45 G | Refills: 3 | Status: SHIPPED | OUTPATIENT
Start: 2022-08-09 | End: 2022-12-07

## 2022-08-09 ASSESSMENT — PAIN SCALES - GENERAL: PAINLEVEL: NO PAIN (0)

## 2022-08-09 NOTE — PATIENT INSTRUCTIONS
Plan:   - Apply calcipotriene to rash on back Monday-Friday   - Apply betamethasone to rash on back Saturday and Sunday    Continue above regimen as needed.       Follow up visit in 3-6mo.

## 2022-08-09 NOTE — NURSING NOTE
Dermatology Rooming Note    Chasity Gaspar's goals for this visit include:   Chief Complaint   Patient presents with     Psoriasis     Greta Davis, Visit Facilitator

## 2022-08-09 NOTE — LETTER
8/9/2022       RE: Chasity Gaspar  [address] [place] [address]     Dear Colleague,    Thank you for referring your patient, Chasity Gaspar, to the [facility] DERMATOLOGY [facility] [place] at [facility]. Please see a copy of my visit note below.    [facility] Dermatology Note  Encounter Date: Aug 9, 2022  Office Visit     Dermatology Problem List:  1. Psoriasis   2. Fixed drug eruption     ____________________________________________    Assessment & Plan:    # Psoriasis vulgaris. Chronic, stable/improved.   Scaly, lichenified plaque, right upper back which has been present for 2 years. Biopsy completed consistent with psoriasis. Appearance of plaque has significantly improved with betamethasone 0.05% ointment. Discussed with patient chronic nature of psoriasis lesions (waxing and waning). With significant improvement of lesion would start tapering topical steroid. If he continues to have frequent or bothersome flares of his psoriasis could consider pursuing other treatments (including systemic therapy).   - Patient has been using betamethasone 0.05% ointment daily, plaque has since gotten much smaller  - Refill betamethasone 0.05% ointment sent today, may apply to affected area twice daily on weekends  - Ordered calcipotriene cream to apply to affected areas on weekdays  - Follow-up 4 months       Procedures Performed:   None      Follow-up: 3-6 month(s) in-person, or earlier for new or changing lesions    Staff and Scribe:     Scribe Disclosure:  I, Zak Huber, am serving as a scribe to document services personally performed by Leon Orlando MD based on data collection and the provider's statements to me.     Pt seen and discussed with attending physician, Dr Johanny Swanson, MS4    Provider Disclosure:   The documentation recorded by the scribe accurately reflects the services I personally performed and the decisions made by  me.    Staff Physician:  I was present with the medical student who participated in the service and in the documentation of the note. I have verified the history and personally performed the physical exam and medical decision making. I agree with the assessment and plan of care as documented in the note.     Leon Orlando MD  Pronouns: he/him/his    Department of Dermatology  [facility]: Phone: [phone], Fax:[phone]  [facility]: Phone: [phone] Fax: [phone]  ____________________________________________    CC: Psoriasis    HPI:  Mr. Chasity Gaspar is a(n) 72 year old male who presents today as a return patient for psoriasis. Last seen by me on 5/4/2022, at which time patient was instructed to taper betamethasone 0.05% ointment for treatment of psoriasis vulgaris. Additionally, patient was discontinued on betamethasone ointment for treatment of fixed drug eruption.    Today, patient reports great improvement of psoriatic plaque on posterior right shoulder.  Patient has been applying topical betamethasone 0.05% ointment to affected area daily with great improvement.  Patient recently ran out of the steroid cream about a week ago and would like a refill.    Erythematous patch on left flank has since resolved and now has only residual hyperpigmentation in affected area but is no longer symptomatic.    Denies any itching, bleeding/non-healing, rapidly-evolving, or otherwise concerning lesions today.  Patient is otherwise feeling well, without additional skin concerns.    Labs Reviewed:  N/A    Physical Exam:  Vitals: There were no vitals taken for this visit.  SKIN: Total skin excluding the undergarment areas was performed. The exam included the head/face, neck, both arms, chest, back, abdomen, both legs, digits and/or nails.   -Small 1 cm erythematous plaque on posterior right  shoulder, greatly improved from prior  - 4 cm hyperpigmented patch on left flank with small hyperpigmented papule, no noticeable residual erythema  - No other lesions of concern on areas examined.     Medications:  Current Outpatient Medications   Medication     acetaminophen 500 mg coapsule     allopurinol (ZYLOPRIM) 300 MG tablet     allopurinoL (ZYLOPRIM) 300 MG tablet     allopurinoL (ZYLOPRIM) 300 MG tablet     atorvastatin (LIPITOR) 40 MG tablet     atorvastatin (LIPITOR) 40 MG tablet     atorvastatin (LIPITOR) 40 MG tablet     augmented betamethasone dipropionate (DIPROLENE-AF) 0.05 % external ointment     betamethasone dipropionate (DIPROSONE) 0.05 % external ointment     calcipotriene (DOVONOX) 0.005 % external ointment     clobetasol (TEMOVATE) 0.05 % ointment     clopidogrel (PLAVIX) 75 MG tablet     clopidogrel (PLAVIX) 75 MG tablet     clopidogreL (PLAVIX) 75 mg tablet     diclofenac (VOLTAREN) 1 % topical gel     diclofenac (VOLTAREN) 75 MG EC tablet     DULoxetine (CYMBALTA) 60 MG capsule     DULoxetine (CYMBALTA) 60 MG capsule     dutasteride (AVODART) 0.5 MG capsule     ergocalciferol (ERGOCALCIFEROL) 1.25 MG (12191 UT) capsule     ergocalciferol (ERGOCALCIFEROL) 1.25 MG (58826 UT) capsule     gabapentin (NEURONTIN) 300 MG capsule     gabapentin (NEURONTIN) 300 MG capsule     gabapentin (NEURONTIN) 300 MG capsule     hydrOXYzine pamoate (VISTARIL) 50 MG capsule     lidocaine (XYLOCAINE) 5 % external ointment     magnesium oxide (MAG-OX) 400 mg (241.3 mg magnesium) tablet     magnesium oxide (MAG-OX) 400 MG tablet     meclizine (ANTIVERT) 25 mg tablet     melatonin 3 mg Tab tablet     methocarbamol (ROBAXIN) 500 MG tablet     methylPREDNISolone (MEDROL DOSEPAK) 4 MG tablet therapy pack     metoprolol tartrate (LOPRESSOR) 25 MG tablet     metoprolol tartrate (LOPRESSOR) 25 MG tablet     metoprolol tartrate (LOPRESSOR) 25 MG tablet     mirtazapine (REMERON) 15 MG tablet     omeprazole (PRILOSEC) 20 MG  capsule     omeprazole (PRILOSEC) 20 MG DR capsule     omeprazole (PRILOSEC) 20 MG DR capsule     polyethylene glycol (MIRALAX) 17 GM/Dose powder     polyethylene glycol (MIRALAX) 17 gram packet     tamsulosin (FLOMAX) 0.4 MG capsule     No current facility-administered medications for this visit.      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     Abdominal Pain In The Right Lower Belly (RLQ)     Abdominal Pain In The Right Upper Belly (RUQ)     Memory Lapses Or Loss     Hyperlipidemia     Hearing Loss     Lacunar Stroke     Spinal Stenosis     Radiculopathy, lumbar region     Cervicalgia     Dysthymia (Depressive Neurosis), Primary     Essential Hypertriglyceridemia     Hiatal Hernia     Lumbar Canal Stenosis     Lower Back Pain     Vitamin D Deficiency     Moderate Recurrent Major Depression     Esophageal reflux     Gastritis     Constipation     Gout     Dermatitis     Elbow swelling, right     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

## 2022-08-09 NOTE — PROGRESS NOTES
[facility] Dermatology Note  Encounter Date: Aug 9, 2022  Office Visit     Dermatology Problem List:  1. Psoriasis   2. Fixed drug eruption     ____________________________________________    Assessment & Plan:    # Psoriasis vulgaris. Chronic, stable/improved.   Scaly, lichenified plaque, right upper back which has been present for 2 years. Biopsy completed consistent with psoriasis. Appearance of plaque has significantly improved with betamethasone 0.05% ointment. Discussed with patient chronic nature of psoriasis lesions (waxing and waning). With significant improvement of lesion would start tapering topical steroid. If he continues to have frequent or bothersome flares of his psoriasis could consider pursuing other treatments (including systemic therapy).   - Patient has been using betamethasone 0.05% ointment daily, plaque has since gotten much smaller  - Refill betamethasone 0.05% ointment sent today, may apply to affected area twice daily on weekends  - Ordered calcipotriene cream to apply to affected areas on weekdays  - Follow-up 4 months       Procedures Performed:   None      Follow-up: 3-6 month(s) in-person, or earlier for new or changing lesions    Staff and Scribe:     Scribe Disclosure:  I, Zak Huber, am serving as a scribe to document services personally performed by Leon Orlando MD based on data collection and the provider's statements to me.     Pt seen and discussed with attending physician, Dr Johanny Swanson, MS4    Provider Disclosure:   The documentation recorded by the scribe accurately reflects the services I personally performed and the decisions made by me.    Staff Physician:  I was present with the medical student who participated in the service and in the documentation of the note. I have verified the history and personally performed the physical exam and medical decision making. I agree with the assessment and plan of care as documented in the  note.     Leon Orlando MD  Pronouns: he/him/his    Department of Dermatology  [facility]: Phone: [phone], Fax:[phone]  [facility]: Phone: [phone] Fax: [phone]  ____________________________________________    CC: Psoriasis    HPI:  Mr. Chasity Gaspar is a(n) 72 year old male who presents today as a return patient for psoriasis. Last seen by me on 5/4/2022, at which time patient was instructed to taper betamethasone 0.05% ointment for treatment of psoriasis vulgaris. Additionally, patient was discontinued on betamethasone ointment for treatment of fixed drug eruption.    Today, patient reports great improvement of psoriatic plaque on posterior right shoulder.  Patient has been applying topical betamethasone 0.05% ointment to affected area daily with great improvement.  Patient recently ran out of the steroid cream about a week ago and would like a refill.    Erythematous patch on left flank has since resolved and now has only residual hyperpigmentation in affected area but is no longer symptomatic.    Denies any itching, bleeding/non-healing, rapidly-evolving, or otherwise concerning lesions today.  Patient is otherwise feeling well, without additional skin concerns.    Labs Reviewed:  N/A    Physical Exam:  Vitals: There were no vitals taken for this visit.  SKIN: Total skin excluding the undergarment areas was performed. The exam included the head/face, neck, both arms, chest, back, abdomen, both legs, digits and/or nails.   -Small 1 cm erythematous plaque on posterior right shoulder, greatly improved from prior  - 4 cm hyperpigmented patch on left flank with small hyperpigmented papule, no noticeable residual erythema  - No other lesions of concern on areas examined.     Medications:  Current Outpatient Medications   Medication     acetaminophen 500 mg coapsule     allopurinol  (ZYLOPRIM) 300 MG tablet     allopurinoL (ZYLOPRIM) 300 MG tablet     allopurinoL (ZYLOPRIM) 300 MG tablet     atorvastatin (LIPITOR) 40 MG tablet     atorvastatin (LIPITOR) 40 MG tablet     atorvastatin (LIPITOR) 40 MG tablet     augmented betamethasone dipropionate (DIPROLENE-AF) 0.05 % external ointment     betamethasone dipropionate (DIPROSONE) 0.05 % external ointment     calcipotriene (DOVONOX) 0.005 % external ointment     clobetasol (TEMOVATE) 0.05 % ointment     clopidogrel (PLAVIX) 75 MG tablet     clopidogrel (PLAVIX) 75 MG tablet     clopidogreL (PLAVIX) 75 mg tablet     diclofenac (VOLTAREN) 1 % topical gel     diclofenac (VOLTAREN) 75 MG EC tablet     DULoxetine (CYMBALTA) 60 MG capsule     DULoxetine (CYMBALTA) 60 MG capsule     dutasteride (AVODART) 0.5 MG capsule     ergocalciferol (ERGOCALCIFEROL) 1.25 MG (17577 UT) capsule     ergocalciferol (ERGOCALCIFEROL) 1.25 MG (41389 UT) capsule     gabapentin (NEURONTIN) 300 MG capsule     gabapentin (NEURONTIN) 300 MG capsule     gabapentin (NEURONTIN) 300 MG capsule     hydrOXYzine pamoate (VISTARIL) 50 MG capsule     lidocaine (XYLOCAINE) 5 % external ointment     magnesium oxide (MAG-OX) 400 mg (241.3 mg magnesium) tablet     magnesium oxide (MAG-OX) 400 MG tablet     meclizine (ANTIVERT) 25 mg tablet     melatonin 3 mg Tab tablet     methocarbamol (ROBAXIN) 500 MG tablet     methylPREDNISolone (MEDROL DOSEPAK) 4 MG tablet therapy pack     metoprolol tartrate (LOPRESSOR) 25 MG tablet     metoprolol tartrate (LOPRESSOR) 25 MG tablet     metoprolol tartrate (LOPRESSOR) 25 MG tablet     mirtazapine (REMERON) 15 MG tablet     omeprazole (PRILOSEC) 20 MG capsule     omeprazole (PRILOSEC) 20 MG DR capsule     omeprazole (PRILOSEC) 20 MG DR capsule     polyethylene glycol (MIRALAX) 17 GM/Dose powder     polyethylene glycol (MIRALAX) 17 gram packet     tamsulosin (FLOMAX) 0.4 MG capsule     No current facility-administered medications for this visit.      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     Abdominal Pain In The Right Lower Belly (RLQ)     Abdominal Pain In The Right Upper Belly (RUQ)     Memory Lapses Or Loss     Hyperlipidemia     Hearing Loss     Lacunar Stroke     Spinal Stenosis     Radiculopathy, lumbar region     Cervicalgia     Dysthymia (Depressive Neurosis), Primary     Essential Hypertriglyceridemia     Hiatal Hernia     Lumbar Canal Stenosis     Lower Back Pain     Vitamin D Deficiency     Moderate Recurrent Major Depression     Esophageal reflux     Gastritis     Constipation     Gout     Dermatitis     Elbow swelling, right     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)     Left hemiparesis (H)     ASNHL (asymmetrical sensorineural hearing loss)     Elevated liver enzymes     Hiatal hernia     Past Medical History:   Diagnosis Date     Cervicalgia      Depression      Dyslipidemia      Dysthymia      Gastritis      GERD (gastroesophageal reflux disease)      Hearing loss      Hiatal hernia      Hypertriglyceridemia      Insomnia      Lacunar stroke (H)      Lumbar canal stenosis      Lumbar radiculopathy      Myalgia and myositis         CC David Fang MD  [address] [facility] [address]  [place] [address] on close of this encounter.

## 2022-08-18 ENCOUNTER — PATIENT OUTREACH (OUTPATIENT)
Dept: GERIATRIC MEDICINE | Facility: CLINIC | Age: 72
End: 2022-08-18

## 2022-08-19 ENCOUNTER — OFFICE VISIT (OUTPATIENT)
Dept: FAMILY MEDICINE | Facility: CLINIC | Age: 72
End: 2022-08-19
Payer: COMMERCIAL

## 2022-08-19 VITALS
BODY MASS INDEX: 24.54 KG/M2 | HEART RATE: 86 BPM | WEIGHT: 125 LBS | OXYGEN SATURATION: 95 % | SYSTOLIC BLOOD PRESSURE: 156 MMHG | HEIGHT: 60 IN | TEMPERATURE: 98.3 F | DIASTOLIC BLOOD PRESSURE: 86 MMHG | RESPIRATION RATE: 18 BRPM

## 2022-08-19 DIAGNOSIS — R39.16 BENIGN PROSTATIC HYPERPLASIA (BPH) WITH STRAINING ON URINATION: ICD-10-CM

## 2022-08-19 DIAGNOSIS — E78.5 HYPERLIPIDEMIA LDL GOAL <100: ICD-10-CM

## 2022-08-19 DIAGNOSIS — N40.1 BENIGN PROSTATIC HYPERPLASIA (BPH) WITH STRAINING ON URINATION: ICD-10-CM

## 2022-08-19 DIAGNOSIS — M48.061 SPINAL STENOSIS, LUMBAR REGION, WITHOUT NEUROGENIC CLAUDICATION: ICD-10-CM

## 2022-08-19 DIAGNOSIS — K59.00 CONSTIPATION, UNSPECIFIED CONSTIPATION TYPE: ICD-10-CM

## 2022-08-19 DIAGNOSIS — L40.9 PSORIASIS: Primary | ICD-10-CM

## 2022-08-19 DIAGNOSIS — I10 BENIGN ESSENTIAL HYPERTENSION: ICD-10-CM

## 2022-08-19 DIAGNOSIS — Z76.0 ENCOUNTER FOR MEDICATION REFILL: ICD-10-CM

## 2022-08-19 PROCEDURE — 99214 OFFICE O/P EST MOD 30 MIN: CPT | Performed by: FAMILY MEDICINE

## 2022-08-19 RX ORDER — GABAPENTIN 300 MG/1
CAPSULE ORAL
Qty: 270 CAPSULE | Refills: 4 | Status: SHIPPED | OUTPATIENT
Start: 2022-08-19 | End: 2022-11-25

## 2022-08-19 RX ORDER — TAMSULOSIN HYDROCHLORIDE 0.4 MG/1
0.4 CAPSULE ORAL DAILY
Qty: 30 CAPSULE | Refills: 11 | Status: SHIPPED | OUTPATIENT
Start: 2022-08-19 | End: 2022-11-25

## 2022-08-19 RX ORDER — METOPROLOL TARTRATE 25 MG/1
25 TABLET, FILM COATED ORAL 2 TIMES DAILY
Qty: 180 TABLET | Refills: 3 | Status: SHIPPED | OUTPATIENT
Start: 2022-08-19

## 2022-08-19 RX ORDER — POLYETHYLENE GLYCOL 3350 17 G/17G
17 POWDER, FOR SOLUTION ORAL DAILY
Qty: 510 G | Refills: 3 | Status: SHIPPED | OUTPATIENT
Start: 2022-08-19 | End: 2022-11-25

## 2022-08-19 RX ORDER — ATORVASTATIN CALCIUM 40 MG/1
TABLET, FILM COATED ORAL
Qty: 30 TABLET | Refills: 11 | Status: SHIPPED | OUTPATIENT
Start: 2022-08-19 | End: 2022-11-25

## 2022-08-19 ASSESSMENT — PATIENT HEALTH QUESTIONNAIRE - PHQ9
10. IF YOU CHECKED OFF ANY PROBLEMS, HOW DIFFICULT HAVE THESE PROBLEMS MADE IT FOR YOU TO DO YOUR WORK, TAKE CARE OF THINGS AT HOME, OR GET ALONG WITH OTHER PEOPLE: NOT DIFFICULT AT ALL
SUM OF ALL RESPONSES TO PHQ QUESTIONS 1-9: 4
SUM OF ALL RESPONSES TO PHQ QUESTIONS 1-9: 4

## 2022-08-19 NOTE — PROGRESS NOTES
"  Assessment & Plan     Benign essential hypertension    Blood pressure is poorly controlled he did not take his metoprolol today denies any dizziness or chest pain or weakness.  - metoprolol tartrate (LOPRESSOR) 25 MG tablet; Take 1 tablet (25 mg) by mouth 2 times daily    Encounter for medication refill    - metoprolol tartrate (LOPRESSOR) 25 MG tablet; Take 1 tablet (25 mg) by mouth 2 times daily    Benign prostatic hyperplasia (BPH) with straining on urination    He is now urinating more regularly has not noticed any side effects with the Flomax.  I have refilled the medication  - tamsulosin (FLOMAX) 0.4 MG capsule; Take 1 capsule (0.4 mg) by mouth daily    Constipation, unspecified constipation type    Not constipated having 1 bowel movement daily.  - polyethylene glycol (MIRALAX) 17 GM/Dose powder; Take 17 g by mouth daily    Hyperlipidemia LDL goal <100    Taking atorvastatin we will recheck lipid panel at wellness visit next at the clinic  - atorvastatin (LIPITOR) 40 MG tablet; atorvastatin 40 mg tablet    Spinal stenosis, lumbar region, without neurogenic claudication    He states he has back pain but has been ambulating regularly and without aid according to his daughter he has not noticed any side effects of the gabapentin no dizziness noted he also takes duloxetine.  - gabapentin (NEURONTIN) 300 MG capsule; [GABAPENTIN (NEURONTIN) 300 MG CAPSULE] TAKE 1 CAPSULE(300 MG) BY MOUTH THREE TIMES DAILY    Psoriasis    Notes reviewed from dermatology.  He is doing well with the medication and is scratching and itching the area less        BMI:   Estimated body mass index is 26.13 kg/m  as calculated from the following:    Height as of this encounter: 1.473 m (4' 10\").    Weight as of this encounter: 56.7 kg (125 lb).           Return in about 3 months (around 11/19/2022) for Routine preventive.    David Fang MD  [facility] ROSALINE Gaspar is a 72 year old accompanied by his daughter, " "presenting for the following health issues:  Follow Up      HPI   72-year-old male with sensorineural bilateral deafness, lumbar canal stenosis, hypertension, constipation psoriasis here for follow-up he reports he been doing quite well he is no longer itching on his skin.  He has not noticed any dizzy spells.  He thinks his back pains been well controlled he sleeping well at night.  His daughter states that he takes all his medications and she thinks his appetite is normal        Review of Systems   Constitutional, HEENT, cardiovascular, pulmonary, gi and gu systems are negative, except as otherwise noted.      Objective    BP (!) 150/80 (BP Location: Right arm, Patient Position: Sitting, Cuff Size: Adult Regular)   Pulse 86   Temp 98.3  F (36.8  C) (Oral)   Resp 18   Ht 1.473 m (4' 10\")   Wt 56.7 kg (125 lb)   SpO2 95%   BMI 26.13 kg/m    Body mass index is 26.13 kg/m .  Physical Exam   GENERAL: healthy, alert and no distress  EYES: Eyes grossly normal to inspection, PERRL and conjunctivae and sclerae normal  RESP: lungs clear to auscultation - no rales, rhonchi or wheezes  CV: regular rate and rhythm, normal S1 S2, no S3 or S4, no murmur, click or rub, no peripheral edema and peripheral pulses strong  ABDOMEN: soft, nontender, no hepatosplenomegaly, no masses and bowel sounds normal  MS: no gross musculoskeletal defects noted, no edema  SKIN: no suspicious lesions or rashes  NEURO: Normal strength and tone, mentation intact and speech normal  PSYCH: mentation appears normal, affect normal/bright          .  ..  "

## 2022-08-19 NOTE — PROGRESS NOTES
CC updated program tasks and targets for [employer] launch.    Savanna Landaverde RN, N  [facility]  [phone]

## 2022-11-05 ENCOUNTER — HOSPITAL ENCOUNTER (EMERGENCY)
Facility: HOSPITAL | Age: 72
Discharge: HOME | End: 2022-11-06
Attending: EMERGENCY MEDICINE | Admitting: EMERGENCY MEDICINE
Payer: COMMERCIAL

## 2022-11-05 ENCOUNTER — APPOINTMENT (OUTPATIENT)
Dept: MRI IMAGING | Facility: HOSPITAL | Age: 72
End: 2022-11-05
Attending: EMERGENCY MEDICINE
Payer: COMMERCIAL

## 2022-11-05 VITALS
HEART RATE: 81 BPM | OXYGEN SATURATION: 95 % | DIASTOLIC BLOOD PRESSURE: 94 MMHG | SYSTOLIC BLOOD PRESSURE: 172 MMHG | WEIGHT: 125 LBS | BODY MASS INDEX: 24.54 KG/M2 | TEMPERATURE: 98.3 F | HEIGHT: 60 IN | RESPIRATION RATE: 18 BRPM

## 2022-11-05 DIAGNOSIS — M54.2 CERVICALGIA: ICD-10-CM

## 2022-11-05 LAB
ALBUMIN SERPL BCG-MCNC: 4.4 G/DL (ref 3.5–5.2)
ALP SERPL-CCNC: 121 U/L (ref 40–129)
ALT SERPL W P-5'-P-CCNC: 92 U/L (ref 10–50)
ANION GAP SERPL CALCULATED.3IONS-SCNC: 12 MMOL/L (ref 7–15)
AST SERPL W P-5'-P-CCNC: 56 U/L (ref 10–50)
BASOPHILS # BLD AUTO: 0.1 10E3/UL (ref 0–0.2)
BASOPHILS NFR BLD AUTO: 1 %
BILIRUB DIRECT SERPL-MCNC: <0.2 MG/DL (ref 0–0.3)
BILIRUB SERPL-MCNC: 0.3 MG/DL
BUN SERPL-MCNC: 10.9 MG/DL (ref 8–23)
CALCIUM SERPL-MCNC: 9.1 MG/DL (ref 8.8–10.2)
CHLORIDE SERPL-SCNC: 99 MMOL/L (ref 98–107)
CREAT SERPL-MCNC: 0.59 MG/DL (ref 0.67–1.17)
CRP SERPL-MCNC: <3 MG/L
DEPRECATED HCO3 PLAS-SCNC: 25 MMOL/L (ref 22–29)
EOSINOPHIL # BLD AUTO: 0.2 10E3/UL (ref 0–0.7)
EOSINOPHIL NFR BLD AUTO: 2 %
ERYTHROCYTE [DISTWIDTH] IN BLOOD BY AUTOMATED COUNT: 13.9 % (ref 10–15)
ERYTHROCYTE [SEDIMENTATION RATE] IN BLOOD BY WESTERGREN METHOD: 10 MM/HR (ref 0–15)
GFR SERPL CREATININE-BSD FRML MDRD: >90 ML/MIN/1.73M2
GLUCOSE SERPL-MCNC: 113 MG/DL (ref 70–99)
HCT VFR BLD AUTO: 48.3 % (ref 40–53)
HGB BLD-MCNC: 15.7 G/DL (ref 13.3–17.7)
IMM GRANULOCYTES # BLD: 0 10E3/UL
IMM GRANULOCYTES NFR BLD: 0 %
LYMPHOCYTES # BLD AUTO: 1.8 10E3/UL (ref 0.8–5.3)
LYMPHOCYTES NFR BLD AUTO: 18 %
MCH RBC QN AUTO: 26.4 PG (ref 26.5–33)
MCHC RBC AUTO-ENTMCNC: 32.5 G/DL (ref 31.5–36.5)
MCV RBC AUTO: 81 FL (ref 78–100)
MONOCYTES # BLD AUTO: 1.2 10E3/UL (ref 0–1.3)
MONOCYTES NFR BLD AUTO: 12 %
NEUTROPHILS # BLD AUTO: 6.5 10E3/UL (ref 1.6–8.3)
NEUTROPHILS NFR BLD AUTO: 67 %
NRBC # BLD AUTO: 0 10E3/UL
NRBC BLD AUTO-RTO: 0 /100
PLATELET # BLD AUTO: 242 10E3/UL (ref 150–450)
POTASSIUM SERPL-SCNC: 3.9 MMOL/L (ref 3.4–5.3)
PROT SERPL-MCNC: 7.8 G/DL (ref 6.4–8.3)
RBC # BLD AUTO: 5.94 10E6/UL (ref 4.4–5.9)
SODIUM SERPL-SCNC: 136 MMOL/L (ref 136–145)
WBC # BLD AUTO: 9.7 10E3/UL (ref 4–11)

## 2022-11-05 PROCEDURE — 250N000012 HC RX MED GY IP 250 OP 636 PS 637: Performed by: EMERGENCY MEDICINE

## 2022-11-05 PROCEDURE — 82248 BILIRUBIN DIRECT: CPT | Performed by: EMERGENCY MEDICINE

## 2022-11-05 PROCEDURE — 255N000002 HC RX 255 OP 636: Performed by: EMERGENCY MEDICINE

## 2022-11-05 PROCEDURE — A9585 GADOBUTROL INJECTION: HCPCS | Performed by: EMERGENCY MEDICINE

## 2022-11-05 PROCEDURE — 99285 EMERGENCY DEPT VISIT HI MDM: CPT | Mod: 25

## 2022-11-05 PROCEDURE — 72157 MRI CHEST SPINE W/O & W/DYE: CPT

## 2022-11-05 PROCEDURE — 250N000013 HC RX MED GY IP 250 OP 250 PS 637: Performed by: EMERGENCY MEDICINE

## 2022-11-05 PROCEDURE — 85652 RBC SED RATE AUTOMATED: CPT | Performed by: EMERGENCY MEDICINE

## 2022-11-05 PROCEDURE — 72156 MRI NECK SPINE W/O & W/DYE: CPT

## 2022-11-05 PROCEDURE — 36415 COLL VENOUS BLD VENIPUNCTURE: CPT | Performed by: EMERGENCY MEDICINE

## 2022-11-05 PROCEDURE — 85025 COMPLETE CBC W/AUTO DIFF WBC: CPT | Performed by: EMERGENCY MEDICINE

## 2022-11-05 PROCEDURE — 80053 COMPREHEN METABOLIC PANEL: CPT | Performed by: EMERGENCY MEDICINE

## 2022-11-05 PROCEDURE — 86140 C-REACTIVE PROTEIN: CPT | Performed by: EMERGENCY MEDICINE

## 2022-11-05 RX ORDER — METHYLPREDNISOLONE 4 MG
TABLET, DOSE PACK ORAL
Qty: 21 TABLET | Refills: 0 | Status: SHIPPED | OUTPATIENT
Start: 2022-11-05 | End: 2022-11-25 | Stop reason: ALTCHOICE

## 2022-11-05 RX ORDER — MORPHINE SULFATE 4 MG/ML
4 INJECTION, SOLUTION INTRAMUSCULAR; INTRAVENOUS ONCE
Status: DISCONTINUED | OUTPATIENT
Start: 2022-11-05 | End: 2022-11-05

## 2022-11-05 RX ORDER — GADOBUTROL 604.72 MG/ML
6 INJECTION INTRAVENOUS ONCE
Status: COMPLETED | OUTPATIENT
Start: 2022-11-05 | End: 2022-11-05

## 2022-11-05 RX ORDER — HYDROMORPHONE HYDROCHLORIDE 2 MG/1
2 TABLET ORAL EVERY 6 HOURS PRN
Qty: 10 TABLET | Refills: 0 | Status: SHIPPED | OUTPATIENT
Start: 2022-11-05 | End: 2022-11-08

## 2022-11-05 RX ORDER — HYDROMORPHONE HYDROCHLORIDE 2 MG/1
2 TABLET ORAL ONCE
Status: COMPLETED | OUTPATIENT
Start: 2022-11-05 | End: 2022-11-05

## 2022-11-05 RX ORDER — PREDNISONE 20 MG/1
40 TABLET ORAL ONCE
Status: COMPLETED | OUTPATIENT
Start: 2022-11-05 | End: 2022-11-05

## 2022-11-05 RX ORDER — FENTANYL CITRATE 50 UG/ML
100 INJECTION, SOLUTION INTRAMUSCULAR; INTRAVENOUS
Status: DISCONTINUED | OUTPATIENT
Start: 2022-11-05 | End: 2022-11-06 | Stop reason: HOSPADM

## 2022-11-05 RX ADMIN — GADOBUTROL 6 ML: 604.72 INJECTION INTRAVENOUS at 20:02

## 2022-11-05 RX ADMIN — PREDNISONE 40 MG: 20 TABLET ORAL at 22:34

## 2022-11-05 RX ADMIN — HYDROMORPHONE HYDROCHLORIDE 2 MG: 2 TABLET ORAL at 22:34

## 2022-11-05 ASSESSMENT — ACTIVITIES OF DAILY LIVING (ADL)
ADLS_ACUITY_SCORE: 35

## 2022-11-05 NOTE — ED TRIAGE NOTES
Patient arrives with complaints of neck pain that started 2-3 days ago and became worse last night. Numbness and tingling bilateral hands that started today. Denies injury. Has not taken anything for pain. Collar placed.

## 2022-11-05 NOTE — ED PROVIDER NOTES
"EMERGENCY DEPARTMENT NOTE     Name: Chasity Gaspar    Age/Sex: 72 year old male   MRN: [id number]   Evaluation Date & Time:  No admission date for patient encounter.    PCP:    David Fang   ED Provider: Jcarlos Herman D.O.       CHIEF COMPLAINT    Neck Pain       DIAGNOSIS & DISPOSITION     1. Cervicalgia      DISPOSITION: Home    At the conclusion of the encounter I discussed the results of all of the tests and the disposition. The questions were answered. The patient or family acknowledged understanding and was agreeable with the care plan.    TOTAL CRITICAL CARE TIME (EXCLUDING PROCEDURES): Not applicable    PROCEDURES:   None    EMERGENCY DEPARTMENT COURSE/MEDICAL DECISION MAKING   5:25 PM I met with the patient to gather history and to perform my initial exam.  We discussed treatment options and the plan for care while in the Emergency Department.  11:56 PM Plan to discharge.     Chasity Gaspar is a 72 year old year old male with a relevant past history of cervicalgia, lumbar canal stenosis, lumbar radiculopathy, hypertriglyceridemia, lacunar stroke, myalgia and myositis, GERD, s/p aspiration or injection major joint, s/p lumbar transforaminal epidural strd inj, and s/p PICC, who presents to this for evaluation of neck pain.    Triage note reviewed:   Differential  diagnosis  considered included but not limited to cervical spine fracture, discitis, epidural abscess, central cord syndrome  Vital signs:BP (!) 172/94   Pulse 81   Temp 98.3  F (36.8  C) (Oral)   Resp 18   Ht 1.473 m (4' 10\")   Wt 56.7 kg (125 lb)   SpO2 95%   BMI 26.13 kg/m    Pertinent physical exam findings:  HEENT: Tenderness of the lower cervical spine or upper thoracic spine  Neuro: Cranial nerves III through XII intact, 5 out of 5 motor strength of upper and lower extremity.  Has good  strength, normal resisted flexion and extension is able to raise hands overhead although on the right its more difficult secondary to pain intact sensation " light touch but initially complained of paresthesias in both arms  Diagnostic studies:  Imaging:  MR Thoracic Spine w/o & w Contrast   Final Result   IMPRESSION:      CERVICAL SPINE MRI:   1.  Normal cervical cord.   2.  Marked canal narrowing with mild mass effect on the cord and slight AP cord flattening at C3-C4. No definite pathological cord signal.   3.  Moderate to marked canal narrowing at C5-C6 with mild mass effect on the cord and slight AP cord flattening. No definite pathological cord signal.   4.  Mild canal narrowing at C4-C5.   5.  Multilevel neural foraminal narrowing, as described.   6.  Fluid in right facet joint of C2-C3, C3-C4, and C4-C5 level; at C4-C5 with slight patchy high T2 signal in adjacent bones. This is most likely due to degenerative changes and reactive edema; less likely an early infectious facet arthropathy could    have this appearance. Please correlate clinically.       THORACIC SPINE MRI:   1.  Normal thoracic cord.   2.  Mild canal narrowing at T10-T11 and T11-T12.   3.  Suggestion of enhancement of L2. This is incompletely visualized at the very inferior aspect of field-of-view. There is no evidence of corresponding signal abnormality on other sequences. Finding is probably artifactual due to incomplete fat    suppression. However, it is quite conspicuous and abnormality of L2 cannot be completely excluded. If clinically indicated, MRI lumbar spine without and with contrast may be helpful.       MR Cervical Spine w/o & w Contrast   Final Result   IMPRESSION:      CERVICAL SPINE MRI:   1.  Normal cervical cord.   2.  Marked canal narrowing with mild mass effect on the cord and slight AP cord flattening at C3-C4. No definite pathological cord signal.   3.  Moderate to marked canal narrowing at C5-C6 with mild mass effect on the cord and slight AP cord flattening. No definite pathological cord signal.   4.  Mild canal narrowing at C4-C5.   5.  Multilevel neural foraminal narrowing,  as described.   6.  Fluid in right facet joint of C2-C3, C3-C4, and C4-C5 level; at C4-C5 with slight patchy high T2 signal in adjacent bones. This is most likely due to degenerative changes and reactive edema; less likely an early infectious facet arthropathy could    have this appearance. Please correlate clinically.       THORACIC SPINE MRI:   1.  Normal thoracic cord.   2.  Mild canal narrowing at T10-T11 and T11-T12.   3.  Suggestion of enhancement of L2. This is incompletely visualized at the very inferior aspect of field-of-view. There is no evidence of corresponding signal abnormality on other sequences. Finding is probably artifactual due to incomplete fat    suppression. However, it is quite conspicuous and abnormality of L2 cannot be completely excluded. If clinically indicated, MRI lumbar spine without and with contrast may be helpful.          Lab:  Labs Ordered and Resulted from Time of ED Arrival to Time of ED Departure   BASIC METABOLIC PANEL - Abnormal       Result Value    Sodium 136      Potassium 3.9      Chloride 99      Carbon Dioxide (CO2) 25      Anion Gap 12      Urea Nitrogen 10.9      Creatinine 0.59 (*)     Calcium 9.1      Glucose 113 (*)     GFR Estimate >90     CBC WITH PLATELETS AND DIFFERENTIAL - Abnormal    WBC Count 9.7      RBC Count 5.94 (*)     Hemoglobin 15.7      Hematocrit 48.3      MCV 81      MCH 26.4 (*)     MCHC 32.5      RDW 13.9      Platelet Count 242      % Neutrophils 67      % Lymphocytes 18      % Monocytes 12      % Eosinophils 2      % Basophils 1      % Immature Granulocytes 0      NRBCs per 100 WBC 0      Absolute Neutrophils 6.5      Absolute Lymphocytes 1.8      Absolute Monocytes 1.2      Absolute Eosinophils 0.2      Absolute Basophils 0.1      Absolute Immature Granulocytes 0.0      Absolute NRBCs 0.0     HEPATIC FUNCTION PANEL - Abnormal    Protein Total 7.8      Albumin 4.4      Bilirubin Total 0.3      Alkaline Phosphatase 121      AST 56 (*)     ALT 92  (*)     Bilirubin Direct <0.20     CRP INFLAMMATION - Normal    CRP Inflammation <3.00     ERYTHROCYTE SEDIMENTATION RATE AUTO - Normal    Erythrocyte Sedimentation Rate 10        Interventions: Fentanyl, oral hydromorphone, prednisone  Medical decision making: Patient's pain is improved.  Patient remains neurologically intact without upper extremity weakness.  MRI demonstrated degenerative changes, spinal stenosis but no central cord syndrome or abnormal cord signal abnormality.  CRP and sed rate not elevated and low suspicion for discitis.  Discussed case with neurosurgery and reviewed MRI results and he is felt to be appropriate for outpatient follow-up.  Patient will be discharged on a Medrol Dosepak, continue hydromorphone as needed for pain management.  Return criteria discussed and if uncontrolled pain or progressive neurologic symptoms including numbness or weakness in her arms will return to the emergency department.  Medical Decision Making    Supplemental history from: Family Member    External Record(s) Reviewed: Inpatient Record, Outpatient Record and Outside ED Record    Differential Diagnosis: See MDM charting for differential considered.     I performed an independent interpretation of the: N/A    Discussed with radiology regarding test interpretation: N/A    Discussion of management with another provider: See ED Course and Other:NEurosurgery    The following testing was considered but ultimately not selected: None     I considered prescription management with: Symptomatic Management    The patient's care impacted: None    Consideration of Admission/Observation: Admission/Observation considered but ultimately discharged: to home    Care significantly affected by Social Determinants of Health including: N/A    ED INTERVENTIONS     Medications   fentaNYL (PF) (SUBLIMAZE) injection 100 mcg (has no administration in time range)   gadobutrol (GADAVIST) injection 6 mL (6 mLs Intravenous Given 11/5/22 2002)    predniSONE (DELTASONE) tablet 40 mg (40 mg Oral Given 11/5/22 2234)   HYDROmorphone (DILAUDID) tablet 2 mg (2 mg Oral Given 11/5/22 2234)       DISCHARGE MEDICATIONS        Review of your medicines      UNREVIEWED medicines. Ask your doctor about these medicines      Dose / Directions   acetaminophen 500 MG Caps      Dose: 2 tablet  [ACETAMINOPHEN 500 MG COAPSULE] Take 2 tablets by mouth 3 (three) times a day as needed for fever or pain.  Refills: 0     * allopurinol 300 MG tablet  Commonly known as: ZYLOPRIM  Used for: Gout      Dose: 300 mg  [ALLOPURINOL (ZYLOPRIM) 300 MG TABLET] Take 1 tablet (300 mg total) by mouth daily.  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, unspecified laterality      allopurinol 300 mg tablet  Quantity: 30 tablet  Refills: 11     * atorvastatin 40 MG tablet  Commonly known as: LIPITOR  Used for: Hyperlipidemia      [ATORVASTATIN (LIPITOR) 40 MG TABLET] TAKE 1 TABLET(40 MG) BY MOUTH AT BEDTIME  Quantity: 90 tablet  Refills: 3     * atorvastatin 40 MG tablet  Commonly known as: LIPITOR  Used for: Hyperlipidemia      [ATORVASTATIN (LIPITOR) 40 MG TABLET] TAKE 1 TABLET(40 MG) BY MOUTH AT BEDTIME  Quantity: 90 tablet  Refills: 3     * atorvastatin 40 MG tablet  Commonly known as: LIPITOR  Used for: Hyperlipidemia LDL goal <100      atorvastatin 40 mg tablet  Quantity: 30 tablet  Refills: 11     augmented betamethasone dipropionate 0.05 % external ointment  Commonly known as: DIPROLENE-AF  Used for: Dermatitis      Apply twice daily as needed for rash on the trunk or extremities. Do not apply to face.  Quantity: 50 g  Refills: 11     betamethasone dipropionate 0.05 % external ointment  Commonly  known as: DIPROSONE  Used for: Psoriasis vulgaris      Apply to rash on back twice daily on Saturday and Sunday. (Monday-Friday, use calcipotriene ointment).  Quantity: 45 g  Refills: 3     calcipotriene 0.005 % external ointment  Commonly known as: DOVONOX  Used for: Psoriasis vulgaris      Apply to rash on back twice daily Monday-Friday; then use betamethasone on weekends. Continue alternating regimen as needed.  Quantity: 90 g  Refills: 3     clobetasol 0.05 % external ointment  Commonly known as: TEMOVATE      Dose: 1 Application  [CLOBETASOL (TEMOVATE) 0.05 % OINTMENT] Apply 1 application topically 2 (two) times a day as needed.  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)      Dose: 75 mg  Take 1 tablet (75 mg) by mouth daily  Quantity: 90 tablet  Refills: 3     diclofenac 1 % topical gel  Commonly known as: VOLTAREN  Used for: Spinal stenosis, lumbar region, without neurogenic claudication, Radiculopathy, lumbar region      Dose: 2 g  Apply 2 g topically 4 times daily  Quantity: 350 g  Refills: 3     diclofenac 75 MG EC tablet  Commonly known as: VOLTAREN  Used for: Idiopathic gout, unspecified chronicity, unspecified site      [DICLOFENAC (VOLTAREN) 75 MG EC TABLET] TAKE 1 TABLET(75 MG) BY MOUTH TWICE DAILY  Quantity: 60 tablet  Refills: 0     DULoxetine 60 MG capsule  Commonly known as: CYMBALTA  Used for: Major depressive disorder, recurrent episode, moderate (H)      [DULOXETINE (CYMBALTA) 60 MG CAPSULE]  TAKE 1 CAPSULE(60 MG) BY MOUTH DAILY  Quantity: 90 capsule  Refills: 3     dutasteride 0.5 MG capsule  Commonly known as: AVODART  Used for: Benign prostatic hyperplasia (BPH) with straining on urination      Dose: 0.5 mg  Take 1 capsule (0.5 mg) by mouth daily  Quantity: 30 capsule  Refills: 4     * ergocalciferol 1.25 MG (54437 UT) capsule  Commonly known as: ERGOCALCIFEROL  Used for: Spinal stenosis, lumbar region, without neurogenic claudication      ergocalciferol (vitamin D2) 1,250 mcg (50,000 unit) capsule  Quantity: 30 capsule  Refills: 11     * ergocalciferol 1.25 MG (79005 UT) capsule  Commonly known as: ERGOCALCIFEROL  Used for: Vitamin D deficiency      [ERGOCALCIFEROL (ERGOCALCIFEROL) 1,250 MCG (50,000 UNIT) CAPSULE] TAKE 1 CAPSULE BY MOUTH WEEKLY  Quantity: 12 capsule  Refills: 3     * gabapentin 300 MG capsule  Commonly known as: NEURONTIN      Refills: 0     * gabapentin 300 MG capsule  Commonly known as: NEURONTIN  Used for: Lumbar radiculopathy      Take 1 cap at bedtime, increase by 1 cap every three days, max dose 2 caps tid  Quantity: 180 capsule  Refills: 0     * gabapentin 300 MG capsule  Commonly known as: NEURONTIN  Used for: Spinal stenosis, lumbar region, without neurogenic claudication      [GABAPENTIN (NEURONTIN) 300 MG CAPSULE] TAKE 1 CAPSULE(300 MG) BY MOUTH THREE TIMES DAILY  Quantity: 270 capsule  Refills: 4     hydrOXYzine 50 MG capsule  Commonly known as: VISTARIL  Used for: Sleep disorder      [HYDROXYZINE PAMOATE (VISTARIL) 50 MG CAPSULE] TAKE 1 CAPSULE BY MOUTH AT BEDTIME  Quantity: 30 capsule  Refills: 10     lidocaine 5 % external ointment  Commonly known as: XYLOCAINE  Used for: Spinal stenosis, lumbar region, without neurogenic claudication, Radiculopathy, lumbar region      Apply topically 2 times daily  Quantity: 240 g  Refills: 3     magnesium oxide 400 MG tablet  Commonly known as: MAG-OX  Used for: 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  Quantity: 30 tablet  Refills: 0     * metoprolol tartrate 25 MG tablet  Commonly known as: LOPRESSOR  Used for: Benign essential hypertension      [METOPROLOL TARTRATE (LOPRESSOR) 25 MG TABLET] TAKE 1 TABLET BY MOUTH TWICE DAILY  Quantity: 180 tablet  Refills: 3     * metoprolol tartrate 25 MG tablet  Commonly known as: LOPRESSOR  Used for: Benign essential hypertension, Encounter for medication refill      Dose: 25 mg  Take 1 tablet (25 mg) by mouth 2 times daily  Quantity: 180 tablet  Refills: 3     mirtazapine 15 MG tablet  Commonly known as: REMERON  Used for: Depressed      [MIRTAZAPINE (REMERON) 15 MG TABLET] TAKE 1 TABLET(15 MG) BY MOUTH AT BEDTIME  Quantity: 90 tablet  Refills: 3     * omeprazole 20 MG DR capsule  Commonly known as: priLOSEC  Used for: Gastroesophageal reflux disease without esophagitis      [OMEPRAZOLE (PRILOSEC) 20 MG CAPSULE] TAKE 2 CAPSULES(40 MG) BY MOUTH DAILY  Quantity: 180 capsule  Refills: 1     * omeprazole 20 MG DR capsule  Commonly known as: priLOSEC      Refills: 0     * omeprazole 20 MG DR capsule  Commonly known as: priLOSEC  Used for: Encounter for medication refill, Gastroesophageal reflux disease without esophagitis      [OMEPRAZOLE (PRILOSEC) 20 MG CAPSULE] TAKE 2 CAPSULES(40 MG) BY MOUTH DAILY  Quantity: 180 capsule  Refills: 3     * polyethylene glycol 17 GM/Dose powder  Commonly known as: MIRALAX  Used for:  Constipation, unspecified constipation type      Dose: 17 g  Take 17 g by mouth daily  Quantity: 510 g  Refills: 11     * polyethylene glycol 17 GM/Dose powder  Commonly known as: MIRALAX  Used for: Constipation, unspecified constipation type      Dose: 17 g  Take 17 g by mouth daily  Quantity: 510 g  Refills: 3     tamsulosin 0.4 MG capsule  Commonly known as: FLOMAX  Used for: Benign prostatic hyperplasia (BPH) with straining on urination      Dose: 0.4 mg  Take 1 capsule (0.4 mg) by mouth daily  Quantity: 30 capsule  Refills: 11         * This list has 21 medication(s) that are the same as other medications prescribed for you. Read the directions carefully, and ask your doctor or other care provider to review them with you.            START taking      Dose / Directions   HYDROmorphone 2 MG tablet  Commonly known as: DILAUDID      Dose: 2 mg  Take 1 tablet (2 mg) by mouth every 6 hours as needed for pain  Quantity: 10 tablet  Refills: 0        CONTINUE these medicines which have NOT CHANGED      Dose / Directions   melatonin 3 MG tablet  Used for: Insomnia      [MELATONIN 3 MG TAB TABLET] TAKE 1 TABLET(3 MG) BY MOUTH AT BEDTIME AS NEEDED  Quantity: 100 tablet  Refills: 2     methylPREDNISolone 4 MG tablet therapy pack  Commonly known as: MEDROL DOSEPAK      Follow Package Directions  Quantity: 21 tablet  Refills: 0           Where to get your medicines      Some of these will need a paper prescription and others can be bought over the counter. Ask your nurse if you have questions.    Bring a paper prescription for each of these medications    HYDROmorphone 2 MG tablet    methylPREDNISolone 4 MG tablet therapy pack           INFORMATION SOURCE AND LIMITATIONS    History/Exam limitations: None   Patient information was obtained from: Patient   Use of : N/A    HISTORY OF PRESENT ILLNESS   Chasity Gaspar is a 72 year old year old male with a relevant past history of cervicalgia, lumbar canal stenosis, lumbar  radiculopathy, hypertriglyceridemia, lacunar stroke, myalgia and myositis, GERD, s/p aspiration or injection major joint, s/p lumbar transforaminal epidural strd inj, and s/p PICC, who presents to this ED by walk in accompanied by family member for evaluation of neck pain.    Patient reports of posterior neck pain located to the lower right region with an onset of yesterday. The patient denies any falls or trauma. The patient denies any URI symptoms, including no fevers, chills, or cough. He does note of having numbness and weakness bilaterally to his arms. He has not taken any medication for the pain. He otherwise denies any other symptoms or complaints at this time.     REVIEW OF SYSTEMS:   Constitutional: Negative for  fever.   HENT: Negative for URI symptoms or sore throat.    Cardiac: Negative for  chest pain,palpitations, near syncope or syncope  Respiratory: Negative for cough and shortness of breath.    Gastrointestinal: Negative for abdominal pain, nausea, vomiting, constipation, diarrhea, rectal bleeding or melena.  Genitourinary: Negative for dysuria, flank pain and hematuria.   Musculoskeletal: Positive for neck pain. Negative for back pain and falls.   Skin: Negative for  rash  Neurological: Positive for numbness and weakness. Negative for dizziness, headache, syncope, speech difficulty, or imbalance with walking.   Hematological: Negative for adenopathy. Does not bruise/bleed easily.   Psychiatric/Behavioral: Negative for confusion.       PATIENT HISTORY     Past Medical History:   Diagnosis Date     Cervicalgia      Depression      Dyslipidemia      Dysthymia      Gastritis      GERD (gastroesophageal reflux disease)      Hearing loss      Hiatal hernia      Hypertriglyceridemia      Insomnia      Lacunar stroke (H)      Lumbar canal stenosis      Lumbar radiculopathy      Myalgia and myositis      Patient Active Problem List   Diagnosis     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      Abdominal Pain In The Right Lower Belly (RLQ)     Abdominal Pain In The Right Upper Belly (RUQ)     Memory Lapses Or Loss     Hyperlipidemia     Hearing Loss     Lacunar Stroke     Spinal Stenosis     Radiculopathy, lumbar region     Cervicalgia     Dysthymia (Depressive Neurosis), Primary     Essential Hypertriglyceridemia     Hiatal Hernia     Lumbar Canal Stenosis     Lower Back Pain     Vitamin D Deficiency     Moderate Recurrent Major Depression     Esophageal reflux     Gastritis     Constipation     Gout     Dermatitis     Elbow swelling, right     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)     Left hemiparesis (H)     ASNHL (asymmetrical sensorineural hearing loss)     Elevated liver enzymes     Hiatal hernia     Past Surgical History:   Procedure Laterality Date     IR CEREBRAL ANGIOGRAM  4/27/2017     IR LUMBAR TRANSFORAMINAL EPIDURAL STRD INJ  7/2/2012     PICC  4/28/2017          CT ARTHRODESIS ANT INTERBODY MIN DISCECTOMY,LUMBAR      Description: Lumbar Vertebral Fusion;  Recorded: 07/16/2013;  Comments: 3/17/11 spinal decompression and fusion L4-5, L5-S1 for spinal stenosis, DDD, spondylolysis; Dr. [facility]illas [facility]     CT ESOPHAGOGASTRODUODENOSCOPY TRANSORAL DIAGNOSTIC      Description: Esophagogastroduodenoscopy;  Proc Date: 04/02/2012;  Comments: biosies:   reactive gastropathy with chronic superimposed nonspecific chronic inflammation (likely secondary to ASA, NSAIDS, EtOH or other irritants), NEG for h. pylori; small hiatal hernia     CT INJECT ANES/STEROID FORAMEN LUMBAR/SACRAL W IMG GUIDE ,1 LEVEL      Description: Nerve Block Transforaminal Epidural Lumbar L3 - L4;  Recorded: 07/10/2012;  Comments: 7/2/2012 for severe low back pain, spinal stenosis and radiculopathy     THROMBECTOMY  04/24/2017    Rt CELIA     US ASPIRATION OR INJECTION MAJOR JOINT  10/23/2019     Z APPENDECTOMY      Description: Appendectomy;  Recorded: 07/12/2013;     Social  "Histrory  Smoking:  Alcohol Use:  Allergies   Allergen Reactions     Aspirin Hives     Oxycodone Itching     Vicodin [Hydrocodone-Acetaminophen] Unknown         OUTPATIENT MEDICATIONS     Discharge Medication List as of 11/6/2022 12:14 AM      START taking these medications    Details   HYDROmorphone (DILAUDID) 2 MG tablet Take 1 tablet (2 mg) by mouth every 6 hours as needed for pain, Disp-10 tablet, R-0, Local Print            Vitals:    11/05/22 1716 11/05/22 1826 11/05/22 2034   BP: (!) 177/99  (!) 172/94   Pulse: 76  81   Resp: 18     Temp: 98.3  F (36.8  C)     TempSrc: Oral     SpO2: 95%  95%   Weight:  56.7 kg (125 lb)    Height:  1.473 m (4' 10\")        Physical Exam   Constitutional: Oriented to person, place, and time. Appears well-developed and well-nourished.   HEENT:    Head: Atraumatic.   Neck: Normal range of motion. Tenderness posterior to neck and upper thoracic spine.   Cardiovascular: Normal rate, regular rhythm and normal heart sounds.    Pulmonary/Chest: Normal effort  and breath sounds normal.   Abdominal: Soft. Bowel sounds are normal.   Musculoskeletal: Normal range of motion.   Neurological: Alert and oriented to person, place, and time. Normal strength.No sensory deficit. No cranial nerve deficit.  and flexion normal. Patient can raise arms above head.    Skin: Skin is warm and dry.   Psychiatric: Normal mood and affect. Behavior is normal. Thought content normal.       DIAGNOSTICS    LABORATORY FINDINGS (REVIEWED AND INTERPRETED):  Labs Ordered and Resulted from Time of ED Arrival to Time of ED Departure   BASIC METABOLIC PANEL - Abnormal       Result Value    Sodium 136      Potassium 3.9      Chloride 99      Carbon Dioxide (CO2) 25      Anion Gap 12      Urea Nitrogen 10.9      Creatinine 0.59 (*)     Calcium 9.1      Glucose 113 (*)     GFR Estimate >90     CBC WITH PLATELETS AND DIFFERENTIAL - Abnormal    WBC Count 9.7      RBC Count 5.94 (*)     Hemoglobin 15.7      Hematocrit " 48.3      MCV 81      MCH 26.4 (*)     MCHC 32.5      RDW 13.9      Platelet Count 242      % Neutrophils 67      % Lymphocytes 18      % Monocytes 12      % Eosinophils 2      % Basophils 1      % Immature Granulocytes 0      NRBCs per 100 WBC 0      Absolute Neutrophils 6.5      Absolute Lymphocytes 1.8      Absolute Monocytes 1.2      Absolute Eosinophils 0.2      Absolute Basophils 0.1      Absolute Immature Granulocytes 0.0      Absolute NRBCs 0.0     HEPATIC FUNCTION PANEL - Abnormal    Protein Total 7.8      Albumin 4.4      Bilirubin Total 0.3      Alkaline Phosphatase 121      AST 56 (*)     ALT 92 (*)     Bilirubin Direct <0.20     CRP INFLAMMATION - Normal    CRP Inflammation <3.00     ERYTHROCYTE SEDIMENTATION RATE AUTO - Normal    Erythrocyte Sedimentation Rate 10           IMAGING (REVIEWED AND INTERPRETED):  MR Thoracic Spine w/o & w Contrast   Final Result   IMPRESSION:      CERVICAL SPINE MRI:   1.  Normal cervical cord.   2.  Marked canal narrowing with mild mass effect on the cord and slight AP cord flattening at C3-C4. No definite pathological cord signal.   3.  Moderate to marked canal narrowing at C5-C6 with mild mass effect on the cord and slight AP cord flattening. No definite pathological cord signal.   4.  Mild canal narrowing at C4-C5.   5.  Multilevel neural foraminal narrowing, as described.   6.  Fluid in right facet joint of C2-C3, C3-C4, and C4-C5 level; at C4-C5 with slight patchy high T2 signal in adjacent bones. This is most likely due to degenerative changes and reactive edema; less likely an early infectious facet arthropathy could    have this appearance. Please correlate clinically.       THORACIC SPINE MRI:   1.  Normal thoracic cord.   2.  Mild canal narrowing at T10-T11 and T11-T12.   3.  Suggestion of enhancement of L2. This is incompletely visualized at the very inferior aspect of field-of-view. There is no evidence of corresponding signal abnormality on other sequences.  Finding is probably artifactual due to incomplete fat    suppression. However, it is quite conspicuous and abnormality of L2 cannot be completely excluded. If clinically indicated, MRI lumbar spine without and with contrast may be helpful.       MR Cervical Spine w/o & w Contrast   Final Result   IMPRESSION:      CERVICAL SPINE MRI:   1.  Normal cervical cord.   2.  Marked canal narrowing with mild mass effect on the cord and slight AP cord flattening at C3-C4. No definite pathological cord signal.   3.  Moderate to marked canal narrowing at C5-C6 with mild mass effect on the cord and slight AP cord flattening. No definite pathological cord signal.   4.  Mild canal narrowing at C4-C5.   5.  Multilevel neural foraminal narrowing, as described.   6.  Fluid in right facet joint of C2-C3, C3-C4, and C4-C5 level; at C4-C5 with slight patchy high T2 signal in adjacent bones. This is most likely due to degenerative changes and reactive edema; less likely an early infectious facet arthropathy could    have this appearance. Please correlate clinically.       THORACIC SPINE MRI:   1.  Normal thoracic cord.   2.  Mild canal narrowing at T10-T11 and T11-T12.   3.  Suggestion of enhancement of L2. This is incompletely visualized at the very inferior aspect of field-of-view. There is no evidence of corresponding signal abnormality on other sequences. Finding is probably artifactual due to incomplete fat    suppression. However, it is quite conspicuous and abnormality of L2 cannot be completely excluded. If clinically indicated, MRI lumbar spine without and with contrast may be helpful.                 I, Harris Edwards, am serving as a scribe to document services personally performed by Jcarlos Herman D.O., based on my observation and the provider s statements to me.    I, Jcarlos Herman D.O., attest that Harris Edwards is acting in a scribe capacity, has observed my performance of the services and has documented them in accordance  with my direction.    Jcarlos Herman D.O.  EMERGENCY MEDICINE   11/05/22  [facility] EMERGENCY DEPARTMENT  [facility] [facility] [id number] [phone]  Dept: [phone]     Jcarlos Heramn DO  11/06/22 0047

## 2022-11-06 ENCOUNTER — TELEPHONE (OUTPATIENT)
Dept: NEUROLOGY | Facility: CLINIC | Age: 72
End: 2022-11-06

## 2022-11-06 NOTE — TELEPHONE ENCOUNTER
Called by Dr Herman 11/5/2022 from the ED about patient arriving with one day of neck pain. No recent trauma. No weakness or altered sensation reported on exam. MRI reviewed. Will see patient in clinic for spinal stenosis, cord flattening without signal change. Likely would benefit with decompression. Sent home with muscle relaxer and steroids. Attending: Dr Fam     Clinic appt with Dr Fam or Dr Gibson to discuss cervical spine    MRI C Spine   Marked canal narrowing with mild mass effect on the cord and slight AP cord flattening at C3-C4. No definite pathological cord signal.  Moderate to marked canal narrowing at C5-C6 with mild mass effect on the cord and slight AP cord flattening. No definite pathological cord signal.   Abnormality noted L2 but incompletely viewed on thoracic spine. Recommend MRI image. Not done. Unclear if patient has low back pain.      BRIAN Guillory  [facility] Neurosurgery  O: [phone]

## 2022-11-06 NOTE — DISCHARGE INSTRUCTIONS
Start Medrol Dosepak.  Use Dilaudid every 4-6 hours as needed for pain.  Follow-up with neurosurgery early next week.    Call on Monday for an appointment early next [phone]  If you have uncontrolled pain or progressive symptoms including weakness in your arms return to the emergency department.

## 2022-11-25 ENCOUNTER — OFFICE VISIT (OUTPATIENT)
Dept: FAMILY MEDICINE | Facility: CLINIC | Age: 72
End: 2022-11-25
Payer: COMMERCIAL

## 2022-11-25 VITALS
DIASTOLIC BLOOD PRESSURE: 82 MMHG | OXYGEN SATURATION: 95 % | WEIGHT: 127.75 LBS | TEMPERATURE: 98.6 F | HEART RATE: 100 BPM | BODY MASS INDEX: 25.08 KG/M2 | SYSTOLIC BLOOD PRESSURE: 132 MMHG | HEIGHT: 60 IN | RESPIRATION RATE: 16 BRPM

## 2022-11-25 DIAGNOSIS — M54.16 LUMBAR RADICULOPATHY: ICD-10-CM

## 2022-11-25 DIAGNOSIS — F33.1 MAJOR DEPRESSIVE DISORDER, RECURRENT EPISODE, MODERATE (H): ICD-10-CM

## 2022-11-25 DIAGNOSIS — Z76.0 ENCOUNTER FOR MEDICATION REFILL: ICD-10-CM

## 2022-11-25 DIAGNOSIS — M1A.49X0 OTHER SECONDARY CHRONIC GOUT OF MULTIPLE SITES WITHOUT TOPHUS: ICD-10-CM

## 2022-11-25 DIAGNOSIS — E78.5 HYPERLIPIDEMIA LDL GOAL <100: ICD-10-CM

## 2022-11-25 DIAGNOSIS — Z23 ENCOUNTER FOR IMMUNIZATION: ICD-10-CM

## 2022-11-25 DIAGNOSIS — K59.00 CONSTIPATION, UNSPECIFIED CONSTIPATION TYPE: ICD-10-CM

## 2022-11-25 DIAGNOSIS — M54.42 CHRONIC BILATERAL LOW BACK PAIN WITH LEFT-SIDED SCIATICA: ICD-10-CM

## 2022-11-25 DIAGNOSIS — R39.16 BENIGN PROSTATIC HYPERPLASIA (BPH) WITH STRAINING ON URINATION: ICD-10-CM

## 2022-11-25 DIAGNOSIS — N40.1 BENIGN PROSTATIC HYPERPLASIA (BPH) WITH STRAINING ON URINATION: ICD-10-CM

## 2022-11-25 DIAGNOSIS — K21.00 GASTROESOPHAGEAL REFLUX DISEASE WITH ESOPHAGITIS WITHOUT HEMORRHAGE: ICD-10-CM

## 2022-11-25 DIAGNOSIS — I63.02 CEREBROVASCULAR ACCIDENT (CVA) DUE TO THROMBOSIS OF BASILAR ARTERY (H): ICD-10-CM

## 2022-11-25 DIAGNOSIS — L30.9 DERMATITIS: ICD-10-CM

## 2022-11-25 DIAGNOSIS — G89.29 CHRONIC BILATERAL LOW BACK PAIN WITH LEFT-SIDED SCIATICA: ICD-10-CM

## 2022-11-25 DIAGNOSIS — M54.16 RADICULOPATHY, LUMBAR REGION: ICD-10-CM

## 2022-11-25 DIAGNOSIS — M48.061 SPINAL STENOSIS, LUMBAR REGION, WITHOUT NEUROGENIC CLAUDICATION: Primary | ICD-10-CM

## 2022-11-25 PROCEDURE — 90662 IIV NO PRSV INCREASED AG IM: CPT | Performed by: FAMILY MEDICINE

## 2022-11-25 PROCEDURE — 99214 OFFICE O/P EST MOD 30 MIN: CPT | Mod: 25 | Performed by: FAMILY MEDICINE

## 2022-11-25 PROCEDURE — 90471 IMMUNIZATION ADMIN: CPT | Performed by: FAMILY MEDICINE

## 2022-11-25 RX ORDER — TAMSULOSIN HYDROCHLORIDE 0.4 MG/1
0.4 CAPSULE ORAL DAILY
Qty: 30 CAPSULE | Refills: 11 | Status: SHIPPED | OUTPATIENT
Start: 2022-11-25 | End: 2023-01-25

## 2022-11-25 RX ORDER — DULOXETIN HYDROCHLORIDE 60 MG/1
CAPSULE, DELAYED RELEASE ORAL
Qty: 90 CAPSULE | Refills: 3 | Status: SHIPPED | OUTPATIENT
Start: 2022-11-25 | End: 2023-01-23

## 2022-11-25 RX ORDER — ALLOPURINOL 300 MG/1
300 TABLET ORAL DAILY
Qty: 90 TABLET | Refills: 3 | Status: SHIPPED | OUTPATIENT
Start: 2022-11-25 | End: 2022-11-25

## 2022-11-25 RX ORDER — POLYETHYLENE GLYCOL 3350 17 G/17G
17 POWDER, FOR SOLUTION ORAL DAILY
Qty: 510 G | Refills: 3 | Status: SHIPPED | OUTPATIENT
Start: 2022-11-25 | End: 2023-01-25

## 2022-11-25 RX ORDER — CLOPIDOGREL BISULFATE 75 MG/1
75 TABLET ORAL DAILY
Qty: 90 TABLET | Refills: 3 | Status: SHIPPED | OUTPATIENT
Start: 2022-11-25 | End: 2023-03-01

## 2022-11-25 RX ORDER — CLOBETASOL PROPIONATE 0.5 MG/G
OINTMENT TOPICAL 2 TIMES DAILY
Qty: 60 G | Refills: 4 | Status: SHIPPED | OUTPATIENT
Start: 2022-11-25 | End: 2023-12-07

## 2022-11-25 RX ORDER — HYDROMORPHONE HYDROCHLORIDE 2 MG/1
2 TABLET ORAL EVERY 6 HOURS PRN
COMMUNITY
End: 2022-12-26

## 2022-11-25 RX ORDER — GABAPENTIN 300 MG/1
CAPSULE ORAL
Qty: 180 CAPSULE | Refills: 11 | Status: SHIPPED | OUTPATIENT
Start: 2022-11-25 | End: 2023-02-28

## 2022-11-25 RX ORDER — ATORVASTATIN CALCIUM 40 MG/1
TABLET, FILM COATED ORAL
Qty: 30 TABLET | Refills: 11 | Status: SHIPPED | OUTPATIENT
Start: 2022-11-25 | End: 2023-02-28

## 2022-11-25 NOTE — PROGRESS NOTES
Assessment & Plan     Lumbar Canal Stenosis  Patient has exquisite back pain went to the emergency room.  An MRI which showed inconclusive findings at L2.  He has been scheduled with neurosurgery.  He does not complain of any pain his daughter has not been giving him any hydromorphone.  He has his regular medications in the form of gabapentin and duloxetine    Moderate Recurrent Major Depression    - DULoxetine (CYMBALTA) 60 MG capsule; [DULOXETINE (CYMBALTA) 60 MG CAPSULE] TAKE 1 CAPSULE(60 MG) BY MOUTH DAILY    Gastroesophageal reflux disease with esophagitis without hemorrhage    No abdominal pain or burning noted    Radiculopathy, lumbar region    I refilled his gabapentin he states that sometimes he has a burning pain down his left leg.  Denies any acute symptoms today.    Chronic bilateral low back pain with left-sided sciatica        Benign prostatic hyperplasia (BPH) with straining on urination      - tamsulosin (FLOMAX) 0.4 MG capsule; Take 1 capsule (0.4 mg) by mouth daily    Encounter for immunization    Agrees for immunization    Constipation, unspecified constipation type    He does not take his polyethylene glycol he becomes constipated`1 10` I refilled that medicine for him today   - polyethylene glycol (MIRALAX) 17 GM/Dose powder; Take 17 g by mouth daily    Lumbar radiculopathy      - gabapentin (NEURONTIN) 300 MG capsule; Take 1 cap at bedtime, increase by 1 cap every three days, max dose 2 caps tid    Major depressive disorder, recurrent episode, moderate (H)      - DULoxetine (CYMBALTA) 60 MG capsule; [DULOXETINE (CYMBALTA) 60 MG CAPSULE] TAKE 1 CAPSULE(60 MG) BY MOUTH DAILY    Encounter for medication refill      - clopidogrel (PLAVIX) 75 MG tablet; Take 1 tablet (75 mg) by mouth daily    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    MD RODNEY [facility] ROSALINE Gaspar is a 72 year old accompanied by his daughter, presenting for the following health issues:  Hypertension    History of presenting illness 72-year-old male here with his daughter for follow-up.  He is following up for blood pressure lumbar canal stenosis he also has cervical canal stenosis.  And radiculopathy.  He was in the ER earlier this month because of ongoing back pain there get a repeat MRI which showed potential worsening of his lumbar canal stenosis at L2.  Because he had exquisite pain he was given fentanyl and hydromorphone in the emergency room his daughter has been giving him the hydromorphone only as needed he has not used any doses in the last 5 days.  He is not walking much because of his increased pain says he has some numbness in his left leg which is not new.  Appetite remains the same sleep quality remains the same.  He is constipated if he does not take the medication.  He is interested in getting a flu shot  History of Present Illness       Hypertension: He presents for follow up of hypertension.  He does not check blood pressure  regularly outside of the clinic. Outside blood pressures have been over 140/90. He follows a low salt diet.               Review of Systems   Constitutional, HEENT, cardiovascular, pulmonary, gi and gu systems are negative, except as otherwise noted.      Objective    /82   Pulse 100   Temp 98.6  F (37  C) (Oral)   Resp 16   Ht 1.473 m (4' 10\")   Wt 57.9 kg (127 lb 12 oz)   SpO2 95%   BMI 26.70 kg/m    Body mass index is 26.7 kg/m .  Physical Exam   GENERAL: healthy, alert and no distress  EYES: Eyes grossly normal to inspection, PERRL and " conjunctivae and sclerae normal  HENT: ear canals and TM's normal, nose and mouth without ulcers or lesions  NECK: no adenopathy, no asymmetry, masses, or scars and thyroid normal to palpation  RESP: lungs clear to auscultation - no rales, rhonchi or wheezes  CV: regular rate and rhythm, normal S1 S2, no S3 or S4, no murmur, click or rub, no peripheral edema and peripheral pulses strong  ABDOMEN: soft, nontender, no hepatosplenomegaly, no masses and bowel sounds normal  MS: no gross musculoskeletal defects noted, no edema  SKIN: no suspicious lesions or rashes  NEURO: Normal strength and tone, mentation intact and speech normal  PSYCH: mentation appears normal, affect normal/bright

## 2022-11-28 DIAGNOSIS — M54.2 NECK PAIN: Primary | ICD-10-CM

## 2022-11-29 ENCOUNTER — PATIENT OUTREACH (OUTPATIENT)
Dept: GERIATRIC MEDICINE | Facility: CLINIC | Age: 72
End: 2022-11-29

## 2022-11-30 ENCOUNTER — PATIENT OUTREACH (OUTPATIENT)
Dept: GERIATRIC MEDICINE | Facility: CLINIC | Age: 72
End: 2022-11-30

## 2022-11-30 NOTE — PROGRESS NOTES
Encounter opened due to Regulatory [employer] Update to open FVP Program.    Van Brenner  Care Management Specialist  [facility]  [phone]

## 2022-11-30 NOTE — PROGRESS NOTES
Encounter opened due to Regulatory [employer] Ell[employer] Update to close FVP Program.    Van Brenner  Care Management Specialist  [facility]  [phone]

## 2022-12-01 ENCOUNTER — OFFICE VISIT (OUTPATIENT)
Dept: NEUROSURGERY | Facility: CLINIC | Age: 72
End: 2022-12-01
Payer: COMMERCIAL

## 2022-12-01 ENCOUNTER — HOSPITAL ENCOUNTER (OUTPATIENT)
Dept: RADIOLOGY | Facility: HOSPITAL | Age: 72
Discharge: HOME | End: 2022-12-01
Attending: SURGERY | Admitting: SURGERY
Payer: COMMERCIAL

## 2022-12-01 VITALS
HEIGHT: 60 IN | DIASTOLIC BLOOD PRESSURE: 77 MMHG | WEIGHT: 127 LBS | HEART RATE: 84 BPM | OXYGEN SATURATION: 94 % | BODY MASS INDEX: 24.94 KG/M2 | SYSTOLIC BLOOD PRESSURE: 137 MMHG

## 2022-12-01 DIAGNOSIS — M54.2 NECK PAIN: ICD-10-CM

## 2022-12-01 DIAGNOSIS — M54.2 NECK PAIN: Primary | ICD-10-CM

## 2022-12-01 PROCEDURE — 99205 OFFICE O/P NEW HI 60 MIN: CPT | Performed by: NURSE PRACTITIONER

## 2022-12-01 PROCEDURE — 72050 X-RAY EXAM NECK SPINE 4/5VWS: CPT

## 2022-12-01 NOTE — PATIENT INSTRUCTIONS
Spine Center referral to discuss possible injections  - they will call you to schedule     Physical therapy referral - they will call you to schedule     Call us if you want to discuss surgery

## 2022-12-01 NOTE — PROGRESS NOTES
NEUROSURGERY CONSULTATION NOTE  12/1/2022     CHIEF COMPLAINT: neck pain    HPI:    Chasity Gaspar is a 72 year old male who is sent to us in consultation by the emergency room for evaluation of one month of neck pain. He denies trauma or injury. He has posterior neck pain, bilateral shoulder pain, right deltoid, forearm pain. No pain in her hands, fingers. No numbness or tingling. No gait instability per family. Uses a cane. To my observation has altered gait compensating for back pain. Daughter states present since back surgery 2011 (L4-5, L5-S1 fusion). Denies bowel or bladder dysfunction. On occasion he feels he does not have strength to open things. He denies leg pain. He was given medrol dose pack in the ED which was helpful. He has not had injections or PT to date.     On imaging he has severe cervical stenosis C3-4, C5-6 with mild mass effect on the cord and slight flattening. Mild canal narrowing C4-5. Multilevel foraminal narrowing of varying degree's. Fluid in the facet joints of C2-3, C3-4, C4-5 on the right. Flex/Ex done today and pending.     Visit today conducted with professional  via telephone and patient's daughter who answers questions for patient. She states he is very hard of hearing. She writes to him to communicate.     Recommend cervical decompression and fusion. Patient reports he needs time to think about this. He felt like his back symptoms never got better after his fusion so he is reluctant to under go any more surgery. We do not have updated MRI of lumbar spine. I cannot confirm or rule out he has segmental disease. CT from 2021 shows multilevel foraminal stenosis at multiple levels. L2-3, L3-4 severe stenosis on CT which is above level of fusion. We discussed he may have new cause for back pain based on this information. We discussed without surgery risk of cervical stenosis has potential to worsen causing debilitating symptoms - loosing mobility/sensation of arms, legs, bowel and  bladder. He voices understanding. Risk of falls with underlying cervical stenosis potentially leading to spinal cord injury. Surgery would likely improve his current symptoms. He would prefer to try conservative management first. He will call us if he would like to further discuss surgical option.     PLAN:   Spine Center referral to discuss possible injections  - they will call you to schedule   Physical therapy referral - they will call you to schedule   Call us if you want to discuss surgery       Past Medical History:   Diagnosis Date     Cervicalgia      Depression      Dyslipidemia      Dysthymia      Gastritis      GERD (gastroesophageal reflux disease)      Hearing loss      Hiatal hernia      Hypertriglyceridemia      Insomnia      Lacunar stroke (H)      Lumbar canal stenosis      Lumbar radiculopathy      Myalgia and myositis      Past Surgical History:   Procedure Laterality Date     IR CEREBRAL ANGIOGRAM  4/27/2017     IR LUMBAR TRANSFORAMINAL EPIDURAL STRD INJ  7/2/2012     PICC  4/28/2017          [place] ARTHRODESIS ANT INTERBODY MIN DISCECTOMY,LUMBAR      Description: Lumbar Vertebral Fusion;  Recorded: 07/16/2013;  Comments: 3/17/11 spinal decompression and fusion L4-5, L5-S1 for spinal stenosis, DDD, spondylolysis; Dr. Casillas [facility]     [place] ESOPHAGOGASTRODUODENOSCOPY TRANSORAL DIAGNOSTIC      Description: Esophagogastroduodenoscopy;  Proc Date: 04/02/2012;  Comments: biosies:   reactive gastropathy with chronic superimposed nonspecific chronic inflammation (likely secondary to ASA, NSAIDS, EtOH or other irritants), NEG for h. pylori; small hiatal hernia     [place] INJECT ANES/STEROID FORAMEN LUMBAR/SACRAL W IMG GUIDE ,1 LEVEL      Description: Nerve Block Transforaminal Epidural Lumbar L3 - L4;  Recorded: 07/10/2012;  Comments: 7/2/2012 for severe low back pain, spinal stenosis and radiculopathy     THROMBECTOMY  04/24/2017    Rt CELIA     US ASPIRATION OR INJECTION MAJOR JOINT  10/23/2019     AMY  APPENDECTOMY      Description: Appendectomy;  Recorded: 07/12/2013;       REVIEW OF SYSTEMS:  Negative with exception of HPI     MEDICATIONS:  Current Outpatient Medications   Medication Sig Dispense Refill     acetaminophen 500 mg coapsule [ACETAMINOPHEN 500 MG COAPSULE] Take 2 tablets by mouth 3 (three) times a day as needed for fever or pain.       atorvastatin (LIPITOR) 40 MG tablet atorvastatin 40 mg tablet 30 tablet 11     atorvastatin (LIPITOR) 40 MG tablet [ATORVASTATIN (LIPITOR) 40 MG TABLET] TAKE 1 TABLET(40 MG) BY MOUTH AT BEDTIME 90 tablet 3     atorvastatin (LIPITOR) 40 MG tablet [ATORVASTATIN (LIPITOR) 40 MG TABLET] TAKE 1 TABLET(40 MG) BY MOUTH AT BEDTIME 90 tablet 3     betamethasone dipropionate (DIPROSONE) 0.05 % external ointment Apply to rash on back twice daily on Saturday and Sunday. (Monday-Friday, use calcipotriene ointment). 45 g 3     calcipotriene (DOVONOX) 0.005 % external ointment Apply to rash on back twice daily Monday-Friday; then use betamethasone on weekends. Continue alternating regimen as needed. 90 g 3     clobetasol (TEMOVATE) 0.05 % external ointment Apply topically 2 times daily 60 g 4     clopidogrel (PLAVIX) 75 MG tablet Take 1 tablet (75 mg) by mouth daily 90 tablet 3     clopidogrel (PLAVIX) 75 MG tablet Take 1 tablet by mouth daily       clopidogreL (PLAVIX) 75 mg tablet [CLOPIDOGREL (PLAVIX) 75 MG TABLET] TAKE 1 TABLET(75 MG) BY MOUTH DAILY 90 tablet 1     diclofenac (VOLTAREN) 1 % topical gel Apply 2 g topically 4 times daily 350 g 3     DULoxetine (CYMBALTA) 60 MG capsule [DULOXETINE (CYMBALTA) 60 MG CAPSULE] TAKE 1 CAPSULE(60 MG) BY MOUTH DAILY 90 capsule 3     dutasteride (AVODART) 0.5 MG capsule Take 1 capsule (0.5 mg) by mouth daily 30 capsule 4     gabapentin (NEURONTIN) 300 MG capsule Take 1 cap at bedtime, increase by 1 cap every three days, max dose 2 caps tid 180 capsule 11     gabapentin (NEURONTIN) 300 MG capsule        HYDROmorphone (DILAUDID) 2 MG tablet  "Take 2 mg by mouth every 6 hours as needed for severe pain (7-10)       magnesium oxide (MAG-OX) 400 mg (241.3 mg magnesium) tablet [MAGNESIUM OXIDE (MAG-OX) 400 MG (241.3 MG MAGNESIUM) TABLET] TAKE 1 TABLET(400 MG) BY MOUTH DAILY 100 tablet 0     magnesium oxide (MAG-OX) 400 MG tablet Take 1 tablet (400 mg) by mouth daily 90 tablet 3     metoprolol tartrate (LOPRESSOR) 25 MG tablet Take 1 tablet (25 mg) by mouth 2 times daily 180 tablet 3     metoprolol tartrate (LOPRESSOR) 25 MG tablet [METOPROLOL TARTRATE (LOPRESSOR) 25 MG TABLET] TAKE 1 TABLET BY MOUTH TWICE DAILY 180 tablet 3     omeprazole (PRILOSEC) 20 MG DR capsule [OMEPRAZOLE (PRILOSEC) 20 MG CAPSULE] TAKE 2 CAPSULES(40 MG) BY MOUTH DAILY 180 capsule 3     omeprazole (PRILOSEC) 20 MG DR capsule        polyethylene glycol (MIRALAX) 17 GM/Dose powder Take 17 g by mouth daily 510 g 3     polyethylene glycol (MIRALAX) 17 GM/Dose powder Take 17 g by mouth daily 510 g 11     tamsulosin (FLOMAX) 0.4 MG capsule Take 1 capsule (0.4 mg) by mouth daily 30 capsule 11         ALLERGIES/SENSITIVITIES:     Allergies   Allergen Reactions     Aspirin Hives     Oxycodone Itching     Vicodin [Hydrocodone-Acetaminophen] Unknown       PERTINENT SOCIAL HISTORY:   Social History     Socioeconomic History     Marital status:    Tobacco Use     Smoking status: Never     Smokeless tobacco: Never   Substance and Sexual Activity     Alcohol use: Never     Drug use: Never     Sexual activity: Not Currently     Partners: Female   Social History Narrative    ** Merged History Encounter **              FAMILY HISTORY:  No family history on file.     PHYSICAL EXAM:     Constitution: /77   Pulse 84   Ht 4' 10\" (1.473 m)   Wt 127 lb (57.6 kg)   SpO2 94%   BMI 26.54 kg/m      General: alert, oriented. REYES. Speech clear.      Motor: Normal bulk and tone all muscle groups of upper and lower extremities.     Strength:     Right Left  Right Left   Deltoid 5 5 Hip flexion 5 5 "   Biceps 5 5 Hip extension 5 5   Triceps 5 5 Knee flexion 5 5   Wrist ex 5 5 Knee ex 5 5   Wrist flex 5 5 Dorsiflex 5 5   Finger ex 5 5 Plantar flex 5 5   Hand intrinsic 5 5 EHL 5 5    Full Full         Sensory: Sensation intact bilaterally to light touch and temperature throughout.    Coordination:  Gait is altered compensating for back pain.      Reflexes: 2+ supinator, biceps, triceps. 2+ patellar and achilles. No muller.    Musculoskeletal: Negative straight leg raise bilaterally.    IMAGING: I personally reviewed all radiographic images    MRI C Spine 11/6/2022   Marked canal narrowing with mild mass effect on the cord and slight AP cord flattening at C3-C4. No definite pathological cord signal. Moderate to marked canal narrowing at C5-C6 with mild mass effect on the cord and slight AP cord flattening. No definite pathological cord signal. Abnormality noted L2 but incompletely viewed on thoracic spine. Recommend MRI image. Not done. Unclear if patient has low back pain.      Flex/ext: pending today     CONSULTATION ASSESSMENT AND PLAN:    Chasity Gaspar is a 72 year old male who was seen in the ED with one day of neck pain. No trauma. MRI Marked canal narrowing with mild mass effect on the cord and slight AP cord flattening at C3-C4. No definite pathological cord signal. Moderate to marked canal narrowing at C5-C6 with mild mass effect on the cord and slight AP cord flattening. No definite pathological cord signal. Abnormality noted L2 but incompletely viewed on thoracic spine. Radiology Recommended MRI image but thought to be artifact.     Visit today conducted with professional  via telephone and patient's daughter who answers questions for patient. She states he is very hard of hearing. She writes to him to communicate.     Recommend cervical decompression and fusion. Patient reports he needs time to think about this. He felt like his back symptoms never got better after his fusion so he is  reluctant to under go any more surgery. We do not have updated MRI of lumbar spine. I cannot confirm or rule out he has segmental disease. CT from 2021 shows multilevel foraminal stenosis at multiple levels. L2-3, L3-4 severe stenosis on CT which is above level of fusion. We discussed he may have new cause for back pain based on this information. We discussed without surgery risk of cervical stenosis has potential to worsen causing debilitating symptoms - loosing mobility/sensation of arms, legs, bowel and bladder. He voices understanding. Risk of falls with underlying cervical stenosis potentially leading to spinal cord injury. Surgery would likely improve his current symptoms. He would prefer to try conservative management first. He will call us if he would like to further discuss surgical option.     PLAN:   Spine Center referral to discuss possible injections  - they will call you to schedule   Physical therapy referral - they will call you to schedule   Call us if you want to discuss surgery     JUMANA Escobar [facility] Neurosurgery        Cc:   David Fang

## 2022-12-01 NOTE — LETTER
12/1/2022         RE: Chasity Gaspar  [address] [place] [address]        Dear Colleague,    Thank you for referring your patient, Chasity Gaspar, to the [facility]UR[facility]ERY. Please see a copy of my visit note below.    NEUROSURGERY CONSULTATION NOTE  12/1/2022     CHIEF COMPLAINT: neck pain    HPI:    Chasity Gaspar is a 72 year old male who is sent to us in consultation by the emergency room for evaluation of one month of neck pain. He denies trauma or injury. He has posterior neck pain, bilateral shoulder pain, right deltoid, forearm pain. No pain in her hands, fingers. No numbness or tingling. No gait instability per family. Uses a cane. To my observation has altered gait compensating for back pain. Daughter states present since back surgery 2011 (L4-5, L5-S1 fusion). Denies bowel or bladder dysfunction. On occasion he feels he does not have strength to open things. He denies leg pain. He was given medrol dose pack in the ED which was helpful. He has not had injections or PT to date.     On imaging he has severe cervical stenosis C3-4, C5-6 with mild mass effect on the cord and slight flattening. Mild canal narrowing C4-5. Multilevel foraminal narrowing of varying degree's. Fluid in the facet joints of C2-3, C3-4, C4-5 on the right. Flex/Ex done today and pending.     Visit today conducted with professional  via telephone and patient's daughter who answers questions for patient. She states he is very hard of hearing. She writes to him to communicate.     Recommend cervical decompression and fusion. Patient reports he needs time to think about this. He felt like his back symptoms never got better after his fusion so he is reluctant to under go any more surgery. We do not have updated MRI of lumbar spine. I cannot confirm or rule out he has segmental disease. CT from 2021 shows multilevel foraminal stenosis at multiple levels. L2-3, L3-4 severe stenosis on CT which is above level of fusion.  We discussed he may have new cause for back pain based on this information. We discussed without surgery risk of cervical stenosis has potential to worsen causing debilitating symptoms - loosing mobility/sensation of arms, legs, bowel and bladder. He voices understanding. Risk of falls with underlying cervical stenosis potentially leading to spinal cord injury. Surgery would likely improve his current symptoms. He would prefer to try conservative management first. He will call us if he would like to further discuss surgical option.     PLAN:   Spine Center referral to discuss possible injections  - they will call you to schedule   Physical therapy referral - they will call you to schedule   Call us if you want to discuss surgery       Past Medical History:   Diagnosis Date     Cervicalgia      Depression      Dyslipidemia      Dysthymia      Gastritis      GERD (gastroesophageal reflux disease)      Hearing loss      Hiatal hernia      Hypertriglyceridemia      Insomnia      Lacunar stroke (H)      Lumbar canal stenosis      Lumbar radiculopathy      Myalgia and myositis      Past Surgical History:   Procedure Laterality Date     IR CEREBRAL ANGIOGRAM  4/27/2017     IR LUMBAR TRANSFORAMINAL EPIDURAL STRD INJ  7/2/2012     PICC  4/28/2017          [facility] ARTHRODESIS ANT INTERBODY MIN DISCECTOMY,LUMBAR      Description: Lumbar Vertebral Fusion;  Recorded: 07/16/2013;  Comments: 3/17/11 spinal decompression and fusion L4-5, L5-S1 for spinal stenosis, DDD, spondylolysis; Dr. Casillas [facility]     VA ESOPHAGOGASTRODUODENOSCOPY TRANSORAL DIAGNOSTIC      Description: Esophagogastroduodenoscopy;  Proc Date: 04/02/2012;  Comments: biosies:   reactive gastropathy with chronic superimposed nonspecific chronic inflammation (likely secondary to ASA, NSAIDS, EtOH or other irritants), NEG for h. pylori; small hiatal hernia     VA INJECT ANES/STEROID FORAMEN LUMBAR/SACRAL W IMG GUIDE ,1 LEVEL      Description: Nerve Block  Transforaminal Epidural Lumbar L3 - L4;  Recorded: 07/10/2012;  Comments: 7/2/2012 for severe low back pain, spinal stenosis and radiculopathy     THROMBECTOMY  04/24/2017    Rt CELIA     US ASPIRATION OR INJECTION MAJOR JOINT  10/23/2019     [facility] APPENDECTOMY      Description: Appendectomy;  Recorded: 07/12/2013;       REVIEW OF SYSTEMS:  Negative with exception of HPI     MEDICATIONS:  Current Outpatient Medications   Medication Sig Dispense Refill     acetaminophen 500 mg coapsule [ACETAMINOPHEN 500 MG COAPSULE] Take 2 tablets by mouth 3 (three) times a day as needed for fever or pain.       atorvastatin (LIPITOR) 40 MG tablet atorvastatin 40 mg tablet 30 tablet 11     atorvastatin (LIPITOR) 40 MG tablet [ATORVASTATIN (LIPITOR) 40 MG TABLET] TAKE 1 TABLET(40 MG) BY MOUTH AT BEDTIME 90 tablet 3     atorvastatin (LIPITOR) 40 MG tablet [ATORVASTATIN (LIPITOR) 40 MG TABLET] TAKE 1 TABLET(40 MG) BY MOUTH AT BEDTIME 90 tablet 3     betamethasone dipropionate (DIPROSONE) 0.05 % external ointment Apply to rash on back twice daily on Saturday and Sunday. (Monday-Friday, use calcipotriene ointment). 45 g 3     calcipotriene (DOVONOX) 0.005 % external ointment Apply to rash on back twice daily Monday-Friday; then use betamethasone on weekends. Continue alternating regimen as needed. 90 g 3     clobetasol (TEMOVATE) 0.05 % external ointment Apply topically 2 times daily 60 g 4     clopidogrel (PLAVIX) 75 MG tablet Take 1 tablet (75 mg) by mouth daily 90 tablet 3     clopidogrel (PLAVIX) 75 MG tablet Take 1 tablet by mouth daily       clopidogreL (PLAVIX) 75 mg tablet [CLOPIDOGREL (PLAVIX) 75 MG TABLET] TAKE 1 TABLET(75 MG) BY MOUTH DAILY 90 tablet 1     diclofenac (VOLTAREN) 1 % topical gel Apply 2 g topically 4 times daily 350 g 3     DULoxetine (CYMBALTA) 60 MG capsule [DULOXETINE (CYMBALTA) 60 MG CAPSULE] TAKE 1 CAPSULE(60 MG) BY MOUTH DAILY 90 capsule 3     dutasteride (AVODART) 0.5 MG capsule Take 1 capsule (0.5 mg) by  "mouth daily 30 capsule 4     gabapentin (NEURONTIN) 300 MG capsule Take 1 cap at bedtime, increase by 1 cap every three days, max dose 2 caps tid 180 capsule 11     gabapentin (NEURONTIN) 300 MG capsule        HYDROmorphone (DILAUDID) 2 MG tablet Take 2 mg by mouth every 6 hours as needed for severe pain (7-10)       magnesium oxide (MAG-OX) 400 mg (241.3 mg magnesium) tablet [MAGNESIUM OXIDE (MAG-OX) 400 MG (241.3 MG MAGNESIUM) TABLET] TAKE 1 TABLET(400 MG) BY MOUTH DAILY 100 tablet 0     magnesium oxide (MAG-OX) 400 MG tablet Take 1 tablet (400 mg) by mouth daily 90 tablet 3     metoprolol tartrate (LOPRESSOR) 25 MG tablet Take 1 tablet (25 mg) by mouth 2 times daily 180 tablet 3     metoprolol tartrate (LOPRESSOR) 25 MG tablet [METOPROLOL TARTRATE (LOPRESSOR) 25 MG TABLET] TAKE 1 TABLET BY MOUTH TWICE DAILY 180 tablet 3     omeprazole (PRILOSEC) 20 MG DR capsule [OMEPRAZOLE (PRILOSEC) 20 MG CAPSULE] TAKE 2 CAPSULES(40 MG) BY MOUTH DAILY 180 capsule 3     omeprazole (PRILOSEC) 20 MG DR capsule        polyethylene glycol (MIRALAX) 17 GM/Dose powder Take 17 g by mouth daily 510 g 3     polyethylene glycol (MIRALAX) 17 GM/Dose powder Take 17 g by mouth daily 510 g 11     tamsulosin (FLOMAX) 0.4 MG capsule Take 1 capsule (0.4 mg) by mouth daily 30 capsule 11         ALLERGIES/SENSITIVITIES:     Allergies   Allergen Reactions     Aspirin Hives     Oxycodone Itching     Vicodin [Hydrocodone-Acetaminophen] Unknown       PERTINENT SOCIAL HISTORY:   Social History     Socioeconomic History     Marital status:    Tobacco Use     Smoking status: Never     Smokeless tobacco: Never   Substance and Sexual Activity     Alcohol use: Never     Drug use: Never     Sexual activity: Not Currently     Partners: Female   Social History Narrative    ** Merged History Encounter **              FAMILY HISTORY:  No family history on file.     PHYSICAL EXAM:     Constitution: /77   Pulse 84   Ht 4' 10\" (1.473 m)   Wt 127 lb " (57.6 kg)   SpO2 94%   BMI 26.54 kg/m      General: alert, oriented. REYES. Speech clear.      Motor: Normal bulk and tone all muscle groups of upper and lower extremities.     Strength:     Right Left  Right Left   Deltoid 5 5 Hip flexion 5 5   Biceps 5 5 Hip extension 5 5   Triceps 5 5 Knee flexion 5 5   Wrist ex 5 5 Knee ex 5 5   Wrist flex 5 5 Dorsiflex 5 5   Finger ex 5 5 Plantar flex 5 5   Hand intrinsic 5 5 EHL 5 5    Full Full         Sensory: Sensation intact bilaterally to light touch and temperature throughout.    Coordination:  Gait is altered compensating for back pain.      Reflexes: 2+ supinator, biceps, triceps. 2+ patellar and achilles. No muller.    Musculoskeletal: Negative straight leg raise bilaterally.    IMAGING: I personally reviewed all radiographic images    MRI C Spine 11/6/2022   Marked canal narrowing with mild mass effect on the cord and slight AP cord flattening at C3-C4. No definite pathological cord signal. Moderate to marked canal narrowing at C5-C6 with mild mass effect on the cord and slight AP cord flattening. No definite pathological cord signal. Abnormality noted L2 but incompletely viewed on thoracic spine. Recommend MRI image. Not done. Unclear if patient has low back pain.      Flex/ext: pending today     CONSULTATION ASSESSMENT AND PLAN:    Chasity Gaspar is a 72 year old male who was seen in the ED with one day of neck pain. No trauma. MRI Marked canal narrowing with mild mass effect on the cord and slight AP cord flattening at C3-C4. No definite pathological cord signal. Moderate to marked canal narrowing at C5-C6 with mild mass effect on the cord and slight AP cord flattening. No definite pathological cord signal. Abnormality noted L2 but incompletely viewed on thoracic spine. Radiology Recommended MRI image but thought to be artifact.     Visit today conducted with professional  via telephone and patient's daughter who answers questions for patient. She states  he is very hard of hearing. She writes to him to communicate.     Recommend cervical decompression and fusion. Patient reports he needs time to think about this. He felt like his back symptoms never got better after his fusion so he is reluctant to under go any more surgery. We do not have updated MRI of lumbar spine. I cannot confirm or rule out he has segmental disease. CT from 2021 shows multilevel foraminal stenosis at multiple levels. L2-3, L3-4 severe stenosis on CT which is above level of fusion. We discussed he may have new cause for back pain based on this information. We discussed without surgery risk of cervical stenosis has potential to worsen causing debilitating symptoms - loosing mobility/sensation of arms, legs, bowel and bladder. He voices understanding. Risk of falls with underlying cervical stenosis potentially leading to spinal cord injury. Surgery would likely improve his current symptoms. He would prefer to try conservative management first. He will call us if he would like to further discuss surgical option.     PLAN:   Spine Center referral to discuss possible injections  - they will call you to schedule   Physical therapy referral - they will call you to schedule   Call us if you want to discuss surgery     JUMANA Escobar CNP   [facility] Neurosurgery   [phone]     Cc:   David Fang          Again, thank you for allowing me to participate in the care of your patient.        Sincerely,        JUMANA Escobar CNP

## 2022-12-07 ENCOUNTER — OFFICE VISIT (OUTPATIENT)
Dept: DERMATOLOGY | Facility: CLINIC | Age: 72
End: 2022-12-07
Payer: COMMERCIAL

## 2022-12-07 DIAGNOSIS — D18.01 CHERRY ANGIOMA: ICD-10-CM

## 2022-12-07 DIAGNOSIS — L40.0 PSORIASIS VULGARIS: Primary | ICD-10-CM

## 2022-12-07 DIAGNOSIS — D22.9 MULTIPLE BENIGN NEVI: ICD-10-CM

## 2022-12-07 DIAGNOSIS — L30.9 DERMATITIS: ICD-10-CM

## 2022-12-07 DIAGNOSIS — L82.1 SEBORRHEIC KERATOSIS: ICD-10-CM

## 2022-12-07 DIAGNOSIS — L81.4 SOLAR LENTIGO: ICD-10-CM

## 2022-12-07 PROCEDURE — 99213 OFFICE O/P EST LOW 20 MIN: CPT | Performed by: DERMATOLOGY

## 2022-12-07 RX ORDER — BETAMETHASONE DIPROPIONATE 0.05 %
OINTMENT (GRAM) TOPICAL
Qty: 45 G | Refills: 11 | Status: SHIPPED | OUTPATIENT
Start: 2022-12-07 | End: 2024-05-22

## 2022-12-07 RX ORDER — CALCIPOTRIENE 50 UG/G
OINTMENT TOPICAL
Qty: 90 G | Refills: 11 | Status: SHIPPED | OUTPATIENT
Start: 2022-12-07 | End: 2023-03-01

## 2022-12-07 ASSESSMENT — PAIN SCALES - GENERAL: PAINLEVEL: NO PAIN (0)

## 2022-12-07 NOTE — PATIENT INSTRUCTIONS
Patient Education     Checking for Skin Cancer  You can find cancer early by checking your skin each month. There are 3 kinds of skin cancer. They are melanoma, basal cell carcinoma, and squamous cell carcinoma. Doing monthly skin checks is the best way to find new marks or skin changes. Follow the instructions below for checking your skin.   The ABCDEs of checking moles for melanoma   Check your moles or growths for signs of melanoma using ABCDE:   Asymmetry: the sides of the mole or growth don t match  Border: the edges are ragged, notched, or blurred  Color: the color within the mole or growth varies  Diameter: the mole or growth is larger than 6 mm (size of a pencil eraser)  Evolving: the size, shape, or color of the mole or growth is changing (evolving is not shown in the images below)    Checking for other types of skin cancer  Basal cell carcinoma or squamous cell carcinoma have symptoms such as:     A spot or mole that looks different from all other marks on your skin  Changes in how an area feels, such as itching, tenderness, or pain  Changes in the skin's surface, such as oozing, bleeding, or scaliness  A sore that does not heal  New swelling or redness beyond the border of a mole    Who s at risk?  Anyone can get skin cancer. But you are at greater risk if you have:   Fair skin, light-colored hair, or light-colored eyes  Many moles or abnormal moles on your skin  A history of sunburns from sunlight or tanning beds  A family history of skin cancer  A history of exposure to radiation or chemicals  A weakened immune system  If you have had skin cancer in the past, you are at risk for recurring skin cancer.   How to check your skin  Do your monthly skin checkups in front of a full-length mirror. Check all parts of your body, including your:   Head (ears, face, neck, and scalp)  Torso (front, back, and sides)  Arms (tops, undersides, upper, and lower armpits)  Hands (palms, backs, and fingers, including  under the nails)  Buttocks and genitals  Legs (front, back, and sides)  Feet (tops, soles, toes, including under the nails, and between toes)  If you have a lot of moles, take digital photos of them each month. Make sure to take photos both up close and from a distance. These can help you see if any moles change over time.   Most skin changes are not cancer. But if you see any changes in your skin, call your doctor right away. Only he or she can diagnose a problem. If you have skin cancer, seeing your doctor can be the first step toward getting the treatment that could save your life.   [employer] last reviewed this educational content on 4/1/2019 2000-2020 [employer].. [address] [place] [address], [place] [address]. All rights reserved. This information is not intended as a substitute for professional medical care. Always follow your healthcare professional's instructions.       When should I call my doctor?  If you are worsening or not improving, please, contact us or seek urgent care as noted below.     Who should I call with questions (adults)?  [facility] (adult and pediatric): [phone]  [facility] (adult): [phone]  For urgent needs outside of business hours call the [facility] at [phone] and ask for the dermatology resident on call to be paged  If this is a medical emergency and you are unable to reach an ER, Call [phone]    Who should I call with questions (pediatric)?  [facility]- Pediatric Dermatology  Dr. Gloria Gaston, Dr. Marquez Santiago, Dr. Kaylie Godoy, ONEL Anne, Dr. Tiffanie Lobo, Dr. Vero Franks & Dr. Wade Kang  Non-urgent nurse triage line; [phone]- Karlee and Indu ORTEGA Care Coordinatorwill Camacho (/Complex ) [phone]    If you need a prescription refill, please contact your pharmacy. Refills are approved or denied by our  Physicians during normal business hours, Monday through Fridays  Per office policy, refills will not be granted if you have not been seen within the past year (or sooner depending on your child's condition)    Scheduling Information:  Pediatric Appointment Scheduling and Call Center [phone]  Radiology Scheduling- [phone]  Sedation Unit Scheduling- [phone]  [place] Scheduling- General [phone]; Pediatric Dermatology [phone]  Main  Services: [phone]  Khmer: [phone]  Brazilian: [phone]  Hmong/Andorran/Ukrainian: [phone]  Preadmission Nursing Department Fax Number: [phone] (Fax all pre-operative paperwork to this number)    For urgent matters arising during evenings, weekends, or holidays that cannot wait for normal business hours please call [phone] and ask for the dermatology resident on call to be paged.

## 2022-12-07 NOTE — PROGRESS NOTES
[facility] Dermatology Note  Encounter Date: Dec 7, 2022  Office Visit     Dermatology Problem List:  1. Psoriasis vulgaris  - Current tx: betamethasone 0.05% ointment, calcipotriene 0.005% cream  2. Dermatitis  - Current tx: clobetasol 0.05% ointment  ____________________________________________    Assessment & Plan:    # Psoriasis vulgaris. Chronic, stable, well managed on current treatment regimen.  - Continue betamethasone 0.05% ointment BID PRN on weekends to affected areas on the back; refilled today. Advised that this can increase to BID every day for two weeks for flares.  - Continue calcipotriene 0.005% cream BID PRN on weekdays to affected areas on the back; refilled today.    # Benign lesions: Multiple benign nevi, solar lentigos, seborrheic keratoses, cherry angiomas. Explained to patient benign nature of lesion. No treatment is necessary at this time unless the lesion changes or becomes symptomatic.   - ABCDs of melanoma were discussed and self skin checks were advised.  - Sun precaution was advised including the use of sun screens of SPF 30 or higher, sun protective clothing, and avoidance of tanning beds.    Procedures Performed:   None    Follow-up: 1 year(s) in-person, or earlier for new or changing lesions    Staff and Scribe:     Scribe Disclosure:  I, Antony Olivares, am serving as a scribe to document services personally performed by Leon Orlando MD based on data collection and the provider's statements to me.     Provider Disclosure:   The documentation recorded by the scribe accurately reflects the services I personally performed and the decisions made by me.    Leon Orlando MD    Department of Dermatology  [facility]: Phone: [phone], Fax:[phone]  [facility]: Phone: [phone] Fax:  [phone]  ____________________________________________    CC: Derm Problem (4 month follow up; skin check.)    HPI:  Mr. Chasity Gaspar is a(n) 72 year old male who presents today as a return patient for a FBSE. Last seen in dermatology by me on 8/9/2022, at which time patient was started on calcipotriene 0.005% cream for treatment of psoriasis.    Today, patient reports that he still continues to experience itching related to his psoriasis. He is using his topicals as directed and does not feel that any changes need to be made to the current regimen. Patient is otherwise feeling well, without additional skin concerns. He denies a personal or family history of skin cancer. He previously lived in [facility] and [facility].    Labs Reviewed:  N/A    Physical Exam:  Vitals: There were no vitals taken for this visit.  SKIN: Total skin excluding the undergarment areas was performed. The exam included the head/face, neck, both arms, chest, back, abdomen, both legs, digits and/or nails.   - Small pink scaly plaque on the right upper back. Improved from prior.  - There are dome shaped bright red papules on the trunk and extremities.   - Multiple regular brown pigmented macules and papules are identified on the trunk and extremities.   - Scattered brown macules on sun exposed areas.  - There are waxy stuck on tan to brown papules on the trunk and extremities.   - No other lesions of concern on areas examined.     Medications:  Current Outpatient Medications   Medication     acetaminophen 500 mg coapsule     atorvastatin (LIPITOR) 40 MG tablet     atorvastatin (LIPITOR) 40 MG tablet     atorvastatin (LIPITOR) 40 MG tablet     betamethasone dipropionate (DIPROSONE) 0.05 % external ointment     calcipotriene (DOVONOX) 0.005 % external ointment     clobetasol (TEMOVATE) 0.05 % external ointment     clopidogrel (PLAVIX) 75 MG tablet     clopidogrel (PLAVIX) 75 MG tablet     clopidogreL  (PLAVIX) 75 mg tablet     diclofenac (VOLTAREN) 1 % topical gel     DULoxetine (CYMBALTA) 60 MG capsule     dutasteride (AVODART) 0.5 MG capsule     gabapentin (NEURONTIN) 300 MG capsule     gabapentin (NEURONTIN) 300 MG capsule     HYDROmorphone (DILAUDID) 2 MG tablet     magnesium oxide (MAG-OX) 400 mg (241.3 mg magnesium) tablet     magnesium oxide (MAG-OX) 400 MG tablet     metoprolol tartrate (LOPRESSOR) 25 MG tablet     metoprolol tartrate (LOPRESSOR) 25 MG tablet     omeprazole (PRILOSEC) 20 MG DR capsule     omeprazole (PRILOSEC) 20 MG DR capsule     polyethylene glycol (MIRALAX) 17 GM/Dose powder     polyethylene glycol (MIRALAX) 17 GM/Dose powder     tamsulosin (FLOMAX) 0.4 MG capsule     No current facility-administered medications for this visit.      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     Abdominal Pain In The Right Lower Belly (RLQ)     Abdominal Pain In The Right Upper Belly (RUQ)     Memory Lapses Or Loss     Hyperlipidemia     Hearing Loss     Lacunar Stroke     Spinal Stenosis     Radiculopathy, lumbar region     Cervicalgia     Dysthymia (Depressive Neurosis), Primary     Essential Hypertriglyceridemia     Hiatal Hernia     Lumbar Canal Stenosis     Lower Back Pain     Vitamin D Deficiency     Moderate Recurrent Major Depression     Esophageal reflux     Gastritis     Constipation     Gout     Dermatitis     Elbow swelling, right     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)     Left hemiparesis (H)     ASNHL (asymmetrical sensorineural hearing loss)     Elevated liver enzymes     Hiatal hernia     Past Medical History:   Diagnosis Date     Cervicalgia      Depression      Dyslipidemia      Dysthymia      Gastritis      GERD (gastroesophageal reflux disease)      Hearing loss      Hiatal hernia      Hypertriglyceridemia      Insomnia      Lacunar stroke (H)      Lumbar canal stenosis       Lumbar radiculopathy      Myalgia and myositis         CC David Fang MD  [address] [facility] [address]  [place] [address] on close of this encounter.

## 2022-12-07 NOTE — LETTER
12/7/2022       RE: Chasity Gaspar  [address] [place] [address]     Dear Colleague,    Thank you for referring your patient, Chasity Gaspar, to the [facility] [place] at [facility]. Please see a copy of my visit note below.    [facility] Dermatology Note  Encounter Date: Dec 7, 2022  Office Visit     Dermatology Problem List:  1. Psoriasis vulgaris  - Current tx: betamethasone 0.05% ointment, calcipotriene 0.005% cream  2. Dermatitis  - Current tx: clobetasol 0.05% ointment  ____________________________________________    Assessment & Plan:    # Psoriasis vulgaris. Chronic, stable, well managed on current treatment regimen.  - Continue betamethasone 0.05% ointment BID PRN on weekends to affected areas on the back; refilled today. Advised that this can increase to BID every day for two weeks for flares.  - Continue calcipotriene 0.005% cream BID PRN on weekdays to affected areas on the back; refilled today.    # Benign lesions: Multiple benign nevi, solar lentigos, seborrheic keratoses, cherry angiomas. Explained to patient benign nature of lesion. No treatment is necessary at this time unless the lesion changes or becomes symptomatic.   - ABCDs of melanoma were discussed and self skin checks were advised.  - Sun precaution was advised including the use of sun screens of SPF 30 or higher, sun protective clothing, and avoidance of tanning beds.    Procedures Performed:   None    Follow-up: 1 year(s) in-person, or earlier for new or changing lesions    Staff and Scribe:     Scribe Disclosure:  I, Antony Olivares, am serving as a scribe to document services personally performed by Leon Orlando MD based on data collection and the provider's statements to me.     Provider Disclosure:   The documentation recorded by the scribe accurately reflects the services I personally performed and the decisions made by me.    Leon Orlando,  MD    Department of Dermatology  [facility]: Phone: [phone], Fax:[phone]  [facility]: Phone: [phone] Fax: [phone]  ____________________________________________    CC: Derm Problem (4 month follow up; skin check.)    HPI:  Mr. Chasity Gaspar is a(n) 72 year old male who presents today as a return patient for a FBSE. Last seen in dermatology by me on 8/9/2022, at which time patient was started on calcipotriene 0.005% cream for treatment of psoriasis.    Today, patient reports that he still continues to experience itching related to his psoriasis. He is using his topicals as directed and does not feel that any changes need to be made to the current regimen. Patient is otherwise feeling well, without additional skin concerns. He denies a personal or family history of skin cancer. He previously lived in [facility] and [facility], relocating to the [place] about 14 years ago.    Labs Reviewed:  N/A    Physical Exam:  Vitals: There were no vitals taken for this visit.  SKIN: Total skin excluding the undergarment areas was performed. The exam included the head/face, neck, both arms, chest, back, abdomen, both legs, digits and/or nails.   - Small pink scaly plaque on the right upper back. Improved from prior.  - There are dome shaped bright red papules on the trunk and extremities.   - Multiple regular brown pigmented macules and papules are identified on the trunk and extremities.   - Scattered brown macules on sun exposed areas.  - There are waxy stuck on tan to brown papules on the trunk and extremities.   - No other lesions of concern on areas examined.     Medications:  Current Outpatient Medications   Medication     acetaminophen 500 mg coapsule     atorvastatin (LIPITOR) 40 MG tablet     atorvastatin (LIPITOR) 40 MG tablet     atorvastatin (LIPITOR) 40 MG tablet     betamethasone  dipropionate (DIPROSONE) 0.05 % external ointment     calcipotriene (DOVONOX) 0.005 % external ointment     clobetasol (TEMOVATE) 0.05 % external ointment     clopidogrel (PLAVIX) 75 MG tablet     clopidogrel (PLAVIX) 75 MG tablet     clopidogreL (PLAVIX) 75 mg tablet     diclofenac (VOLTAREN) 1 % topical gel     DULoxetine (CYMBALTA) 60 MG capsule     dutasteride (AVODART) 0.5 MG capsule     gabapentin (NEURONTIN) 300 MG capsule     gabapentin (NEURONTIN) 300 MG capsule     HYDROmorphone (DILAUDID) 2 MG tablet     magnesium oxide (MAG-OX) 400 mg (241.3 mg magnesium) tablet     magnesium oxide (MAG-OX) 400 MG tablet     metoprolol tartrate (LOPRESSOR) 25 MG tablet     metoprolol tartrate (LOPRESSOR) 25 MG tablet     omeprazole (PRILOSEC) 20 MG DR capsule     omeprazole (PRILOSEC) 20 MG DR capsule     polyethylene glycol (MIRALAX) 17 GM/Dose powder     polyethylene glycol (MIRALAX) 17 GM/Dose powder     tamsulosin (FLOMAX) 0.4 MG capsule     No current facility-administered medications for this visit.      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     Abdominal Pain In The Right Lower Belly (RLQ)     Abdominal Pain In The Right Upper Belly (RUQ)     Memory Lapses Or Loss     Hyperlipidemia     Hearing Loss     Lacunar Stroke     Spinal Stenosis     Radiculopathy, lumbar region     Cervicalgia     Dysthymia (Depressive Neurosis), Primary     Essential Hypertriglyceridemia     Hiatal Hernia     Lumbar Canal Stenosis     Lower Back Pain     Vitamin D Deficiency     Moderate Recurrent Major Depression     Esophageal reflux     Gastritis     Constipation     Gout     Dermatitis     Elbow swelling, right     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

## 2022-12-21 ENCOUNTER — PATIENT OUTREACH (OUTPATIENT)
Dept: GERIATRIC MEDICINE | Facility: CLINIC | Age: 72
End: 2022-12-21

## 2022-12-21 NOTE — PROGRESS NOTES
[facility] Care Coordination Contact    Called adult daughter Flip to schedule annual HRA home visit. HRA has been scheduled for 12/22/22.     Savanna Landaverde RN, PHN  [facility]  [phone]

## 2022-12-22 ENCOUNTER — PATIENT OUTREACH (OUTPATIENT)
Dept: GERIATRIC MEDICINE | Facility: CLINIC | Age: 72
End: 2022-12-22

## 2022-12-22 ASSESSMENT — ACTIVITIES OF DAILY LIVING (ADL): DEPENDENT_IADLS:: CLEANING;COOKING;LAUNDRY;SHOPPING;MEAL PREPARATION;MEDICATION MANAGEMENT;MONEY MANAGEMENT;INCONTINENCE

## 2022-12-22 NOTE — PROGRESS NOTES
[facility] Care Coordination Contact    [facility] Home Visit Assessment     Home visit for Health Risk Assessment with Chasity Gaspar completed on December 22, 2022    Type of residence:: Private home - stairs  Current living arrangement:: I live in a private home with family     Assessment completed with:: Children    Current Care Plan  Member currently receiving the following home care services:     Member currently receiving the following community resources: PCA      Medication Review  Medication reconciliation completed in Epic: No  Medication set-up & administration: Family/informal caregiver sets up weekly.  Family caregiver administers medications.  Medication Risk Assessment Medication (1 or more, place referral to MTM): N/A: No risk factors identified  MTM Referral Placed: No: No risk factors idenified    Mental/Behavioral Health   Depression Screening:           Mental health DX:: No        Falls Assessment:   Fallen 2 or more times in the past year?: No   Any fall with injury in the past year?: No    ADL/IADL Dependencies:   Dependent ADLs:: Ambulation-walker, Bathing, Dressing, Eating, Grooming, Incontinence, Positioning, Transfers, Wheelchair-with assist, Toileting  Dependent IADLs:: Cleaning, Cooking, Laundry, Shopping, Meal Preparation, Medication Management, Money Management, Incontinence    [facility] sponsored benefits: Shared information re: Silver Sneakers/gym memberships, ASA, Calcium +D.    PCA Assessment completed at visit: Yes Annual PCA assessment indicated 44 units per day of PCA. This is the same as the previous assessment.     Elderly Waiver Eligibility: Yes, but member declines EW services; will not open to EW    Care Plan & Recommendations:     See CC for detailed assessment information.    Follow-Up Plan: Member informed of future contact, plan to f/u with member with a 6 month telephone assessment.  Contact information shared with member and family, encouraged member  to call with any questions or concerns at any time.    [place] care continuum providers: Please see Snapshot and Care Management Flowsheets for Specific details of care plan.    This CC note routed to PCP.    Savanna Landaverde RN, PHN  [facility]  [phone]

## 2022-12-26 ENCOUNTER — HOSPITAL ENCOUNTER (EMERGENCY)
Facility: HOSPITAL | Age: 72
Discharge: HOME | End: 2022-12-26
Attending: EMERGENCY MEDICINE | Admitting: EMERGENCY MEDICINE
Payer: COMMERCIAL

## 2022-12-26 ENCOUNTER — APPOINTMENT (OUTPATIENT)
Dept: MRI IMAGING | Facility: HOSPITAL | Age: 72
End: 2022-12-26
Attending: EMERGENCY MEDICINE
Payer: COMMERCIAL

## 2022-12-26 VITALS
OXYGEN SATURATION: 96 % | SYSTOLIC BLOOD PRESSURE: 164 MMHG | DIASTOLIC BLOOD PRESSURE: 88 MMHG | HEART RATE: 95 BPM | HEIGHT: 60 IN | WEIGHT: 120 LBS | BODY MASS INDEX: 23.56 KG/M2 | RESPIRATION RATE: 26 BRPM | TEMPERATURE: 98.3 F

## 2022-12-26 DIAGNOSIS — M54.2 NECK PAIN ON RIGHT SIDE: ICD-10-CM

## 2022-12-26 LAB
ANION GAP SERPL CALCULATED.3IONS-SCNC: 13 MMOL/L (ref 7–15)
BASOPHILS # BLD AUTO: 0 10E3/UL (ref 0–0.2)
BASOPHILS NFR BLD AUTO: 0 %
BUN SERPL-MCNC: 11.6 MG/DL (ref 8–23)
CALCIUM SERPL-MCNC: 9.9 MG/DL (ref 8.8–10.2)
CHLORIDE SERPL-SCNC: 97 MMOL/L (ref 98–107)
CREAT SERPL-MCNC: 0.79 MG/DL (ref 0.67–1.17)
CRP SERPL-MCNC: 52.8 MG/L
DEPRECATED HCO3 PLAS-SCNC: 24 MMOL/L (ref 22–29)
EOSINOPHIL # BLD AUTO: 0.2 10E3/UL (ref 0–0.7)
EOSINOPHIL NFR BLD AUTO: 2 %
ERYTHROCYTE [DISTWIDTH] IN BLOOD BY AUTOMATED COUNT: 13.4 % (ref 10–15)
ERYTHROCYTE [SEDIMENTATION RATE] IN BLOOD BY WESTERGREN METHOD: 41 MM/HR (ref 0–15)
GFR SERPL CREATININE-BSD FRML MDRD: >90 ML/MIN/1.73M2
GLUCOSE SERPL-MCNC: 129 MG/DL (ref 70–99)
HCT VFR BLD AUTO: 46.3 % (ref 40–53)
HGB BLD-MCNC: 15.1 G/DL (ref 13.3–17.7)
IMM GRANULOCYTES # BLD: 0.1 10E3/UL
IMM GRANULOCYTES NFR BLD: 0 %
LYMPHOCYTES # BLD AUTO: 1.3 10E3/UL (ref 0.8–5.3)
LYMPHOCYTES NFR BLD AUTO: 12 %
MCH RBC QN AUTO: 26.6 PG (ref 26.5–33)
MCHC RBC AUTO-ENTMCNC: 32.6 G/DL (ref 31.5–36.5)
MCV RBC AUTO: 82 FL (ref 78–100)
MONOCYTES # BLD AUTO: 1.2 10E3/UL (ref 0–1.3)
MONOCYTES NFR BLD AUTO: 11 %
NEUTROPHILS # BLD AUTO: 8.9 10E3/UL (ref 1.6–8.3)
NEUTROPHILS NFR BLD AUTO: 75 %
NRBC # BLD AUTO: 0 10E3/UL
NRBC BLD AUTO-RTO: 0 /100
PLATELET # BLD AUTO: 323 10E3/UL (ref 150–450)
POTASSIUM SERPL-SCNC: 3.7 MMOL/L (ref 3.4–5.3)
RBC # BLD AUTO: 5.67 10E6/UL (ref 4.4–5.9)
SODIUM SERPL-SCNC: 134 MMOL/L (ref 136–145)
WBC # BLD AUTO: 11.6 10E3/UL (ref 4–11)

## 2022-12-26 PROCEDURE — 85025 COMPLETE CBC W/AUTO DIFF WBC: CPT | Performed by: EMERGENCY MEDICINE

## 2022-12-26 PROCEDURE — 96375 TX/PRO/DX INJ NEW DRUG ADDON: CPT | Mod: 59

## 2022-12-26 PROCEDURE — 72156 MRI NECK SPINE W/O & W/DYE: CPT

## 2022-12-26 PROCEDURE — 86140 C-REACTIVE PROTEIN: CPT | Performed by: EMERGENCY MEDICINE

## 2022-12-26 PROCEDURE — 250N000011 HC RX IP 250 OP 636: Performed by: EMERGENCY MEDICINE

## 2022-12-26 PROCEDURE — 255N000002 HC RX 255 OP 636: Performed by: EMERGENCY MEDICINE

## 2022-12-26 PROCEDURE — 36415 COLL VENOUS BLD VENIPUNCTURE: CPT | Performed by: EMERGENCY MEDICINE

## 2022-12-26 PROCEDURE — 82310 ASSAY OF CALCIUM: CPT | Performed by: EMERGENCY MEDICINE

## 2022-12-26 PROCEDURE — 96374 THER/PROPH/DIAG INJ IV PUSH: CPT | Mod: 59

## 2022-12-26 PROCEDURE — 85652 RBC SED RATE AUTOMATED: CPT | Performed by: EMERGENCY MEDICINE

## 2022-12-26 PROCEDURE — 250N000013 HC RX MED GY IP 250 OP 250 PS 637: Performed by: EMERGENCY MEDICINE

## 2022-12-26 PROCEDURE — 99285 EMERGENCY DEPT VISIT HI MDM: CPT | Mod: 25

## 2022-12-26 PROCEDURE — A9585 GADOBUTROL INJECTION: HCPCS | Performed by: EMERGENCY MEDICINE

## 2022-12-26 RX ORDER — HYDROMORPHONE HYDROCHLORIDE 2 MG/1
2 TABLET ORAL EVERY 6 HOURS PRN
Qty: 10 TABLET | Refills: 0 | Status: SHIPPED | OUTPATIENT
Start: 2022-12-26 | End: 2022-12-29

## 2022-12-26 RX ORDER — DEXAMETHASONE SODIUM PHOSPHATE 4 MG/ML
4 INJECTION, SOLUTION INTRA-ARTICULAR; INTRALESIONAL; INTRAMUSCULAR; INTRAVENOUS; SOFT TISSUE ONCE
Status: COMPLETED | OUTPATIENT
Start: 2022-12-26 | End: 2022-12-26

## 2022-12-26 RX ORDER — GADOBUTROL 604.72 MG/ML
6 INJECTION INTRAVENOUS ONCE
Status: COMPLETED | OUTPATIENT
Start: 2022-12-26 | End: 2022-12-26

## 2022-12-26 RX ORDER — METHYLPREDNISOLONE 4 MG
TABLET, DOSE PACK ORAL
Qty: 21 TABLET | Refills: 0 | Status: SHIPPED | OUTPATIENT
Start: 2022-12-26 | End: 2023-03-01

## 2022-12-26 RX ORDER — HYDROMORPHONE HYDROCHLORIDE 2 MG/1
2 TABLET ORAL ONCE
Status: COMPLETED | OUTPATIENT
Start: 2022-12-26 | End: 2022-12-26

## 2022-12-26 RX ORDER — HYDROMORPHONE HYDROCHLORIDE 1 MG/ML
0.5 INJECTION, SOLUTION INTRAMUSCULAR; INTRAVENOUS; SUBCUTANEOUS
Status: COMPLETED | OUTPATIENT
Start: 2022-12-26 | End: 2022-12-26

## 2022-12-26 RX ORDER — ONDANSETRON 2 MG/ML
4 INJECTION INTRAMUSCULAR; INTRAVENOUS EVERY 30 MIN PRN
Status: DISCONTINUED | OUTPATIENT
Start: 2022-12-26 | End: 2022-12-26 | Stop reason: HOSPADM

## 2022-12-26 RX ADMIN — HYDROMORPHONE HYDROCHLORIDE 2 MG: 2 TABLET ORAL at 08:53

## 2022-12-26 RX ADMIN — HYDROMORPHONE HYDROCHLORIDE 0.5 MG: 1 INJECTION, SOLUTION INTRAMUSCULAR; INTRAVENOUS; SUBCUTANEOUS at 05:31

## 2022-12-26 RX ADMIN — DEXAMETHASONE SODIUM PHOSPHATE 4 MG: 4 INJECTION, SOLUTION INTRA-ARTICULAR; INTRALESIONAL; INTRAMUSCULAR; INTRAVENOUS; SOFT TISSUE at 08:51

## 2022-12-26 RX ADMIN — GADOBUTROL 6 ML: 604.72 INJECTION INTRAVENOUS at 06:54

## 2022-12-26 ASSESSMENT — ENCOUNTER SYMPTOMS
NUMBNESS: 0
NECK PAIN: 1

## 2022-12-26 ASSESSMENT — ACTIVITIES OF DAILY LIVING (ADL)
ADLS_ACUITY_SCORE: 35
ADLS_ACUITY_SCORE: 35
ADLS_ACUITY_SCORE: 33
ADLS_ACUITY_SCORE: 35

## 2022-12-26 NOTE — ED NOTES
EMERGENCY DEPARTMENT ENCOUNTER      NAME: Chasity Gaspar  AGE: 72 year old male  YOB: 1950  MRN: [id number]  EVALUATION DATE & TIME: 2022  4:17 AM    PCP: David Fang    ED PROVIDER: Olive Emmanuel M.D.        Chief Complaint   Patient presents with     Neck Pain     Right side         FINAL IMPRESSION:    1. Neck pain on right side          Patient was signed out to me at change of shift by Dr. Quezada at 7:03 AM. Please see their note for full HPI, exam and plan.    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  Patient signed out to me at change of shift awaiting MRI results and disposition.  He had a similar presentation about a month ago and had MRI and CT scans.  Ultimately sent home on Medrol Dosepak and Dilaudid and had significant improvement in symptoms until last night.  He had seen neurosurgery as an outpatient who recommended surgery but he declined.  He was supposed to follow-up with spine clinic for possible injections but does not appear that that has occurred.  MRI imaging here appears unchanged and imaging was done with and without contrast.  WBC only slightly elevated but ESR and CRP elevated of unclear significance.  He denies any other infectious symptoms.  Spoke with neurosurgery and they do not feel this represents an acute spinal etiology given the MRI and they recommend considering other sources.  At this time I do not think he requires any further radiology work-up in the ER but have sent a referral for him to follow-up in spine clinic.  We will send him home with a prescription for Medrol Dosepak and Dilaudid as he previously tolerated these well and had good pain control at that time.  Patient given 2 mg oral Dilaudid here in the ER with improvement of his symptoms and also was given a dose of Decadron here IV.        ED  COURSE:  7:03 AM  Patient was signed out to me at change of shift by Dr. Quezada. Please see their note for full HPI, exam and plan.    8:31 AM Spoke with Cheri GRIFFIN, Neurosurgery, regarding the patient.  Patient CRP is quite elevated but neurosurgery feels quite confident this is not related to the neck pain that he is having based on the MRI was done with and without contrast.  They feel comfortable with outpatient management with regards to that pain in general.    8:34 AM Updated the patient on the current treatment plan using the language line .  Patient continues to complain of this neck pain that radiates down his arm.  Neuro exam overall unremarkable except for some decreased hand strength though he admits this is due to increased pain in the neck and shoulder when he tries to squeeze.  He does not think there is actual weakness in that hand.  Sensory appears to be intact.  He denies any other infectious symptoms such as headache, fever, cough, sore throat, abdominal pain, shortness of breath, vomiting or diarrhea.  Nothing else at this time to suggest an acute infectious etiology.  Nothing by MRI imaging to suggest an infectious spine etiology.  Plan is to get him better pain control with anticipation for discharge home to follow-up in spine clinic.    10:02 AM  Patient states the pain really is not a whole lot better.  He received the oral Dilaudid about an hour ago.  Though to me he seems to be moving much easier and looks well comfortable.  I spoke with family and the plan is to recheck on him a little while to make disposition plans.    10:44 AM  Patient now looking much more comfortable.  Spoke with patient and family the plan is discharge home to follow-up with spine clinic as an outpatient.  Looks acute been offered surgery by the neurosurgery team last month but he declined.  He was supposed to follow-up in spine clinic for possible injections but unclear whether or not that has actually  happened.  No notes that I can see in the computer system suggested he followed up.  Referral has been sent to the spine clinic to help facilitate outpatient follow-up.  Prescriptions for Dilaudid and Medrol Dosepak have been provided as he had good results of these medications at last ER discharge.    Neurologically only seems to have very slight weakness of the right hand with squeeze otherwise rest of his neuro exam is completely unremarkable.  MRI imaging reviewed.  Elevated CRP and ESR unclear etiology at this time I do not think this represents a spinal infection.  He denies any other infectious symptoms such as pneumonias, intra-abdominal sources, UTI, influenza, COVID, etc.  Nothing at this time to suggest septic joint.  He does not appear toxic or septic.    I do not think that this represents ACS, PE, ruptured AAA, aortic dissection, cholecystitis, bowel obstruction, bowel ischemia, kidney stone, pyelonephritis, cauda equina syndrome, discitis, epidural abscess, or other such etiologies at this time.      COVID-19 PPE worn during patient evaluation:  Mask: n95 and homemade masks   Eye Protection: goggles   Gown: none   Hair cover: yes  Face shield: none   Patient wearing a mask: yes         At the conclusion of the encounter I discussed the results of all of the tests and the disposition. Their questions were answered. The patient (and any family present) acknowledged understanding and were agreeable with the care plan.      CONSULTANTS:  Neurosurgery- ELIAS Ornelas  Referral to spine clinic sent          MEDICATIONS GIVEN IN THE EMERGENCY:  Medications   ondansetron (ZOFRAN) injection 4 mg (has no administration in time range)   HYDROmorphone (PF) (DILAUDID) injection 0.5 mg (0.5 mg Intravenous Given 12/26/22 0531)   gadobutrol (GADAVIST) injection 6 mL (6 mLs Intravenous Given 12/26/22 0654)   HYDROmorphone (DILAUDID) tablet 2 mg (2 mg Oral Given 12/26/22 8911)   dexamethasone (DECADRON) injection 4 mg (4  mg Intravenous Given 12/26/22 0851)             NEW PRESCRIPTIONS STARTED AT TODAY'S ER VISIT     Medication List      Started    methylPREDNISolone 4 MG tablet therapy pack  Commonly known as: MEDROL DOSEPAK  Follow Package Directions        Modified    HYDROmorphone 2 MG tablet  Commonly known as: DILAUDID  2 mg, Oral, EVERY 6 HOURS PRN  What changed: reasons to take this                CONDITION:  Stable, improving        DISPOSITION:  discharge home with family         =================================================================  =================================================================      Patient was signed out to me at change of shift by Dr. Quezada at 7:03 AM. Please see their note for full HPI, exam and plan.        HPI    Naw Naw is a 72 year old male with history of neck pain, GERD, CVA, who presents to the ER with complaints of neck pain.    Patient has been seen emergency department back in November with similar neck pain in and had multiple imaging including CTs and MRIs.  Ultimately thought to be due to stenosis seen on MRI imaging.  He was discharged on steroids and followed up in clinic.  Felt much better in general and has not had any issues until now.  Now woke up during the night with pain that shoots down his right arm.  Patient signed out to me at change of shift awaiting MRI of the cervical spine with likely plan for discharge home on steroids and follow-up in spine clinic.    Chart Review:  11/5/22-11/6/22: Patient presented neck pain. MR thoracic spine w/o & w contrast:   CERVICAL SPINE MRI:  1. Normal cervical cord. 2. Marked canal narrowing with mild mass effect on the cord and slight AP cord flattening at C3-C4. No definite pathological cord signal.3. Moderate to marked canal narrowing at C5-C6 with mild mass effect on the cord and slight AP cord flattening. No definite pathological cord signal. 4. Mild canal narrowing at C4-C5. 5. Multilevel neural foraminal narrowing, as  described. 6. Fluid in right facet joint of C2-C3, C3-C4, and C4-C5 level; at C4-C5 with slight patchy high T2 signal in adjacent bones. This is most likely due to degenerative changes and reactive edema; less likely an early infectious facet arthropathy could have this appearance. Please correlate clinically.  THORACIC SPINE MRI:  1. Normal thoracic cord. 2. Mild canal narrowing at T10-T11 and T11-T12. 3. Suggestion of enhancement of L2. This is incompletely visualized at the very inferior aspect of field-of-view. There is no evidence of corresponding signal abnormality on other sequences.Finding is probably artifactual due to incomplete fat suppression. However, it is quite conspicuous and abnormality of L2 cannot be completely excluded. If clinically indicated, MRI lumbar spine without and with contrast may be helpful. Neurosurgery was consulted and felt patient was appropriate. Patient discharged on Medrol Dosepak and advised to continue hydromorphone.        PAST MEDICAL HISTORY:  Past Medical History:   Diagnosis Date     Cervicalgia      Depression      Dyslipidemia      Dysthymia      Gastritis      GERD (gastroesophageal reflux disease)      Hearing loss      Hiatal hernia      Hypertriglyceridemia      Insomnia      Lacunar stroke (H)      Lumbar canal stenosis      Lumbar radiculopathy      Myalgia and myositis          PAST SURGICAL HISTORY:  Past Surgical History:   Procedure Laterality Date     IR CEREBRAL ANGIOGRAM  4/27/2017     IR LUMBAR TRANSFORAMINAL EPIDURAL STRD INJ  7/2/2012     PICC  4/28/2017          [place] ARTHRODESIS ANT INTERBODY MIN DISCECTOMY,LUMBAR      Description: Lumbar Vertebral Fusion;  Recorded: 07/16/2013;  Comments: 3/17/11 spinal decompression and fusion L4-5, L5-S1 for spinal stenosis, DDD, spondylolysis; Dr. Casillas [facility]     FL ESOPHAGOGASTRODUODENOSCOPY TRANSORAL DIAGNOSTIC      Description: Esophagogastroduodenoscopy;  Proc Date: 04/02/2012;  Comments: biosies:    reactive gastropathy with chronic superimposed nonspecific chronic inflammation (likely secondary to ASA, NSAIDS, EtOH or other irritants), NEG for h. pylori; small hiatal hernia     [place] INJECT ANES/STEROID FORAMEN LUMBAR/SACRAL W IMG GUIDE ,1 LEVEL      Description: Nerve Block Transforaminal Epidural Lumbar L3 - L4;  Recorded: 07/10/2012;  Comments: 7/2/2012 for severe low back pain, spinal stenosis and radiculopathy     THROMBECTOMY  04/24/2017    Rt CELIA     US ASPIRATION OR INJECTION MAJOR JOINT  10/23/2019     [facility] APPENDECTOMY      Description: Appendectomy;  Recorded: 07/12/2013;         CURRENT MEDICATIONS:    Prior to Admission medications    Medication Sig Start Date End Date Taking? Authorizing Provider   acetaminophen 500 mg coapsule [ACETAMINOPHEN 500 MG COAPSULE] Take 2 tablets by mouth 3 (three) times a day as needed for fever or pain. 12/8/14   Virginie Jones MD   atorvastatin (LIPITOR) 40 MG tablet atorvastatin 40 mg tablet 11/25/22   David Fang MD   atorvastatin (LIPITOR) 40 MG tablet [ATORVASTATIN (LIPITOR) 40 MG TABLET] TAKE 1 TABLET(40 MG) BY MOUTH AT BEDTIME 6/24/21   David Fang MD   atorvastatin (LIPITOR) 40 MG tablet [ATORVASTATIN (LIPITOR) 40 MG TABLET] TAKE 1 TABLET(40 MG) BY MOUTH AT BEDTIME 4/9/21   Wade Hinson MD   betamethasone dipropionate (DIPROSONE) 0.05 % external ointment Apply to rash on back twice daily on Saturday and Sunday. (Monday-Friday, use calcipotriene ointment). 12/7/22   Leon Orlando MD   calcipotriene (DOVONOX) 0.005 % external ointment Apply to rash on back twice daily Monday-Friday; then use betamethasone on weekends. Continue alternating regimen as needed. 12/7/22   Leon Orlando MD   clobetasol (TEMOVATE) 0.05 % external ointment Apply topically 2 times daily 11/25/22   David Fang MD   clopidogrel (PLAVIX) 75 MG tablet Take 1 tablet (75 mg) by mouth daily 11/25/22   David Fang MD   clopidogrel (PLAVIX) 75 MG tablet Take 1 tablet by  mouth daily 8/1/21   Reported, Patient   clopidogreL (PLAVIX) 75 mg tablet [CLOPIDOGREL (PLAVIX) 75 MG TABLET] TAKE 1 TABLET(75 MG) BY MOUTH DAILY 4/30/21   David Fang MD   diclofenac (VOLTAREN) 1 % topical gel Apply 2 g topically 4 times daily 9/16/21   David Fang MD   DULoxetine (CYMBALTA) 60 MG capsule [DULOXETINE (CYMBALTA) 60 MG CAPSULE] TAKE 1 CAPSULE(60 MG) BY MOUTH DAILY 11/25/22   David Fang MD   dutasteride (AVODART) 0.5 MG capsule Take 1 capsule (0.5 mg) by mouth daily 8/11/21   David Fang MD   gabapentin (NEURONTIN) 300 MG capsule Take 1 cap at bedtime, increase by 1 cap every three days, max dose 2 caps tid 11/25/22   David Fang MD   gabapentin (NEURONTIN) 300 MG capsule  7/2/21   Reported, Patient   HYDROmorphone (DILAUDID) 2 MG tablet Take 2 mg by mouth every 6 hours as needed for severe pain (7-10)    Reported, Patient   magnesium oxide (MAG-OX) 400 mg (241.3 mg magnesium) tablet [MAGNESIUM OXIDE (MAG-OX) 400 MG (241.3 MG MAGNESIUM) TABLET] TAKE 1 TABLET(400 MG) BY MOUTH DAILY 4/30/21   David Fang MD   magnesium oxide (MAG-OX) 400 MG tablet Take 1 tablet (400 mg) by mouth daily 6/7/22   David Fang MD   metoprolol tartrate (LOPRESSOR) 25 MG tablet Take 1 tablet (25 mg) by mouth 2 times daily 8/19/22   David Fang MD   metoprolol tartrate (LOPRESSOR) 25 MG tablet [METOPROLOL TARTRATE (LOPRESSOR) 25 MG TABLET] TAKE 1 TABLET BY MOUTH TWICE DAILY 6/3/20   David Fang MD   omeprazole (PRILOSEC) 20 MG DR capsule [OMEPRAZOLE (PRILOSEC) 20 MG CAPSULE] TAKE 2 CAPSULES(40 MG) BY MOUTH DAILY 7/26/22   David Fang MD   omeprazole (PRILOSEC) 20 MG DR capsule  7/2/21   Reported, Patient   polyethylene glycol (MIRALAX) 17 GM/Dose powder Take 17 g by mouth daily 11/25/22   David Fang MD   polyethylene glycol (MIRALAX) 17 GM/Dose powder Take 17 g by mouth daily 4/19/22   David Fang MD   tamsulosin (FLOMAX) 0.4 MG capsule Take 1 capsule (0.4 mg) by mouth daily 11/25/22   David Fang MD  "        ALLERGIES:  Allergies   Allergen Reactions     Aspirin Hives     Oxycodone Itching     Vicodin [Hydrocodone-Acetaminophen] Unknown         FAMILY HISTORY:  History reviewed. No pertinent family history.      SOCIAL HISTORY:  Social History     Socioeconomic History     Marital status:      Spouse name: None     Number of children: None     Years of education: None     Highest education level: None   Tobacco Use     Smoking status: Never     Smokeless tobacco: Never   Substance and Sexual Activity     Alcohol use: Never     Drug use: Never     Sexual activity: Not Currently     Partners: Female   Social History Narrative    ** Merged History Encounter **              VITALS:  Patient Vitals for the past 24 hrs:   BP Temp Temp src Pulse Resp SpO2 Height Weight   12/26/22 0930 (!) 152/84 -- -- 98 -- 95 % -- --   12/26/22 0920 (!) 153/99 -- -- 120 -- 96 % -- --   12/26/22 0910 -- -- -- 92 -- 93 % -- --   12/26/22 0900 (!) 153/97 -- -- 91 -- 96 % -- --   12/26/22 0850 -- -- -- 91 -- 91 % -- --   12/26/22 0845 (!) 163/102 -- -- -- -- -- -- --   12/26/22 0630 (!) 166/84 -- -- 92 -- 95 % -- --   12/26/22 0600 (!) 170/87 -- -- 93 -- 91 % -- --   12/26/22 0530 (!) 186/92 -- -- 90 26 96 % -- --   12/26/22 0413 (!) 190/91 98.3  F (36.8  C) Oral 90 20 98 % 1.448 m (4' 9\") 54.4 kg (120 lb)       Wt Readings from Last 3 Encounters:   12/26/22 54.4 kg (120 lb)   12/01/22 57.6 kg (127 lb)   11/25/22 57.9 kg (127 lb 12 oz)         PHYSICAL EXAM    Please see initial providers note for detailed physical exam        LAB:  All pertinent labs reviewed and interpreted.  Recent Results (from the past 24 hour(s))   Basic metabolic panel    Collection Time: 12/26/22  5:28 AM   Result Value Ref Range    Sodium 134 (L) 136 - 145 mmol/L    Potassium 3.7 3.4 - 5.3 mmol/L    Chloride 97 (L) 98 - 107 mmol/L    Carbon Dioxide (CO2) 24 22 - 29 mmol/L    Anion Gap 13 7 - 15 mmol/L    Urea Nitrogen 11.6 8.0 - 23.0 mg/dL    Creatinine " 0.79 0.67 - 1.17 mg/dL    Calcium 9.9 8.8 - 10.2 mg/dL    Glucose 129 (H) 70 - 99 mg/dL    GFR Estimate >90 >60 mL/min/1.73m2   Erythrocyte sedimentation rate auto    Collection Time: 12/26/22  5:28 AM   Result Value Ref Range    Erythrocyte Sedimentation Rate 41 (H) 0 - 15 mm/hr   CRP inflammation    Collection Time: 12/26/22  5:28 AM   Result Value Ref Range    CRP Inflammation 52.80 (H) <5.00 mg/L   CBC with platelets and differential    Collection Time: 12/26/22  5:28 AM   Result Value Ref Range    WBC Count 11.6 (H) 4.0 - 11.0 10e3/uL    RBC Count 5.67 4.40 - 5.90 10e6/uL    Hemoglobin 15.1 13.3 - 17.7 g/dL    Hematocrit 46.3 40.0 - 53.0 %    MCV 82 78 - 100 fL    MCH 26.6 26.5 - 33.0 pg    MCHC 32.6 31.5 - 36.5 g/dL    RDW 13.4 10.0 - 15.0 %    Platelet Count 323 150 - 450 10e3/uL    % Neutrophils 75 %    % Lymphocytes 12 %    % Monocytes 11 %    % Eosinophils 2 %    % Basophils 0 %    % Immature Granulocytes 0 %    NRBCs per 100 WBC 0 <1 /100    Absolute Neutrophils 8.9 (H) 1.6 - 8.3 10e3/uL    Absolute Lymphocytes 1.3 0.8 - 5.3 10e3/uL    Absolute Monocytes 1.2 0.0 - 1.3 10e3/uL    Absolute Eosinophils 0.2 0.0 - 0.7 10e3/uL    Absolute Basophils 0.0 0.0 - 0.2 10e3/uL    Absolute Immature Granulocytes 0.1 <=0.4 10e3/uL    Absolute NRBCs 0.0 10e3/uL       No results found for: [facility]        RADIOLOGY:  Reviewed all pertinent imaging. Please see official radiology report.    MR Cervical Spine w/o & w Contrast   Final Result   IMPRESSION:   1.  Diffuse degenerative change of the cervical spine as detailed above without appreciable change from the recent comparison study.   2.  Moderate degenerative spinal canal stenosis at C3-C4, C4-C5 and C5-C6 resulting in varying degrees of deformity/compression of the cervical spinal cord as detailed above.   3.  Moderate neural foraminal stenosis on the left at C3-C4, on the right at C4-C5 and bilaterally at C5-C6.   4.  Overall, no appreciable change from the comparison  study other than a decrease in the amount of fluid signal intensity in the right facet joints at C2-C3, C3-C4 and C4-C5 suggesting an interval decrease in acute facet arthropathy/synovitis.               EKG:    none    PROCEDURES:  none      Olive Emmanuel M.D. [facility]  Emergency Medicine and Medical Toxicology  [facility] EMERGENCY DEPARTMENT  [facility] [facility] [address] [phone]  Dept: [phone]         Olive Emmanuel MD  12/26/22 1047

## 2022-12-26 NOTE — ED NOTES
Discharge paperwork, follow up care and prescriptions discussed with pt and family using . Pt has scheduled appointment for 9 am tomorrow morning at spine clinic. Pt and family verbalized understanding. IV access removed. VSS. Pt able to dress with assistance of family. Pt helped from bed to wheel chair and and assisted to lobby.

## 2022-12-26 NOTE — ED PROVIDER NOTES
NAME: Chasity Gaspar  AGE: 72 year old male  YOB: 1950  MRN: [id number]  EVALUATION DATE & TIME: 2022  4:17 AM    PCP: David Fang    ED PROVIDER: Jamel Quezada M.D.      Chief Complaint   Patient presents with     Neck Pain     Right side     FINAL IMPRESSION:  No diagnosis found.    MEDICAL DECISION MAKIN:35 AM I met with the patient, obtained history, performed an initial exam, and discussed options and plan for diagnostics and treatment here in the ED.   6:45 AM patient's pain improved and went to MRI for evaluation of neck pain for history of significant stenosis.    Patient was clinically assessed and consented to treatment. After assessment, medical decision making and workup were discussed with the patient. The patient was agreeable to plan for testing, workup, and treatment.  Pertinent Labs & Imaging studies reviewed. (See chart for details)       Medical Decision Making    History:    Supplemental history from: Documented in HPI, if applicable    External Record(s) reviewed: Documented in HPI, if applicable.    Work Up:    Chart documentation includes differential considered and any EKGs or imaging independently interpreted by provider.    In additional to work up documented, I considered the following work up: See chart documentation, if applicable.    External consultation:    Discussion of management with another provider: See chart documentation, if applicable    Complicating factors:    Care impacted by chronic illness: Other: left hemiparesis    Care affected by social determinants of health: N/A    Disposition considerations: Admission considered. Patient was signed out to the oncoming physician, disposition pending.    Chasity Gaspar is a 72 year old male who presents with right-sided neck pain.   Differential diagnosis includes but not limited to cervicalgia, cervical radiculopathy, cervical foraminal narrowing, cervical central stenosis, facet arthropathy.  Patient with history  of neck pain was seen in November and had sharp pain in the right neck similar to tonight.  Patient may be had some slight pain and going to bed but at 3 AM became acutely worse.  He does report some shooting pain down the arm possibly to mid biceps is where patient points to.  Patient does have history of this and previous MRI did show significant canal narrowing as well as foraminal narrowing with chronic degenerative changes.  Patient had been discussed with neurology and spine surgery and placed on a Medrol Dosepak which he reports helped at that time and he has not had pain since.  I would consider another course of steroids however given the significant findings on prior MRI would plan for repeat MRI tonight to ensure there has been no acute change in stenosis and compression.  Patient comfortable with this plan and pain medication was given as well as labs checked and patient sent for MRI with and without contrast which was suggested previously after patient had a noncontrast MRI.    0 minutes of critical care time    MEDICATIONS GIVEN IN THE EMERGENCY:  Medications   ondansetron (ZOFRAN) injection 4 mg (has no administration in time range)   HYDROmorphone (PF) (DILAUDID) injection 0.5 mg (0.5 mg Intravenous Given 12/26/22 0531)       NEW PRESCRIPTIONS STARTED AT TODAY'S ER VISIT:  New Prescriptions    No medications on file          =================================================================    HPI    Patient information was obtained from: patient, and his family members    Use of : Patient's daughter acted as intepretor        Chasity Lopezw is a 72 year old male with a past medical history of lumbar canal stenosis, lumbar radiculopathy, spinal stenosis, hyperlipidemia, left hemiparesis, who presents neck pain.    Patient here with right sided neck pain before going to bed and had woken up this morning around 2am with pain becoming more severe. There is no radiation to the pain. He says that the  pain is similar to the prior evaluation in November. No recent falls or trauma. No numbness or tingling of the arm. No other medical complaints at this time.     Chart Review:  11/5/22-11/6/22: Patient presented neck pain. MR thoracic spine w/o & w contrast:   CERVICAL SPINE MRI:  1. Normal cervical cord. 2. Marked canal narrowing with mild mass effect on the cord and slight AP cord flattening at C3-C4. No definite pathological cord signal.3. Moderate to marked canal narrowing at C5-C6 with mild mass effect on the cord and slight AP cord flattening. No definite pathological cord signal. 4. Mild canal narrowing at C4-C5. 5. Multilevel neural foraminal narrowing, as described. 6. Fluid in right facet joint of C2-C3, C3-C4, and C4-C5 level; at C4-C5 with slight patchy high T2 signal in adjacent bones. This is most likely due to degenerative changes and reactive edema; less likely an early infectious facet arthropathy could have this appearance. Please correlate clinically.  THORACIC SPINE MRI:  1. Normal thoracic cord. 2. Mild canal narrowing at T10-T11 and T11-T12. 3. Suggestion of enhancement of L2. This is incompletely visualized at the very inferior aspect of field-of-view. There is no evidence of corresponding signal abnormality on other sequences.Finding is probably artifactual due to incomplete fat suppression. However, it is quite conspicuous and abnormality of L2 cannot be completely excluded. If clinically indicated, MRI lumbar spine without and with contrast may be helpful. Neurosurgery was consulted and felt patient was appropriate. Patient discharged on Medrol Dosepak and advised to continue hydromorphone.     REVIEW OF SYSTEMS   Review of Systems   Musculoskeletal: Positive for neck pain (right sided).   Neurological: Negative for numbness.   All other systems reviewed and are negative.     PAST MEDICAL HISTORY:  Past Medical History:   Diagnosis Date     Cervicalgia      Depression      Dyslipidemia       Dysthymia      Gastritis      GERD (gastroesophageal reflux disease)      Hearing loss      Hiatal hernia      Hypertriglyceridemia      Insomnia      Lacunar stroke (H)      Lumbar canal stenosis      Lumbar radiculopathy      Myalgia and myositis      PAST SURGICAL HISTORY:  Past Surgical History:   Procedure Laterality Date     IR CEREBRAL ANGIOGRAM  4/27/2017     IR LUMBAR TRANSFORAMINAL EPIDURAL STRD INJ  7/2/2012     PICC  4/28/2017          MI ARTHRODESIS ANT INTERBODY MIN DISCECTOMY,LUMBAR      Description: Lumbar Vertebral Fusion;  Recorded: 07/16/2013;  Comments: 3/17/11 spinal decompression and fusion L4-5, L5-S1 for spinal stenosis, DDD, spondylolysis; Dr. Casillas [facility]     MI ESOPHAGOGASTRODUODENOSCOPY TRANSORAL DIAGNOSTIC      Description: Esophagogastroduodenoscopy;  Proc Date: 04/02/2012;  Comments: biosies:   reactive gastropathy with chronic superimposed nonspecific chronic inflammation (likely secondary to ASA, NSAIDS, EtOH or other irritants), NEG for h. pylori; small hiatal hernia     MI INJECT ANES/STEROID FORAMEN LUMBAR/SACRAL W IMG GUIDE ,1 LEVEL      Description: Nerve Block Transforaminal Epidural Lumbar L3 - L4;  Recorded: 07/10/2012;  Comments: 7/2/2012 for severe low back pain, spinal stenosis and radiculopathy     THROMBECTOMY  04/24/2017    Rt CELIA     US ASPIRATION OR INJECTION MAJOR JOINT  10/23/2019     [facility] APPENDECTOMY      Description: Appendectomy;  Recorded: 07/12/2013;       CURRENT MEDICATIONS:      Current Facility-Administered Medications:      ondansetron (ZOFRAN) injection 4 mg, 4 mg, Intravenous, Q30 Min PRN, Jamel Quezada MD    Current Outpatient Medications:      acetaminophen 500 mg coapsule, [ACETAMINOPHEN 500 MG COAPSULE] Take 2 tablets by mouth 3 (three) times a day as needed for fever or pain., Disp: , Rfl:      atorvastatin (LIPITOR) 40 MG tablet, atorvastatin 40 mg tablet, Disp: 30 tablet, Rfl: 11     atorvastatin (LIPITOR) 40 MG tablet,  [ATORVASTATIN (LIPITOR) 40 MG TABLET] TAKE 1 TABLET(40 MG) BY MOUTH AT BEDTIME, Disp: 90 tablet, Rfl: 3     atorvastatin (LIPITOR) 40 MG tablet, [ATORVASTATIN (LIPITOR) 40 MG TABLET] TAKE 1 TABLET(40 MG) BY MOUTH AT BEDTIME, Disp: 90 tablet, Rfl: 3     betamethasone dipropionate (DIPROSONE) 0.05 % external ointment, Apply to rash on back twice daily on Saturday and Sunday. (Monday-Friday, use calcipotriene ointment)., Disp: 45 g, Rfl: 11     calcipotriene (DOVONOX) 0.005 % external ointment, Apply to rash on back twice daily Monday-Friday; then use betamethasone on weekends. Continue alternating regimen as needed., Disp: 90 g, Rfl: 11     clobetasol (TEMOVATE) 0.05 % external ointment, Apply topically 2 times daily, Disp: 60 g, Rfl: 4     clopidogrel (PLAVIX) 75 MG tablet, Take 1 tablet (75 mg) by mouth daily, Disp: 90 tablet, Rfl: 3     clopidogrel (PLAVIX) 75 MG tablet, Take 1 tablet by mouth daily, Disp: , Rfl:      clopidogreL (PLAVIX) 75 mg tablet, [CLOPIDOGREL (PLAVIX) 75 MG TABLET] TAKE 1 TABLET(75 MG) BY MOUTH DAILY, Disp: 90 tablet, Rfl: 1     diclofenac (VOLTAREN) 1 % topical gel, Apply 2 g topically 4 times daily, Disp: 350 g, Rfl: 3     DULoxetine (CYMBALTA) 60 MG capsule, [DULOXETINE (CYMBALTA) 60 MG CAPSULE] TAKE 1 CAPSULE(60 MG) BY MOUTH DAILY, Disp: 90 capsule, Rfl: 3     dutasteride (AVODART) 0.5 MG capsule, Take 1 capsule (0.5 mg) by mouth daily, Disp: 30 capsule, Rfl: 4     gabapentin (NEURONTIN) 300 MG capsule, Take 1 cap at bedtime, increase by 1 cap every three days, max dose 2 caps tid, Disp: 180 capsule, Rfl: 11     gabapentin (NEURONTIN) 300 MG capsule, , Disp: , Rfl:      HYDROmorphone (DILAUDID) 2 MG tablet, Take 2 mg by mouth every 6 hours as needed for severe pain (7-10), Disp: , Rfl:      magnesium oxide (MAG-OX) 400 mg (241.3 mg magnesium) tablet, [MAGNESIUM OXIDE (MAG-OX) 400 MG (241.3 MG MAGNESIUM) TABLET] TAKE 1 TABLET(400 MG) BY MOUTH DAILY, Disp: 100 tablet, Rfl: 0     magnesium  "oxide (MAG-OX) 400 MG tablet, Take 1 tablet (400 mg) by mouth daily, Disp: 90 tablet, Rfl: 3     metoprolol tartrate (LOPRESSOR) 25 MG tablet, Take 1 tablet (25 mg) by mouth 2 times daily, Disp: 180 tablet, Rfl: 3     metoprolol tartrate (LOPRESSOR) 25 MG tablet, [METOPROLOL TARTRATE (LOPRESSOR) 25 MG TABLET] TAKE 1 TABLET BY MOUTH TWICE DAILY, Disp: 180 tablet, Rfl: 3     omeprazole (PRILOSEC) 20 MG DR capsule, [OMEPRAZOLE (PRILOSEC) 20 MG CAPSULE] TAKE 2 CAPSULES(40 MG) BY MOUTH DAILY, Disp: 180 capsule, Rfl: 3     omeprazole (PRILOSEC) 20 MG DR capsule, , Disp: , Rfl:      polyethylene glycol (MIRALAX) 17 GM/Dose powder, Take 17 g by mouth daily, Disp: 510 g, Rfl: 3     polyethylene glycol (MIRALAX) 17 GM/Dose powder, Take 17 g by mouth daily, Disp: 510 g, Rfl: 11     tamsulosin (FLOMAX) 0.4 MG capsule, Take 1 capsule (0.4 mg) by mouth daily, Disp: 30 capsule, Rfl: 11    ALLERGIES:  Allergies   Allergen Reactions     Aspirin Hives     Oxycodone Itching     Vicodin [Hydrocodone-Acetaminophen] Unknown     FAMILY HISTORY:  History reviewed. No pertinent family history.    SOCIAL HISTORY:   Social History     Socioeconomic History     Marital status:    Tobacco Use     Smoking status: Never     Smokeless tobacco: Never   Substance and Sexual Activity     Alcohol use: Never     Drug use: Never     Sexual activity: Not Currently     Partners: Female   Social History Narrative    ** Merged History Encounter **          PHYSICAL EXAM:    Vitals: BP (!) 166/84   Pulse 92   Temp 98.3  F (36.8  C) (Oral)   Resp 26   Ht 1.448 m (4' 9\")   Wt 54.4 kg (120 lb)   SpO2 95%   BMI 25.97 kg/m     Physical Exam  Vitals and nursing note reviewed.   Constitutional:       General: He is not in acute distress.     Appearance: Normal appearance. He is normal weight. He is not ill-appearing or toxic-appearing.   HENT:      Head: Normocephalic and atraumatic.   Neck:     Cardiovascular:      Rate and Rhythm: Normal rate and " regular rhythm.      Heart sounds: Normal heart sounds.   Pulmonary:      Effort: Pulmonary effort is normal. No respiratory distress.      Breath sounds: Normal breath sounds.   Musculoskeletal:         General: No signs of injury.      Cervical back: Neck supple. Tenderness present. No erythema or crepitus. Pain with movement and muscular tenderness present. No spinous process tenderness. Decreased range of motion.   Skin:     General: Skin is warm and dry.      Coloration: Skin is not pale.   Neurological:      Mental Status: He is alert and oriented to person, place, and time.      Cranial Nerves: No cranial nerve deficit.      Sensory: No sensory deficit.      Motor: No tremor.      Coordination: Coordination normal.      Comments: Patient with severe right neck pain and unable to fully assess strength in right arm due to complaints of pain with , bicep, tricep flexion.   Psychiatric:         Mood and Affect: Mood normal.        LAB:  All pertinent labs reviewed and interpreted.  Labs Ordered and Resulted from Time of ED Arrival to Time of ED Departure   BASIC METABOLIC PANEL - Abnormal       Result Value    Sodium 134 (*)     Potassium 3.7      Chloride 97 (*)     Carbon Dioxide (CO2) 24      Anion Gap 13      Urea Nitrogen 11.6      Creatinine 0.79      Calcium 9.9      Glucose 129 (*)     GFR Estimate >90     ERYTHROCYTE SEDIMENTATION RATE AUTO - Abnormal    Erythrocyte Sedimentation Rate 41 (*)    CRP INFLAMMATION - Abnormal    CRP Inflammation 52.80 (*)    CBC WITH PLATELETS AND DIFFERENTIAL - Abnormal    WBC Count 11.6 (*)     RBC Count 5.67      Hemoglobin 15.1      Hematocrit 46.3      MCV 82      MCH 26.6      MCHC 32.6      RDW 13.4      Platelet Count 323      % Neutrophils 75      % Lymphocytes 12      % Monocytes 11      % Eosinophils 2      % Basophils 0      % Immature Granulocytes 0      NRBCs per 100 WBC 0      Absolute Neutrophils 8.9 (*)     Absolute Lymphocytes 1.3      Absolute  Monocytes 1.2      Absolute Eosinophils 0.2      Absolute Basophils 0.0      Absolute Immature Granulocytes 0.1      Absolute NRBCs 0.0       RADIOLOGY:  MR Cervical Spine w/o & w Contrast    (Results Pending)       PROCEDURES:   None      I, Va Edwards, am serving as a scribe to document services personally performed by Dr. Jamel Quezada  based on my observation and the provider's statements to me. I, Jamel Quezada MD attest that Va Edwards is acting in a scribe capacity, has observed my performance of the services and has documented them in accordance with my direction.      Jamel Quezada M.D.  Emergency Medicine  [facility] Emergency Department     Jamel Quezada MD  12/26/22 0622

## 2022-12-26 NOTE — ED PROVIDER NOTES
While patient was being discharged from the ER the family received a call from [facility] scheduling an appointment for tomorrow morning.     Olive Emmanuel MD  12/26/22 9161

## 2022-12-26 NOTE — DISCHARGE INSTRUCTIONS
Take the medications as directed to help control your pain.  Call today and make an appointment to follow-up in the spine clinic for this week. Call [phone] to schedule the appointment at the spine clinic.    Return to emergency department with worse pain, new numbness or weakness in arms or legs, fever, chest pain, difficulty breathing, or any other concerns.    Thank you for choosing [facility] Emergency Department.  It has been my pleasure caring for you today.     ~Dr. Cholo MD

## 2022-12-26 NOTE — ED NOTES
Pt reporting improvement in neck pain. States he can move his arm better now but still has pain on the right side of his neck

## 2022-12-26 NOTE — ED TRIAGE NOTES
Family reports patient woke around 2AM with 10 out of 10 right neck pain. He denies injury or exertion. Family reports this happened in November also.     Triage Assessment     Row Name 12/26/22 0415       Triage Assessment (Adult)    Airway WDL WDL       Respiratory WDL    Respiratory WDL WDL       Skin Circulation/Temperature WDL    Skin Circulation/Temperature WDL WDL  HTN       Cardiac WDL    Cardiac WDL X       Peripheral/Neurovascular WDL    Peripheral Neurovascular WDL WDL       Cognitive/Neuro/Behavioral WDL    Cognitive/Neuro/Behavioral WDL WDL

## 2022-12-27 ENCOUNTER — OFFICE VISIT (OUTPATIENT)
Dept: NEUROSURGERY | Facility: CLINIC | Age: 72
End: 2022-12-27
Payer: COMMERCIAL

## 2022-12-27 VITALS
SYSTOLIC BLOOD PRESSURE: 125 MMHG | HEIGHT: 60 IN | OXYGEN SATURATION: 95 % | WEIGHT: 120 LBS | BODY MASS INDEX: 23.56 KG/M2 | DIASTOLIC BLOOD PRESSURE: 78 MMHG | HEART RATE: 89 BPM

## 2022-12-27 DIAGNOSIS — M54.2 NECK PAIN ON RIGHT SIDE: ICD-10-CM

## 2022-12-27 PROCEDURE — 99204 OFFICE O/P NEW MOD 45 MIN: CPT | Performed by: NEUROLOGICAL SURGERY

## 2022-12-27 RX ORDER — METHYLPREDNISOLONE 4 MG
TABLET, DOSE PACK ORAL
Qty: 21 TABLET | Refills: 0 | Status: SHIPPED | OUTPATIENT
Start: 2022-12-27 | End: 2024-05-22

## 2022-12-27 ASSESSMENT — PAIN SCALES - GENERAL: PAINLEVEL: EXTREME PAIN (8)

## 2022-12-27 NOTE — LETTER
12/27/2022         RE: Chasity Gaspar  [address] [place] [address]        Dear Colleague,    Thank you for referring your patient, Chasity Gaspar, to the [facility]UROLOG[facility]. Please see a copy of my visit note below.    Mr. Gaspar is a 72-year-old man seen today for evaluation of right-sided neck, right shoulder and right medial scapular pain present for approximately 2 months without known antecedent event.  He notes some weakness in his dominant right upper extremity as well.  He has no left-sided symptoms.  He does not speak English and is accompanied by his daughter who also apparently does not speak English.  This clinic visit was facilitated by the use of a telephonic .  He has not undergone any conservative management other than apparently a course of Medrol dose pack with significant symptomatic improvement apparently.  He is scheduled to see physical therapy and reportedly is also scheduled to undergo a cervical epidural steroid injection.  Neither of these events have yet occurred.  He was seen in the emergency room last evening and a repeat cervical MRI scan was performed and compared with study obtained approximately 6 weeks ago.  There was no significant interval change in the cervical spondylitic changes previously visualized at C3-4, C4-5 and C5-6.  He has no complaint of new problems with ambulation.  He complains of weakness with use of his right upper extremity and difficulty with fine manipulation.    On examination, he is awake alert and oriented apparently x3.  Cervical range of motion is diminished secondary to right-sided paraspinal cervical pain into the supraclavicular region as well as over the dorsum of his right shoulder and lateral aspect of his proximal right shoulder as well.  He may also have some pain and weakness into his right upper extremity in a C6 dermatomal pattern although this is difficult to definitively establish.  He is limited range of motion  of his right shoulder and complains bitterly of significant and severe pain with active and passive range of motion of the right shoulder.  However, he denies significant focal tenderness with palpation over the acromioclavicular joint and rotator cuff.  He is noted to have marked giveaway weakness throughout his entire right upper extremity including hand intrinsics.  Testing of his right deltoid and biceps is quite limited secondary to pain.  Not otherwise occupied, he tends to cradle his right arm with his left.  With change in position he appears to be significantly uncomfortable.  Deep tendon reflex testing in the upper extremities is 2+ and equal to biceps, triceps and brachioradialis bilaterally.  Patellar tendon and ankle jerks are 1+ and equal.  He is noted to have sustained left ankle clonus and several beats of nonsustained right ankle clonus.  Gait appears normal.  Prior history is obtained of a multilevel lumbar fusion more than a decade ago with recurrent and persistent back pain associated with difficulty with ambulation which preexisted this current problem.    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  There are moderate size spondylitic spurs eccentric toward the right side at all 3 levels compromising the neural foramen.  There is no evidence of intrinsic signal change within the cervical spinal cord at any level.  Level cephalad to C3-4 and caudal to C5-6 appear normal without evidence of focal neurologic impingement.  There is no evidence of fracture, soft tissue swelling, etc.    Assessment: 6 weeks and intermittent and now apparently intractable right neck and shoulder pain with evidence of severe cervical spondylitic change most pronounced at the right C3-4 level suggesting the possibility of multilevel right-sided cervical radiculopathy in the proximal cervical spine.   However, there are some findings which suggest the possibility of primary right shoulder disease as well.  There is evidence of incipient myelopathy on examination 2.    I have reviewed the clinical and radiographic findings in detail with the patient and his daughter with the aid of an .  He is apparently not happy with the long-term results of his multilevel anterior posterior lumbar fusion performed in 2011 and is reluctant to consider surgical intervention for this current problem.  However, he is also acutely uncomfortable.  We discussed both medical and surgical means of management and I have recommended a trial of physical therapy to focus on cervical traction, repeat use of Solu-Medrol Dosepak, cervical epidural steroid injection if he elects to proceed with that procedure as well as an MRI scan of his right shoulder to ensure that there is no evidence of significant primary pathology involving his right limb.  I will plan to see him back in the office in about 5 weeks and follow-up on his progress.  If these measures prove ineffective in reducing his current symptoms and improving his right upper extremity function, we will again discussed the possibility of an anterior cervical discectomy, decompression and fusion from C3-C6.  Briefly, I touched on this with the patient and his daughter today with the use of an  and discussed management alternatives, both surgical and nonsurgical.  At the conclusion of our discussion, they appeared to have good understanding the situation, asked appropriate questions with the aid of an  and appeared satisfied.    More than 45 minutes in total was spent with the patient and his daughter the majority reviewing clinical and radiographic findings and counseling them regarding management alternatives, risks and benefits.      Again, thank you for allowing me to participate in the care of your patient.        Sincerely,        David Mckeon  MD Par[facility]

## 2022-12-27 NOTE — PATIENT INSTRUCTIONS
Patient Next Steps:    Order placed for physical therapy Cervical Traction. You can call the phone number highlighted in the order to schedule your appointment. Please call our clinic if symptoms persist after your course of physical therapy.    Order placed for MRI of Right Shoulder. You can schedule at our  today, or central scheduling will call you to make the appointment. If you do not hear from them within 1-2 business days you can call the numbers below. We will call you with the results and next steps once imaging is completed.  [phone]     Order placed for Medrol Dose Trevin  Sent to your pharmacy  Follow-up with Dr. Chavis when he is back in clinic in 5 weeks    Please call us if you have any further questions or concerns.    [facility]   Phone: [phone]  Fax: [phone]

## 2022-12-27 NOTE — PROGRESS NOTES
Mr. Gaspar is a 72-year-old man seen today for evaluation of right-sided neck, right shoulder and right medial scapular pain present for approximately 2 months without known antecedent event.  He notes some weakness in his dominant right upper extremity as well.  He has no left-sided symptoms.  He does not speak English and is accompanied by his daughter who also apparently does not speak English.  This clinic visit was facilitated by the use of a telephonic .  He has not undergone any conservative management other than apparently a course of Medrol dose pack with significant symptomatic improvement apparently.  He is scheduled to see physical therapy and reportedly is also scheduled to undergo a cervical epidural steroid injection.  Neither of these events have yet occurred.  He was seen in the emergency room last evening and a repeat cervical MRI scan was performed and compared with study obtained approximately 6 weeks ago.  There was no significant interval change in the cervical spondylitic changes previously visualized at C3-4, C4-5 and C5-6.  He has no complaint of new problems with ambulation.  He complains of weakness with use of his right upper extremity and difficulty with fine manipulation.    On examination, he is awake alert and oriented apparently x3.  Cervical range of motion is diminished secondary to right-sided paraspinal cervical pain into the supraclavicular region as well as over the dorsum of his right shoulder and lateral aspect of his proximal right shoulder as well.  He may also have some pain and weakness into his right upper extremity in a C6 dermatomal pattern although this is difficult to definitively establish.  He is limited range of motion of his right shoulder and complains bitterly of significant and severe pain with active and passive range of motion of the right shoulder.  However, he denies significant focal tenderness with palpation over the acromioclavicular joint and  rotator cuff.  He is noted to have marked giveaway weakness throughout his entire right upper extremity including hand intrinsics.  Testing of his right deltoid and biceps is quite limited secondary to pain.  Not otherwise occupied, he tends to cradle his right arm with his left.  With change in position he appears to be significantly uncomfortable.  Deep tendon reflex testing in the upper extremities is 2+ and equal to biceps, triceps and brachioradialis bilaterally.  Patellar tendon and ankle jerks are 1+ and equal.  He is noted to have sustained left ankle clonus and several beats of nonsustained right ankle clonus.  Gait appears normal.  Prior history is obtained of a multilevel lumbar fusion more than a decade ago with recurrent and persistent back pain associated with difficulty with ambulation which preexisted this current problem.    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  There are moderate size spondylitic spurs eccentric toward the right side at all 3 levels compromising the neural foramen.  There is no evidence of intrinsic signal change within the cervical spinal cord at any level.  Level cephalad to C3-4 and caudal to C5-6 appear normal without evidence of focal neurologic impingement.  There is no evidence of fracture, soft tissue swelling, etc.    Assessment: 6 weeks and intermittent and now apparently intractable right neck and shoulder pain with evidence of severe cervical spondylitic change most pronounced at the right C3-4 level suggesting the possibility of multilevel right-sided cervical radiculopathy in the proximal cervical spine.  However, there are some findings which suggest the possibility of primary right shoulder disease as well.  There is evidence of incipient myelopathy on examination 2.    I have reviewed the clinical and radiographic findings in detail with  the patient and his daughter with the aid of an .  He is apparently not happy with the long-term results of his multilevel anterior posterior lumbar fusion performed in 2011 and is reluctant to consider surgical intervention for this current problem.  However, he is also acutely uncomfortable.  We discussed both medical and surgical means of management and I have recommended a trial of physical therapy to focus on cervical traction, repeat use of Solu-Medrol Dosepak, cervical epidural steroid injection if he elects to proceed with that procedure as well as an MRI scan of his right shoulder to ensure that there is no evidence of significant primary pathology involving his right limb.  I will plan to see him back in the office in about 5 weeks and follow-up on his progress.  If these measures prove ineffective in reducing his current symptoms and improving his right upper extremity function, we will again discussed the possibility of an anterior cervical discectomy, decompression and fusion from C3-C6.  Briefly, I touched on this with the patient and his daughter today with the use of an  and discussed management alternatives, both surgical and nonsurgical.  At the conclusion of our discussion, they appeared to have good understanding the situation, asked appropriate questions with the aid of an  and appeared satisfied.    More than 45 minutes in total was spent with the patient and his daughter the majority reviewing clinical and radiographic findings and counseling them regarding management alternatives, risks and benefits.

## 2022-12-27 NOTE — NURSING NOTE
"Chasity Gaspar is a 72 year old male who presents for:  Chief Complaint   Patient presents with     Neck Pain        Initial Vitals:  /78   Pulse 89   Ht 4' 9\" (1.448 m)   Wt 120 lb (54.4 kg)   SpO2 95%   BMI 25.97 kg/m   Estimated body mass index is 25.97 kg/m  as calculated from the following:    Height as of this encounter: 4' 9\" (1.448 m).    Weight as of this encounter: 120 lb (54.4 kg).. Body surface area is 1.48 meters squared. BP completed using cuff size: regular  Extreme Pain (8)    Nursing Comments:     Velia Valles    "

## 2023-01-04 ENCOUNTER — PATIENT OUTREACH (OUTPATIENT)
Dept: GERIATRIC MEDICINE | Facility: CLINIC | Age: 73
End: 2023-01-04
Payer: COMMERCIAL

## 2023-01-04 NOTE — LETTER
January 4, 2023    CHASITY TERRAZAS  [address] [place] [address]        Dear Chasity:    At [facility], we re dedicated to improving your health and wellness. Enclosed is the Care Plan developed with you on 12/22/22. Please review the Care Plan carefully.    As a reminder, during your visit we talked about:    Ways to manage your physical and mental health    Using health care to maintain and improve your health     Your preventive care needs     Remember to contact your care coordinator if you:    Are hospitalized, or plan to be hospitalized     Have a fall      Have a change in your physical or mental health    Need help finding support or services    If you have questions, or don t agree with your Care Plan, call me at [phone]. You can also call me if your needs change. TTY users, call the [place] [employer] at (705) or 1-636.248.1813 (zcitnn-bk-llttrr relay service).    Sincerely,        Savanna Landaverde RN  [phone]  [email or web address]    [id number] accepted    [id number] (07/2022)

## 2023-01-04 NOTE — PROGRESS NOTES
[facility] Care Coordination Contact    Received after visit chart from care coordinator.  Completed following tasks: Mailed copy of care plan to client, Mailed copy of POC signature sheet for member to sign and return in SASE , Mailed Consent to Communicate form  and Mailed [facility] Safe Medication Disposal .     [facility]:  Emailed completed PCA assessment to [facility].  Faxed copy of PCA assessment to [facility] and mailed copy to member.  Faxed MD Communication to PCP.     Van Brenner  Care Management Specialist  [facility]  [phone]

## 2023-01-08 ENCOUNTER — HOSPITAL ENCOUNTER (OUTPATIENT)
Dept: MRI IMAGING | Facility: HOSPITAL | Age: 73
Discharge: HOME | End: 2023-01-08
Attending: NEUROLOGICAL SURGERY | Admitting: NEUROLOGICAL SURGERY
Payer: COMMERCIAL

## 2023-01-08 DIAGNOSIS — M54.2 NECK PAIN ON RIGHT SIDE: ICD-10-CM

## 2023-01-08 PROCEDURE — 73221 MRI JOINT UPR EXTREM W/O DYE: CPT | Mod: RT

## 2023-01-10 ENCOUNTER — HOSPITAL ENCOUNTER (OUTPATIENT)
Dept: PHYSICAL THERAPY | Facility: REHABILITATION | Age: 73
Discharge: HOME | End: 2023-01-10
Attending: NURSE PRACTITIONER
Payer: COMMERCIAL

## 2023-01-10 ENCOUNTER — OFFICE VISIT (OUTPATIENT)
Dept: PHYSICAL MEDICINE AND REHAB | Facility: CLINIC | Age: 73
End: 2023-01-10
Attending: NURSE PRACTITIONER
Payer: COMMERCIAL

## 2023-01-10 VITALS
DIASTOLIC BLOOD PRESSURE: 78 MMHG | BODY MASS INDEX: 24.35 KG/M2 | SYSTOLIC BLOOD PRESSURE: 129 MMHG | HEIGHT: 60 IN | WEIGHT: 124 LBS

## 2023-01-10 DIAGNOSIS — M54.12 CERVICAL RADICULAR PAIN: Primary | ICD-10-CM

## 2023-01-10 DIAGNOSIS — M43.12 SPONDYLOLISTHESIS OF CERVICAL REGION: ICD-10-CM

## 2023-01-10 DIAGNOSIS — M54.2 NECK PAIN: ICD-10-CM

## 2023-01-10 DIAGNOSIS — M50.20 CERVICAL DISC HERNIATION: ICD-10-CM

## 2023-01-10 DIAGNOSIS — M54.2 NECK PAIN ON RIGHT SIDE: ICD-10-CM

## 2023-01-10 DIAGNOSIS — M79.18 MYOFASCIAL PAIN: ICD-10-CM

## 2023-01-10 DIAGNOSIS — M48.02 CERVICAL STENOSIS OF SPINE: ICD-10-CM

## 2023-01-10 DIAGNOSIS — M47.812 ARTHROPATHY OF CERVICAL FACET JOINT: ICD-10-CM

## 2023-01-10 DIAGNOSIS — M48.02 FORAMINAL STENOSIS OF CERVICAL REGION: ICD-10-CM

## 2023-01-10 PROCEDURE — 99204 OFFICE O/P NEW MOD 45 MIN: CPT | Performed by: PHYSICAL MEDICINE & REHABILITATION

## 2023-01-10 PROCEDURE — 97140 MANUAL THERAPY 1/> REGIONS: CPT | Mod: GP | Performed by: PHYSICAL THERAPIST

## 2023-01-10 PROCEDURE — 97110 THERAPEUTIC EXERCISES: CPT | Mod: GP | Performed by: PHYSICAL THERAPIST

## 2023-01-10 PROCEDURE — 97161 PT EVAL LOW COMPLEX 20 MIN: CPT | Mod: GP | Performed by: PHYSICAL THERAPIST

## 2023-01-10 RX ORDER — BACLOFEN 10 MG/1
5-10 TABLET ORAL 3 TIMES DAILY PRN
Qty: 60 TABLET | Refills: 1 | Status: SHIPPED | OUTPATIENT
Start: 2023-01-10 | End: 2023-01-25

## 2023-01-10 NOTE — PROGRESS NOTES
[facility]    OUTPATIENT PHYSICAL THERAPY ORTHOPEDIC EVALUATION  PLAN OF TREATMENT FOR OUTPATIENT REHABILITATION  (COMPLETE FOR INITIAL CLAIMS ONLY)  Patient's Last Name, First Name, M.I.  YOB: 1950  Chasity Gaspar       Provider s Name:  [facility]   Medical Record No.  [id number]   Start of Care Date:  01/10/23   Onset Date:  10/02/22   Type:     _X__PT   ___OT   ___SLP Medical Diagnosis:  Neck pain on right side (M54.2)     PT Diagnosis:  cervical pain and referral into the arms   Visits from SOC:  1      _________________________________________________________________________________  Plan of Treatment/Functional Goals:  gait training, manual therapy, neuromuscular re-education, ROM, strengthening  manual neural mobilization  Cryotherapy, Hot packs, Traction  may possibly do traction but need to check alar ligament and need to do manually first and assess then possibly mechanical but please do manual neural mobilization first and posture and body mechanics as well as stretching and strengthening first.  Goals  Goal Identifier: HEP  Goal Description: The patient will demonstrate independence in HEP to aid in home management of symptoms.  Target Date: 04/09/23    Goal Identifier: sitting  Goal Description: The patient will demonstrate proper posture in sitting for 30 minutes using a lumbar roll if needed to aid in his posture and patient will have pain 2/10 or less with this.  Target Date: 04/09/23    Goal Identifier: MMT  Goal Description: The patient will improve his strength in his shoulder and elbow to 5-/5 to aid in retuning to previous function and to be able to lift and carry a gallon of milk without increased pain.  Target Date: 04/09/23         Therapy Frequency:  1 time/week  Predicted Duration of Therapy Intervention:  90 days    Trinity Ricketts, PT                  I CERTIFY THE NEED FOR THESE SERVICES FURNISHED UNDER        THIS PLAN OF TREATMENT AND WHILE UNDER MY CARE     (Physician co-signature of this document indicates review and certification of the therapy plan).                     Certification Date From:  01/10/23   Certification Date To:  04/09/23    Referring Provider:  David Chavis MD    Initial Assessment        See Epic Evaluation Start of Care Date: 01/10/23                  01/10/23 0900   General Information   Type of Visit Initial OP Ortho PT Evaluation   Start of Care Date 01/10/23   Referring Physician David Chavis MD   Patient/Family Goals Statement Patient reports that they are going to try PT then injections before doing surgery   Orders Evaluate and Treat   Orders Comment cervical traction   Date of Order 12/27/22   Certification Required? Yes   Medical Diagnosis Neck pain on right side (M54.2)       Present Yes   Language Other   Body Part(s)   Body Part(s) Cervical Spine   Presentation and Etiology   Pertinent history of current problem (include personal factors and/or comorbidities that impact the POC) 7/10 pain in the neck and upper trap.   Numbness and tingling in the entire arm in arm bilaterally.  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  Feels weaker on the right side now.  It started in October then got worse in November had to go to the ER and December went to the ER again.   Not able to lift have to lay down due to pain. Pain with turning to the right.  PT and injections first then surgery.   Impairments A. Pain;D. Decreased ROM;F. Decreased strength and endurance;K. Numbness;L. Tingling;N. Headaches;Q. Dizziness;R. Other   Impairment comment sometimes he will feel faint.   Functional Limitations perform activities of daily living   Symptom Location 7/10 pain in the neck and upper trap.   Numbness and tingling in the entire arm in arm bilaterally   How/Where did it occur From  insidious onset   Onset date of current episode/exacerbation 10/02/22   Pain rating (0-10 point scale) Worst (/10);Best (/10)   Best (/10) 4/10   Worst (/10) 10/10   Pain quality C. Aching;A. Sharp;G. Cramping;H. Other   Pain quality comment also feel tired with the achy pain.   Frequency of pain/symptoms A. Constant   Pain/symptoms exacerbated by M. Other   Pain exacerbation comment sitting   Pain/symptoms eased by K. Other   Pain eased by comment laying, to recheck answers as the patient is hard of hearing and said the opposite but then during the examination found that laying his pain was almost gone.   Progression of symptoms since onset: Worsened   Fall Risk Screen   Fall screen completed by PT   Have you fallen 2 or more times in the past year? No   Have you fallen and had an injury in the past year? No   Is patient a fall risk? Yes   Fall screen comments The patient uses a cane and is slow to move the daughter says that he only walks when there is someone around to walk with him at home.   Abuse Screen (yes response referral indicated)   Feels Unsafe at Home or Work/School no   Feels Threatened by Someone no   Does Anyone Try to Keep You From Having Contact with Others or Doing Things Outside Your Home? no   Physical Signs of Abuse Present no   Cervical Spine   Cervical Right Rotation ROM 43 right pain   Cervical Left Rotation ROM 42 left no pain   Cervical Flexion ROM 1.5 fingers to the chest feels good   Cervical Extension ROM 28 degrees extension. Then sharp pain on the right side of the neck and upper trap region deviation in the motion as the patient does the motion.   Cervical/Thoracic/Shoulder ROM Comments shoulder flexion and abduction left 5/5 right flexion 4/5 and left unable to get to 90 degrees and weak 3-/5   Elbow Flexion (C5, C6) Strength 4-/5 pain on the right on the left 5/5 right with pain on the right   Elbow Extension (C7) Strength 4-/5 pain on the right on the left 5/5 right with pain on  the right upper trap   Wrist Extension (C6) Strength 5/5 bilaterally   Wrist Flexion (C7) Strength 5/5 bilaterally   Thumb Abd (C8) Strength 5/5   5th Finger Add (T1) Strength 4-/5 right   Cervical/Shoulder Special Tests Comments right rotated cervical spine and neutral thoracic spine upper   Palpation tender to the touch first rib on the right Lindgrens test   Observation Tendency to hold the right side of his neck and upper trap.  Patient sits slouched .   Planned Therapy Interventions   Planned Therapy Interventions gait training;manual therapy;neuromuscular re-education;ROM;strengthening   Planned Therapy Interventions Comment manual neural mobilization   Planned Modality Interventions   Planned Modality Interventions Cryotherapy;Hot packs;Traction   Planned Modality Interventions Comments may possibly do traction but need to check alar ligament and need to do manually first and assess then possibly mechanical but please do manual neural mobilization first and posture and body mechanics as well as stretching and strengthening first.   Clinical Impression   Criteria for Skilled Therapeutic Interventions Met yes, treatment indicated   PT Diagnosis cervical pain and referral into the arms   Influenced by the following impairments A. Pain;D. Decreased ROM;F. Decreased strength and endurance;K. Numbness;L. Tingling;N. Headaches;Q. Dizziness;R. Other;   Functional limitations due to impairments cervical motion especially extension and right rotation.  sitting tolerance and lifting and carrying   Clinical Presentation Stable/Uncomplicated   Clinical Decision Making (Complexity) Low complexity   Therapy Frequency 1 time/week   Predicted Duration of Therapy Intervention (days/wks) 90 days   Risk & Benefits of therapy have been explained Yes   Patient, Family & other staff in agreement with plan of care Yes   Clinical Impression Comments The patient has jerky movements with his neck with flexion and right rotation,  weakness in the shoulder with flexion and abduction as well as elbow flexion and extension and 5th finger adduction.   ORTHO GOALS   PT Ortho Eval Goals 1;2;3   Ortho Goal 1   Goal Identifier HEP   Goal Description The patient will demonstrate independence in HEP to aid in home management of symptoms.   Target Date 04/09/23   Ortho Goal 2   Goal Identifier sitting   Goal Description The patient will demonstrate proper posture in sitting for 30 minutes using a lumbar roll if needed to aid in his posture and patient will have pain 2/10 or less with this.   Target Date 04/09/23   Ortho Goal 3   Goal Identifier MMT   Goal Description The patient will improve his strength in his shoulder and elbow to 5-/5 to aid in retuning to previous function and to be able to lift and carry a gallon of milk without increased pain.   Target Date 04/09/23   Total Evaluation Time   PT Eval, Low Complexity Minutes (00975) 35   Therapy Certification   Certification date from 01/10/23   Certification date to 04/09/23   Medical Diagnosis Neck pain on right side (M54.2)

## 2023-01-10 NOTE — PROGRESS NOTES
Assessment/Plan:      Chasity was seen today for neck pain.    Diagnoses and all orders for this visit:    Cervical radicular pain  -     baclofen (LIORESAL) 10 MG tablet; Take 0.5-1 tablets (5-10 mg) by mouth 3 times daily as needed for muscle spasms    Cervical stenosis of spine    Cervical disc herniation    Arthropathy of cervical facet joint    Foraminal stenosis of cervical region    Myofascial pain  -     baclofen (LIORESAL) 10 MG tablet; Take 0.5-1 tablets (5-10 mg) by mouth 3 times daily as needed for muscle spasms    Spondylolisthesis of cervical region    Other orders  -     Spine  Referral         Assessment: Pleasant 73 year old male with a history of acid reflux, depression, hyperlipidemia, hearing loss, lacunar stroke with:    1.  Chronic cervical spine pain with right cervical radicular pain increased over the past 4 months.  He has a C3-4 spondylolisthesis with C3-4 C4-5 and C5-6 degenerative disc disease with broad-based disc bulges resulting in Moderate spinal stenosis at those levels along with moderate right foraminal stenosis C4-5 and bilateral C5-6 foraminal stenosis likely responsible for his right cervical radicular pain.    2.  Moderate spinal stenosis from C3-4 through C5-6 without myelopathic findings on exam.    3.  Myofascial pain cervical spine and parascapular region on the right      Discussion:    1.  I discussed the diagnosis and treatment options with the patient and his daughter.  The patient has limited communication and has hearing loss and his daughter does much of the speaking today.  We discussed options of continuing plan of physical therapy and conservative management with medications such as Tylenol and muscle relaxants.  He is already on gabapentin and Cymbalta.  He reports not taking Plavix which is on his medication list.  We discussed cervical epidural as well but they would like to start conservatively.    2.  Continue physical therapy as planned.    3.   Continue Tylenol 500-1000 mg 3 times daily as needed for pain.    4.  Trial baclofen 5 to 10 mg 3 times daily as needed for myofascial pain.    5.  Can consider cervical epidural steroid injection if he remains off Plavix.    6.  Follow-up approximately 6 weeks.      It was our pleasure caring for your patient today, if there any questions or concerns please do not hesitate to contact us.      Subjective:   Patient ID: Chasity Gaspar is a 73 year old male.    History of Present Illness: The patient presents at the request of Cheri Pulido CNP for evaluation of cervical spine pain and right arm pain and paresthesias.  Patient reports chronic cervical spine pain flared since September of last year.  No injury.  Intermittent neck pain mid cervical spine with radiation to the right parascapular region.  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  His daughter does much of the speaking.  He has had a stroke as well and his communication may be limited.  He does point to where his pain and paresthesias are the most severe for him.  His pain is worse with turning his head bilaterally better with laying down.    He presented to the emergency department on December 26.  He also was seen by neurosurgery on December 1.  At that time they recommended physical therapy.  He has been given a Medrol dose pack as well.  They state the Medrol Dosepak did help while he was on the medication and for a few days after but his pain continues to wax and wane.  His pain is a 10/10 at worst 7/10 today 0/10 at best.  He takes Tylenol as needed.  He has had a lacunar stroke per his record.  He has Plavix listed as a medication but they state he is no longer taking the medication.    Physical therapy is starting today.      Imaging:  MRI report and images were personally reviewed and discussed with the patient.  A plastic model  was utilized during the discussion.  MRI of the cervical spine personally reviewed.  Spondylolisthesis C3 on C4 multilevel degenerative disc disease C3-4 4-55-6 resulting in moderate spinal stenosis at those levels with no spinal cord signal abnormality.  Moderate foraminal stenosis bilaterally C5-6 on the right at C4-5 and on the left at C3-4 there are superimposed central disc protrusion and broad-based disc bulges at those levels.  Facet arthropathy appears moderate at C3-4 right greater than left mild bilaterally at C4-5 and C5-6.    Flexion-extension x-rays reveal the moderate severe disc height loss C5-6 without any instability from flexion to extension.    Right shoulder MRI reveals mild tendinopathy without any rotator cuff tears or degenerative changes of significance.       Review of Systems: Patient complains of joint pain and skin itching.  Denies fever, weight loss, weight gain, chest pain, shortness of breath, Andreas pain, nausea vomiting, bowel or bladder incontinence, balance issues, sleep issues.  Remainder of 12 point review systems negative unless listed above.    Past Medical History:   Diagnosis Date     Cervicalgia      Depression      Dyslipidemia      Dysthymia      Gastritis      GERD (gastroesophageal reflux disease)      Hearing loss      Hiatal hernia      Hypertriglyceridemia      Insomnia      Lacunar stroke (H)      Lumbar canal stenosis      Lumbar radiculopathy      Myalgia and myositis        Family History   Problem Relation Age of Onset     Coronary Artery Disease No family hx of      Hypertension No family hx of      Cerebrovascular Disease No family hx of          Social History     Socioeconomic History     Marital status:      Spouse name: None     Number of children: None     Years of education: None     Highest education level: None   Tobacco Use     Smoking status: Never     Smokeless tobacco: Never   Substance and Sexual Activity     Alcohol use: Never     Drug  "use: Never     Sexual activity: Not Currently     Partners: Female   Social History Narrative    ** Merged History Encounter **        Social history: No tobacco or alcohol.  Daughter presents with him to his appointment.      The following portions of the patient's history were reviewed and updated as appropriate: allergies, current medications, past family history, past medical history, past social history, past surgical history and problem list.    Oswestry (STEPHANIE) Questionnaire    OSWESTRY DISABILITY INDEX 9/1/2021   Count 8   Sum 21   Oswestry Score (%) 52.5   Some recent data might be hidden       Neck Disability Index:  Neck Disability Index (  [employer]CIO. and Daniela JEFF. 1991. All rights reserved.; used with permission) 1/10/2023   SECTION 1 - PAIN INTENSITY 2   SECTION 2 - PERSONAL CARE 3   SECTION 3 - LIFTING 3   SECTION 4 - READING 1   SECTION 5 - HEADACHES 5   SECTION 6 - CONCENTRATION 5   SECTION 7 - WORK 5   SECTION 8 - DRIVING 5   SECTION 9 - SLEEPING 5   SECTION 10 - RECREATION 5   Count 10   Sum 39   Raw Score: /50 39   Neck Disability Index Score: (%) 78          PHQ-2 Score:     PHQ-2 ( 1999 [employer]) 3/31/2022 8/11/2021   Q1: Little interest or pleasure in doing things 0 0   Q2: Feeling down, depressed or hopeless 0 0   PHQ-2 Score 0 0   PHQ-2 Total Score (12-17 Years)- Positive if 3 or more points; Administer PHQ-A if positive - 0   Q1: Little interest or pleasure in doing things - Not at all   Q2: Feeling down, depressed or hopeless - Not at all   PHQ-2 Score - 0                  Objective:   Physical Exam:    /78   Ht 4' 9\" (1.448 m)   Wt 124 lb (56.2 kg)   BMI 26.83 kg/m    Body mass index is 26.83 kg/m .      General:  Well-appearing male in no acute distress.  Pleasant and cooperative   throughout the examination and interview.  CV: No lower extremity edema on inspection or paltation.  Lymphatics: No cervical lymphadenopathy palpated.  Eyes: sclera clear.  Skin: No rashes or lesions seen " over the head/neck, hairline, arms, legs .  Respirations unlabored.  MSK: Gait is mildly antalgic right lower extremity.  Negative Romberg.  Spine: normal AP curves of the C, T, and L spine mildly reduced range of motion cervical spine rotation bilaterally full flexion extension.  Palpation: Tenderness to palpation over cervical paraspinals bilaterally in the mid cervical spine upper trapezius with hypertonic tissue textures. Extremities: Full range of motion of the shoulders and abduction, elbows, and wrists with no effusions or tenderness to palpation.  Negative arm drop, empty can, and Speed's test bilaterally.  Negative Devries and Neer's test bilaterally.  Full range of motion of the  knees, and ankles from a seated position with no effusions or tenderness to palpation. No hypermobility of the upper or lower extremities.  Neurologic exam: Mental status: Patient is alert  with normal affect.  Limited verbal interactions with telephone .   can follow commands during physical exam.  Normal coordination throughout the examination.  Reflexes are 2+ and symmetric biceps, triceps, brachioradialis, patellar, and Achilles with Negative Ashwin's.  Sensation is intact to light touch throughout the upper and lower extremities bilaterally.  Manual muscle testing reveals 5 out of 5 strength in the shoulder abductors, elbow flexors/extensors, wrist extensors, interosseous, and finger flexors; lower extremity strength appears grossly normal.   Normal muscle bulk and tone in the arms and legs.  Difficult to interpret Spurling's test likely positive on the right mildly.

## 2023-01-10 NOTE — LETTER
1/10/2023         RE: Chasity Gaspar  [address] [place] [address]        Dear Colleague,    Thank you for referring your patient, Chasity Gaspar, to the [facility]. Please see a copy of my visit note below.    Assessment/Plan:      Chasity was seen today for neck pain.    Diagnoses and all orders for this visit:    Cervical radicular pain  -     baclofen (LIORESAL) 10 MG tablet; Take 0.5-1 tablets (5-10 mg) by mouth 3 times daily as needed for muscle spasms    Cervical stenosis of spine    Cervical disc herniation    Arthropathy of cervical facet joint    Foraminal stenosis of cervical region    Myofascial pain  -     baclofen (LIORESAL) 10 MG tablet; Take 0.5-1 tablets (5-10 mg) by mouth 3 times daily as needed for muscle spasms    Spondylolisthesis of cervical region    Other orders  -     Spine  Referral         Assessment: Pleasant 73 year old male with a history of acid reflux, depression, hyperlipidemia, hearing loss, lacunar stroke with:    1.  Chronic cervical spine pain with right cervical radicular pain increased over the past 4 months.  He has a C3-4 spondylolisthesis with C3-4 C4-5 and C5-6 degenerative disc disease with broad-based disc bulges resulting in Moderate spinal stenosis at those levels along with moderate right foraminal stenosis C4-5 and bilateral C5-6 foraminal stenosis likely responsible for his right cervical radicular pain.    2.  Moderate spinal stenosis from C3-4 through C5-6 without myelopathic findings on exam.    3.  Myofascial pain cervical spine and parascapular region on the right      Discussion:    1.  I discussed the diagnosis and treatment options with the patient and his daughter.  The patient has limited communication and has hearing loss and his daughter does much of the speaking today.  We discussed options of continuing plan of physical therapy and conservative management with medications such as Tylenol and muscle relaxants.  He is  already on gabapentin and Cymbalta.  He reports not taking Plavix which is on his medication list.  We discussed cervical epidural as well but they would like to start conservatively.    2.  Continue physical therapy as planned.    3.  Continue Tylenol 500-1000 mg 3 times daily as needed for pain.    4.  Trial baclofen 5 to 10 mg 3 times daily as needed for myofascial pain.    5.  Can consider cervical epidural steroid injection if he remains off Plavix.    6.  Follow-up approximately 6 weeks.      It was our pleasure caring for your patient today, if there any questions or concerns please do not hesitate to contact us.      Subjective:   Patient ID: Chasity Gaspar is a 73 year old male.    History of Present Illness: The patient presents at the request of Cheri Pulido CNP for evaluation of cervical spine pain and right arm pain and paresthesias.  Patient reports chronic cervical spine pain flared since September of last year.  No injury.  Intermittent neck pain mid cervical spine with radiation to the right parascapular region.  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  His daughter does much of the speaking.  He has had a stroke as well and his communication may be limited.  He does point to where his pain and paresthesias are the most severe for him.  His pain is worse with turning his head bilaterally better with laying down.    He presented to the emergency department on December 26.  He also was seen by neurosurgery on December 1.  At that time they recommended physical therapy.  He has been given a Medrol dose pack as well.  They state the Medrol Dosepak did help while he was on the medication and for a few days after but his pain continues to wax and wane.  His pain is a 10/10 at worst 7/10 today 0/10 at best.  He takes Tylenol as needed.  He has had a lacunar stroke per his record.  He  has Plavix listed as a medication but they state he is no longer taking the medication.    Physical therapy is starting today.      Imaging:  MRI report and images were personally reviewed and discussed with the patient.  A plastic model was utilized during the discussion.  MRI of the cervical spine personally reviewed.  Spondylolisthesis C3 on C4 multilevel degenerative disc disease C3-4 4-55-6 resulting in moderate spinal stenosis at those levels with no spinal cord signal abnormality.  Moderate foraminal stenosis bilaterally C5-6 on the right at C4-5 and on the left at C3-4 there are superimposed central disc protrusion and broad-based disc bulges at those levels.  Facet arthropathy appears moderate at C3-4 right greater than left mild bilaterally at C4-5 and C5-6.    Flexion-extension x-rays reveal the moderate severe disc height loss C5-6 without any instability from flexion to extension.    Right shoulder MRI reveals mild tendinopathy without any rotator cuff tears or degenerative changes of significance.       Review of Systems: Patient complains of joint pain and skin itching.  Denies fever, weight loss, weight gain, chest pain, shortness of breath, Andreas pain, nausea vomiting, bowel or bladder incontinence, balance issues, sleep issues.  Remainder of 12 point review systems negative unless listed above.    Past Medical History:   Diagnosis Date     Cervicalgia      Depression      Dyslipidemia      Dysthymia      Gastritis      GERD (gastroesophageal reflux disease)      Hearing loss      Hiatal hernia      Hypertriglyceridemia      Insomnia      Lacunar stroke (H)      Lumbar canal stenosis      Lumbar radiculopathy      Myalgia and myositis        Family History   Problem Relation Age of Onset     Coronary Artery Disease No family hx of      Hypertension No family hx of      Cerebrovascular Disease No family hx of          Social History     Socioeconomic History     Marital status:      Spouse  "name: None     Number of children: None     Years of education: None     Highest education level: None   Tobacco Use     Smoking status: Never     Smokeless tobacco: Never   Substance and Sexual Activity     Alcohol use: Never     Drug use: Never     Sexual activity: Not Currently     Partners: Female   Social History Narrative    ** Merged History Encounter **        Social history: No tobacco or alcohol.  Daughter presents with him to his appointment.      The following portions of the patient's history were reviewed and updated as appropriate: allergies, current medications, past family history, past medical history, past social history, past surgical history and problem list.    Oswestry (STEPHANIE) Questionnaire    OSWESTRY DISABILITY INDEX 9/1/2021   Count 8   Sum 21   Oswestry Score (%) 52.5   Some recent data might be hidden       Neck Disability Index:  Neck Disability Index (  Bowen H. and Daniela JEFF. 1991. All rights reserved.; used with permission) 1/10/2023   SECTION 1 - PAIN INTENSITY 2   SECTION 2 - PERSONAL CARE 3   SECTION 3 - LIFTING 3   SECTION 4 - READING 1   SECTION 5 - HEADACHES 5   SECTION 6 - CONCENTRATION 5   SECTION 7 - WORK 5   SECTION 8 - DRIVING 5   SECTION 9 - SLEEPING 5   SECTION 10 - RECREATION 5   Count 10   Sum 39   Raw Score: /50 39   Neck Disability Index Score: (%) 78          PHQ-2 Score:     PHQ-2 ( 1999 [employer]) 3/31/2022 8/11/2021   Q1: Little interest or pleasure in doing things 0 0   Q2: Feeling down, depressed or hopeless 0 0   PHQ-2 Score 0 0   PHQ-2 Total Score (12-17 Years)- Positive if 3 or more points; Administer PHQ-A if positive - 0   Q1: Little interest or pleasure in doing things - Not at all   Q2: Feeling down, depressed or hopeless - Not at all   PHQ-2 Score - 0                  Objective:   Physical Exam:    /78   Ht 4' 9\" (1.448 m)   Wt 124 lb (56.2 kg)   BMI 26.83 kg/m    Body mass index is 26.83 kg/m .      General:  Well-appearing male in no acute " distress.  Pleasant and cooperative   throughout the examination and interview.  CV: No lower extremity edema on inspection or paltation.  Lymphatics: No cervical lymphadenopathy palpated.  Eyes: sclera clear.  Skin: No rashes or lesions seen over the head/neck, hairline, arms, legs .  Respirations unlabored.  MSK: Gait is mildly antalgic right lower extremity.  Negative Romberg.  Spine: normal AP curves of the C, T, and L spine mildly reduced range of motion cervical spine rotation bilaterally full flexion extension.  Palpation: Tenderness to palpation over cervical paraspinals bilaterally in the mid cervical spine upper trapezius with hypertonic tissue textures. Extremities: Full range of motion of the shoulders and abduction, elbows, and wrists with no effusions or tenderness to palpation.  Negative arm drop, empty can, and Speed's test bilaterally.  Negative Devries and Neer's test bilaterally.  Full range of motion of the  knees, and ankles from a seated position with no effusions or tenderness to palpation. No hypermobility of the upper or lower extremities.  Neurologic exam: Mental status: Patient is alert  with normal affect.  Limited verbal interactions with telephone .   can follow commands during physical exam.  Normal coordination throughout the examination.  Reflexes are 2+ and symmetric biceps, triceps, brachioradialis, patellar, and Achilles with Negative Ashwin's.  Sensation is intact to light touch throughout the upper and lower extremities bilaterally.  Manual muscle testing reveals 5 out of 5 strength in the shoulder abductors, elbow flexors/extensors, wrist extensors, interosseous, and finger flexors; lower extremity strength appears grossly normal.   Normal muscle bulk and tone in the arms and legs.  Difficult to interpret Spurling's test likely positive on the right mildly.                                  Again, thank you for allowing me to participate in the care of your patient.         Sincerely,        Thee Velarde, DO

## 2023-01-10 NOTE — PATIENT INSTRUCTIONS
Start physical therapy as planned    Baclofen (muscle relaxant medication) has been prescribed today. Please take 1/2-1 tab three times daily as needed. This medication may cause drowsiness. Please do not work or drive while taking this medication until you know how it effects you. If it does make you drowsy, you should only take it before bedtime or at times that you do not have to work/drive.

## 2023-01-17 ENCOUNTER — HOSPITAL ENCOUNTER (OUTPATIENT)
Dept: PHYSICAL THERAPY | Facility: REHABILITATION | Age: 73
Discharge: HOME | End: 2023-01-17
Payer: COMMERCIAL

## 2023-01-17 PROCEDURE — 97110 THERAPEUTIC EXERCISES: CPT | Mod: GP

## 2023-01-17 PROCEDURE — 97140 MANUAL THERAPY 1/> REGIONS: CPT | Mod: GP

## 2023-01-23 DIAGNOSIS — E78.5 HYPERLIPIDEMIA LDL GOAL <100: ICD-10-CM

## 2023-01-23 DIAGNOSIS — F33.1 MAJOR DEPRESSIVE DISORDER, RECURRENT EPISODE, MODERATE (H): ICD-10-CM

## 2023-01-23 DIAGNOSIS — R39.16 BENIGN PROSTATIC HYPERPLASIA (BPH) WITH STRAINING ON URINATION: ICD-10-CM

## 2023-01-23 DIAGNOSIS — E55.9 VITAMIN D DEFICIENCY: ICD-10-CM

## 2023-01-23 DIAGNOSIS — Z76.0 ENCOUNTER FOR MEDICATION REFILL: Primary | ICD-10-CM

## 2023-01-23 DIAGNOSIS — M1A.0390 IDIOPATHIC CHRONIC GOUT OF WRIST WITHOUT TOPHUS, UNSPECIFIED LATERALITY: ICD-10-CM

## 2023-01-23 DIAGNOSIS — N40.1 BENIGN PROSTATIC HYPERPLASIA (BPH) WITH STRAINING ON URINATION: ICD-10-CM

## 2023-01-23 RX ORDER — ATORVASTATIN CALCIUM 40 MG/1
TABLET, FILM COATED ORAL
Qty: 90 TABLET | Refills: 3 | Status: SHIPPED | OUTPATIENT
Start: 2023-01-23 | End: 2024-05-22

## 2023-01-23 RX ORDER — DULOXETIN HYDROCHLORIDE 60 MG/1
CAPSULE, DELAYED RELEASE ORAL
Qty: 90 CAPSULE | Refills: 3 | Status: SHIPPED | OUTPATIENT
Start: 2023-01-23 | End: 2024-05-22

## 2023-01-23 RX ORDER — METOPROLOL TARTRATE 25 MG/1
TABLET, FILM COATED ORAL
Qty: 180 TABLET | Refills: 3 | Status: SHIPPED | OUTPATIENT
Start: 2023-01-23 | End: 2024-02-07

## 2023-01-23 RX ORDER — DUTASTERIDE 0.5 MG/1
CAPSULE, LIQUID FILLED ORAL
Qty: 90 CAPSULE | Refills: 3 | Status: SHIPPED | OUTPATIENT
Start: 2023-01-23 | End: 2023-01-25

## 2023-01-23 RX ORDER — ERGOCALCIFEROL 1.25 MG/1
CAPSULE, LIQUID FILLED ORAL
Qty: 12 CAPSULE | Refills: 3 | Status: SHIPPED | OUTPATIENT
Start: 2023-01-23 | End: 2024-05-22

## 2023-01-23 RX ORDER — ALLOPURINOL 300 MG/1
TABLET ORAL
Qty: 90 TABLET | Refills: 3 | Status: SHIPPED | OUTPATIENT
Start: 2023-01-23 | End: 2024-04-02

## 2023-01-23 RX ORDER — GABAPENTIN 300 MG/1
CAPSULE ORAL
Qty: 270 CAPSULE | Refills: 3 | Status: SHIPPED | OUTPATIENT
Start: 2023-01-23 | End: 2023-03-01

## 2023-01-25 ENCOUNTER — OFFICE VISIT (OUTPATIENT)
Dept: FAMILY MEDICINE | Facility: CLINIC | Age: 73
End: 2023-01-25
Payer: COMMERCIAL

## 2023-01-25 VITALS
TEMPERATURE: 98.3 F | OXYGEN SATURATION: 96 % | WEIGHT: 125 LBS | BODY MASS INDEX: 24.54 KG/M2 | HEART RATE: 105 BPM | SYSTOLIC BLOOD PRESSURE: 124 MMHG | HEIGHT: 60 IN | DIASTOLIC BLOOD PRESSURE: 72 MMHG

## 2023-01-25 DIAGNOSIS — M79.18 MYOFASCIAL PAIN: ICD-10-CM

## 2023-01-25 DIAGNOSIS — M54.12 CERVICAL RADICULAR PAIN: ICD-10-CM

## 2023-01-25 DIAGNOSIS — Z76.0 ENCOUNTER FOR MEDICATION REFILL: ICD-10-CM

## 2023-01-25 DIAGNOSIS — Z79.899 ENCOUNTER FOR LONG-TERM (CURRENT) USE OF MEDICATIONS: Primary | ICD-10-CM

## 2023-01-25 DIAGNOSIS — M54.16 RADICULOPATHY, LUMBAR REGION: ICD-10-CM

## 2023-01-25 DIAGNOSIS — E78.41 ELEVATED LIPOPROTEIN(A): ICD-10-CM

## 2023-01-25 DIAGNOSIS — K59.00 CONSTIPATION, UNSPECIFIED CONSTIPATION TYPE: ICD-10-CM

## 2023-01-25 DIAGNOSIS — R39.16 BENIGN PROSTATIC HYPERPLASIA (BPH) WITH STRAINING ON URINATION: ICD-10-CM

## 2023-01-25 DIAGNOSIS — N40.1 BENIGN PROSTATIC HYPERPLASIA (BPH) WITH STRAINING ON URINATION: ICD-10-CM

## 2023-01-25 DIAGNOSIS — M48.061 SPINAL STENOSIS, LUMBAR REGION, WITHOUT NEUROGENIC CLAUDICATION: ICD-10-CM

## 2023-01-25 PROCEDURE — 99215 OFFICE O/P EST HI 40 MIN: CPT | Performed by: FAMILY MEDICINE

## 2023-01-25 RX ORDER — POLYETHYLENE GLYCOL 3350 17 G/17G
17 POWDER, FOR SOLUTION ORAL DAILY
Qty: 510 G | Refills: 3 | Status: SHIPPED | OUTPATIENT
Start: 2023-01-25

## 2023-01-25 RX ORDER — BACLOFEN 10 MG/1
5-10 TABLET ORAL 3 TIMES DAILY PRN
Qty: 60 TABLET | Refills: 1 | Status: SHIPPED | OUTPATIENT
Start: 2023-01-25 | End: 2023-10-03

## 2023-01-25 RX ORDER — TAMSULOSIN HYDROCHLORIDE 0.4 MG/1
0.4 CAPSULE ORAL DAILY
Qty: 30 CAPSULE | Refills: 11 | Status: SHIPPED | OUTPATIENT
Start: 2023-01-25 | End: 2023-03-01

## 2023-01-25 RX ORDER — DUTASTERIDE 0.5 MG/1
CAPSULE, LIQUID FILLED ORAL
Qty: 90 CAPSULE | Refills: 3 | Status: SHIPPED | OUTPATIENT
Start: 2023-01-25 | End: 2023-10-03

## 2023-01-25 RX ORDER — ATORVASTATIN CALCIUM 40 MG/1
TABLET, FILM COATED ORAL
Qty: 90 TABLET | Refills: 3 | Status: SHIPPED | OUTPATIENT
Start: 2023-01-25 | End: 2024-04-01

## 2023-01-25 NOTE — PROGRESS NOTES
Assessment & Plan     Encounter for long-term (current) use of medications  With atorvastatin he was informed that he needs to take this medication  - atorvastatin (LIPITOR) 40 MG tablet; [ATORVASTATIN (LIPITOR) 40 MG TABLET] TAKE 1 TABLET(40 MG) BY MOUTH AT BEDTIME Strength: 40 mg    Radiculopathy, lumbar region    I have prescribed Voltaren he has been able to tolerate the topical preparation in the past he can apply it to his lumbar spine  - diclofenac (VOLTAREN) 1 % topical gel; Apply 2 g topically 4 times daily    Constipation, unspecified constipation type    He is not constipated he did not bring in the MiraLAX today I want him to resume this medication  - polyethylene glycol (MIRALAX) 17 GM/Dose powder; Take 17 g by mouth daily    Benign prostatic hyperplasia (BPH) with straining on urination    No urinary issues noted  - tamsulosin (FLOMAX) 0.4 MG capsule; Take 1 capsule (0.4 mg) by mouth daily  - dutasteride (AVODART) 0.5 MG capsule; TAKE 1 CAPSULE(0.5 MG) BY MOUTH DAILY Strength: 0.5 mg    Encounter for medication refill      - dutasteride (AVODART) 0.5 MG capsule; TAKE 1 CAPSULE(0.5 MG) BY MOUTH DAILY Strength: 0.5 mg    Spinal stenosis, lumbar region, without neurogenic claudication    Review of notes including ER visit, visit with neurosurgery and with PMNR, he has been going to physical therapy  - diclofenac (VOLTAREN) 1 % topical gel; Apply 2 g topically 4 times daily    Hyperlipidemia      Cervical radicular pain  MRI results reviewed neurosurgery report notes reviewed.  They have been using the baclofen incorrectly has been getting drowsy but they have been dosing appropriately we corrected that today  - baclofen (LIORESAL) 10 MG tablet; Take 0.5-1 tablets (5-10 mg) by mouth 3 times daily as needed for muscle spasms    Myofascial pain    Mild relief with the baclofen continue medication at current dose  - baclofen (LIORESAL) 10 MG tablet; Take 0.5-1 tablets (5-10 mg) by mouth 3 times daily as  "needed for muscle spasms                 Return in about 5 weeks (around 3/1/2023).    David Fang MD  [facility] ROSALINE Gaspar is a 73 year old accompanied by his daughter, presenting for the following health issues:  Hypertension      HPI   73-year-old male here for follow-up.  He has a medical history significant for lumbar radicular symptoms and cervical neck pain and numbness in his right upper extremity secondary to cervical stenosis.  Recently he has already seen neurosurgery and PMNR.  Has been to physical therapy went to the emergency room about a month ago.  He says his neck pain is getting worse he has difficulty moving and carrying objects in his right hand.  He says he gets numbness in his hands since he has been given medication from another doctor he says the pain is decreased but he still has numbness.  Recently had an MRI of his neck again.  His daughter knows those results and he also had an MRI of his shoulder.  His daughter reports that he is still fairly active.  He sleeps well at night except when he lies on his right side.  Patient states that he feels tight in his right shoulder.        Review of Systems   Constitutional, HEENT, cardiovascular, pulmonary, GI, , musculoskeletal, neuro, skin, endocrine and psych systems are negative, except as otherwise noted.      Objective    /72 (BP Location: Left arm, Patient Position: Sitting, Cuff Size: Adult Regular)   Pulse 105   Temp 98.3  F (36.8  C) (Oral)   Ht 1.448 m (4' 9\")   Wt 56.7 kg (125 lb)   SpO2 96%   BMI 27.05 kg/m    Body mass index is 27.05 kg/m .  Physical Exam   GENERAL: healthy, alert and no distress  EYES: Eyes grossly normal to inspection, PERRL and conjunctivae and sclerae normal  NECK: no adenopathy, no asymmetry, masses, or scars and thyroid normal to palpation  RESP: lungs clear to auscultation - no rales, rhonchi or wheezes  CV: regular rate and rhythm, normal S1 S2, no S3 or S4, no " murmur, click or rub, no peripheral edema and peripheral pulses strong  ABDOMEN: soft, nontender, no hepatosplenomegaly, no masses and bowel sounds normal  MS:   Musculoskeletal examination shows tenderness in the anterior portion of the right deltoid muscle he is tender over the right triceps.  SKIN: no suspicious lesions or rashes  NEURO: Handgrips are essentially equal.  No loss of power noted in the right upper extremity.  No fasciculations or tremors noted  PSYCH: mentation appears normal, affect normal/bright        Approximately 40 minutes were spent in coordination of care today with the patient and his daughter

## 2023-02-01 NOTE — PROGRESS NOTES
The PCA sig pag, POC sig page and CEDRICK were received and saved in member folder.     Van Brenner  Care Management Specialist  [facility]  [phone]

## 2023-02-16 ENCOUNTER — HOSPITAL ENCOUNTER (OUTPATIENT)
Dept: PHYSICAL THERAPY | Facility: REHABILITATION | Age: 73
Discharge: HOME | End: 2023-02-16
Payer: COMMERCIAL

## 2023-02-16 PROCEDURE — 97140 MANUAL THERAPY 1/> REGIONS: CPT | Mod: GP

## 2023-02-16 PROCEDURE — 97110 THERAPEUTIC EXERCISES: CPT | Mod: GP

## 2023-02-28 ENCOUNTER — OFFICE VISIT (OUTPATIENT)
Dept: PHYSICAL MEDICINE AND REHAB | Facility: CLINIC | Age: 73
End: 2023-02-28
Payer: COMMERCIAL

## 2023-02-28 VITALS
BODY MASS INDEX: 25.13 KG/M2 | HEIGHT: 60 IN | DIASTOLIC BLOOD PRESSURE: 73 MMHG | SYSTOLIC BLOOD PRESSURE: 125 MMHG | HEART RATE: 96 BPM | WEIGHT: 128 LBS

## 2023-02-28 DIAGNOSIS — M48.02 CERVICAL STENOSIS OF SPINE: ICD-10-CM

## 2023-02-28 DIAGNOSIS — M54.12 CERVICAL RADICULAR PAIN: Primary | ICD-10-CM

## 2023-02-28 DIAGNOSIS — M43.12 SPONDYLOLISTHESIS OF CERVICAL REGION: ICD-10-CM

## 2023-02-28 DIAGNOSIS — M79.18 MYOFASCIAL PAIN: ICD-10-CM

## 2023-02-28 DIAGNOSIS — M48.02 FORAMINAL STENOSIS OF CERVICAL REGION: ICD-10-CM

## 2023-02-28 PROCEDURE — 99214 OFFICE O/P EST MOD 30 MIN: CPT | Performed by: PHYSICAL MEDICINE & REHABILITATION

## 2023-02-28 NOTE — PROGRESS NOTES
Assessment/Plan:      Chasity was seen today for neck pain.    Diagnoses and all orders for this visit:    Cervical radicular pain  -     PAIN Translaminar Epidural Steroid Injection Cervical; Future    Cervical stenosis of spine  -     PAIN Translaminar Epidural Steroid Injection Cervical; Future    Foraminal stenosis of cervical region  -     PAIN Translaminar Epidural Steroid Injection Cervical; Future    Myofascial pain    Spondylolisthesis of cervical region         Assessment: Pleasant 73 year old male with a history of acid reflux, depression, hyperlipidemia, hearing loss, lacunar stroke with:     1.  Chronic cervical spine pain with right cervical radicular pain increased over the past 4-6 months.  He has a C3-4 spondylolisthesis with C3-4, C4-5 and C5-6 degenerative disc disease with broad-based disc bulges resulting in Moderate spinal stenosis at those levels along with moderate right foraminal stenosis C4-5 and bilateral C5-6 foraminal stenosis likely responsible for his right cervical radicular pain.  No improvement with physical therapy and baclofen.  Has been to 3 visits of physical therapy doing home exercises.     2.  Moderate spinal stenosis from C3-4 through C5-6 without myelopathic findings on exam.     3.  Myofascial pain cervical spine and parascapular region on the right    Discussion:    1.  I discussed the diagnosis and treatment options.  He has a spondylolisthesis at L3-4 but has more diffuse right upper extremity radicular symptoms.  We discussed options of continuing therapy along with medication changes and injections.    2.  Recommend cervical interlaminar epidural steroid injection.  Patient would like to proceed.  This has been ordered.  He is no longer taking Plavix.    3.  Continue with physical therapy.    4.  Follow-up with myself or Evita 2 to 4 weeks after injection.  If no benefit can consider right upper extremity EMG.    It was our pleasure caring for your patient today, if  there any questions or concerns please do not hesitate to contact us.      Subjective:   Patient ID: Chasity Gaspar is a 73 year old male.    History of Present Illness:Patient presents today with a telephone  and his daughter who helps interpret as he is hard of hearing.  He has persistent cervical spine pain to the mid cervical spine and right parascapular region down the right arm with numbness diffusely to the right arm.  Worse with sitting upright or using his arm or turning his head.  Better with lying down.  Pain is an 8/10 today.  Has been to 3 visits of physical therapy doing home exercises without benefit.  Baclofen and gabapentin are being taken but no improvement in pain.      Imaging: Cervical spine MRI personally reviewed for medical decision-making purposes.  Diffuse facet arthropathy throughout the cervical spine with moderate degenerative spinal stenosis C3-4 through C5-6 without any high-grade spinal cord compression/spinal cord signal abnormality.  He does have a spondylolisthesis of C4 on C5.  Varying amounts of foraminal stenosis typically mild to moderate most significant C5-6 bilaterally.    MRI right shoulder reveals mild diffuse rotator cuff tendinopathy with low-grade partial-thickness intrasubstance tearing of the subscapularis.    Review of Systems: Pertinent positives: Numbness right arm and headaches .  Pertinent negatives: No weakness.  No bowel or bladder incontinence.  No urinary retention.  No fevers, unintentional weight loss, balance changes,   frequent falling, difficulty swallowing, or coordination difficulties.  All others reviewed are negative.    Past Medical History:   Diagnosis Date     Cervicalgia      Depression      Dyslipidemia      Dysthymia      Gastritis      GERD (gastroesophageal reflux disease)      Hearing loss      Hiatal hernia      Hypertriglyceridemia      Insomnia      Lacunar stroke (H)      Lumbar canal stenosis      Lumbar radiculopathy      Myalgia  "and myositis        The following portions of the patient's history were reviewed and updated as appropriate: allergies, current medications, past family history, past medical history, past social history, past surgical history and problem list.           Objective:   Physical Exam:    /73   Pulse 96   Ht 4' 9\" (1.448 m)   Wt 128 lb (58.1 kg)   BMI 27.70 kg/m    Body mass index is 27.7 kg/m .      General: Alert and oriented with normal affect. Attention, knowledge, memory, and language are intact. No acute distress.       Tenderness to palpation cervical paraspinals and upper trapezius on the right with hypertonic tissue textures of the upper trapezius.  Sensation is intact to light touch throughout the upper  extremities.  Reflexes are 1+ and symmetric in the biceps triceps and brachioradialis with negative Hoffmans.      Manual muscle testing reveals:  Right /Left out of 5     5/5 elbow flexors  5/5 elbow extensors  5/5 wrist extensors  5/5 interosseus  5/5 finger flexors     "

## 2023-02-28 NOTE — PATIENT INSTRUCTIONS
A cervical interlaminar epidural injection has been ordered today. Please schedule this injection at the . On the day of your injection, you cannot be sick or taking antibiotics. If you become sick and are prescribed, please call the clinic so your injection can be rescheduled for once you have completed your antibiotics. You will need to bring a  with you for your injection. If you have any questions or concerns prior to your injection, please do not hesitate to call the nurse navigation line at [phone].   Continue with physical therapy

## 2023-02-28 NOTE — LETTER
2/28/2023         RE: Chasity Gaspar  [address] [place] [address]        Dear Colleague,    Thank you for referring your patient, Chasity Gaspar, to the [facility]. Please see a copy of my visit note below.    Assessment/Plan:      Chasity was seen today for neck pain.    Diagnoses and all orders for this visit:    Cervical radicular pain  -     PAIN Translaminar Epidural Steroid Injection Cervical; Future    Cervical stenosis of spine  -     PAIN Translaminar Epidural Steroid Injection Cervical; Future    Foraminal stenosis of cervical region  -     PAIN Translaminar Epidural Steroid Injection Cervical; Future    Myofascial pain    Spondylolisthesis of cervical region         Assessment: Pleasant 73 year old male with a history of acid reflux, depression, hyperlipidemia, hearing loss, lacunar stroke with:     1.  Chronic cervical spine pain with right cervical radicular pain increased over the past 4-6 months.  He has a C3-4 spondylolisthesis with C3-4, C4-5 and C5-6 degenerative disc disease with broad-based disc bulges resulting in Moderate spinal stenosis at those levels along with moderate right foraminal stenosis C4-5 and bilateral C5-6 foraminal stenosis likely responsible for his right cervical radicular pain.  No improvement with physical therapy and baclofen.  Has been to 3 visits of physical therapy doing home exercises.     2.  Moderate spinal stenosis from C3-4 through C5-6 without myelopathic findings on exam.     3.  Myofascial pain cervical spine and parascapular region on the right    Discussion:    1.  I discussed the diagnosis and treatment options.  He has a spondylolisthesis at L3-4 but has more diffuse right upper extremity radicular symptoms.  We discussed options of continuing therapy along with medication changes and injections.    2.  Recommend cervical interlaminar epidural steroid injection.  Patient would like to proceed.  This has been ordered.  He is no longer  taking Plavix.    3.  Continue with physical therapy.    4.  Follow-up with myself or Evita 2 to 4 weeks after injection.  If no benefit can consider right upper extremity EMG.    It was our pleasure caring for your patient today, if there any questions or concerns please do not hesitate to contact us.      Subjective:   Patient ID: Chasity Gaspar is a 73 year old male.    History of Present Illness:Patient presents today with a telephone  and his daughter who helps interpret as he is hard of hearing.  He has persistent cervical spine pain to the mid cervical spine and right parascapular region down the right arm with numbness diffusely to the right arm.  Worse with sitting upright or using his arm or turning his head.  Better with lying down.  Pain is an 8/10 today.  Has been to 3 visits of physical therapy doing home exercises without benefit.  Baclofen and gabapentin are being taken but no improvement in pain.      Imaging: Cervical spine MRI personally reviewed for medical decision-making purposes.  Diffuse facet arthropathy throughout the cervical spine with moderate degenerative spinal stenosis C3-4 through C5-6 without any high-grade spinal cord compression/spinal cord signal abnormality.  He does have a spondylolisthesis of C4 on C5.  Varying amounts of foraminal stenosis typically mild to moderate most significant C5-6 bilaterally.    MRI right shoulder reveals mild diffuse rotator cuff tendinopathy with low-grade partial-thickness intrasubstance tearing of the subscapularis.    Review of Systems: Pertinent positives: Numbness right arm and headaches .  Pertinent negatives: No weakness.  No bowel or bladder incontinence.  No urinary retention.  No fevers, unintentional weight loss, balance changes,   frequent falling, difficulty swallowing, or coordination difficulties.  All others reviewed are negative.    Past Medical History:   Diagnosis Date     Cervicalgia      Depression      Dyslipidemia       "Dysthymia      Gastritis      GERD (gastroesophageal reflux disease)      Hearing loss      Hiatal hernia      Hypertriglyceridemia      Insomnia      Lacunar stroke (H)      Lumbar canal stenosis      Lumbar radiculopathy      Myalgia and myositis        The following portions of the patient's history were reviewed and updated as appropriate: allergies, current medications, past family history, past medical history, past social history, past surgical history and problem list.           Objective:   Physical Exam:    /73   Pulse 96   Ht 4' 9\" (1.448 m)   Wt 128 lb (58.1 kg)   BMI 27.70 kg/m    Body mass index is 27.7 kg/m .      General: Alert and oriented with normal affect. Attention, knowledge, memory, and language are intact. No acute distress.       Tenderness to palpation cervical paraspinals and upper trapezius on the right with hypertonic tissue textures of the upper trapezius.  Sensation is intact to light touch throughout the upper  extremities.  Reflexes are 1+ and symmetric in the biceps triceps and brachioradialis with negative Hoffmans.      Manual muscle testing reveals:  Right /Left out of 5     5/5 elbow flexors  5/5 elbow extensors  5/5 wrist extensors  5/5 interosseus  5/5 finger flexors         Again, thank you for allowing me to participate in the care of your patient.        Sincerely,        Thee Velarde, DO    "

## 2023-03-01 ENCOUNTER — OFFICE VISIT (OUTPATIENT)
Dept: FAMILY MEDICINE | Facility: CLINIC | Age: 73
End: 2023-03-01
Payer: COMMERCIAL

## 2023-03-01 VITALS
HEART RATE: 96 BPM | DIASTOLIC BLOOD PRESSURE: 80 MMHG | HEIGHT: 60 IN | WEIGHT: 129.25 LBS | RESPIRATION RATE: 20 BRPM | BODY MASS INDEX: 25.38 KG/M2 | SYSTOLIC BLOOD PRESSURE: 120 MMHG | OXYGEN SATURATION: 95 % | TEMPERATURE: 98.5 F

## 2023-03-01 DIAGNOSIS — M54.42 CHRONIC BILATERAL LOW BACK PAIN WITH LEFT-SIDED SCIATICA: ICD-10-CM

## 2023-03-01 DIAGNOSIS — F33.1 MAJOR DEPRESSIVE DISORDER, RECURRENT EPISODE, MODERATE (H): ICD-10-CM

## 2023-03-01 DIAGNOSIS — M54.12 CERVICAL RADICULAR PAIN: ICD-10-CM

## 2023-03-01 DIAGNOSIS — H90.3 ASNHL (ASYMMETRICAL SENSORINEURAL HEARING LOSS): ICD-10-CM

## 2023-03-01 DIAGNOSIS — M54.16 RADICULOPATHY, LUMBAR REGION: ICD-10-CM

## 2023-03-01 DIAGNOSIS — I10 BENIGN ESSENTIAL HYPERTENSION: ICD-10-CM

## 2023-03-01 DIAGNOSIS — Z76.0 ENCOUNTER FOR MEDICATION REFILL: ICD-10-CM

## 2023-03-01 DIAGNOSIS — Z79.899 ENCOUNTER FOR LONG-TERM (CURRENT) USE OF MEDICATIONS: Primary | ICD-10-CM

## 2023-03-01 DIAGNOSIS — G89.29 CHRONIC BILATERAL LOW BACK PAIN WITH LEFT-SIDED SCIATICA: ICD-10-CM

## 2023-03-01 PROCEDURE — 99214 OFFICE O/P EST MOD 30 MIN: CPT | Performed by: FAMILY MEDICINE

## 2023-03-01 RX ORDER — GABAPENTIN 300 MG/1
300 CAPSULE ORAL 2 TIMES DAILY
Qty: 270 CAPSULE | Refills: 3 | Status: SHIPPED | OUTPATIENT
Start: 2023-03-01 | End: 2023-05-02

## 2023-03-01 ASSESSMENT — PATIENT HEALTH QUESTIONNAIRE - PHQ9
10. IF YOU CHECKED OFF ANY PROBLEMS, HOW DIFFICULT HAVE THESE PROBLEMS MADE IT FOR YOU TO DO YOUR WORK, TAKE CARE OF THINGS AT HOME, OR GET ALONG WITH OTHER PEOPLE: NOT DIFFICULT AT ALL
SUM OF ALL RESPONSES TO PHQ QUESTIONS 1-9: 3
SUM OF ALL RESPONSES TO PHQ QUESTIONS 1-9: 3

## 2023-03-01 NOTE — PROGRESS NOTES
"  Assessment & Plan     Encounter for long-term (current) use of medications  Medications reviewed with the patient he has not had any problems picking up his medication his daughter is not here today she will contact me via [employer] if he needs any refills.    Radiculopathy, lumbar region    Back pain has decreased he is using Cymbalta and gabapentin I will be titrating down his dose of gabapentin because his back pain has been stable he has not noticed any pain while walking he will be going down to 1 capsule twice daily.    Chronic bilateral low back pain with left-sided sciatica    No symptoms suggestive of sciatica noted today.    Major depressive disorder, recurrent episode, moderate (H)    According to the patient he is doing well he is not depressed feels that his life is going well he sleeping well eating regularly    Cervical radicular pain    Seen by physical medicine yesterday has been scheduled for therapeutic injection and says his neck pain is slightly better.    Benign essential hypertension    Blood pressure has not been adequately controlled, he has been using his metoprolol improperly he has been using medication only when he has a headache at like him to take the medication daily    Encounter for medication refill      - gabapentin (NEURONTIN) 300 MG capsule; Take 1 capsule (300 mg) by mouth 2 times daily    ASNHL (asymmetrical sensorineural hearing loss)    He states that he has had problems with his hearing aid and makes his head spin for about a year he is not using his hearing aids regularly he would like to go to the audiology clinic and have them adjusted he can  - Adult Audiology [facility] Referral; Future  [id number]}     BMI:   Estimated body mass index is 26.41 kg/m  as calculated from the following:    Height as of this encounter: 1.49 m (4' 10.66\").    Weight as of this encounter: 58.6 kg (129 lb 4 oz).           Return in about 2 months (around 5/1/2023).    David Fang MD  [facility] " "[facility] ROSALINE Gaspar is a 73 year old accompanied by his friend, presenting for the following health issues:  Hypertension and Shoulder Pain    73-year-old male here for follow-up.  I saw him about a month ago he reports that he is feeling better his back pain is better and he says his neck pain is better after visiting the other doctor he has brought all his medications in for review he states he has been sleeping well his friend states that he does take his blood pressure medication when he wants to so he is not taking that regularly.  Patient denies having headaches today.  He denies having any numbness on his left leg or left thigh.  He states he has not been using his hearing aids because they are too loud and they make his head hurt and his ear is ringing he would like to see audiology again to have them adjusted  History of Present Illness       Hypertension: He presents for follow up of hypertension.  He does not check blood pressure  regularly outside of the clinic. Outpatient blood pressures have not been over 140/90. He follows a low salt diet.      Today's PHQ-9         PHQ-9 Total Score: 3    PHQ-9 Q9 Thoughts of better off dead/self-harm past 2 weeks :   Not at all    How difficult have these problems made it for you to do your work, take care of things at home, or get along with other people: Not difficult at all             Review of Systems   Constitutional, HEENT, cardiovascular, pulmonary, gi and gu systems are negative, except as otherwise noted.      Objective    BP (!) 156/80   Pulse 96   Temp 98.5  F (36.9  C) (Oral)   Resp 20   Ht 1.49 m (4' 10.66\")   Wt 58.6 kg (129 lb 4 oz)   SpO2 95%   BMI 26.41 kg/m    Body mass index is 26.41 kg/m .  Physical Exam   GENERAL: healthy, alert and no distress  EYES: Eyes grossly normal to inspection, PERRL and conjunctivae and sclerae normal  NECK: no adenopathy, no asymmetry, masses, or scars and thyroid normal to " palpation  RESP: lungs clear to auscultation - no rales, rhonchi or wheezes  CV: regular rate and rhythm, normal S1 S2, no S3 or S4, no murmur, click or rub, no peripheral edema and peripheral pulses strong  ABDOMEN: soft, nontender, no hepatosplenomegaly, no masses and bowel sounds normal  MS: no gross musculoskeletal defects noted, no edema no tenderness elicited over palpation of the lumbar spine.  He has no tenderness in his shoulders bilaterally  SKIN: no suspicious lesions or rashes  NEURO: Normal strength and tone, mentation intact and speech normal  PSYCH: mentation appears normal, affect normal/bright

## 2023-03-02 ENCOUNTER — HOSPITAL ENCOUNTER (OUTPATIENT)
Dept: PHYSICAL THERAPY | Facility: REHABILITATION | Age: 73
Discharge: HOME | End: 2023-03-02
Payer: COMMERCIAL

## 2023-03-02 PROCEDURE — 97110 THERAPEUTIC EXERCISES: CPT | Mod: GP

## 2023-03-02 PROCEDURE — 97140 MANUAL THERAPY 1/> REGIONS: CPT | Mod: GP

## 2023-03-12 ENCOUNTER — HOSPITAL ENCOUNTER (EMERGENCY)
Facility: HOSPITAL | Age: 73
Discharge: HOME | End: 2023-03-12
Attending: EMERGENCY MEDICINE | Admitting: EMERGENCY MEDICINE
Payer: COMMERCIAL

## 2023-03-12 ENCOUNTER — APPOINTMENT (OUTPATIENT)
Dept: RADIOLOGY | Facility: HOSPITAL | Age: 73
End: 2023-03-12
Attending: EMERGENCY MEDICINE
Payer: COMMERCIAL

## 2023-03-12 VITALS
WEIGHT: 115 LBS | RESPIRATION RATE: 18 BRPM | DIASTOLIC BLOOD PRESSURE: 94 MMHG | HEART RATE: 86 BPM | BODY MASS INDEX: 21.71 KG/M2 | TEMPERATURE: 97.8 F | SYSTOLIC BLOOD PRESSURE: 160 MMHG | HEIGHT: 61 IN | OXYGEN SATURATION: 94 %

## 2023-03-12 DIAGNOSIS — M79.672 LEFT FOOT PAIN: ICD-10-CM

## 2023-03-12 DIAGNOSIS — M25.572 ACUTE LEFT ANKLE PAIN: ICD-10-CM

## 2023-03-12 PROCEDURE — 73610 X-RAY EXAM OF ANKLE: CPT | Mod: LT

## 2023-03-12 PROCEDURE — 73630 X-RAY EXAM OF FOOT: CPT | Mod: LT

## 2023-03-12 PROCEDURE — 99284 EMERGENCY DEPT VISIT MOD MDM: CPT

## 2023-03-12 PROCEDURE — 250N000013 HC RX MED GY IP 250 OP 250 PS 637: Performed by: EMERGENCY MEDICINE

## 2023-03-12 RX ORDER — HYDROMORPHONE HYDROCHLORIDE 2 MG/1
2 TABLET ORAL ONCE
Status: COMPLETED | OUTPATIENT
Start: 2023-03-12 | End: 2023-03-12

## 2023-03-12 RX ADMIN — HYDROMORPHONE HYDROCHLORIDE 2 MG: 2 TABLET ORAL at 22:11

## 2023-03-12 ASSESSMENT — ACTIVITIES OF DAILY LIVING (ADL): ADLS_ACUITY_SCORE: 35

## 2023-03-13 NOTE — ED PROVIDER NOTES
Emergency Department Encounter      NAME: Chasity Gaspar  AGE: 73 year old male  YOB: 1950  MRN: [id number]  EVALUATION DATE & TIME: 3/12/2023  9:38 PM    PCP: David Fang    ED PROVIDER: Ben Marsh M.D.      Chief Complaint   Patient presents with     Foot Pain         FINAL IMPRESSION:  1. Left foot pain    2. Acute left ankle pain        MEDICAL DECISION MAKIN:54 PM I met with the patient, obtained history, performed an initial exam, and discussed options and plan for diagnostics and treatment here in the ED.     This patient is a 73-year-old  male who presents with left foot pain.  He says that he has a history of gout and that the pain feels similar to his previous episodes of gouty arthritis.  This time he has had 3 days of constant left foot pain.  He said he had not had a previous trauma to the area.  No fevers or chills.  He is on allopurinol and states that he has been taking it regularly.  On exam he had some mild swelling at the left ankle.  X-rays were done which showed degenerative changes but no acute fracture or dislocation.  The patient received a dose of oral Dilaudid while in the ER which worked well for his pain.  He was discharged home with instruction on how to treat foot sprains and will follow up with his doctor.    Medical Decision Making    History:    Supplemental history from: Documented in chart, if applicable and Family Member/Significant Other    External Record(s) reviewed: Documented in chart, if applicable.    Work Up:    Chart documentation includes differential considered and any EKGs or imaging independently interpreted by provider, where specified.    In additional to work up documented, I considered the following work up: Documented in chart, if applicable.    External consultation:    Discussion of management with another provider: Documented in chart, if applicable    Complicating factors:    Care impacted by chronic illness: Other: gout    Care  affected by social determinants of health: N/A    Disposition considerations: Discharged      Pertinent Labs & Imaging studies reviewed. (See chart for details)    The importance of close follow up was discussed. We reviewed warning signs and symptoms, and I instructed Mr. Gaspar to return to the emergency department immediately if he develops any new or worsening symptoms. I provided additional verbal discharge instructions. Mr. Gaspar expressed understanding and agreement with this plan of care, his questions were answered, and he was discharged in stable condition.       MEDICATIONS GIVEN IN THE EMERGENCY:  Medications   HYDROmorphone (DILAUDID) tablet 2 mg (2 mg Oral $Given 3/12/23 2211)       NEW PRESCRIPTIONS STARTED AT TODAY'S ER VISIT:  Discharge Medication List as of 3/12/2023 11:17 PM             =================================================================    HPI    Patient information was obtained from: Patient     Use of : Yes (Phone) Jessica Gaspar is a 73 year old male with a past medical history of gout, stroke, who presents via walk-in for evaluation of foot pain.    Patient reports he has had 3 days of ongoing, atraumatic left foot pain. He states that this pain is stabbing and is specifically located to the sole of the foot. He notes that he does have a history of this pain with his gout, and he has been compliant with his allopurinol. He has not been taking any pain medications however. He states this pain is not worse with movement of the foot. Patient denies any other complaints.       REVIEW OF SYSTEMS   Review of Systems   Musculoskeletal:        Positive for left foot pain   All other systems reviewed and are negative.       PAST MEDICAL HISTORY:  Past Medical History:   Diagnosis Date     Cervicalgia      Depression      Dyslipidemia      Dysthymia      Gastritis      GERD (gastroesophageal reflux disease)      Hearing loss      Hiatal hernia      Hypertriglyceridemia       Insomnia      Lacunar stroke (H)      Lumbar canal stenosis      Lumbar radiculopathy      Myalgia and myositis        PAST SURGICAL HISTORY:  Past Surgical History:   Procedure Laterality Date     IR CEREBRAL ANGIOGRAM  4/27/2017     IR LUMBAR TRANSFORAMINAL EPIDURAL STRD INJ  7/2/2012     PICC  4/28/2017          NC ARTHRODESIS ANT INTERBODY MIN DISCECTOMY,LUMBAR      Description: Lumbar Vertebral Fusion;  Recorded: 07/16/2013;  Comments: 3/17/11 spinal decompression and fusion L4-5, L5-S1 for spinal stenosis, DDD, spondylolysis; Dr. [facility]     NC ESOPHAGOGASTRODUODENOSCOPY TRANSORAL DIAGNOSTIC      Description: Esophagogastroduodenoscopy;  Proc Date: 04/02/2012;  Comments: biosies:   reactive gastropathy with chronic superimposed nonspecific chronic inflammation (likely secondary to ASA, NSAIDS, EtOH or other irritants), NEG for h. pylori; small hiatal hernia     NC INJECT ANES/STEROID FORAMEN LUMBAR/SACRAL W IMG GUIDE ,1 LEVEL      Description: Nerve Block Transforaminal Epidural Lumbar L3 - L4;  Recorded: 07/10/2012;  Comments: 7/2/2012 for severe low back pain, spinal stenosis and radiculopathy     THROMBECTOMY  04/24/2017    Rt CELIA     US ASPIRATION OR INJECTION MAJOR JOINT  10/23/2019     [facility] APPENDECTOMY      Description: Appendectomy;  Recorded: 07/12/2013;       CURRENT MEDICATIONS:    No current facility-administered medications for this encounter.    Current Outpatient Medications:      acetaminophen 500 mg coapsule, [ACETAMINOPHEN 500 MG COAPSULE] Take 2 tablets by mouth 3 (three) times a day as needed for fever or pain., Disp: , Rfl:      allopurinol (ZYLOPRIM) 300 MG tablet, TAKE 1 TABLET BY MOUTH DAILY, Disp: 90 tablet, Rfl: 3     atorvastatin (LIPITOR) 40 MG tablet, [ATORVASTATIN (LIPITOR) 40 MG TABLET] TAKE 1 TABLET(40 MG) BY MOUTH AT BEDTIME Strength: 40 mg, Disp: 90 tablet, Rfl: 3     atorvastatin (LIPITOR) 40 MG tablet, TAKE 1 TABLET BY MOUTH DAILY, Disp: 90 tablet, Rfl: 3      baclofen (LIORESAL) 10 MG tablet, Take 0.5-1 tablets (5-10 mg) by mouth 3 times daily as needed for muscle spasms, Disp: 60 tablet, Rfl: 1     betamethasone dipropionate (DIPROSONE) 0.05 % external ointment, Apply to rash on back twice daily on Saturday and Sunday. (Monday-Friday, use calcipotriene ointment)., Disp: 45 g, Rfl: 11     clobetasol (TEMOVATE) 0.05 % external ointment, Apply topically 2 times daily, Disp: 60 g, Rfl: 4     DULoxetine (CYMBALTA) 60 MG capsule, TAKE 1 CAPSULE(60 MG) BY MOUTH DAILY, Disp: 90 capsule, Rfl: 3     dutasteride (AVODART) 0.5 MG capsule, TAKE 1 CAPSULE(0.5 MG) BY MOUTH DAILY Strength: 0.5 mg, Disp: 90 capsule, Rfl: 3     gabapentin (NEURONTIN) 300 MG capsule, Take 1 capsule (300 mg) by mouth 2 times daily, Disp: 270 capsule, Rfl: 3     magnesium oxide (MAG-OX) 400 MG tablet, Take 1 tablet (400 mg) by mouth daily, Disp: 90 tablet, Rfl: 3     methylPREDNISolone (MEDROL DOSEPAK) 4 MG tablet therapy pack, Follow Package Directions, Disp: 21 tablet, Rfl: 0     metoprolol tartrate (LOPRESSOR) 25 MG tablet, TAKE 1 TABLET BY MOUTH TWICE DAILY, Disp: 180 tablet, Rfl: 3     metoprolol tartrate (LOPRESSOR) 25 MG tablet, Take 1 tablet (25 mg) by mouth 2 times daily, Disp: 180 tablet, Rfl: 3     metoprolol tartrate (LOPRESSOR) 25 MG tablet, [METOPROLOL TARTRATE (LOPRESSOR) 25 MG TABLET] TAKE 1 TABLET BY MOUTH TWICE DAILY, Disp: 180 tablet, Rfl: 3     omeprazole (PRILOSEC) 20 MG DR capsule, [OMEPRAZOLE (PRILOSEC) 20 MG CAPSULE] TAKE 2 CAPSULES(40 MG) BY MOUTH DAILY, Disp: 180 capsule, Rfl: 3     omeprazole (PRILOSEC) 20 MG DR capsule, , Disp: , Rfl:      polyethylene glycol (MIRALAX) 17 GM/Dose powder, Take 17 g by mouth daily, Disp: 510 g, Rfl: 3     polyethylene glycol (MIRALAX) 17 GM/Dose powder, Take 17 g by mouth daily, Disp: 510 g, Rfl: 11     predniSONE (DELTASONE) 20 MG tablet, Take 1 tablet (20 mg) by mouth 2 times daily, Disp: 14 tablet, Rfl: 1     vitamin D2 (ERGOCALCIFEROL) 89387  "units (1250 mcg) capsule, TAKE 1 CAPSULE BY MOUTH WEEKLY, Disp: 12 capsule, Rfl: 3    ALLERGIES:  Allergies   Allergen Reactions     Aspirin Hives     Oxycodone Itching     Vicodin [Hydrocodone-Acetaminophen] Unknown       FAMILY HISTORY:  Family History   Problem Relation Age of Onset     Coronary Artery Disease No family hx of      Hypertension No family hx of      Cerebrovascular Disease No family hx of        SOCIAL HISTORY:   Social History     Socioeconomic History     Marital status:    Tobacco Use     Smoking status: Never     Passive exposure: Never     Smokeless tobacco: Never   Vaping Use     Vaping Use: Never used   Substance and Sexual Activity     Alcohol use: Never     Drug use: Never     Sexual activity: Not Currently     Partners: Female   Social History Narrative    ** Merged History Encounter **            PHYSICAL EXAM    VITAL SIGNS: BP (!) 160/94   Pulse 86   Temp 97.8  F (36.6  C) (Oral)   Resp 18   Ht 1.549 m (5' 1\")   Wt 52.2 kg (115 lb)   SpO2 94%   BMI 21.73 kg/m    Constitutional:  Well developed, well nourished  male and in no obvious distress.  EYES: Conjunctivae clear, no discharge  HENT: Atraumatic, normocephalic, bilateral external ears normal.  Oropharynx moist. Nose normal.   Neck: Normal ROM , Supple   Respiratory:  No respiratory distress, normal nonlabored respirations.   Cardiovascular:  Distal perfusion appears intact  Musculoskeletal:  Mild swelling over left lateral ankle. No edema appreciated, Good range of motion in all major joints.   Integument:  Warm, Dry, No erythema, No rash.   Neurologic:  Alert and oriented. No focal deficits noted.  Ambulatory  Psychiatric:  Affect normal, Judgment normal, Mood normal.       LAB:  All pertinent labs reviewed and interpreted.  Labs Ordered and Resulted from Time of ED Arrival to Time of ED Departure - No data to display    RADIOLOGY:  XR Foot Left 3 Views   Final Result   IMPRESSION: Degenerative changes at the " great MTP joint with overlying soft tissue swelling. There is slight medial subluxation of the great toe at the MTP joint. No soft tissue mineralization.      XR Ankle Left 3 Views   Final Result   IMPRESSION: Mild bimalleolar soft tissue swelling. No fracture or dislocation.          PROCEDURES:   Procedures       I, Matt Ray, am serving as a scribe to document services personally performed by Dr. Ben Marsh based on my observation and the provider's statements to me. IBen M.D. attest that Matt Ray is acting in a scribe capacity, has observed my performance of the services and has documented them in accordance with my direction.      Ben Marsh M.D.  Emergency Medicine  [facility] EMERGENCY DEPARTMENT  [facility] [facility] [address]  [phone]  Dept: [phone]     Ben Marsh MD  04/10/23 0646

## 2023-03-13 NOTE — ED TRIAGE NOTES
Left foot pain increased over last 3 days. Denies trauma. Pt takes allopurinol for gout. +pp. Pt walks with a walker and able to bare weight     Triage Assessment       Row Name 03/12/23 9764       Triage Assessment (Adult)    Airway WDL WDL

## 2023-03-15 ENCOUNTER — RADIOLOGY INJECTION OFFICE VISIT (OUTPATIENT)
Dept: PHYSICAL MEDICINE AND REHAB | Facility: CLINIC | Age: 73
End: 2023-03-15
Attending: PHYSICAL MEDICINE & REHABILITATION
Payer: COMMERCIAL

## 2023-03-15 VITALS
TEMPERATURE: 98.4 F | DIASTOLIC BLOOD PRESSURE: 64 MMHG | HEART RATE: 89 BPM | SYSTOLIC BLOOD PRESSURE: 138 MMHG | RESPIRATION RATE: 16 BRPM | OXYGEN SATURATION: 95 %

## 2023-03-15 DIAGNOSIS — M48.02 FORAMINAL STENOSIS OF CERVICAL REGION: ICD-10-CM

## 2023-03-15 DIAGNOSIS — M48.02 CERVICAL STENOSIS OF SPINE: ICD-10-CM

## 2023-03-15 DIAGNOSIS — M54.12 CERVICAL RADICULAR PAIN: ICD-10-CM

## 2023-03-15 PROCEDURE — 62321 NJX INTERLAMINAR CRV/THRC: CPT | Performed by: PAIN MEDICINE

## 2023-03-15 RX ORDER — DEXAMETHASONE SODIUM PHOSPHATE 10 MG/ML
INJECTION, SOLUTION INTRAMUSCULAR; INTRAVENOUS
Status: COMPLETED | OUTPATIENT
Start: 2023-03-15 | End: 2023-03-15

## 2023-03-15 RX ORDER — LIDOCAINE HYDROCHLORIDE 10 MG/ML
INJECTION, SOLUTION EPIDURAL; INFILTRATION; INTRACAUDAL; PERINEURAL
Status: COMPLETED | OUTPATIENT
Start: 2023-03-15 | End: 2023-03-15

## 2023-03-15 RX ADMIN — DEXAMETHASONE SODIUM PHOSPHATE 10 MG: 10 INJECTION, SOLUTION INTRAMUSCULAR; INTRAVENOUS at 10:36

## 2023-03-15 RX ADMIN — LIDOCAINE HYDROCHLORIDE 1 ML: 10 INJECTION, SOLUTION EPIDURAL; INFILTRATION; INTRACAUDAL; PERINEURAL at 10:36

## 2023-03-15 ASSESSMENT — PAIN SCALES - GENERAL
PAINLEVEL: SEVERE PAIN (7)
PAINLEVEL: NO PAIN (1)

## 2023-03-15 NOTE — PATIENT INSTRUCTIONS
DISCHARGE INSTRUCTIONS    During office hours (8:00 a.m.- 4:00 p.m.) questions or concerns may be answered  by calling [facility] Navigation Nurses at  [phone].  Messages received after hours will be returned the following business day.      In the case of an emergency, please dial 911 or seek assistance at the nearest Emergency Room/Urgent Care facility.     All Patients:    You may experience an increase in your symptoms for the first 2 days (It may take anywhere between 2 days- 2 weeks for the steroid to have maximum effect).    You may use ice on the injection site, as frequently as 20 minutes each hour if needed.    You may take your pain medicine.    You may continue taking your regular medication after your injection. If you have had a Medial Branch Block you may resume pain medication once your pain diary is completed.    You may shower. No swimming, tub bath or hot tub for 48 hours.  You may remove your bandaid/bandage as soon as you are home.    You may resume light activities, as tolerated.    Resume your usual diet as tolerated.    It is strongly advised that you do not drive for 1-3 hours post injection.    If you have had oral sedation:  Do not drive for 8 hours post injection.      If you have had IV sedation:  Do not drive for 24 hours post injection.  Do not operate hazardous machinery or make important personal/business decisions for 24 hours.      POSSIBLE STEROID SIDE EFFECTS (If steroid/cortisone was used for your procedure)    -If you experience these symptoms, it should only last for a short period    Swelling of the legs              Skin redness (flushing)     Mouth (oral) irritation   Blood sugar (glucose) levels            Sweats                    Mood changes  Headache  Sleeplessness  Weakened immune system for up to 14 days, which could increase the risk of erick the COVID-19 virus and/or experiencing more severe symptoms of the disease, if exposed.  Decreased  effectiveness of the flu vaccine if given within 2 weeks of the steroid.         POSSIBLE PROCEDURE SIDE EFFECTS  -Call the Spine Center if you are concerned  Increased Pain           Increased numbness/tingling      Nausea/Vomiting          Bruising/bleeding at site      Redness or swelling                                              Difficulty walking      Weakness           Fever greater than 100.5    *In the event of a severe headache after an epidural steroid injection that is relieved by lying down, please call the [facility] to speak with a clinical staff member*

## 2023-03-20 ENCOUNTER — HOSPITAL ENCOUNTER (OUTPATIENT)
Dept: GENERAL RADIOLOGY | Facility: HOSPITAL | Age: 73
Discharge: HOME | End: 2023-03-20
Attending: INTERNAL MEDICINE | Admitting: INTERNAL MEDICINE
Payer: COMMERCIAL

## 2023-03-20 ENCOUNTER — OFFICE VISIT (OUTPATIENT)
Dept: INTERNAL MEDICINE | Facility: CLINIC | Age: 73
End: 2023-03-20
Payer: COMMERCIAL

## 2023-03-20 VITALS
OXYGEN SATURATION: 97 % | BODY MASS INDEX: 21.9 KG/M2 | TEMPERATURE: 98.1 F | HEIGHT: 61 IN | DIASTOLIC BLOOD PRESSURE: 90 MMHG | WEIGHT: 116 LBS | HEART RATE: 94 BPM | SYSTOLIC BLOOD PRESSURE: 141 MMHG

## 2023-03-20 DIAGNOSIS — M79.672 LEFT FOOT PAIN: Primary | ICD-10-CM

## 2023-03-20 DIAGNOSIS — M79.672 LEFT FOOT PAIN: ICD-10-CM

## 2023-03-20 LAB — URATE SERPL-MCNC: 6.9 MG/DL (ref 3.4–7)

## 2023-03-20 PROCEDURE — 84550 ASSAY OF BLOOD/URIC ACID: CPT | Performed by: INTERNAL MEDICINE

## 2023-03-20 PROCEDURE — 99213 OFFICE O/P EST LOW 20 MIN: CPT | Performed by: INTERNAL MEDICINE

## 2023-03-20 PROCEDURE — 73630 X-RAY EXAM OF FOOT: CPT | Mod: LT

## 2023-03-20 PROCEDURE — 36415 COLL VENOUS BLD VENIPUNCTURE: CPT | Performed by: INTERNAL MEDICINE

## 2023-03-20 RX ORDER — PREDNISONE 20 MG/1
20 TABLET ORAL 2 TIMES DAILY
Qty: 14 TABLET | Refills: 1 | Status: SHIPPED | OUTPATIENT
Start: 2023-03-20

## 2023-03-20 ASSESSMENT — PAIN SCALES - GENERAL: PAINLEVEL: EXTREME PAIN (8)

## 2023-03-20 NOTE — PROGRESS NOTES
"  {PROVIDER CHARTING PREFERENCE:092319}    Subjective   Chasity is a 73 year old accompanied by his Nephew, presenting for the following health issues:  Office Visit and Leg (Pt states that the pain/swelling started in his left leg around 2-3 weeks ago. Pain is at 9 today. )      History of Present Illness       Reason for visit:  Swollen/Painful Leg    He eats 0-1 servings of fruits and vegetables daily.He consumes 1 sweetened beverage(s) daily.He exercises with enough effort to increase his heart rate 20 to 29 minutes per day.  He exercises with enough effort to increase his heart rate 3 or less days per week.   He is taking medications regularly.       {SUPERLIST (Optional):911498}  {additonal problems for provider to add (Optional):806598}    Review of Systems   {ROS COMP (Optional):838437}      Objective    BP (!) 141/90 (BP Location: Right arm, Patient Position: Sitting, Cuff Size: Adult Regular)   Pulse 94   Temp 98.1  F (36.7  C) (Oral)   Ht 1.549 m (5' 1\")   Wt 52.6 kg (116 lb)   SpO2 97%   BMI 21.92 kg/m    Body mass index is 21.92 kg/m .  Physical Exam   {Exam List (Optional):623768}    {Diagnostic Test Results (Optional):957690}    {AMBULATORY ATTESTATION (Optional):015386}            "

## 2023-03-20 NOTE — PROGRESS NOTES
"Assessment/Plan:    Left foot pain dorsal aspect past health history of gout.  No  available at this juncture.  Nephew present as the .  Try prednisone 20 mg twice daily for 7 days with elevation topical ice plenty of fluids by mouth.  Check x-ray of left foot and uric acid level.  On allopurinol already.    25 minutes spent on the date of the encounter doing chart review, patient visit and documentation     Subjective:  Chasity Gaspar is a 73 year old male presents for the following health issues unable to speak English  present as his nephew.  No other  available.  iPad nonfunctional.  Not on the phone past health history of gout left foot pain no antecedent trauma or injury tender to the touch.  Not hot not red not podagra.    ROS:  No blood in stool or urine med list reviewed reconciled.  Denies chest pain shortness of breath.    Objective:  BP (!) 141/90 (BP Location: Right arm, Patient Position: Sitting, Cuff Size: Adult Regular)   Pulse 94   Temp 98.1  F (36.7  C) (Oral)   Ht 1.549 m (5' 1\")   Wt 52.6 kg (116 lb)   SpO2 97%   BMI 21.92 kg/m    Exam was limited to the left foot dorsal aspect is tender to the touch there is no calf pain or tenderness there is a tattoo noted in the lower leg on the left side there is no evidence for positive Homans' sign or cords palpable the leg shows no evidence for lymphangitis cellulitis.    Eron Reveles MD  Internal Medicine    Answers for HPI/ROS submitted by the patient on 3/20/2023  What is the reason for your visit today? : Swollen/Painful Leg  How many servings of fruits and vegetables do you eat daily?: 0-1  On average, how many sweetened beverages do you drink each day (Examples: soda, juice, sweet tea, etc.  Do NOT count diet or artificially sweetened beverages)?: 1  How many minutes a day do you exercise enough to make your heart beat faster?: 20 to 29  How many days a week do you exercise enough to make your heart " beat faster?: 3 or less  How many days per week do you miss taking your medication?: 0

## 2023-03-21 ENCOUNTER — TELEPHONE (OUTPATIENT)
Dept: INTERNAL MEDICINE | Facility: CLINIC | Age: 73
End: 2023-03-21
Payer: COMMERCIAL

## 2023-03-21 NOTE — TELEPHONE ENCOUNTER
----- Message from Eron Reveles MD sent at 3/21/2023  7:05 AM CDT -----  Uric acid level and x-ray of the left foot was all clear.  There is some arthritis noted in the foot.  If pain continues despite her treatment with prednisone 20 mg twice daily for the next 5 to 7 days I would recommend formal foot and ankle specialty consultation at [facility] orth[facility].  Dr. Delgadillo is an excellent foot and ankle specialist there.  Please call patient for me.

## 2023-03-21 NOTE — TELEPHONE ENCOUNTER
Called patient and spoke with his daughter. Patient was informed of the results/message below. He had no further questions at this time.

## 2023-04-06 ENCOUNTER — OFFICE VISIT (OUTPATIENT)
Dept: PHYSICAL MEDICINE AND REHAB | Facility: CLINIC | Age: 73
End: 2023-04-06
Payer: COMMERCIAL

## 2023-04-06 VITALS — SYSTOLIC BLOOD PRESSURE: 127 MMHG | DIASTOLIC BLOOD PRESSURE: 81 MMHG | HEART RATE: 107 BPM

## 2023-04-06 DIAGNOSIS — M43.12 SPONDYLOLISTHESIS OF CERVICAL REGION: ICD-10-CM

## 2023-04-06 DIAGNOSIS — M48.02 FORAMINAL STENOSIS OF CERVICAL REGION: ICD-10-CM

## 2023-04-06 DIAGNOSIS — R20.2 ARM PARESTHESIA, RIGHT: ICD-10-CM

## 2023-04-06 DIAGNOSIS — M54.12 CERVICAL RADICULAR PAIN: Primary | ICD-10-CM

## 2023-04-06 PROCEDURE — 99214 OFFICE O/P EST MOD 30 MIN: CPT | Performed by: PHYSICAL MEDICINE & REHABILITATION

## 2023-04-06 NOTE — LETTER
4/6/2023         RE: Chasity Gaspar  [address] [place] [address]        Dear Colleague,    Thank you for referring your patient, Chasity Gaspar, to the [facility]. Please see a copy of my visit note below.    Assessment/Plan:      Chasity was seen today for neck pain.    Diagnoses and all orders for this visit:    Cervical radicular pain  -     EMG; Future    Arm paresthesia, right  -     EMG; Future    Foraminal stenosis of cervical region    Spondylolisthesis of cervical region         Assessment: Pleasant 73 year old male with a history of acid reflux, depression, hyperlipidemia, hearing loss, lacunar stroke with:     1.  Chronic cervical spine pain with right cervical radicular pain increased over the past 4-6 months.  He has a C3-4 spondylolisthesis with C3-4, C4-5 and C5-6 degenerative disc disease with broad-based disc bulges resulting in Moderate spinal stenosis at those levels along with at least moderate right foraminal stenosis C4-5 and bilateral C5-6 foraminal stenosis likely responsible for his right cervical radicular pain.  No improvement with physical therapy and baclofen.  Has been to  physical therapy and continues doing home exercises.  Approximate 50% improvement following cervical interlaminar epidural steroid injection.  Is doing okay however continues to have significant right upper extremity paresthesias     2.  Moderate spinal stenosis from C3-4 through C5-6 without myelopathic findings on exam.     3.  Myofascial pain cervical spine and parascapular region on the right    Discussion:    1.  I discussed the diagnosis and treatment options.  His pain is doing okay at this point but still only 50% improved.  His right arm paresthesias seem to be bothering him fairly significantly.  We discussed further evaluation with diagnostic such as EMG.    2.  EMG right upper extremity to evaluate for radiculopathy versus peripheral nerve entrapment.    3.  He is taking gabapentin  600 mg twice a day and does get drowsy with this I recommend continuing on this dose until EMG.    4.  Follow-up with me after EMG.    It was our pleasure caring for your patient today, if there any questions or concerns please do not hesitate to contact us.      Subjective:   Patient ID: Chasity Gaspar is a 73 year old male.    History of Present Illness:Patient presents with his daughter today as well as an  who both provide some of the history as he is hard of hearing.  Has had cervical interlaminar epidural steroid injection see 7-T1.  Reports being up to 50% improved with his neck pain which is bilaterally lower cervical spine into the upper trapezius but continues to have right upper extremity paresthesias.  His pain is constant.  Paresthesias through the right arm all fingers.  Some mild weakness in his right arm.  Pain is constant better with lying down and resting.  Pain is a 6/10 today.  Taking gabapentin 600 mg twice a day but daughter reports he does get some drowsiness with the medication.  Takes Tylenol and ibuprofen as needed.      Imaging: I personally reviewed MRI images  Of the cervical spine for medical decision-making purposes.  Notes from December 2022.  Multilevel degenerative changes of the cervical spine with moderate central stenosis C3-4 through C5-6 varying degrees of spinal cord deformity/compression.  Moderate foraminal stenosis on the left at C3-4 right C4-5 and bilateral C5-6.  Facet arthropathy appears moderate C3-4 bilaterally with grade 1 spondylolisthesis.  Also significant facet arthropathy bilateral C4-5 and mild at C5-6.  There is a retrolisthesis C5-6    Review of Systems: Pertinent positives: Headaches and right upper extremity paresthesias.  Pertinent negatives:   No bowel or bladder incontinence.  No urinary retention.  No fevers, unintentional weight loss, balance changes,   frequent falling, difficulty swallowing, or coordination difficulties.  All others reviewed are  negative.  Past Medical History:   Diagnosis Date     Cervicalgia      Depression      Dyslipidemia      Dysthymia      Gastritis      GERD (gastroesophageal reflux disease)      Hearing loss      Hiatal hernia      Hypertriglyceridemia      Insomnia      Lacunar stroke (H)      Lumbar canal stenosis      Lumbar radiculopathy      Myalgia and myositis        The following portions of the patient's history were reviewed and updated as appropriate: allergies, current medications, past family history, past medical history, past social history, past surgical history and problem list.           Objective:   Physical Exam:    /81   Pulse 107   There is no height or weight on file to calculate BMI.      General: Alert and oriented with normal affect. Attention, knowledge, memory, and language are intact. No acute distress.    Gait:  Nonantalgic with single-point cane.  Sensation is intact to light touch throughout the upper   extremities.  Reflexes are 2+ and symmetric in the biceps triceps and brachioradialis with negative Hoffmans.     Manual muscle testing reveals:  Right /Left out of 5     5/5 elbow flexors  5/5 elbow extensors  5/5 wrist extensors  5/5 interosseus  5/5 finger flexors         Again, thank you for allowing me to participate in the care of your patient.        Sincerely,        Thee Velarde DO

## 2023-04-06 NOTE — PROGRESS NOTES
Assessment/Plan:      Chasity was seen today for neck pain.    Diagnoses and all orders for this visit:    Cervical radicular pain  -     EMG; Future    Arm paresthesia, right  -     EMG; Future    Foraminal stenosis of cervical region    Spondylolisthesis of cervical region         Assessment: Pleasant 73 year old male with a history of acid reflux, depression, hyperlipidemia, hearing loss, lacunar stroke with:     1.  Chronic cervical spine pain with right cervical radicular pain increased over the past 4-6 months.  He has a C3-4 spondylolisthesis with C3-4, C4-5 and C5-6 degenerative disc disease with broad-based disc bulges resulting in Moderate spinal stenosis at those levels along with at least moderate right foraminal stenosis C4-5 and bilateral C5-6 foraminal stenosis likely responsible for his right cervical radicular pain.  No improvement with physical therapy and baclofen.  Has been to  physical therapy and continues doing home exercises.  Approximate 50% improvement following cervical interlaminar epidural steroid injection.  Is doing okay however continues to have significant right upper extremity paresthesias     2.  Moderate spinal stenosis from C3-4 through C5-6 without myelopathic findings on exam.     3.  Myofascial pain cervical spine and parascapular region on the right    Discussion:    1.  I discussed the diagnosis and treatment options.  His pain is doing okay at this point but still only 50% improved.  His right arm paresthesias seem to be bothering him fairly significantly.  We discussed further evaluation with diagnostic such as EMG.    2.  EMG right upper extremity to evaluate for radiculopathy versus peripheral nerve entrapment.    3.  He is taking gabapentin 600 mg twice a day and does get drowsy with this I recommend continuing on this dose until EMG.    4.  Follow-up with me after EMG.    It was our pleasure caring for your patient today, if there any questions or concerns please do not  hesitate to contact us.      Subjective:   Patient ID: Chasity Gaspar is a 73 year old male.    History of Present Illness:Patient presents with his daughter today as well as an  who both provide some of the history as he is hard of hearing.  Has had cervical interlaminar epidural steroid injection see 7-T1.  Reports being up to 50% improved with his neck pain which is bilaterally lower cervical spine into the upper trapezius but continues to have right upper extremity paresthesias.  His pain is constant.  Paresthesias through the right arm all fingers.  Some mild weakness in his right arm.  Pain is constant better with lying down and resting.  Pain is a 6/10 today.  Taking gabapentin 600 mg twice a day but daughter reports he does get some drowsiness with the medication.  Takes Tylenol and ibuprofen as needed.      Imaging: I personally reviewed MRI images  Of the cervical spine for medical decision-making purposes.  Notes from December 2022.  Multilevel degenerative changes of the cervical spine with moderate central stenosis C3-4 through C5-6 varying degrees of spinal cord deformity/compression.  Moderate foraminal stenosis on the left at C3-4 right C4-5 and bilateral C5-6.  Facet arthropathy appears moderate C3-4 bilaterally with grade 1 spondylolisthesis.  Also significant facet arthropathy bilateral C4-5 and mild at C5-6.  There is a retrolisthesis C5-6    Review of Systems: Pertinent positives: Headaches and right upper extremity paresthesias.  Pertinent negatives:   No bowel or bladder incontinence.  No urinary retention.  No fevers, unintentional weight loss, balance changes,   frequent falling, difficulty swallowing, or coordination difficulties.  All others reviewed are negative.  Past Medical History:   Diagnosis Date     Cervicalgia      Depression      Dyslipidemia      Dysthymia      Gastritis      GERD (gastroesophageal reflux disease)      Hearing loss      Hiatal hernia       Hypertriglyceridemia      Insomnia      Lacunar stroke (H)      Lumbar canal stenosis      Lumbar radiculopathy      Myalgia and myositis        The following portions of the patient's history were reviewed and updated as appropriate: allergies, current medications, past family history, past medical history, past social history, past surgical history and problem list.           Objective:   Physical Exam:    /81   Pulse 107   There is no height or weight on file to calculate BMI.      General: Alert and oriented with normal affect. Attention, knowledge, memory, and language are intact. No acute distress.    Gait:  Nonantalgic with single-point cane.  Sensation is intact to light touch throughout the upper   extremities.  Reflexes are 2+ and symmetric in the biceps triceps and brachioradialis with negative Hoffmans.     Manual muscle testing reveals:  Right /Left out of 5     5/5 elbow flexors  5/5 elbow extensors  5/5 wrist extensors  5/5 interosseus  5/5 finger flexors

## 2023-04-06 NOTE — PATIENT INSTRUCTIONS
Schedule an EMG with Dr Velarde to determine the cause of your right arm tingling and numbness.

## 2023-04-21 NOTE — ADDENDUM NOTE
Encounter addended by: Trinity Clemente, PT on: 4/21/2023 9:14 AM   Actions taken: Clinical Note Signed, Flowsheet accepted, Episode resolved

## 2023-04-21 NOTE — PROGRESS NOTES
[facility]    Outpatient Physical Therapy Discharge Note  Patient: Chasity Gaspar  : 1950    Beginning/End Dates of Reporting Period:  1/10/23 to 3/2/23    Referring Provider: David Chavis MD    Therapy Diagnosis: Cervical pain with referral into arms     Client Self Report: Pt arrives with daughter who reports has been doing HEP at home. Has functionally been doing better and arrives with visibly less kinesiophobic behaviors.       23 1000   Signing Clinician's Name / Credentials   Signing clinician's name / credentials Yareli Clemente, PT, DPT   Session Number   Session Number    Progress Note/Recertification   Recertification Due Date 23   Adult Goals   PT Ortho Eval Goals 1;2;3   Ortho Goal 1   Goal Identifier HEP   Goal Description The patient will demonstrate independence in HEP to aid in home management of symptoms.   Goal Progress In progress - reports able to try a couple times.   Target Date 23   Ortho Goal 2   Goal Identifier sitting   Goal Description The patient will demonstrate proper posture in sitting for 30 minutes using a lumbar roll if needed to aid in his posture and patient will have pain 2/10nor less with this.   Goal Progress In progress - poor sitting posture secondary to pain, observed this session   Target Date 23   Ortho Goal 3   Goal Identifier MMT   Goal Description The patient will improve his strength in his shoulder and elbow to 5-/5 to aid in retruning to previous function and to be able to lift and carry a gallon of milk without increased pain.   Goal Progress In progress - 2+/5 B observed today with rigid movement   Target Date 23       Present Yes  (Daughter)   Language   (Jessica)    Comments Pt [place] and speaks Jessica. Ipad unable to hear, requires demonstration and daughter able to translate a little as  needed.   Subjective Report   Subjective Report Pt arrives with daughter who reports has been doing HEP at home. Has functionally been doing better and arrives with visibly less kinesiophobic behaviors.   Objective Measures   Objective Measures Objective Measure 1;Objective Measure 2   Objective Measure 1   Objective Measure MMT 5th finger adduction and elbow flexion adn extension also impingement tests   Objective Measure 2   Objective Measure cervical spine palpation and assessment of mobility   System Outcome Measures   Outcome Measures   (see NDI)   Treatment Interventions   Interventions Manual Therapy;Therapeutic Procedure/Exercise   Therapeutic Procedure/exercise   Therapeutic Procedures: strength, endurance, ROM, flexibillity minutes (45691) 29   Skilled Intervention Updated HEP, Patient education, Verbal and tactile cues utilized to facilitate correct exercise technique   Patient Response Increased R sided neck pain and tenderness. Rigid with all movment, guarded. Observable discomfort with wincing when performing all AROM and PROM   Treatment Detail For improved axial rotation UBE - level 2.0, 3 mins forward and backward - subjective measures gathered from daughter. For improved postural strength and mobility - bicep curls with red TB x 10 each side. Shoulder flexion with red TB x 10 each side, abduction with red TB x 10 each side. Pulldowns with red TB 2 x 10. Rhomboid stretch 15'' x 2 each way. UT stretch 30'' x 2 each side. Levator stretch 30'' x 2 each side. Seated rotation with ball x 10 each way. Progressed to seated rotation with 2lb ball x 10 each way. Overhead press with 2lb ball x 10.   Manual Therapy   Manual Therapy: Mobilization, MFR, MLD, friction massage minutes (90949) 10   Skilled Intervention STM, joint mobilizations   Patient Response patient reported feeling slight discomfort and pain during manual, significant improvements in toelrance for manual therapy.   Treatment Detail Supine with  B feet on bolster: STM to suboccipital, SCM and upper trapezius. TPR to upper trapezius and infraspinatus, observed R sided neck pain.   Education   Learner Patient;Family   Readiness Acceptance   Method Explanation;Video;Demonstration;Literature   Response No Evidence of Learning   Education Comments Need to check and see how he is doing with the HEP   Plan   Homework PTRx (print)   Home program Chin tuck, scap retract, cervical rotation, reach and roll   Updates to plan of care Added: thoracic ext, UT stretch, low rows   Plan for next session Review added HEP, progress scapular strength and MT as appropriate.   Comments   Comments Pt returns for follow up of neck and shoulder pain. Continues to demonstrate improved activity tolerance compared to last previous sessions. Continues to have neck and shoulder pain R>L, however is able to perform all strengthening exercises and tolerated maual therapy well with good response. Appropriate to continue PT, likely DC in next couple visits if function remains improved.   Total Session Time   Timed Code Treatment Minutes 39   Total Treatment Time (sum of timed and untimed services) 39   Medicare Claim Information   Medical Diagnosis Neck pain on right side (M54.2)   PT Diagnosis cervical pain and referral into the arms   Start of Care Date 01/10/23   Onset date of current episode/exacerbation 10/02/22   Certification date from 01/10/23         Plan:  Discharge from therapy.    Discharge:    Reason for Discharge: Patient has failed to schedule further appointments.    Equipment Issued: none    Discharge Plan: Patient to continue home program.

## 2023-04-26 ENCOUNTER — OFFICE VISIT (OUTPATIENT)
Dept: PHYSICAL MEDICINE AND REHAB | Facility: CLINIC | Age: 73
End: 2023-04-26
Attending: PHYSICAL MEDICINE & REHABILITATION
Payer: COMMERCIAL

## 2023-04-26 VITALS — DIASTOLIC BLOOD PRESSURE: 79 MMHG | SYSTOLIC BLOOD PRESSURE: 141 MMHG | HEART RATE: 88 BPM

## 2023-04-26 DIAGNOSIS — M48.02 FORAMINAL STENOSIS OF CERVICAL REGION: ICD-10-CM

## 2023-04-26 DIAGNOSIS — M43.12 SPONDYLOLISTHESIS OF CERVICAL REGION: ICD-10-CM

## 2023-04-26 DIAGNOSIS — M48.02 CERVICAL STENOSIS OF SPINE: ICD-10-CM

## 2023-04-26 DIAGNOSIS — R20.2 ARM PARESTHESIA, RIGHT: ICD-10-CM

## 2023-04-26 DIAGNOSIS — M54.12 CERVICAL RADICULAR PAIN: Primary | ICD-10-CM

## 2023-04-26 PROCEDURE — 95910 NRV CNDJ TEST 7-8 STUDIES: CPT | Performed by: PHYSICAL MEDICINE & REHABILITATION

## 2023-04-26 PROCEDURE — 95886 MUSC TEST DONE W/N TEST COMP: CPT | Mod: RT | Performed by: PHYSICAL MEDICINE & REHABILITATION

## 2023-04-26 NOTE — PROGRESS NOTES
Assessment/Plan:      Chasity was seen today for emg.    Diagnoses and all orders for this visit:    Cervical radicular pain  -     EMG  -     NC NEEDLE EMG EA EXTREMTY W/PARASPINAL AREA COMPLETE  -     NC NCS MOTOR W OR W/O F-WAVE, 7 OR 8    Arm paresthesia, right  -     EMG  -     NC NEEDLE EMG EA EXTREMTY W/PARASPINAL AREA COMPLETE  -     NC NCS MOTOR W OR W/O F-WAVE, 7 OR 8    Spondylolisthesis of cervical region    Cervical stenosis of spine    Foraminal stenosis of cervical region         Assessment: Pleasant 73 year old male with a history of acid reflux, depression, hyperlipidemia, hearing loss, lacunar stroke with:     1.  Chronic cervical spine pain with right cervical radicular pain increased over the past 4-6 months.  He has a C3-4 spondylolisthesis with C3-4, C4-5 and C5-6 degenerative disc disease with broad-based disc bulges resulting in Moderate spinal stenosis at those levels along with at least moderate right foraminal stenosis C4-5 and bilateral C5-6 foraminal stenosis likely responsible for his right cervical radicular pain.  No improvement with physical therapy and baclofen.  Has been to  physical therapy and continues doing home exercises.  Approximate 50% improvement following cervical interlaminar epidural steroid injection.  continues to have significant right upper extremity paresthesias.  EMG normal today.  Continues to have paresthesias through the arm but tolerable..     2.  Moderate spinal stenosis from C3-4 through C5-6 without myelopathic findings on exam.     3.  Myofascial pain cervical spine and parascapular region on the right    Discussion:    1.  We discussed the results of the EMG.  I discussed this medication use.  According to his family member today he is only taking gabapentin 300 mg as needed.  Prior to this at his last visit his daughter reported taking 600 mg twice daily.  We discussed medication changes along with surgical evaluation further injections.  He wants to  continue with medications for now and monitoring his symptoms as he is doing fairly well.    2.  I would recommend slowly increasing the gabapentin from 300 mg daily to 300 mg twice daily for 1 week and then 300 mg 3 times daily.  He has refills available on the bottle for him.  We discussed this at length today and discussed side effects of drowsiness.    3.  Continue to monitor paresthesias and if he has worsening of his symptoms he should return to clinic for follow-up.    Over 20 minutes were spent on the date of the encounter performing chart review, patient visit and documentation in addition to any procedure.     It was our pleasure caring for your patient today, if there any questions or concerns please do not hesitate to contact us.      Subjective:   Patient ID: Chasity Gaspar is a 73 year old male.    History of Present Illness:Patient presents with a telephone  as well as another family member who interprets for the telephone  for the patient due has hearing impairment.  He continues to have cervical spine pain with right arm pain paresthesias.  Constant paresthesias through the arm all fingers of the right hand.  Nothing makes it better or worse but has constant paresthesias.   has had improvement since the cervical epidural steroid injection.  At his last visit his daughter stated he was on gabapentin 600 mg twice daily.  Today he tells me that he is only taking gabapentin 300 mg as needed has a few tablets remaining I did evaluate the bottle today.  He has refills remaining on the bottle.  EMG was done today.    EMG: Normal study.  There is no electrodiagnostic evidence of cervical radiculopathy, brachial plexopathy, focal neuropathy right upper extremity.    Imaging: I personally reviewed cervical spine MRI December 2022 for medical decision-making purposes.  Mild spondylolisthesis C3 on C4 degenerative.  Moderate degenerative disc changes resulting in moderate spinal stenosis C3-4  C4-5 C5-6.  With moderate foraminal stenosis bilaterally at those levels.  Reversal of the cervical lordotic curve with a spondylolisthesis C3 on C4.    Review of Systems: Pertinent positives:  *Paresthesias right arm.  Pertinent negatives: No weakness.  No bowel or bladder incontinence.  No urinary retention.  No fevers, unintentional weight loss, balance changes, headaches, frequent falling, difficulty swallowing, or coordination difficulties.  All others reviewed are negative.       Past Medical History:   Diagnosis Date     Cervicalgia      Depression      Dyslipidemia      Dysthymia      Gastritis      GERD (gastroesophageal reflux disease)      Hearing loss      Hiatal hernia      Hypertriglyceridemia      Insomnia      Lacunar stroke (H)      Lumbar canal stenosis      Lumbar radiculopathy      Myalgia and myositis        The following portions of the patient's history were reviewed and updated as appropriate: allergies, current medications, past family history, past medical history, past social history, past surgical history and problem list.           Objective:   Physical Exam:    BP (!) 141/79   Pulse 88   There is no height or weight on file to calculate BMI.      General: Alert and oriented with normal affect. Attention, knowledge, memory, and language are intact. No acute distress.      Gait:  Nonantalgic    Sensation is intact to light touch throughout the upper  extremities.  Reflexes are 2+ and symmetric in the biceps triceps and brachioradialis with negative Hoffmans.      Manual muscle testing reveals:  Right /Left out of 5  5/5 shoulder abductors  5/5 elbow flexors  5/5 elbow extensors  5/5 wrist extensors  5/5 interosseus  5/5 finger flexors

## 2023-04-26 NOTE — LETTER
4/26/2023         RE: Chasity Gaspar  [address] [place] [address]        Dear Colleague,    Thank you for referring your patient, Chasity Gaspar, to the [facility]. Please see a copy of my visit note below.    Assessment/Plan:      Chasity was seen today for emg.    Diagnoses and all orders for this visit:    Cervical radicular pain  -     EMG  -     AL NEEDLE EMG EA EXTREMTY W/PARASPINAL AREA COMPLETE  -     AL NCS MOTOR W OR W/O F-WAVE, 7 OR 8    Arm paresthesia, right  -     EMG  -     AL NEEDLE EMG EA EXTREMTY W/PARASPINAL AREA COMPLETE  -     AL NCS MOTOR W OR W/O F-WAVE, 7 OR 8    Spondylolisthesis of cervical region    Cervical stenosis of spine    Foraminal stenosis of cervical region         Assessment: Pleasant 73 year old male with a history of acid reflux, depression, hyperlipidemia, hearing loss, lacunar stroke with:     1.  Chronic cervical spine pain with right cervical radicular pain increased over the past 4-6 months.  He has a C3-4 spondylolisthesis with C3-4, C4-5 and C5-6 degenerative disc disease with broad-based disc bulges resulting in Moderate spinal stenosis at those levels along with at least moderate right foraminal stenosis C4-5 and bilateral C5-6 foraminal stenosis likely responsible for his right cervical radicular pain.  No improvement with physical therapy and baclofen.  Has been to  physical therapy and continues doing home exercises.  Approximate 50% improvement following cervical interlaminar epidural steroid injection.  continues to have significant right upper extremity paresthesias.  EMG normal today.  Continues to have paresthesias through the arm but tolerable..     2.  Moderate spinal stenosis from C3-4 through C5-6 without myelopathic findings on exam.     3.  Myofascial pain cervical spine and parascapular region on the right    Discussion:    1.  We discussed the results of the EMG.  I discussed this medication use.  According to his family member  today he is only taking gabapentin 300 mg as needed.  Prior to this at his last visit his daughter reported taking 600 mg twice daily.  We discussed medication changes along with surgical evaluation further injections.  He wants to continue with medications for now and monitoring his symptoms as he is doing fairly well.    2.  I would recommend slowly increasing the gabapentin from 300 mg daily to 300 mg twice daily for 1 week and then 300 mg 3 times daily.  He has refills available on the bottle for him.  We discussed this at length today and discussed side effects of drowsiness.    3.  Continue to monitor paresthesias and if he has worsening of his symptoms he should return to clinic for follow-up.    Over 20 minutes were spent on the date of the encounter performing chart review, patient visit and documentation in addition to any procedure.     It was our pleasure caring for your patient today, if there any questions or concerns please do not hesitate to contact us.      Subjective:   Patient ID: Chasity Gaspar is a 73 year old male.    History of Present Illness:Patient presents with a telephone  as well as another family member who interprets for the telephone  for the patient due has hearing impairment.  He continues to have cervical spine pain with right arm pain paresthesias.  Constant paresthesias through the arm all fingers of the right hand.  Nothing makes it better or worse but has constant paresthesias.   has had improvement since the cervical epidural steroid injection.  At his last visit his daughter stated he was on gabapentin 600 mg twice daily.  Today he tells me that he is only taking gabapentin 300 mg as needed has a few tablets remaining I did evaluate the bottle today.  He has refills remaining on the bottle.  EMG was done today.    EMG: Normal study.  There is no electrodiagnostic evidence of cervical radiculopathy, brachial plexopathy, focal neuropathy right upper  extremity.    Imaging: I personally reviewed cervical spine MRI December 2022 for medical decision-making purposes.  Mild spondylolisthesis C3 on C4 degenerative.  Moderate degenerative disc changes resulting in moderate spinal stenosis C3-4 C4-5 C5-6.  With moderate foraminal stenosis bilaterally at those levels.  Reversal of the cervical lordotic curve with a spondylolisthesis C3 on C4.    Review of Systems: Pertinent positives:  *Paresthesias right arm.  Pertinent negatives: No weakness.  No bowel or bladder incontinence.  No urinary retention.  No fevers, unintentional weight loss, balance changes, headaches, frequent falling, difficulty swallowing, or coordination difficulties.  All others reviewed are negative.       Past Medical History:   Diagnosis Date     Cervicalgia      Depression      Dyslipidemia      Dysthymia      Gastritis      GERD (gastroesophageal reflux disease)      Hearing loss      Hiatal hernia      Hypertriglyceridemia      Insomnia      Lacunar stroke (H)      Lumbar canal stenosis      Lumbar radiculopathy      Myalgia and myositis        The following portions of the patient's history were reviewed and updated as appropriate: allergies, current medications, past family history, past medical history, past social history, past surgical history and problem list.           Objective:   Physical Exam:    BP (!) 141/79   Pulse 88   There is no height or weight on file to calculate BMI.      General: Alert and oriented with normal affect. Attention, knowledge, memory, and language are intact. No acute distress.      Gait:  Nonantalgic    Sensation is intact to light touch throughout the upper  extremities.  Reflexes are 2+ and symmetric in the biceps triceps and brachioradialis with negative Hoffmans.      Manual muscle testing reveals:  Right /Left out of 5  5/5 shoulder abductors  5/5 elbow flexors  5/5 elbow extensors  5/5 wrist extensors  5/5 interosseus  5/5 finger flexors       [facility]  [address] [place] [address] [address]  [place] [address]  Office: [phone] Fax: [phone]    Electromyography and Nerve Conduction Study Report        Indication: Patient presents at the request of Dr. Thee Velarde for right upper extremity EMG.  He has neck pain with right upper extremity paresthesias diffusely through the arm.  On exam he has normal sensation to light touch of the upper extremities, normal reflexes to the upper extremities with negative Ashwin's, and normal muscle strength throughout the major muscle groups of the bilateral upper extremities.        Pt Exam Discussion (Communication Barriers):  Electromyography and nerve conduction testing, including associated discomfort, risks, benefits, and alternatives was discussed with the patient prior to the procedure.  No learning/ communication barriers; patient verbalized understanding of procedure.  Informed consent was obtained.           Pt Assessment:  Testing was successfully completed; patient tolerated testing well.       Blood Thinners: None Skin Temperature: Warmed  30.9                     EMG/NCS  results:     Nerve Conduction Studies  Motor Sites      Amplitude Segment CV Distal Latency F-Latency F-Estimate Temp Comment   Site (mV)  (m/s) (ms) ms ms  C    Right Median (APB) Motor   Wrist 11.2 Wrist-APB  3.4   23.5    Elbow 10.6 Elbow-Wrist 59 6.8   23.5    Right Ulnar (ADM) Motor   Wrist 13.4   2.5   23.5    Bel Elbow 10.2 Bel Elbow-Wrist 69 5.1   23.5    Ab Elbow 9.6 Ab Elbow-Wrist 74 6.5   23.5      Sensory Sites      Amplitude CV Onset Lat Peak Lat Temp Comment   Site ( V) (m/s) (ms) (ms)  C    Right Median Anti Sensory   Wrist-Dig II 17 59 2.2 2.8 26.4    Right Median-Ulnar Palmar Sensory        Median   Palm-Wrist 81 65 1.23 1.70 23.6         Ulnar   Palm-Wrist 26 62 1.30 1.65 23.5    Right Radial Sensory   Forearm-Wrist 38 81 1.23 1.68 23.9    Right Ulnar Anti Sensory   Wrist-Dig V 20 61 1.80 2.4  24.8        NCS Waveforms:    Motor         Sensory                  Electromyography     Side Muscle Nerve Root Ins Act Fibs Psw Amp Dur Poly Recrt Int Pat Comment   Right Deltoid Axillary C5-6 Nml Nml Nml Nml Nml 0 Nml Nml    Right Triceps Radial C6-7-8 Nml Nml Nml Nml Nml 0 Nml Nml    Right PronatorTeres Median C6-7 Nml Nml Nml Nml Nml 0 Nml Nml    Right 1stDorInt Ulnar C8-T1 Nml Nml Nml Nml Nml 0 Nml Nml    Right Abd Poll Brev Median C8-T1 Nml Nml Nml Nml Nml 0 Nml Nml          Comment NCS: Normal study  1.  Normal nerve conduction studies right upper extremity.  This includes normal right median, ulnar, radial SNAPs, median and ulnar transcarpal studies, and median and ulnar CMAP's.    Comment EMG: Normal study  1.  Normal needle EMG right upper extremity.    Interpretation: Normal study:    1. There is no electrodiagnostic evidence of cervical radiculopathy, brachial plexopathy, or focal neuropathy in the right upper extremity.    The testing was completed in its entirety by the physician.       It was our pleasure caring for your patient today, if there any questions or concerns please do not hesitate to contact us.        Again, thank you for allowing me to participate in the care of your patient.        Sincerely,        Thee Velarde DO

## 2023-04-26 NOTE — PROGRESS NOTES
[facility]  [address] [place] [address] [address]  [place] [address]  Office: [phone] Fax: [phone]    Electromyography and Nerve Conduction Study Report        Indication: Patient presents at the request of Dr. Thee Velarde for right upper extremity EMG.  He has neck pain with right upper extremity paresthesias diffusely through the arm.  On exam he has normal sensation to light touch of the upper extremities, normal reflexes to the upper extremities with negative Ashwin's, and normal muscle strength throughout the major muscle groups of the bilateral upper extremities.        Pt Exam Discussion (Communication Barriers):  Electromyography and nerve conduction testing, including associated discomfort, risks, benefits, and alternatives was discussed with the patient prior to the procedure.  No learning/ communication barriers; patient verbalized understanding of procedure.  Informed consent was obtained.           Pt Assessment:  Testing was successfully completed; patient tolerated testing well.       Blood Thinners: None Skin Temperature: Warmed  30.9                     EMG/NCS  results:     Nerve Conduction Studies  Motor Sites      Amplitude Segment CV Distal Latency F-Latency F-Estimate Temp Comment   Site (mV)  (m/s) (ms) ms ms  C    Right Median (APB) Motor   Wrist 11.2 Wrist-APB  3.4   23.5    Elbow 10.6 Elbow-Wrist 59 6.8   23.5    Right Ulnar (ADM) Motor   Wrist 13.4   2.5   23.5    Bel Elbow 10.2 Bel Elbow-Wrist 69 5.1   23.5    Ab Elbow 9.6 Ab Elbow-Wrist 74 6.5   23.5      Sensory Sites      Amplitude CV Onset Lat Peak Lat Temp Comment   Site ( V) (m/s) (ms) (ms)  C    Right Median Anti Sensory   Wrist-Dig II 17 59 2.2 2.8 26.4    Right Median-Ulnar Palmar Sensory        Median   Palm-Wrist 81 65 1.23 1.70 23.6         Ulnar   Palm-Wrist 26 62 1.30 1.65 23.5    Right Radial Sensory   Forearm-Wrist 38 81 1.23 1.68 23.9    Right Ulnar Anti Sensory   Wrist-Dig V 20 61  1.80 2.4 24.8        NCS Waveforms:    Motor         Sensory                  Electromyography     Side Muscle Nerve Root Ins Act Fibs Psw Amp Dur Poly Recrt Int Pat Comment   Right Deltoid Axillary C5-6 Nml Nml Nml Nml Nml 0 Nml Nml    Right Triceps Radial C6-7-8 Nml Nml Nml Nml Nml 0 Nml Nml    Right PronatorTeres Median C6-7 Nml Nml Nml Nml Nml 0 Nml Nml    Right 1stDorInt Ulnar C8-T1 Nml Nml Nml Nml Nml 0 Nml Nml    Right Abd Poll Brev Median C8-T1 Nml Nml Nml Nml Nml 0 Nml Nml          Comment NCS: Normal study  1.  Normal nerve conduction studies right upper extremity.  This includes normal right median, ulnar, radial SNAPs, median and ulnar transcarpal studies, and median and ulnar CMAP's.    Comment EMG: Normal study  1.  Normal needle EMG right upper extremity.    Interpretation: Normal study:    1. There is no electrodiagnostic evidence of cervical radiculopathy, brachial plexopathy, or focal neuropathy in the right upper extremity.    The testing was completed in its entirety by the physician.       It was our pleasure caring for your patient today, if there any questions or concerns please do not hesitate to contact us.

## 2023-04-26 NOTE — PATIENT INSTRUCTIONS
You may continue to take the gabapentin 300mg two times daily for 1 week and then you may increase to 300mg three times daily.  It may cause some drowsiness.  The bottle looks like you have refills available.    2. If your arm pain and tingling does not improve or worsens, please follow-up with me.    Thank you for choosing the [facility] for your EMG testing.    The ordering provider will receive your final EMG results within the next few days.  Please follow up with your provider for the results and further treatment recommendations.

## 2023-05-02 ENCOUNTER — OFFICE VISIT (OUTPATIENT)
Dept: FAMILY MEDICINE | Facility: CLINIC | Age: 73
End: 2023-05-02
Payer: COMMERCIAL

## 2023-05-02 VITALS
SYSTOLIC BLOOD PRESSURE: 147 MMHG | TEMPERATURE: 97.8 F | DIASTOLIC BLOOD PRESSURE: 83 MMHG | BODY MASS INDEX: 24.65 KG/M2 | HEART RATE: 81 BPM | HEIGHT: 60 IN | OXYGEN SATURATION: 97 % | WEIGHT: 125.56 LBS | RESPIRATION RATE: 18 BRPM

## 2023-05-02 DIAGNOSIS — M54.12 CERVICAL RADICULAR PAIN: ICD-10-CM

## 2023-05-02 DIAGNOSIS — N40.1 BENIGN PROSTATIC HYPERPLASIA (BPH) WITH STRAINING ON URINATION: ICD-10-CM

## 2023-05-02 DIAGNOSIS — F33.1 MAJOR DEPRESSIVE DISORDER, RECURRENT EPISODE, MODERATE (H): ICD-10-CM

## 2023-05-02 DIAGNOSIS — I10 BENIGN ESSENTIAL HYPERTENSION: ICD-10-CM

## 2023-05-02 DIAGNOSIS — K21.00 GASTROESOPHAGEAL REFLUX DISEASE WITH ESOPHAGITIS WITHOUT HEMORRHAGE: ICD-10-CM

## 2023-05-02 DIAGNOSIS — Z76.0 ENCOUNTER FOR MEDICATION REFILL: ICD-10-CM

## 2023-05-02 DIAGNOSIS — M48.061 SPINAL STENOSIS, LUMBAR REGION, WITHOUT NEUROGENIC CLAUDICATION: ICD-10-CM

## 2023-05-02 DIAGNOSIS — E78.1 PURE HYPERGLYCERIDEMIA: Primary | ICD-10-CM

## 2023-05-02 DIAGNOSIS — R39.16 BENIGN PROSTATIC HYPERPLASIA (BPH) WITH STRAINING ON URINATION: ICD-10-CM

## 2023-05-02 PROCEDURE — 99214 OFFICE O/P EST MOD 30 MIN: CPT | Performed by: FAMILY MEDICINE

## 2023-05-02 RX ORDER — GABAPENTIN 300 MG/1
300 CAPSULE ORAL 2 TIMES DAILY
Qty: 270 CAPSULE | Refills: 3 | Status: SHIPPED | OUTPATIENT
Start: 2023-05-02 | End: 2024-03-04

## 2023-05-02 NOTE — PROGRESS NOTES
"  Assessment & Plan     Essential Hypertriglyceridemia  Taking a statin we will check it next time    Gastroesophageal reflux disease with esophagitis without hemorrhage    No abdominal discomfort    Cervical radicular pain    Patient is pain at the base of his neck which radiates into his right shoulder his right arm EMG was unremarkable starting Voltaren  - diclofenac (VOLTAREN) 1 % topical gel; Apply 4 g topically 4 times daily    Benign essential hypertension    Blood pressure slightly elevated he did not take his blood pressure medication today    Major depressive disorder, recurrent episode, moderate (H)    Patient denies symptoms    Benign prostatic hyperplasia (BPH) with straining on urination    Urinating regularly    Spinal stenosis, lumbar region, without neurogenic claudication    Minimal back pain continue gabapentin flu    Encounter for medication refill  Refill duloxetine     - gabapentin (NEURONTIN) 300 MG capsule; Take 1 capsule (300 mg) by mouth 2 times daily             BMI:   Estimated body mass index is 25.65 kg/m  as calculated from the following:    Height as of this encounter: 1.49 m (4' 10.66\").    Weight as of this encounter: 57 kg (125 lb 9 oz).           MD RODNEY Valentin [facility]par is a 73 year old, presenting for the following health issues:  Shoulder pain (Right shoulder)        5/2/2023     9:55 AM   Additional Questions   Roomed by Christie STEVENS     73-year-old old male here with his PCA to discuss his shoulder pain on the right side, he also has a history of lumbar canal stenosis headaches hypertension and constipation.  He recently saw rehab medicine and had a EMG done in his right upper extremity which showed no abnormalities.  Patient states he continuously has shoulder pain which radiates from the base of his neck into his shoulder into his right arm denies any numbness in the area his friend is here and states that the patient only complains " "of this pain and no weakness in the right upper extremity.  Patient states he is taking all his medications faithfully and currently does not have serious back pain his appetite is normal and he does not have any heartburn            Review of Systems   Constitutional, HEENT, cardiovascular, pulmonary, GI, , musculoskeletal, neuro, skin, endocrine and psych systems are negative, except as otherwise noted.      Objective    BP (!) 147/83   Pulse 81   Temp 97.8  F (36.6  C) (Oral)   Resp 18   Ht 1.49 m (4' 10.66\")   Wt 57 kg (125 lb 9 oz)   SpO2 97%   BMI 25.65 kg/m    Body mass index is 25.65 kg/m .  Physical Exam   GENERAL: healthy, alert and no distress  EYES: Eyes grossly normal to inspection, PERRL and conjunctivae and sclerae normal  NECK: no adenopathy, no asymmetry, masses, or scars and thyroid normal to palpation  RESP: lungs clear to auscultation - no rales, rhonchi or wheezes  CV: regular rate and rhythm, normal S1 S2, no S3 or S4, no murmur, click or rub, no peripheral edema and peripheral pulses strong  ABDOMEN: soft, nontender, no hepatosplenomegaly, no masses and bowel sounds normal  MS: normal muscle tone  SKIN: no suspicious lesions or rashes  NEURO: Normal strength and tone, mentation intact and speech normal  PSYCH: mentation appears normal, affect normal/bright                    Answers for HPI/ROS submitted by the patient on 5/2/2023  What is the reason for your visit today? : Shoulder pain      "

## 2023-05-10 ENCOUNTER — OFFICE VISIT (OUTPATIENT)
Dept: AUDIOLOGY | Facility: CLINIC | Age: 73
End: 2023-05-10
Attending: FAMILY MEDICINE
Payer: COMMERCIAL

## 2023-05-10 DIAGNOSIS — H90.A31 MIXED CONDUCTIVE AND SENSORINEURAL HEARING LOSS OF RIGHT EAR WITH RESTRICTED HEARING OF LEFT EAR: Primary | ICD-10-CM

## 2023-05-10 DIAGNOSIS — H90.3 ASNHL (ASYMMETRICAL SENSORINEURAL HEARING LOSS): ICD-10-CM

## 2023-05-10 PROCEDURE — 92567 TYMPANOMETRY: CPT | Performed by: AUDIOLOGIST

## 2023-05-10 PROCEDURE — 92553 AUDIOMETRY AIR & BONE: CPT | Mod: 52 | Performed by: AUDIOLOGIST

## 2023-05-10 PROCEDURE — 92555 SPEECH THRESHOLD AUDIOMETRY: CPT | Mod: 52 | Performed by: AUDIOLOGIST

## 2023-05-10 PROCEDURE — 92592 PR HEARING AID CHECK, MONAURAL: CPT | Mod: RT | Performed by: AUDIOLOGIST

## 2023-05-10 NOTE — PROGRESS NOTES
AUDIOLOGY REPORT    SUBJECTIVE:  Chasity Gaspar is a 73 year old male who was seen in the Audiology Clinic at the [facility] for audiologic evaluation, referred by David Fang M.D. .The patient has been seen previously at the [facility] on 3/4/22 for assessment and results indicated a right mixed hearing loss and documented complete hearing loss in the left ear. The patient reports difficult hearing, even with his hearing aid.  He reports constant right tinnitus.  He was fit with a right Phonak V50-UP hearing aid in April 2018. The patient denies  bilateral otalgia, bilateral drainage, bilateral aural fullness and dizziness. The patient notes difficulty with communication in a variety of listening situations.  They were accompanied today by their family member and a Jessica .    Due to the significant hearing loss, it was difficulty at times to communicate with the patient - even with an  present.    OBJECTIVE:  Otoscopic exam indicates ears are clear of cerumen bilaterally, right eardrum perforation noted.     Note: Did not test left thresholds or WRS as previously documented complete loss    Pure Tone Thresholds assessed using conventional audiometry with good  reliability from 250-8000 Hz bilaterally using circumaural headphones     RIGHT: Profound mixed hearing loss    Tympanogram:    RIGHT: large ear canal volume consistent with tympanic membrane perforation    LEFT:   normal eardrum mobility    Speech Detection Threshold:    RIGHT: 95 dB HL      Word Recognition Score:    Could not test as no Jessica word list available    The hearing aid was thoroughly cleaned. The tone hook and earmold tubing was changed. A listening check indicated that the device sounded crisp and clear with no distortion or weakness noted. The hearing aid was then programmed in the Verifit test box to NAL-NL1 target gain. The hearing aid was set to essentially  equipment limits in order to meet target. Unfortunately gain was limited by feedback limitations as well. The patient reported that he may be able to hear a little more but still had difficulty understanding. The limits of the equipment were reviewed in relation to his hearing loss. Again, due to patient's significant hearing loss and going through an , it was difficult to know if patient understood the limitations that were reviewed. Good communication strategies were discussed.    The patient's hearing did decrease significantly in the right ear and so a follow-up with ENT was discussed. The patient would like to transfer all of his hearing care to this clinic. His hearing aids are just over 5 years old and he would be interested in new technology. A hearing aid consultation appointment will be scheduled as well.    All of the patient's questions were answered.      ASSESSMENT:     ICD-10-CM    1. ASNHL (asymmetrical sensorineural hearing loss)  H90.3 Adult Audiology [facility] Referral          Compared to patient's previous audiogram dated 3/4/22, hearing has decreased significantly 250-2000 Hz Air-Conduction. A hearing aid check was performed. Today s results were discussed with the patient in detail.     PLAN:  Patient was counseled regarding hearing loss and impact on communication.  Patient is a candidate for amplification at this time. It is recommended that the patient follow-up with ENT regarding the change in right air-conduction thresholds. A hearing aid consultation appointment will also be scheduled.  Please call this clinic with questions regarding these results or recommendations.        Sylvia Sultana  Audiologist  [place] License  #[id number]

## 2023-05-10 NOTE — TELEPHONE ENCOUNTER
FUTURE VISIT INFORMATION      FUTURE VISIT INFORMATION:    Date: 7/11/23    Time: 11:20AM    Location: [facility]  REFERRAL INFORMATION:    Referring provider:      Referring providers clinic:      Reason for visit/diagnosis  medical clearance for HAs    RECORDS REQUESTED FROM:       Clinic name Comments Records Status Imaging Status   [facility] 5/10/23- NOTE WITH Adri Medel AuD  5/10/23- AUDIOGRAM  Epic     [facility]  4/16/21- NOTE WITH Hiral Riggs CMA   Epic

## 2023-06-25 ENCOUNTER — HOSPITAL ENCOUNTER (EMERGENCY)
Facility: HOSPITAL | Age: 73
Discharge: HOME | End: 2023-06-25
Attending: EMERGENCY MEDICINE | Admitting: EMERGENCY MEDICINE
Payer: COMMERCIAL

## 2023-06-25 ENCOUNTER — OFFICE VISIT (OUTPATIENT)
Dept: FAMILY MEDICINE | Facility: CLINIC | Age: 73
End: 2023-06-25
Payer: COMMERCIAL

## 2023-06-25 VITALS
BODY MASS INDEX: 25.42 KG/M2 | OXYGEN SATURATION: 96 % | SYSTOLIC BLOOD PRESSURE: 148 MMHG | WEIGHT: 124.4 LBS | DIASTOLIC BLOOD PRESSURE: 86 MMHG | RESPIRATION RATE: 16 BRPM | HEART RATE: 74 BPM

## 2023-06-25 DIAGNOSIS — M54.2 CERVICALGIA: Primary | ICD-10-CM

## 2023-06-25 DIAGNOSIS — G89.29 CHRONIC NECK PAIN: ICD-10-CM

## 2023-06-25 DIAGNOSIS — M54.2 CHRONIC NECK PAIN: ICD-10-CM

## 2023-06-25 PROCEDURE — 99213 OFFICE O/P EST LOW 20 MIN: CPT | Performed by: STUDENT IN AN ORGANIZED HEALTH CARE EDUCATION/TRAINING PROGRAM

## 2023-06-25 PROCEDURE — 99282 EMERGENCY DEPT VISIT SF MDM: CPT

## 2023-06-25 RX ORDER — LIDOCAINE 4 G/G
1 PATCH TOPICAL EVERY 24 HOURS
Qty: 15 PATCH | Refills: 0 | Status: SHIPPED | OUTPATIENT
Start: 2023-06-25

## 2023-06-25 RX ORDER — CLOPIDOGREL BISULFATE 75 MG/1
TABLET ORAL
COMMUNITY
Start: 2023-03-21 | End: 2024-02-07

## 2023-06-25 RX ORDER — TAMSULOSIN HYDROCHLORIDE 0.4 MG/1
CAPSULE ORAL
COMMUNITY
Start: 2023-05-13 | End: 2024-03-04

## 2023-06-25 NOTE — PROGRESS NOTES
Assessment & Plan     Cervicalgia  Acute flare of known cervicalgia.  Recommended the increased dose of gabapentin, and did offer trial of 900 mg at bedtime.  Counseled on side effects including increased drowsiness with increased gabapentin use.  Recommended trial of lidocaine patches.  Reassured by exam with symmetric full strength bilaterally and no significant loss of sensations appreciated.  With ongoing chronic stenosis related pain, recommended close follow-up with primary as well as physical medicine and rehabilitation, as he previously has had good results with spinal injections.  No indication at this time for steroid burst, has had a steroid burst in the last 6 months, recently in March for a gout flare.  At this time no indication, and risk of ongoing steroid use outweighs the benefit.  - Lidocaine (LIDOCARE) 4 % Patch  Dispense: 15 patch; Refill: 0    Return in about 1 week (around 7/2/2023) for Follow up.    Love Riggs MD  [facility]    Jax Gaspar is a 73 year old, presenting for the following health issues:  Neck Pain (X1wk, R side )    HPI     R sided neck pain  Present for 1 week.  Has previous history of neck pain, they have had this worked up with CT and MRI in past    Presents with Daughter who provides most history.    Denies recent trauma or increased exertion.  Has not been lifting anything heavier than normal.    In March underwent CERVICAL INTERLAMINAR EPIDURAL STEROID INJECTION WITH FLUOROSCOPIC GUIDANCE with good relief.    Most recent MRI in Dec 2022                                                                   IMPRESSION:  1.  Diffuse degenerative change of the cervical spine as detailed above without appreciable change from the recent comparison study.  2.  Moderate degenerative spinal canal stenosis at C3-C4, C4-C5 and C5-C6 resulting in varying degrees of deformity/compression of the cervical spinal cord as detailed above.  3.  Moderate  neural foraminal stenosis on the left at C3-C4, on the right at C4-C5 and bilaterally at C5-C6.  4.  Overall, no appreciable change from the comparison study other than a decrease in the amount of fluid signal intensity in the right facet joints at C2-C3, C3-C4 and C4-C5 suggesting an interval decrease in acute facet arthropathy/synovitis.      In the past the steroids has been most helpful for.  Chart review last steroid burst was in March, though this was prescribed for a gout flare.    Denies loss of feeling in the hands, no tingling sensations.  Mainly pain that is similar to the past.  Has not noticed significant changes in the pain recently, just that it is increasing.    Does have some subjective weakness associated with the pain, this is normal for his pain flares.    Per visit with Dr. Velarde with Physical Medicine and Rehabilitation in March 2023, he was recommended to increase his gabapentin usage, without pain he takes 1 pill 300 mg 3 times daily    Has tried increasing gabapenting to 2 pills TID and this has not helped much.  Voltaren gel was added by his primary Dr. Fang in May, this has not been helping much.    Have tried Icy/hot in the past, never tried lidocaine patches.    Review of Systems   Constitutional, HEENT, cardiovascular, pulmonary, gi and gu systems are negative, except as otherwise noted.      Objective    BP (!) 148/86   Pulse 74   Resp 16   Wt 56.4 kg (124 lb 6.4 oz)   SpO2 96%   BMI 25.42 kg/m    Body mass index is 25.42 kg/m .  Physical Exam   GENERAL: healthy, alert and no distress  EYES: Eyes grossly normal to inspection, PERRL and conjunctivae and sclerae normal  RESP: lungs clear to auscultation - no rales, rhonchi or wheezes  MS: no gross musculoskeletal defects noted, no edema  SKIN: no suspicious lesions or rashes  NEURO: Normal strength and tone, mentation intact and speech normal; chin to chest ROM is very limited and produces pain,  strength is 5 out of 5 and  symmetric bilaterally, push and pull with equal strength as well.  Sensation intact and symmetric throughout bilateral upper extremities, is able to extend the neck to full range of motion, flexion is limited due to pain.  Pain is elicited with palpation along the  PSYCH: mentation appears normal, affect normal/bright      ----- Service Performed and Documented by Resident or Fellow ------

## 2023-06-25 NOTE — PATIENT INSTRUCTIONS
Try adding a third gabapentin pill at night.    Apply the lidocaine patches during the day and take off at night.

## 2023-06-26 NOTE — ED TRIAGE NOTES
Pt here with right side neck pain that started about a week ago. No injury noted. Went to urgent care today and they put a patch on. Pain is continuing to get worse.      Triage Assessment     Row Name 06/25/23 1956       Triage Assessment (Adult)    Airway WDL WDL       Respiratory WDL    Respiratory WDL WDL       Skin Circulation/Temperature WDL    Skin Circulation/Temperature WDL WDL

## 2023-06-26 NOTE — ED PROVIDER NOTES
EMERGENCY DEPARTMENT ENCOUNTER      NAME: Chasity Gaspar  AGE: 73 year old male  YOB: 1950  MRN: [id number]  EVALUATION DATE & TIME: 6/25/2023  7:59 PM    PCP: David Fang    ED PROVIDER: Wen Orozco M.D.      Chief Complaint   Patient presents with     Neck Pain         FINAL IMPRESSION:  1. Chronic neck pain          ED COURSE & MEDICAL DECISION MAKING:    ED Course as of 06/25/23 2129   [place] Jun 25, 2023 2040 Patient here with continued week right lateral neck pain isolated to region of right sternocleidomasoid muscle body which worsened after he electively removed the lidoderm patch from his neck today, prescribed to him today in conjunction with increased gabapentin Rx, per daughter they do have both of those at home. They are ok with plan to discharge to reapply lidoderm patch. Patient discharged after being provided with extensive anticipatory guidance and given return precautions, importance of PMD follow-up emphasized.        8:33 PM I met with the patient, obtained history, performed an initial exam, and discussed options and plan for diagnostics and treatment here in the ED. We discussed the plan for discharge and the patient is agreeable. Reviewed supportive cares, symptomatic treatment, outpatient follow up, and reasons to return to the Emergency Department. Patient to be discharged by ED RN.     Pertinent Labs & Imaging studies reviewed. (See chart for details)    N95 worn  A face shield was worn also  COVID PPE    Medical Decision Making    History:    Supplemental history from: Documented in chart, if applicable    External Record(s) reviewed: Documented in chart, if applicable.    Work Up:    Chart documentation includes differential considered and any EKGs or imaging independently interpreted by provider, where specified.    In additional to work up documented, I considered the following work up: Documented in chart, if applicable.    External consultation:    Discussion of management with  another provider: Documented in chart, if applicable    Complicating factors:    Care impacted by chronic illness: Chronic Pain and Hyperlipidemia    Care affected by social determinants of health: N/A    Disposition considerations: Discharge. I recommended the patient continue their current prescription strength medication(s): gabapentin and lidoderm patch for neck pain. See documentation for any additional details.        At the conclusion of the encounter I discussed the results of all of the tests and the disposition. The questions were answered. The patient or family acknowledged understanding and was agreeable with the care plan.     MEDICATIONS GIVEN IN THE EMERGENCY:  Medications - No data to display    NEW PRESCRIPTIONS STARTED AT TODAY'S ER VISIT  Discharge Medication List as of 6/25/2023  8:42 PM             =================================================================    HPI      Naw Nakelly is a 73 year old male with PMHx of memory loss and chronic neck pain who presents to the ED today via walk in with neck pain.    Per chart review, on 6/25/23, the patient was seen for evaluation of neck pain by the on-call urgent care team at his primary care office. Patient's symptoms attributed to an acute flare of known chronic neck pain. Started on lidocaine patch therapy and increased patient's gabapentin. Patient has a history of improvement with physical medicine and rehabilitation. He was referred to [facility] for physical therapy consult. Patient has a plan for follow up with his PCP later this week.    Patient's family member reports that the patient has been having right-sided neck pain for the past few days. He was seen at urgent care earlier today where a lidocaine patch was applied. Family member says that the patient took off the patch a few hours ago, but she is not sure why. Since then, the patient has been having increased neck pain. No cough, fever, nausea, vomiting, diarrhea, shortness of breath,  rash, or any other complaints at this time.      REVIEW OF SYSTEMS   All other systems reviewed and are negative except as noted above in HPI.    PAST MEDICAL HISTORY:  Past Medical History:   Diagnosis Date     Cervicalgia      Depression      Dyslipidemia      Dysthymia      Gastritis      GERD (gastroesophageal reflux disease)      Hearing loss      Hiatal hernia      Hypertriglyceridemia      Insomnia      Lacunar stroke (H)      Lumbar canal stenosis      Lumbar radiculopathy      Myalgia and myositis        PAST SURGICAL HISTORY:  Past Surgical History:   Procedure Laterality Date     IR CEREBRAL ANGIOGRAM  4/27/2017     IR LUMBAR TRANSFORAMINAL EPIDURAL STRD INJ  7/2/2012     PICC  4/28/2017          DC ARTHRODESIS ANT INTERBODY MIN DISCECTOMY,LUMBAR      Description: Lumbar Vertebral Fusion;  Recorded: 07/16/2013;  Comments: 3/17/11 spinal decompression and fusion L4-5, L5-S1 for spinal stenosis, DDD, spondylolysis; Dr. Casillas [facility]     DC ESOPHAGOGASTRODUODENOSCOPY TRANSORAL DIAGNOSTIC      Description: Esophagogastroduodenoscopy;  Proc Date: 04/02/2012;  Comments: biosies:   reactive gastropathy with chronic superimposed nonspecific chronic inflammation (likely secondary to ASA, NSAIDS, EtOH or other irritants), NEG for h. pylori; small hiatal hernia     DC INJECT ANES/STEROID FORAMEN LUMBAR/SACRAL W IMG GUIDE ,1 LEVEL      Description: Nerve Block Transforaminal Epidural Lumbar L3 - L4;  Recorded: 07/10/2012;  Comments: 7/2/2012 for severe low back pain, spinal stenosis and radiculopathy     THROMBECTOMY  04/24/2017    Rt CELIA     US ASPIRATION OR INJECTION MAJOR JOINT  10/23/2019     [facility] APPENDECTOMY      Description: Appendectomy;  Recorded: 07/12/2013;       CURRENT MEDICATIONS:    acetaminophen 500 mg coapsule  allopurinol (ZYLOPRIM) 300 MG tablet  atorvastatin (LIPITOR) 40 MG tablet  atorvastatin (LIPITOR) 40 MG tablet  baclofen (LIORESAL) 10 MG tablet  betamethasone dipropionate (DIPROSONE)  0.05 % external ointment  clobetasol (TEMOVATE) 0.05 % external ointment  clopidogrel (PLAVIX) 75 MG tablet  diclofenac (VOLTAREN) 1 % topical gel  DULoxetine (CYMBALTA) 60 MG capsule  dutasteride (AVODART) 0.5 MG capsule  gabapentin (NEURONTIN) 300 MG capsule  Lidocaine (LIDOCARE) 4 % Patch  magnesium oxide (MAG-OX) 400 MG tablet  methylPREDNISolone (MEDROL DOSEPAK) 4 MG tablet therapy pack  metoprolol tartrate (LOPRESSOR) 25 MG tablet  metoprolol tartrate (LOPRESSOR) 25 MG tablet  metoprolol tartrate (LOPRESSOR) 25 MG tablet  omeprazole (PRILOSEC) 20 MG DR capsule  omeprazole (PRILOSEC) 20 MG DR capsule  polyethylene glycol (MIRALAX) 17 GM/Dose powder  polyethylene glycol (MIRALAX) 17 GM/Dose powder  predniSONE (DELTASONE) 20 MG tablet  tamsulosin (FLOMAX) 0.4 MG capsule  vitamin D2 (ERGOCALCIFEROL) 10651 units (1250 mcg) capsule        ALLERGIES:  Allergies   Allergen Reactions     Aspirin Hives     Oxycodone Itching     Vicodin [Hydrocodone-Acetaminophen] Unknown       FAMILY HISTORY:  Family History   Problem Relation Age of Onset     Coronary Artery Disease No family hx of      Hypertension No family hx of      Cerebrovascular Disease No family hx of        SOCIAL HISTORY:   Social History     Socioeconomic History     Marital status:    Tobacco Use     Smoking status: Never     Passive exposure: Never     Smokeless tobacco: Never   Vaping Use     Vaping Use: Never used   Substance and Sexual Activity     Alcohol use: Never     Drug use: Never     Sexual activity: Not Currently     Partners: Female   Social History Narrative    ** Merged History Encounter **            VITALS:  No data found.    PHYSICAL EXAM    GENERAL: Awake, alert.  In no acute distress.   HEENT:  Normocephalic, atraumatic.  Pupils equal, round and reactive.  Conjunctiva normal.  EOMI.  NECK: Right SCM tenderness. Able to move neck. No midline tenderness. No stridor or apparent deformity.  PULMONARY: Symmetrical breath sounds without  distress.  Lungs clear to auscultation bilaterally without wheezes, rhonchi or rales.  CARDIO: Regular rate and rhythm.  No significant murmur, rub or gallop.  Radial pulses strong and symmetrical.  ABDOMINAL: Abdomen soft, non-distended and non-tender to palpation.  No CVAT, no palpable hepatosplenomegaly.  EXTREMITIES: No lower extremity swelling or edema.    NEURO: Alert and oriented to person, place and time.  Cranial nerves grossly intact.  No focal motor deficit.  PSYCH: Normal mood and affect  SKIN: No rashes       I, Shraddha Ingram, am serving as a scribe to document services personally performed by Dr. Wen Orozco based on my observation and the provider's statements to me. I, Wen Orozco MD attest that Shraddha Ingram is acting in a scribe capacity, has observed my performance of the services and has documented them in accordance with my direction.     Wen Orozco MD  06/25/23 8264

## 2023-06-27 ENCOUNTER — TELEPHONE (OUTPATIENT)
Dept: DERMATOLOGY | Facility: CLINIC | Age: 73
End: 2023-06-27
Payer: COMMERCIAL

## 2023-06-27 NOTE — TELEPHONE ENCOUNTER
Via phone and  patient was rescheduled for the following:    Appointment type: Return  Provider: Dr. Gama  Return date: 12-7-23  Specialty phone number: [phone]

## 2023-07-11 ENCOUNTER — OFFICE VISIT (OUTPATIENT)
Dept: OTOLARYNGOLOGY | Facility: CLINIC | Age: 73
End: 2023-07-11
Payer: COMMERCIAL

## 2023-07-11 ENCOUNTER — PRE VISIT (OUTPATIENT)
Dept: OTOLARYNGOLOGY | Facility: CLINIC | Age: 73
End: 2023-07-11

## 2023-07-11 VITALS
HEIGHT: 60 IN | DIASTOLIC BLOOD PRESSURE: 76 MMHG | BODY MASS INDEX: 24.25 KG/M2 | SYSTOLIC BLOOD PRESSURE: 123 MMHG | HEART RATE: 95 BPM | WEIGHT: 123.5 LBS

## 2023-07-11 DIAGNOSIS — H72.91 PERFORATION OF TYMPANIC MEMBRANE, RIGHT: ICD-10-CM

## 2023-07-11 DIAGNOSIS — H91.91 PROFOUND HEARING LOSS OF RIGHT EAR: Primary | ICD-10-CM

## 2023-07-11 PROCEDURE — 99203 OFFICE O/P NEW LOW 30 MIN: CPT | Mod: 25 | Performed by: REGISTERED NURSE

## 2023-07-11 PROCEDURE — 92504 EAR MICROSCOPY EXAMINATION: CPT | Performed by: REGISTERED NURSE

## 2023-07-11 ASSESSMENT — PAIN SCALES - GENERAL: PAINLEVEL: NO PAIN (0)

## 2023-07-11 NOTE — PROGRESS NOTES
Otolaryngology Clinic  July 11, 2023    Chief Complaint:   Hearing loss       History of Present Illness:   Chasity Gaspar is a 73 year old male who presents today for evaluation of hearing loss and medical clearance for hearing aids. Patient's history is obtained with assistance of an in-person Jessica .  Patient has a documented history of complete loss of the left sided hearing.  Right mixed hearing loss and documented right TM perforation.  Patient has worn hearing aids for the last few years.  Patient reports that hearing aid seems 'too loud'.  Family member reports that patient seems to be hearing very well with hearing aids.  Patient reports a constant right-sided tinnitus. Patient denies any otalgia, otorrhea, facial numbness/weakness, history of frequent ear infections, or ear surgeries.     Past Medical History:  Past Medical History:   Diagnosis Date     Cervicalgia      Depression      Dyslipidemia      Dysthymia      Gastritis      GERD (gastroesophageal reflux disease)      Hearing loss      Hiatal hernia      Hypertriglyceridemia      Insomnia      Lacunar stroke (H)      Lumbar canal stenosis      Lumbar radiculopathy      Myalgia and myositis        Past Surgical History:  Past Surgical History:   Procedure Laterality Date     IR CEREBRAL ANGIOGRAM  4/27/2017     IR LUMBAR TRANSFORAMINAL EPIDURAL STRD INJ  7/2/2012     PICC  4/28/2017          TX ARTHRODESIS ANT INTERBODY MIN DISCECTOMY,LUMBAR      Description: Lumbar Vertebral Fusion;  Recorded: 07/16/2013;  Comments: 3/17/11 spinal decompression and fusion L4-5, L5-S1 for spinal stenosis, DDD, spondylolysis; Dr. Casillas [facility]     TX ESOPHAGOGASTRODUODENOSCOPY TRANSORAL DIAGNOSTIC      Description: Esophagogastroduodenoscopy;  Proc Date: 04/02/2012;  Comments: biosies:   reactive gastropathy with chronic superimposed nonspecific chronic inflammation (likely secondary to ASA, NSAIDS, EtOH or other irritants), NEG for h. pylori; small  hiatal hernia     NC INJECT ANES/STEROID FORAMEN LUMBAR/SACRAL W IMG GUIDE ,1 LEVEL      Description: Nerve Block Transforaminal Epidural Lumbar L3 - L4;  Recorded: 07/10/2012;  Comments: 7/2/2012 for severe low back pain, spinal stenosis and radiculopathy     THROMBECTOMY  04/24/2017    Rt CELIA     US ASPIRATION OR INJECTION MAJOR JOINT  10/23/2019     [facility] APPENDECTOMY      Description: Appendectomy;  Recorded: 07/12/2013;       Medications:  Current Outpatient Medications   Medication Sig Dispense Refill     allopurinol (ZYLOPRIM) 300 MG tablet TAKE 1 TABLET BY MOUTH DAILY 90 tablet 3     atorvastatin (LIPITOR) 40 MG tablet [ATORVASTATIN (LIPITOR) 40 MG TABLET] TAKE 1 TABLET(40 MG) BY MOUTH AT BEDTIME Strength: 40 mg 90 tablet 3     atorvastatin (LIPITOR) 40 MG tablet TAKE 1 TABLET BY MOUTH DAILY 90 tablet 3     baclofen (LIORESAL) 10 MG tablet Take 0.5-1 tablets (5-10 mg) by mouth 3 times daily as needed for muscle spasms 60 tablet 1     clopidogrel (PLAVIX) 75 MG tablet        dutasteride (AVODART) 0.5 MG capsule TAKE 1 CAPSULE(0.5 MG) BY MOUTH DAILY Strength: 0.5 mg 90 capsule 3     gabapentin (NEURONTIN) 300 MG capsule Take 1 capsule (300 mg) by mouth 2 times daily 270 capsule 3     Lidocaine (LIDOCARE) 4 % Patch Place 1 patch onto the skin every 24 hours To prevent lidocaine toxicity, patient should be patch free for 12 hrs daily. 15 patch 0     metoprolol tartrate (LOPRESSOR) 25 MG tablet TAKE 1 TABLET BY MOUTH TWICE DAILY 180 tablet 3     metoprolol tartrate (LOPRESSOR) 25 MG tablet Take 1 tablet (25 mg) by mouth 2 times daily 180 tablet 3     metoprolol tartrate (LOPRESSOR) 25 MG tablet [METOPROLOL TARTRATE (LOPRESSOR) 25 MG TABLET] TAKE 1 TABLET BY MOUTH TWICE DAILY 180 tablet 3     omeprazole (PRILOSEC) 20 MG DR capsule [OMEPRAZOLE (PRILOSEC) 20 MG CAPSULE] TAKE 2 CAPSULES(40 MG) BY MOUTH DAILY 180 capsule 3     omeprazole (PRILOSEC) 20 MG DR capsule        tamsulosin (FLOMAX) 0.4 MG capsule         acetaminophen 500 mg coapsule [ACETAMINOPHEN 500 MG COAPSULE] Take 2 tablets by mouth 3 (three) times a day as needed for fever or pain. (Patient not taking: Reported on 6/25/2023)       betamethasone dipropionate (DIPROSONE) 0.05 % external ointment Apply to rash on back twice daily on Saturday and Sunday. (Monday-Friday, use calcipotriene ointment). (Patient not taking: Reported on 6/25/2023) 45 g 11     clobetasol (TEMOVATE) 0.05 % external ointment Apply topically 2 times daily (Patient not taking: Reported on 6/25/2023) 60 g 4     diclofenac (VOLTAREN) 1 % topical gel Apply 4 g topically 4 times daily (Patient not taking: Reported on 6/25/2023) 350 g 4     DULoxetine (CYMBALTA) 60 MG capsule TAKE 1 CAPSULE(60 MG) BY MOUTH DAILY (Patient not taking: Reported on 6/25/2023) 90 capsule 3     magnesium oxide (MAG-OX) 400 MG tablet Take 1 tablet (400 mg) by mouth daily (Patient not taking: Reported on 6/25/2023) 90 tablet 3     methylPREDNISolone (MEDROL DOSEPAK) 4 MG tablet therapy pack Follow Package Directions (Patient not taking: Reported on 6/25/2023) 21 tablet 0     polyethylene glycol (MIRALAX) 17 GM/Dose powder Take 17 g by mouth daily (Patient not taking: Reported on 6/25/2023) 510 g 3     polyethylene glycol (MIRALAX) 17 GM/Dose powder Take 17 g by mouth daily (Patient not taking: Reported on 6/25/2023) 510 g 11     predniSONE (DELTASONE) 20 MG tablet Take 1 tablet (20 mg) by mouth 2 times daily (Patient not taking: Reported on 6/25/2023) 14 tablet 1     vitamin D2 (ERGOCALCIFEROL) 58854 units (1250 mcg) capsule TAKE 1 CAPSULE BY MOUTH WEEKLY (Patient not taking: Reported on 6/25/2023) 12 capsule 3       Allergies:  Allergies   Allergen Reactions     Aspirin Hives     Oxycodone Itching     Vicodin [Hydrocodone-Acetaminophen] Unknown        Social History:  Social History     Tobacco Use     Smoking status: Never     Passive exposure: Never     Smokeless tobacco: Never   Vaping Use     Vaping Use: Never used  "  Substance Use Topics     Alcohol use: Never     Drug use: Never       ROS: 10 point ROS neg other than the symptoms noted above in the HPI.    Physical Exam:    /76 (BP Location: Right arm, Patient Position: Sitting, Cuff Size: Adult Regular)   Pulse 95   Ht 1.486 m (4' 10.5\")   Wt 56 kg (123 lb 8 oz)   BMI 25.37 kg/m       Constitutional:  The patient was accompanied by family member, well-groomed, and in no acute distress.     Skin: Normal:  warm and pink without rash   Neurologic: Alert and oriented x 3. Answering questions appropriately.   Psychiatric: The patient's affect was calm, cooperative, and appropriate.    Communication:  Normal; communicates verbally, normal voice quality.   Respiratory: Breathing comfortably without stridor or exertion of accessory muscles.   Ears: Pinnae and tragus non-tender.       Otologic microscope exam:    Right ear was examined under the microscope. Cerumen at entrance of canal. TM perforation with crusting surrounding perforation, cleaned with right angled hook and alligator forceps.  Clean middle ear space.    Left ear was also examined under the microscope. Cerumen impaction cleaned with suction and alligator forceps. Normal appearing TM, nicely aerated middle ear space.       Audiogram: 5/10/2023 - data independently reviewed  Right ear: Profound mixed hearing loss (significant decrease from 250-2000 Hz compared to 3/4/22 audiogram)  Left ear: Not tested due to documented complete loss  WR Not tested due to no Jessica word liste  Tympanograms: type B right, type A left     Assessment and Plan:  Patient with complete deafness in the left and profound hearing loss in the right. Ears were cleaned today. Right perforation that appears clean without drainage or infection.     During clinic appointment today, patient seemed to be able to follow the majority of the conversation which does not seem consistent with audiogram results. Recommend repeating audiogram to " include word recognition scores if able.     Patient is medically cleared for hearing aids. May benefit from a cochlear implant evaluation based on audiogram results.    Monica Helton DNP, APRN, CNP  Otolaryngology  Head & Neck Surgery  [phone]    30 minutes spent by me on the date of the encounter doing chart review, history and exam, documentation and further activities per the note excluding time spent examining and cleaning ears under microscopy.

## 2023-07-11 NOTE — NURSING NOTE
"Chief Complaint   Patient presents with     Consult     Hearing aid clearance       Blood pressure 123/76, pulse 95, height 1.486 m (4' 10.5\"), weight 56 kg (123 lb 8 oz).    Kendy Franco, EMT    "

## 2023-07-11 NOTE — PATIENT INSTRUCTIONS
- You were seen in the ENT Clinic today by Monica Helton NP.   - Proceed with hearing aid consult.  - Recommend repeat audiogram with audiology at your convenience.  - Follow up in 9 months for right ear recheck.  - Please send a [employer] message or call the ENT clinic at [phone] with any questions or concerns.

## 2023-07-11 NOTE — LETTER
7/11/2023       RE: Chasity Gaspar  [address] [place] [address]     Dear Colleague,    Thank you for referring your patient, Chasity Gaspar, to the [facility] [place] at [facility]. Please see a copy of my visit note below.      Otolaryngology Clinic  July 11, 2023    Chief Complaint:   Hearing loss       History of Present Illness:   Chasity Gaspar is a 73 year old male who presents today for evaluation of hearing loss and medical clearance for hearing aids. Patient's history is obtained with assistance of an in-person Jessica .  Patient has a documented history of complete loss of the left sided hearing.  Right mixed hearing loss and documented right TM perforation.  Patient has worn hearing aids for the last few years.  Patient reports that hearing aid seems 'too loud'.  Family member reports that patient seems to be hearing very well with hearing aids.  Patient reports a constant right-sided tinnitus. Patient denies any otalgia, otorrhea, facial numbness/weakness, history of frequent ear infections, or ear surgeries.     Past Medical History:  Past Medical History:   Diagnosis Date    Cervicalgia     Depression     Dyslipidemia     Dysthymia     Gastritis     GERD (gastroesophageal reflux disease)     Hearing loss     Hiatal hernia     Hypertriglyceridemia     Insomnia     Lacunar stroke (H)     Lumbar canal stenosis     Lumbar radiculopathy     Myalgia and myositis        Past Surgical History:  Past Surgical History:   Procedure Laterality Date    IR CEREBRAL ANGIOGRAM  4/27/2017    IR LUMBAR TRANSFORAMINAL EPIDURAL STRD INJ  7/2/2012    PICC  4/28/2017         IA ARTHRODESIS ANT INTERBODY MIN DISCECTOMY,LUMBAR      Description: Lumbar Vertebral Fusion;  Recorded: 07/16/2013;  Comments: 3/17/11 spinal decompression and fusion L4-5, L5-S1 for spinal stenosis, DDD, spondylolysis; Dr. Casillas [facility]    IA  ESOPHAGOGASTRODUODENOSCOPY TRANSORAL DIAGNOSTIC      Description: Esophagogastroduodenoscopy;  Proc Date: 04/02/2012;  Comments: biosies:   reactive gastropathy with chronic superimposed nonspecific chronic inflammation (likely secondary to ASA, NSAIDS, EtOH or other irritants), NEG for h. pylori; small hiatal hernia    TX INJECT ANES/STEROID FORAMEN LUMBAR/SACRAL W IMG GUIDE ,1 LEVEL      Description: Nerve Block Transforaminal Epidural Lumbar L3 - L4;  Recorded: 07/10/2012;  Comments: 7/2/2012 for severe low back pain, spinal stenosis and radiculopathy    THROMBECTOMY  04/24/2017    Rt CELIA    US ASPIRATION OR INJECTION MAJOR JOINT  10/23/2019    Z APPENDECTOMY      Description: Appendectomy;  Recorded: 07/12/2013;       Medications:  Current Outpatient Medications   Medication Sig Dispense Refill    allopurinol (ZYLOPRIM) 300 MG tablet TAKE 1 TABLET BY MOUTH DAILY 90 tablet 3    atorvastatin (LIPITOR) 40 MG tablet [ATORVASTATIN (LIPITOR) 40 MG TABLET] TAKE 1 TABLET(40 MG) BY MOUTH AT BEDTIME Strength: 40 mg 90 tablet 3    atorvastatin (LIPITOR) 40 MG tablet TAKE 1 TABLET BY MOUTH DAILY 90 tablet 3    baclofen (LIORESAL) 10 MG tablet Take 0.5-1 tablets (5-10 mg) by mouth 3 times daily as needed for muscle spasms 60 tablet 1    clopidogrel (PLAVIX) 75 MG tablet       dutasteride (AVODART) 0.5 MG capsule TAKE 1 CAPSULE(0.5 MG) BY MOUTH DAILY Strength: 0.5 mg 90 capsule 3    gabapentin (NEURONTIN) 300 MG capsule Take 1 capsule (300 mg) by mouth 2 times daily 270 capsule 3    Lidocaine (LIDOCARE) 4 % Patch Place 1 patch onto the skin every 24 hours To prevent lidocaine toxicity, patient should be patch free for 12 hrs daily. 15 patch 0    metoprolol tartrate (LOPRESSOR) 25 MG tablet TAKE 1 TABLET BY MOUTH TWICE DAILY 180 tablet 3    metoprolol tartrate (LOPRESSOR) 25 MG tablet Take 1 tablet (25 mg) by mouth 2 times daily 180 tablet 3    metoprolol tartrate (LOPRESSOR) 25 MG tablet [METOPROLOL TARTRATE (LOPRESSOR) 25  MG TABLET] TAKE 1 TABLET BY MOUTH TWICE DAILY 180 tablet 3    omeprazole (PRILOSEC) 20 MG DR capsule [OMEPRAZOLE (PRILOSEC) 20 MG CAPSULE] TAKE 2 CAPSULES(40 MG) BY MOUTH DAILY 180 capsule 3    omeprazole (PRILOSEC) 20 MG DR capsule       tamsulosin (FLOMAX) 0.4 MG capsule       acetaminophen 500 mg coapsule [ACETAMINOPHEN 500 MG COAPSULE] Take 2 tablets by mouth 3 (three) times a day as needed for fever or pain. (Patient not taking: Reported on 6/25/2023)      betamethasone dipropionate (DIPROSONE) 0.05 % external ointment Apply to rash on back twice daily on Saturday and Sunday. (Monday-Friday, use calcipotriene ointment). (Patient not taking: Reported on 6/25/2023) 45 g 11    clobetasol (TEMOVATE) 0.05 % external ointment Apply topically 2 times daily (Patient not taking: Reported on 6/25/2023) 60 g 4    diclofenac (VOLTAREN) 1 % topical gel Apply 4 g topically 4 times daily (Patient not taking: Reported on 6/25/2023) 350 g 4    DULoxetine (CYMBALTA) 60 MG capsule TAKE 1 CAPSULE(60 MG) BY MOUTH DAILY (Patient not taking: Reported on 6/25/2023) 90 capsule 3    magnesium oxide (MAG-OX) 400 MG tablet Take 1 tablet (400 mg) by mouth daily (Patient not taking: Reported on 6/25/2023) 90 tablet 3    methylPREDNISolone (MEDROL DOSEPAK) 4 MG tablet therapy pack Follow Package Directions (Patient not taking: Reported on 6/25/2023) 21 tablet 0    polyethylene glycol (MIRALAX) 17 GM/Dose powder Take 17 g by mouth daily (Patient not taking: Reported on 6/25/2023) 510 g 3    polyethylene glycol (MIRALAX) 17 GM/Dose powder Take 17 g by mouth daily (Patient not taking: Reported on 6/25/2023) 510 g 11    predniSONE (DELTASONE) 20 MG tablet Take 1 tablet (20 mg) by mouth 2 times daily (Patient not taking: Reported on 6/25/2023) 14 tablet 1    vitamin D2 (ERGOCALCIFEROL) 29934 units (1250 mcg) capsule TAKE 1 CAPSULE BY MOUTH WEEKLY (Patient not taking: Reported on 6/25/2023) 12 capsule 3       Allergies:  Allergies   Allergen  "Reactions    Aspirin Hives    Oxycodone Itching    Vicodin [Hydrocodone-Acetaminophen] Unknown        Social History:  Social History     Tobacco Use    Smoking status: Never     Passive exposure: Never    Smokeless tobacco: Never   Vaping Use    Vaping Use: Never used   Substance Use Topics    Alcohol use: Never    Drug use: Never       ROS: 10 point ROS neg other than the symptoms noted above in the HPI.    Physical Exam:    /76 (BP Location: Right arm, Patient Position: Sitting, Cuff Size: Adult Regular)   Pulse 95   Ht 1.486 m (4' 10.5\")   Wt 56 kg (123 lb 8 oz)   BMI 25.37 kg/m       Constitutional:  The patient was accompanied by family member, well-groomed, and in no acute distress.     Skin: Normal:  warm and pink without rash   Neurologic: Alert and oriented x 3. Answering questions appropriately.   Psychiatric: The patient's affect was calm, cooperative, and appropriate.    Communication:  Normal; communicates verbally, normal voice quality.   Respiratory: Breathing comfortably without stridor or exertion of accessory muscles.   Ears: Pinnae and tragus non-tender.       Otologic microscope exam:    Right ear was examined under the microscope. Cerumen at entrance of canal. TM perforation with crusting surrounding perforation, cleaned with right angled hook and alligator forceps.  Clean middle ear space.    Left ear was also examined under the microscope. Cerumen impaction cleaned with suction and alligator forceps. Normal appearing TM, nicely aerated middle ear space.       Audiogram: 5/10/2023 - data independently reviewed  Right ear: Profound mixed hearing loss (significant decrease from 250-2000 Hz compared to 3/4/22 audiogram)  Left ear: Not tested due to documented complete loss  WR Not tested due to no Jessica word liste  Tympanograms: type B right, type A left     Assessment and Plan:  Patient with complete deafness in the left and profound hearing loss in the right. Ears were cleaned today. " Right perforation that appears clean without drainage or infection.     During clinic appointment today, patient seemed to be able to follow the majority of the conversation which does not seem consistent with audiogram results. Recommend repeating audiogram to include word recognition scores if able.     Patient is medically cleared for hearing aids. May benefit from a cochlear implant evaluation based on audiogram results.    Monica Helton DNP, APRN, CNP  Otolaryngology  Head & Neck Surgery  [phone]    30 minutes spent by me on the date of the encounter doing chart review, history and exam, documentation and further activities per the note excluding time spent examining and cleaning ears under microscopy.      Again, thank you for allowing me to participate in the care of your patient.      Sincerely,    Elis Helton, NP

## 2023-07-12 DIAGNOSIS — H90.3 SENSORINEURAL HEARING LOSS (SNHL) OF BOTH EARS: Primary | ICD-10-CM

## 2023-07-13 ENCOUNTER — PATIENT OUTREACH (OUTPATIENT)
Dept: GERIATRIC MEDICINE | Facility: CLINIC | Age: 73
End: 2023-07-13
Payer: COMMERCIAL

## 2023-07-18 ENCOUNTER — OFFICE VISIT (OUTPATIENT)
Dept: AUDIOLOGY | Facility: CLINIC | Age: 73
End: 2023-07-18
Payer: COMMERCIAL

## 2023-07-18 DIAGNOSIS — H90.A31 MIXED CONDUCTIVE AND SENSORINEURAL HEARING LOSS OF RIGHT EAR WITH RESTRICTED HEARING OF LEFT EAR: Primary | ICD-10-CM

## 2023-07-18 DIAGNOSIS — H90.6 MIXED HEARING LOSS, BILATERAL: Primary | ICD-10-CM

## 2023-07-18 DIAGNOSIS — H90.3 SENSORINEURAL HEARING LOSS (SNHL) OF BOTH EARS: ICD-10-CM

## 2023-07-18 PROCEDURE — 92567 TYMPANOMETRY: CPT | Performed by: AUDIOLOGIST

## 2023-07-18 PROCEDURE — 92590 PR HEARING AID EXAM MONAURAL: CPT | Mod: RT | Performed by: AUDIOLOGIST

## 2023-07-18 PROCEDURE — 92557 COMPREHENSIVE HEARING TEST: CPT | Mod: 52 | Performed by: AUDIOLOGIST

## 2023-07-18 PROCEDURE — V5275 EAR IMPRESSION: HCPCS | Mod: RT | Performed by: AUDIOLOGIST

## 2023-07-18 NOTE — PROGRESS NOTES
AUDIOLOGY REPORT    SUBJECTIVE:   Chasity Gaspar is a 73 year old male who was seen in Audiology at the [facility] and [facility] [place] on 7/18/23 to discuss concerns with hearing and functional communication difficulties. The patient has been seen previously in this facility for a hearing evaluation on 5/10/2023 and results revealed  Profound to unmeasurable mixed hearing loss for the right ear and previously documented complete hearing loss for the left ear.  The patient was medically evaluated and determined to be cleared for purchase and use of an upgraded hearing aid by Monica Helton CNP.       OBJECTIVE:  The patient was presented with different options for amplification to help aid in communication. Styles, levels of technology and monaural vs. binaural fitting were discussed.     The hearing aid(s) mutually chosen were:  Right: Phonak P70-UP  COLOR: P8- black  BATTERY SIZE: 675  EARMOLD/TIPS: Westone full-shell otoblast, clear, pressure vent    Otoscopy revealed a clear right ear canal. A right earmold was taken without incident.    ASSESSMENT:   Purchase information and warranty information was reviewed with the patient. The 45 day trial period was explained to the patient.Patient was counseled that exact out of pocket amounts cannot be determined for hearing aid claims being sent to insurance. Any insurance coverage information presented to the patient is an estimate only, and is not a guarantee of payment. Patient has been advised to check with their own insurance.  The chosen hearing amplification was ordered today.     PLAN:   The patient is scheduled to return in 2-3 weeks for a hearing aid fitting and programming. The purchase agreement will be completed on that date. The patient risk factors have been reviewed with the patient and will be presented in writing prior to the sale of the hearing aid per FDA regulation. The risk factors are also available in the User Instructional  Booklet to be presented on the day of the hearing aid fitting.     Please contact this clinic with any questions or concerns.        Dante Garcia.  Licensed Audiologist  [place] #[id number]

## 2023-07-18 NOTE — PROGRESS NOTES
AUDIOLOGY REPORT    SUMMARY: Audiology visit completed. See audiogram for results.      RECOMMENDATIONS: Follow-up with ENT.      Ward Danielson, CCC-A  Licensed Audiologist  [place] #[id number]

## 2023-08-02 ENCOUNTER — OFFICE VISIT (OUTPATIENT)
Dept: FAMILY MEDICINE | Facility: CLINIC | Age: 73
End: 2023-08-02
Attending: FAMILY MEDICINE
Payer: COMMERCIAL

## 2023-08-02 VITALS
OXYGEN SATURATION: 95 % | HEIGHT: 60 IN | HEART RATE: 89 BPM | DIASTOLIC BLOOD PRESSURE: 80 MMHG | WEIGHT: 123.6 LBS | TEMPERATURE: 97.8 F | BODY MASS INDEX: 24.26 KG/M2 | SYSTOLIC BLOOD PRESSURE: 138 MMHG | RESPIRATION RATE: 20 BRPM

## 2023-08-02 DIAGNOSIS — M48.061 SPINAL STENOSIS, LUMBAR REGION, WITHOUT NEUROGENIC CLAUDICATION: ICD-10-CM

## 2023-08-02 DIAGNOSIS — N40.1 BENIGN PROSTATIC HYPERPLASIA (BPH) WITH STRAINING ON URINATION: Primary | ICD-10-CM

## 2023-08-02 DIAGNOSIS — H90.5 SENSORINEURAL HEARING LOSS (SNHL), UNSPECIFIED LATERALITY: ICD-10-CM

## 2023-08-02 DIAGNOSIS — R39.16 BENIGN PROSTATIC HYPERPLASIA (BPH) WITH STRAINING ON URINATION: Primary | ICD-10-CM

## 2023-08-02 DIAGNOSIS — M54.2 CERVICALGIA: ICD-10-CM

## 2023-08-02 DIAGNOSIS — F33.1 MAJOR DEPRESSIVE DISORDER, RECURRENT EPISODE, MODERATE (H): ICD-10-CM

## 2023-08-02 PROCEDURE — 99214 OFFICE O/P EST MOD 30 MIN: CPT | Performed by: FAMILY MEDICINE

## 2023-08-02 RX ORDER — METHOCARBAMOL 500 MG/1
500 TABLET, FILM COATED ORAL AT BEDTIME
Qty: 30 TABLET | Refills: 3 | Status: SHIPPED | OUTPATIENT
Start: 2023-08-02

## 2023-08-02 NOTE — PROGRESS NOTES
"  Assessment & Plan     Benign prostatic hyperplasia (BPH) with straining on urination    Patient did not bring his medication in for review however he states that he is urinating regularly has not noticed any dizziness or dry mouth    Spinal stenosis, lumbar region, without neurogenic claudication    Back pain occasionally present denies any pain as long as he uses his cane no falls reported at home  - methocarbamol (ROBAXIN) 500 MG tablet; Take 1 tablet (500 mg) by mouth At Bedtime    Major depressive disorder, recurrent episode, moderate (H)    Is a nurse states that he is interactive when spoken to.  He is denying being sad or fatigue    Cervicalgia    Continues to have neck pain was seen in urgent care.  Says the Voltaren helps the most small degree adding Robaxin to his regimen side effects discussed.  - methocarbamol (ROBAXIN) 500 MG tablet; Take 1 tablet (500 mg) by mouth At Bedtime    Sensorineural hearing loss (SNHL), unspecified laterality    He is being fitted with a new series of hearing aids in approximately 2 weeks.  Notes from audiology and ENT reviewed  [id number]     BMI:   Estimated body mass index is 25.83 kg/m  as calculated from the following:    Height as of this encounter: 1.473 m (4' 10\").    Weight as of this encounter: 56.1 kg (123 lb 9.6 oz).           David Fang MD  [facility] is a 73 year old, presenting for the following health issues:  Shoulder Pain and Neck Pain      8/2/2023    10:11 AM   Additional Questions   Roomed by Flip PETE   Accompanied by daughter       Shoulder Pain    History of Present Illness       Back Pain:  He presents for follow up of back pain. Patient's back pain is a recurring problem.  Location of back pain:  Right middle of back and right shoulder  Description of back pain: shooting  Back pain spreads: right shoulder    Since patient first noticed back pain, pain is: always present, but gets better and worse  Does back pain " "interfere with his job:  No       Hypertension: He presents for follow up of hypertension.  He does not check blood pressure  regularly outside of the clinic. Outside blood pressures have been over 140/90. He follows a low salt diet.     He eats 4 or more servings of fruits and vegetables daily.He consumes 2 sweetened beverage(s) daily.He exercises with enough effort to increase his heart rate 10 to 19 minutes per day.  He exercises with enough effort to increase his heart rate 4 days per week.   He is taking medications regularly.               Review of Systems   Constitutional, HEENT, cardiovascular, pulmonary, gi and gu systems are negative, except as otherwise noted.      Objective    /80   Pulse 89   Temp 97.8  F (36.6  C) (Oral)   Resp 20   Ht 1.473 m (4' 10\")   Wt 56.1 kg (123 lb 9.6 oz)   SpO2 95%   BMI 25.83 kg/m    Body mass index is 25.83 kg/m .  Physical Exam   GENERAL: healthy, alert and no distress  EYES: Eyes grossly normal to inspection, PERRL and conjunctivae and sclerae normal  NECK: no adenopathy, no asymmetry, masses, or scars and thyroid normal to palpation  RESP: lungs clear to auscultation - no rales, rhonchi or wheezes  CV: regular rate and rhythm, normal S1 S2, no S3 or S4, no murmur, click or rub, no peripheral edema and peripheral pulses strong  ABDOMEN: soft, nontender, no hepatosplenomegaly, no masses and bowel sounds normal  MS: no gross musculoskeletal defects noted, no edema tenderness elicited when I palpate the cervical spine at the level of C3 and C4 flexion 30 degrees causes discomfort at the back of his neck at C2 lateral rotation to the left causes discomfort no pain on extension  SKIN: no suspicious lesions or rashes  NEURO: Normal strength and tone, mentation intact and speech normal  PSYCH: mentation appears normal, affect normal/bright              "

## 2023-08-15 ENCOUNTER — OFFICE VISIT (OUTPATIENT)
Dept: AUDIOLOGY | Facility: CLINIC | Age: 73
End: 2023-08-15
Payer: COMMERCIAL

## 2023-08-15 DIAGNOSIS — H90.3 SENSORINEURAL HEARING LOSS (SNHL), BILATERAL: Primary | ICD-10-CM

## 2023-08-15 PROCEDURE — V5257 HEARING AID, DIGIT, MON, BTE: HCPCS | Mod: NU | Performed by: AUDIOLOGIST

## 2023-08-15 PROCEDURE — V5241 DISPENSING FEE, MONAURAL: HCPCS | Mod: RT | Performed by: AUDIOLOGIST

## 2023-08-15 PROCEDURE — V5264 EAR MOLD/INSERT: HCPCS | Mod: NU | Performed by: AUDIOLOGIST

## 2023-08-15 PROCEDURE — V5020 CONFORMITY EVALUATION: HCPCS | Performed by: AUDIOLOGIST

## 2023-08-15 NOTE — PROGRESS NOTES
AUDIOLOGY REPORT    SUBJECTIVE:   Chasity Gaspar is a 73 year old male who was seen in Audiology at the [facility] and Surgery [place] for the fitting of a Phonak Nanci P70-UP behind the ear hearing aid with custom hearing aid earmold for the right ear. The patient has been seen previously in this clinic on 7/18/2023 for hearing assessment and results indicated a complete hearing loss for the left ear and profound to unmeasurable hearing loss for the right ear. The patient was given medical clearance to pursue amplification by Elis Helton CNP.    OBJECTIVE:   The hearing aid conformity evaluation was completed.The hearing aid was placed and  provided a good fit. Simulated real-ear-probe-microphone measurements were completed on the Victor system and were a close match to NAL-NL1 target with soft sounds audible, moderate sounds comfortable, and loud sounds below discomfort with the hearing aid set to maximum output at all fequencies. UCLs are verified through maximum power output measures and demonstrate appropriate limiting of loud inputs.   The patient was oriented to proper hearing aid use, care, cleaning (no water, dry brush), batteries (size 675, insertion/removal, toxicity, low-battery signal), aid insertion/removal, user booklet, warranty information, storage cases, and other hearing aid details. The patient confirmed understanding of hearing aid use and care, and showed proper insertion of the hearing aid and batteries while in the office today.  Hearing aids were programmed as follows:  Program 1: automatic, manual volume control activated    EAR(S) FIT: Right  HEARING AID MODEL NAME: Phonak Nanci P70-UP  HEARING AID STYLE: Behind the ear  EARMOLDS/TIP: Westone full-shell otoblast  SERIAL NUMBERS: Right: [id number]  WARRANTY END DATE: 10/15/2026    ASSESSMENT:   Right hearing amplification was fit today. Verification measures were performed. The patient has signed the Hearing Aid  Purchase Agreement and was given a copy as well as details on his hearing aids.     PLAN:  The patient will return for an initial review of programming in 2-3 weeks. Please call this clinic with questions regarding today s appointment.      Dante Garcia.  Licensed Audiologist  [place] #[id number]

## 2023-08-24 ENCOUNTER — TELEPHONE (OUTPATIENT)
Dept: FAMILY MEDICINE | Facility: CLINIC | Age: 73
End: 2023-08-24
Payer: COMMERCIAL

## 2023-08-24 NOTE — TELEPHONE ENCOUNTER
Patient Quality Outreach    Patient is due for the following:   Depression  -  PHQ-9 needed  Physical Annual Wellness Visit    Next Steps:  none      Type of outreach:    Phone, spoke to patient/parent. patient      Questions for provider review:    None           Geetha Carvalho

## 2023-10-03 ENCOUNTER — ANCILLARY PROCEDURE (OUTPATIENT)
Dept: GENERAL RADIOLOGY | Facility: CLINIC | Age: 73
End: 2023-10-03
Attending: FAMILY MEDICINE
Payer: COMMERCIAL

## 2023-10-03 ENCOUNTER — OFFICE VISIT (OUTPATIENT)
Dept: FAMILY MEDICINE | Facility: CLINIC | Age: 73
End: 2023-10-03
Payer: COMMERCIAL

## 2023-10-03 VITALS
DIASTOLIC BLOOD PRESSURE: 80 MMHG | HEART RATE: 74 BPM | TEMPERATURE: 97.9 F | WEIGHT: 118.9 LBS | RESPIRATION RATE: 20 BRPM | SYSTOLIC BLOOD PRESSURE: 160 MMHG | HEIGHT: 60 IN | OXYGEN SATURATION: 98 % | BODY MASS INDEX: 23.34 KG/M2

## 2023-10-03 DIAGNOSIS — Z00.00 ROUTINE HISTORY AND PHYSICAL EXAMINATION OF ADULT: Primary | ICD-10-CM

## 2023-10-03 DIAGNOSIS — M54.12 CERVICAL RADICULAR PAIN: ICD-10-CM

## 2023-10-03 DIAGNOSIS — I10 BENIGN ESSENTIAL HYPERTENSION: ICD-10-CM

## 2023-10-03 DIAGNOSIS — N40.1 BENIGN PROSTATIC HYPERPLASIA (BPH) WITH STRAINING ON URINATION: ICD-10-CM

## 2023-10-03 DIAGNOSIS — Z76.0 ENCOUNTER FOR MEDICATION REFILL: ICD-10-CM

## 2023-10-03 DIAGNOSIS — R39.16 BENIGN PROSTATIC HYPERPLASIA (BPH) WITH STRAINING ON URINATION: ICD-10-CM

## 2023-10-03 DIAGNOSIS — J20.9 ACUTE BRONCHITIS, UNSPECIFIED ORGANISM: ICD-10-CM

## 2023-10-03 DIAGNOSIS — M79.18 MYOFASCIAL PAIN: ICD-10-CM

## 2023-10-03 LAB
ANION GAP SERPL CALCULATED.3IONS-SCNC: 10 MMOL/L (ref 7–15)
BASO+EOS+MONOS # BLD AUTO: ABNORMAL 10*3/UL
BASO+EOS+MONOS NFR BLD AUTO: ABNORMAL %
BASOPHILS # BLD AUTO: 0.1 10E3/UL (ref 0–0.2)
BASOPHILS NFR BLD AUTO: 1 %
BUN SERPL-MCNC: 16.5 MG/DL (ref 8–23)
CALCIUM SERPL-MCNC: 9.2 MG/DL (ref 8.8–10.2)
CHLORIDE SERPL-SCNC: 107 MMOL/L (ref 98–107)
CHOLEST SERPL-MCNC: 247 MG/DL
CREAT SERPL-MCNC: 0.83 MG/DL (ref 0.67–1.17)
DEPRECATED HCO3 PLAS-SCNC: 23 MMOL/L (ref 22–29)
EGFRCR SERPLBLD CKD-EPI 2021: >90 ML/MIN/1.73M2
EOSINOPHIL # BLD AUTO: 0.4 10E3/UL (ref 0–0.7)
EOSINOPHIL NFR BLD AUTO: 7 %
ERYTHROCYTE [DISTWIDTH] IN BLOOD BY AUTOMATED COUNT: 14 % (ref 10–15)
GLUCOSE SERPL-MCNC: 97 MG/DL (ref 70–99)
HCT VFR BLD AUTO: 47.3 % (ref 40–53)
HDLC SERPL-MCNC: 33 MG/DL
HGB BLD-MCNC: 15 G/DL (ref 13.3–17.7)
IMM GRANULOCYTES # BLD: 0 10E3/UL
IMM GRANULOCYTES NFR BLD: 0 %
LDLC SERPL CALC-MCNC: 186 MG/DL
LYMPHOCYTES # BLD AUTO: 2.2 10E3/UL (ref 0.8–5.3)
LYMPHOCYTES NFR BLD AUTO: 33 %
MCH RBC QN AUTO: 26.2 PG (ref 26.5–33)
MCHC RBC AUTO-ENTMCNC: 31.7 G/DL (ref 31.5–36.5)
MCV RBC AUTO: 83 FL (ref 78–100)
MONOCYTES # BLD AUTO: 0.8 10E3/UL (ref 0–1.3)
MONOCYTES NFR BLD AUTO: 11 %
NEUTROPHILS # BLD AUTO: 3.2 10E3/UL (ref 1.6–8.3)
NEUTROPHILS NFR BLD AUTO: 48 %
NONHDLC SERPL-MCNC: 214 MG/DL
PLATELET # BLD AUTO: 216 10E3/UL (ref 150–450)
POTASSIUM SERPL-SCNC: 4.3 MMOL/L (ref 3.4–5.3)
RBC # BLD AUTO: 5.73 10E6/UL (ref 4.4–5.9)
SODIUM SERPL-SCNC: 140 MMOL/L (ref 135–145)
TRIGL SERPL-MCNC: 140 MG/DL
WBC # BLD AUTO: 6.7 10E3/UL (ref 4–11)

## 2023-10-03 PROCEDURE — 99214 OFFICE O/P EST MOD 30 MIN: CPT | Mod: 25 | Performed by: FAMILY MEDICINE

## 2023-10-03 PROCEDURE — 71046 X-RAY EXAM CHEST 2 VIEWS: CPT | Mod: TC | Performed by: RADIOLOGY

## 2023-10-03 PROCEDURE — 85025 COMPLETE CBC W/AUTO DIFF WBC: CPT | Performed by: FAMILY MEDICINE

## 2023-10-03 PROCEDURE — 36415 COLL VENOUS BLD VENIPUNCTURE: CPT | Performed by: FAMILY MEDICINE

## 2023-10-03 PROCEDURE — 80048 BASIC METABOLIC PNL TOTAL CA: CPT | Performed by: FAMILY MEDICINE

## 2023-10-03 PROCEDURE — 99397 PER PM REEVAL EST PAT 65+ YR: CPT | Performed by: FAMILY MEDICINE

## 2023-10-03 PROCEDURE — 80061 LIPID PANEL: CPT | Performed by: FAMILY MEDICINE

## 2023-10-03 RX ORDER — BACLOFEN 10 MG/1
5-10 TABLET ORAL 3 TIMES DAILY PRN
Qty: 60 TABLET | Refills: 1 | Status: SHIPPED | OUTPATIENT
Start: 2023-10-03 | End: 2024-05-22

## 2023-10-03 RX ORDER — METOPROLOL TARTRATE 25 MG/1
TABLET, FILM COATED ORAL
Qty: 180 TABLET | Refills: 3 | Status: SHIPPED | OUTPATIENT
Start: 2023-10-03 | End: 2024-05-22

## 2023-10-03 RX ORDER — DUTASTERIDE 0.5 MG/1
CAPSULE, LIQUID FILLED ORAL
Qty: 90 CAPSULE | Refills: 3 | Status: SHIPPED | OUTPATIENT
Start: 2023-10-03 | End: 2024-05-22

## 2023-10-03 RX ORDER — BENZONATATE 200 MG/1
200 CAPSULE ORAL 3 TIMES DAILY PRN
Qty: 30 CAPSULE | Refills: 0 | Status: SHIPPED | OUTPATIENT
Start: 2023-10-03 | End: 2024-05-22

## 2023-10-03 ASSESSMENT — ENCOUNTER SYMPTOMS
NAUSEA: 0
PALPITATIONS: 0
DIARRHEA: 0
ABDOMINAL PAIN: 0
HEADACHES: 1
HEMATURIA: 0
FREQUENCY: 0
FEVER: 0
SHORTNESS OF BREATH: 0
HEMATOCHEZIA: 0
SORE THROAT: 0
HEARTBURN: 0
JOINT SWELLING: 0
MYALGIAS: 0
NERVOUS/ANXIOUS: 0
COUGH: 0
DIZZINESS: 0
CONSTIPATION: 0
EYE PAIN: 0
PARESTHESIAS: 0
DYSURIA: 0
WEAKNESS: 0
CHILLS: 0
ARTHRALGIAS: 0

## 2023-10-03 ASSESSMENT — PATIENT HEALTH QUESTIONNAIRE - PHQ9
SUM OF ALL RESPONSES TO PHQ QUESTIONS 1-9: 1
SUM OF ALL RESPONSES TO PHQ QUESTIONS 1-9: 1

## 2023-10-03 ASSESSMENT — ACTIVITIES OF DAILY LIVING (ADL)
CURRENT_FUNCTION: PREPARING MEALS REQUIRES ASSISTANCE
CURRENT_FUNCTION: HOUSEWORK REQUIRES ASSISTANCE
CURRENT_FUNCTION: TELEPHONE REQUIRES ASSISTANCE
CURRENT_FUNCTION: SHOPPING REQUIRES ASSISTANCE
CURRENT_FUNCTION: LAUNDRY REQUIRES ASSISTANCE
CURRENT_FUNCTION: MEDICATION ADMINISTRATION REQUIRES ASSISTANCE
CURRENT_FUNCTION: TRANSPORTATION REQUIRES ASSISTANCE

## 2023-10-03 NOTE — PROGRESS NOTES
SUBJECTIVE:   CC: Chasity is an 73 year old who presents for preventative health visit.       10/3/2023     9:21 AM   Additional Questions   Roomed by kimmie lawrence   Accompanied by Daughter       VEE    Today's PHQ-9 Score:       10/3/2023     9:13 AM   PHQ-9 SCORE   PHQ-9 Total Score MyChart 1 (Minimal depression)   PHQ-9 Total Score 1                 Cough   Approximately 2 weeks patient been coughing throughout the day sometimes feels like throwing up no fever noted appetite remains normal.  Have you ever done Advance Care Planning? (For example, a Health Directive, POLST, or a discussion with a medical provider or your loved ones about your wishes): No, advance care planning information given to patient to review.  Patient declined advance care planning discussion at this time.    Social History     Tobacco Use    Smoking status: Never     Passive exposure: Never    Smokeless tobacco: Never   Substance Use Topics    Alcohol use: Never             10/3/2023     9:20 AM   Alcohol Use   Prescreen: >3 drinks/day or >7 drinks/week? No       Last PSA:   Prostate Specific Antigen Screen   Date Value Ref Range Status   10/17/2011 0.8 <3.6 ng/mL Final     Comment:     Method is Abbott Prostate-Specific Antigen (PSA)            Standard-WHO 1st International (90:10) as of 09/26/05         Reviewed orders with patient. Reviewed health maintenance and updated orders accordingly - Yes  Lab work is in process  Labs reviewed in EPIC    Reviewed and updated as needed this visit by clinical staff   Tobacco  Allergies  Meds              Reviewed and updated as needed this visit by Provider                 History of presenting illness    73-year-old gentleman here with his daughter for preventive examination.  Medical history significant for hypertension, sensorineural hearing deficit  Canal stenosis.  He reports that he has been feeling well for about 2 and half weeks she been coughing and the cough is now associated with a  "productive sputum is daughter states that he does not sleep well at night but he is not coughing she has not noticed any wheezing his appetites been about the same has been able to take his medications.  She has not noticed a fever no one else at home is ill.  Patient's not fasting today  Review of Systems  CONSTITUTIONAL: NEGATIVE for fever, chills, change in weight  INTEGUMENTARY/SKIN: NEGATIVE for worrisome rashes, moles or lesions  EYES: NEGATIVE for vision changes or irritation  ENT: NEGATIVE for ear, mouth and throat problems  RESP:NEGATIVE for significant cough or SOB and cough-productive  CV: NEGATIVE for chest pain, palpitations or peripheral edema  GI: NEGATIVE for nausea, abdominal pain, heartburn, or change in bowel habits   male: negative for dysuria, hematuria, decreased urinary stream, erectile dysfunction, urethral discharge  MUSCULOSKELETAL: NEGATIVE for significant arthralgias or myalgia  NEURO: NEGATIVE for weakness, dizziness or paresthesias  PSYCHIATRIC: NEGATIVE for changes in mood or affect    OBJECTIVE:   BP (!) 160/80 (BP Location: Left arm, Patient Position: Sitting, Cuff Size: Adult Small)   Pulse 74   Temp 97.9  F (36.6  C) (Oral)   Resp 20   Ht 1.49 m (4' 10.66\")   Wt 53.9 kg (118 lb 14.4 oz)   SpO2 98%   BMI 24.29 kg/m      Physical Exam  GENERAL: healthy, alert and no distress  EYES: Eyes grossly normal to inspection, PERRL and conjunctivae and sclerae normal  HENT: ear canals and TM's normal, nose and mouth without ulcers or lesions  NECK: no adenopathy, no asymmetry, masses, or scars and thyroid normal to palpation  RESP: lungs clear to auscultation - no rales, rhonchi or wheezes and rales L lower anterior  CV: regular rate and rhythm, normal S1 S2, no S3 or S4, no murmur, click or rub, no peripheral edema and peripheral pulses strong  ABDOMEN: soft, nontender, no hepatosplenomegaly, no masses and bowel sounds normal  MS: no gross musculoskeletal defects noted, no " edema  SKIN: no suspicious lesions or rashes  NEURO: Normal strength and tone, mentation intact and speech normal  PSYCH: mentation appears normal, affect normal/bright    Diagnostic Test Results:  Labs reviewed in Epic    ASSESSMENT/PLAN:       ICD-10-CM    1. Routine history and physical examination of adult anticipatory guidance discussed patient daughter because he has been coughing and does not feel well he will pass on the flu shot today Z00.00 Basic metabolic panel  (Ca, Cl, CO2, Creat, Gluc, K, Na, BUN)     Lipid panel reflex to direct LDL Non-fasting     Basic metabolic panel  (Ca, Cl, CO2, Creat, Gluc, K, Na, BUN)     Lipid panel reflex to direct LDL Non-fasting      2. Benign essential hypertension blood pressure is elevated needs to be reassessed in 3 weeks he has been taking his medication I10 metoprolol tartrate (LOPRESSOR) 25 MG tablet      3. Cervical radicular pain neck pain is better M54.12 baclofen (LIORESAL) 10 MG tablet      4. Myofascial pain  M79.18 baclofen (LIORESAL) 10 MG tablet      5. Benign prostatic hyperplasia (BPH) with straining on urination medication still working he only gets up once at night to urinate N40.1 dutasteride (AVODART) 0.5 MG capsule    R39.16       6. Encounter for medication refill  Z76.0 dutasteride (AVODART) 0.5 MG capsule      7. Acute bronchitis, unspecified organism cough for 2-1/2 weeks he has reveals his appetites been slightly decreased cough is becoming productive.  Hemogram today was unremarkable Augmentin started Tessalon Perles given for cough chest x-ray today reveals no acute infiltrate personally reviewed J20.9 XR Chest 2 Views     CBC with platelets and differential     amoxicillin-clavulanate (AUGMENTIN) 875-125 MG tablet     benzonatate (TESSALON) 200 MG capsule     CBC with platelets and differential                      He reports that he has never smoked. He has never been exposed to tobacco smoke. He has never used smokeless  tobacco.            David Fang MD  [facility] submitted by the patient for this visit:  Patient Health Questionnaire (Submitted on 10/3/2023)  PHQ9 TOTAL SCORE: 1

## 2023-10-04 ENCOUNTER — TELEPHONE (OUTPATIENT)
Dept: FAMILY MEDICINE | Facility: CLINIC | Age: 73
End: 2023-10-04
Payer: COMMERCIAL

## 2023-10-04 NOTE — RESULT ENCOUNTER NOTE
Inform daughter of patient that kidney function tests are normal, blood counts are normal with no sign of infection cholesterol remains at approximately the same level

## 2023-10-04 NOTE — TELEPHONE ENCOUNTER
----- Message from David Fang MD sent at 10/4/2023  7:35 AM CDT -----  Inform daughter of patient that kidney function tests are normal, blood counts are normal with no sign of infection cholesterol remains at approximately the same level

## 2023-10-06 NOTE — TELEPHONE ENCOUNTER
The patient verbalizes understanding of provider/CSS instructions for follow-up and continued care per provider message. Result relayed to daughter (CEDRICK on file).

## 2023-11-29 ENCOUNTER — PATIENT OUTREACH (OUTPATIENT)
Dept: GERIATRIC MEDICINE | Facility: CLINIC | Age: 73
End: 2023-11-29
Payer: COMMERCIAL

## 2023-11-29 NOTE — PROGRESS NOTES
[facility] Care Coordination Contact    Called member to schedule annual HRA home visit. HRA has been scheduled for 11/30/24.    Savanna Landaverde RN, PHN  [facility]  [phone]

## 2023-11-30 ENCOUNTER — PATIENT OUTREACH (OUTPATIENT)
Dept: GERIATRIC MEDICINE | Facility: CLINIC | Age: 73
End: 2023-11-30
Payer: COMMERCIAL

## 2023-11-30 ASSESSMENT — ACTIVITIES OF DAILY LIVING (ADL): DEPENDENT_IADLS:: CLEANING;COOKING;LAUNDRY;SHOPPING;MEAL PREPARATION;MEDICATION MANAGEMENT;MONEY MANAGEMENT;INCONTINENCE

## 2023-12-01 NOTE — PROGRESS NOTES
[facility] Care Coordination Contact    [facility] Home Visit Assessment     Home visit for Health Risk Assessment with Chasity Gaspar completed on November 30, 2023    Type of residence:: Private home - stairs  Current living arrangement:: I live in a private home with family     Assessment completed with:: Children    Current Care Plan  Member currently receiving the following home care services:     Member currently receiving the following community resources: PCA      Medication Review  Medication reconciliation completed in Epic: No  Medication set-up & administration: Family/informal caregiver sets up weekly.  Family caregiver administers medications.  Medication Risk Assessment Medication (1 or more, place referral to MTM): N/A: No risk factors identified  MTM Referral Placed: No: No risk factors idenified    Mental/Behavioral Health   Depression Screening:           Mental health DX:: No        Falls Assessment:   Fallen 2 or more times in the past year?: No   Any fall with injury in the past year?: No    ADL/IADL Dependencies:   Dependent ADLs:: Ambulation-walker, Bathing, Dressing, Eating, Grooming, Incontinence, Positioning, Transfers, Wheelchair-with assist, Toileting  Dependent IADLs:: Cleaning, Cooking, Laundry, Shopping, Meal Preparation, Medication Management, Money Management, Incontinence    [facility] sponsored benefits: Shared information re: Silver Sneakers/gym memberships, ASA, Calcium +D.    PCA Assessment completed at visit: Yes Annual PCA assessment indicated 11 hours per day of PCA. This is the same as the previous assessment.     Elderly Waiver Eligibility: Yes, but member declines EW services; will not open to EW    Care Plan & Recommendations:     See CC for detailed assessment information.    Follow-Up Plan: Member informed of future contact, plan to f/u with member with a 6 month telephone assessment.  Contact information shared with member and family, encouraged member  to call with any questions or concerns at any time.    [facility] care continuum providers: Please see Snapshot and Care Management Flowsheets for Specific details of care plan.    This CC note routed to PCP, David Fang.    Savanna Landaverde RN, N  [facility]  [phone]

## 2023-12-07 ENCOUNTER — OFFICE VISIT (OUTPATIENT)
Dept: DERMATOLOGY | Facility: CLINIC | Age: 73
End: 2023-12-07
Payer: COMMERCIAL

## 2023-12-07 DIAGNOSIS — L27.1 FIXED DRUG ERUPTION: ICD-10-CM

## 2023-12-07 DIAGNOSIS — L40.9 PSORIASIS: Primary | ICD-10-CM

## 2023-12-07 DIAGNOSIS — L30.9 DERMATITIS: ICD-10-CM

## 2023-12-07 DIAGNOSIS — M10.9 ACUTE GOUT OF LEFT FOOT, UNSPECIFIED CAUSE: ICD-10-CM

## 2023-12-07 PROCEDURE — 99214 OFFICE O/P EST MOD 30 MIN: CPT | Mod: GC | Performed by: STUDENT IN AN ORGANIZED HEALTH CARE EDUCATION/TRAINING PROGRAM

## 2023-12-07 RX ORDER — CLOBETASOL PROPIONATE 0.5 MG/G
OINTMENT TOPICAL 2 TIMES DAILY
Qty: 60 G | Refills: 1 | Status: SHIPPED | OUTPATIENT
Start: 2023-12-07

## 2023-12-07 NOTE — PROGRESS NOTES
[facility] Dermatology Note  Encounter Date: Dec 7, 2023  Office Visit     Dermatology Problem List:  1. Psoriasis vulgaris, <5% BSA  - bx 3/31/22, R posterior shoulder  - Current tx: clobetasol 0.05% ointment BID until resolved   - prior: betamethasone 0.05% ointment, calcipotriene 0.005% cream  2. Fixed drug eruption, L flank  - possibly 2/2 NSAIDs  - bx 3/31/22  - Current tx: clobetasol 0.05% ointment    ____________________________________________    Assessment & Plan:   # Psoriasis vulgaris without evidence for psoriatic arthritis  Affecting <5% BSA on today's examination, with a single plaque on the R posterior shoulder. Discussed increasing strength of topical steroids, with systemic medications in reserve (which carry greater risk for potential side effects).   - start clobetasol 0.05% ointment BID to plaque on R posterior shoulder until resolved.     # History of fixed drug eruption, left flank  Biopsy-proven on 3/31/2022.  Not active today, but patient reports a recent flare within the past month.  He does take ibuprofen occasionally for gout, and we are suspicious that this medication class has been causing his intermittent flares.  Recommended that patient pay close attention to medications he has taken around the time of the next flare, and reporting them to us.    # Acute on chronic gout, left dorsal foot.  Recommended that patient treat his acute gout flare as recommended by his PCP.  As discussed above, recommended paying close attention to his left flank in the setting of NSAID use in case these are the cause of his fixed regular option.  Recommended establishing appointment with PCP if flare does not resolve.    Procedures Performed:   - none    Follow-up: 4 months in person for follow-up of psoriasis and fixed drug eruption    Staff: Dr. JOSEPH Singh MD PGY-3  Dermatology Resident    ____________________________________________    CC: Derm Problem (Rash  present on right shoulder blade )    HPI:  Mr. Chasity Gaspar is a(n) 73 year old male who presents today as a return patient for follow up of psoriasis.    Patient notes a stubborn plaque of psoriasis on his R upper shoulder. He was applying the betamethasone ointment, but it did not help much and he ran out.     He also notes an intermittent recurrent rash on the L flank. Prior bx when flaring was consistent with FDE. He last had a rash on this site within the past month. It is not currently flared today. Takes ibuprofen occasionally for gout, as well as allopurinol.     Lastly, has been experiencing pain in the left lateral dorsal foot over the last 2 weeks.  Reports that he had an arthrocentesis within this timeframe (results unavailable), which is not diagnostic.  He does have a history of gout, and this feels like his gout.    Patient is otherwise feeling well, without additional skin concerns.    Labs Reviewed:  N/A    Physical Exam:  Vitals: There were no vitals taken for this visit.  SKIN: Focused examination of the scalp, face, trunk, fingernails was performed.  -On the right posterior shoulder there is a well-demarcated, pink scaly plaque  -On the left flank there is an oval, well-demarcated brown patch.  Within the patches a light brown, sclerotic papule corresponding to a prior punch biopsy site  - Bilateral fingernails with onycholysis and some pitting  - On the left lateral dorsal foot there is mild swelling and a pink patch  - No other lesions of concern on areas examined.     Medications:  Current Outpatient Medications   Medication    allopurinol (ZYLOPRIM) 300 MG tablet    amoxicillin-clavulanate (AUGMENTIN) 875-125 MG tablet    atorvastatin (LIPITOR) 40 MG tablet    atorvastatin (LIPITOR) 40 MG tablet    baclofen (LIORESAL) 10 MG tablet    benzonatate (TESSALON) 200 MG capsule    clobetasol (TEMOVATE) 0.05 % external ointment    dutasteride (AVODART) 0.5 MG capsule    gabapentin (NEURONTIN) 300 MG  capsule    Lidocaine (LIDOCARE) 4 % Patch    methocarbamol (ROBAXIN) 500 MG tablet    metoprolol tartrate (LOPRESSOR) 25 MG tablet    omeprazole (PRILOSEC) 20 MG DR capsule    polyethylene glycol (MIRALAX) 17 GM/Dose powder    polyethylene glycol (MIRALAX) 17 GM/Dose powder    tamsulosin (FLOMAX) 0.4 MG capsule    acetaminophen 500 mg coapsule    betamethasone dipropionate (DIPROSONE) 0.05 % external ointment    clopidogrel (PLAVIX) 75 MG tablet    diclofenac (VOLTAREN) 1 % topical gel    DULoxetine (CYMBALTA) 60 MG capsule    magnesium oxide (MAG-OX) 400 MG tablet    methylPREDNISolone (MEDROL DOSEPAK) 4 MG tablet therapy pack    metoprolol tartrate (LOPRESSOR) 25 MG tablet    metoprolol tartrate (LOPRESSOR) 25 MG tablet    omeprazole (PRILOSEC) 20 MG DR capsule    predniSONE (DELTASONE) 20 MG tablet    vitamin D2 (ERGOCALCIFEROL) 38441 units (1250 mcg) capsule     No current facility-administered medications for this visit.      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    Abdominal Pain In The Right Lower Belly (RLQ)    Abdominal Pain In The Right Upper Belly (RUQ)    Memory Lapses Or Loss    Hyperlipidemia    Hearing Loss    Lacunar Stroke    Spinal Stenosis    Radiculopathy, lumbar region    Cervicalgia    Dysthymia (Depressive Neurosis), Primary    Essential Hypertriglyceridemia    Hiatal Hernia    Lumbar Canal Stenosis    Lower Back Pain    Vitamin D Deficiency    Moderate Recurrent Major Depression    Esophageal reflux    Gastritis    Constipation    Gout    Dermatitis    Elbow swelling, right    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 on close of this encounter.

## 2023-12-07 NOTE — LETTER
12/7/2023       RE: Chasity Gaspar  [address] [place] [address]     Dear Colleague,    Thank you for referring your patient, Chasity Gaspar, to the [facility] [place] [facility]. Please see a copy of my visit note below.    [facility] Dermatology Note  Encounter Date: Dec 7, 2023  Office Visit     Dermatology Problem List:  1. Psoriasis vulgaris, <5% BSA  - bx 3/31/22, R posterior shoulder  - Current tx: clobetasol 0.05% ointment BID until resolved   - prior: betamethasone 0.05% ointment, calcipotriene 0.005% cream  2. Fixed drug eruption, L flank  - possibly 2/2 NSAIDs  - bx 3/31/22  - Current tx: clobetasol 0.05% ointment    ____________________________________________    Assessment & Plan:   # Psoriasis vulgaris without evidence for psoriatic arthritis  Affecting <5% BSA on today's examination, with a single plaque on the R posterior shoulder. Discussed increasing strength of topical steroids, with systemic medications in reserve (which carry greater risk for potential side effects).   - start clobetasol 0.05% ointment BID to plaque on R posterior shoulder until resolved.     # History of fixed drug eruption, left flank  Biopsy-proven on 3/31/2022.  Not active today, but patient reports a recent flare within the past month.  He does take ibuprofen occasionally for gout, and we are suspicious that this medication class has been causing his intermittent flares.  Recommended that patient pay close attention to medications he has taken around the time of the next flare, and reporting them to us.    # Acute on chronic gout, left dorsal foot.  Recommended that patient treat his acute gout flare as recommended by his PCP.  As discussed above, recommended paying close attention to his left flank in the setting of NSAID use in case these are the cause of his fixed regular option.  Recommended establishing appointment with PCP if  flare does not resolve.    Procedures Performed:   - none    Follow-up: 4 months in person for follow-up of psoriasis and fixed drug eruption    Staff: Dr. JOSEPH Singh MD PGY-3  Dermatology Resident    ____________________________________________    CC: Derm Problem (Rash present on right shoulder blade )    HPI:  Mr. Chasity Gaspar is a(n) 73 year old male who presents today as a return patient for follow up of psoriasis.    Patient notes a stubborn plaque of psoriasis on his R upper shoulder. He was applying the betamethasone ointment, but it did not help much and he ran out.     He also notes an intermittent recurrent rash on the L flank. Prior bx when flaring was consistent with FDE. He last had a rash on this site within the past month. It is not currently flared today. Takes ibuprofen occasionally for gout, as well as allopurinol.     Lastly, has been experiencing pain in the left lateral dorsal foot over the last 2 weeks.  Reports that he had an arthrocentesis within this timeframe (results unavailable), which is not diagnostic.  He does have a history of gout, and this feels like his gout.    Patient is otherwise feeling well, without additional skin concerns.    Labs Reviewed:  N/A    Physical Exam:  Vitals: There were no vitals taken for this visit.  SKIN: Focused examination of the scalp, face, trunk, fingernails was performed.  -On the right posterior shoulder there is a well-demarcated, pink scaly plaque  -On the left flank there is an oval, well-demarcated brown patch.  Within the patches a light brown, sclerotic papule corresponding to a prior punch biopsy site  - Bilateral fingernails with onycholysis and some pitting  - On the left lateral dorsal foot there is mild swelling and a pink patch  - No other lesions of concern on areas examined.     Medications:  Current Outpatient Medications   Medication    allopurinol (ZYLOPRIM) 300 MG tablet    amoxicillin-clavulanate (AUGMENTIN) 875-125  MG tablet    atorvastatin (LIPITOR) 40 MG tablet    atorvastatin (LIPITOR) 40 MG tablet    baclofen (LIORESAL) 10 MG tablet    benzonatate (TESSALON) 200 MG capsule    clobetasol (TEMOVATE) 0.05 % external ointment    dutasteride (AVODART) 0.5 MG capsule    gabapentin (NEURONTIN) 300 MG capsule    Lidocaine (LIDOCARE) 4 % Patch    methocarbamol (ROBAXIN) 500 MG tablet    metoprolol tartrate (LOPRESSOR) 25 MG tablet    omeprazole (PRILOSEC) 20 MG DR capsule    polyethylene glycol (MIRALAX) 17 GM/Dose powder    polyethylene glycol (MIRALAX) 17 GM/Dose powder    tamsulosin (FLOMAX) 0.4 MG capsule    acetaminophen 500 mg coapsule    betamethasone dipropionate (DIPROSONE) 0.05 % external ointment    clopidogrel (PLAVIX) 75 MG tablet    diclofenac (VOLTAREN) 1 % topical gel    DULoxetine (CYMBALTA) 60 MG capsule    magnesium oxide (MAG-OX) 400 MG tablet    methylPREDNISolone (MEDROL DOSEPAK) 4 MG tablet therapy pack    metoprolol tartrate (LOPRESSOR) 25 MG tablet    metoprolol tartrate (LOPRESSOR) 25 MG tablet    omeprazole (PRILOSEC) 20 MG DR capsule    predniSONE (DELTASONE) 20 MG tablet    vitamin D2 (ERGOCALCIFEROL) 64951 units (1250 mcg) capsule     No current facility-administered medications for this visit.      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    Abdominal Pain In The Right Lower Belly (RLQ)    Abdominal Pain In The Right Upper Belly (RUQ)    Memory Lapses Or Loss    Hyperlipidemia    Hearing Loss    Lacunar Stroke    Spinal Stenosis    Radiculopathy, lumbar region    Cervicalgia    Dysthymia (Depressive Neurosis), Primary    Essential Hypertriglyceridemia    Hiatal Hernia    Lumbar Canal Stenosis    Lower Back Pain    Vitamin D Deficiency    Moderate Recurrent Major Depression    Esophageal reflux    Gastritis    Constipation    Gout    Dermatitis    Elbow swelling, right    Cerebral infarction due to embolism of right anterior  cerebral artery (H)    Left hemiparesis (H)    ASNHL (asymmetrical sensorineural hearing loss)    Elevated liver enzymes    Hiatal hernia     Past Medical History:   Diagnosis Date    Cervicalgia     Depression     Dyslipidemia     Dysthymia     Gastritis     GERD (gastroesophageal reflux disease)     Hearing loss     Hiatal hernia     Hypertriglyceridemia     Insomnia     Lacunar stroke (H)     Lumbar canal stenosis     Lumbar radiculopathy     Myalgia and myositis        CC No referring provider defined for this encounter. on close of this encounter.    Attestation signed by Francisco Gama MD at 12/7/2023  2:14 PM:  I have personally examined this patient and agree with the resident doctor's documentation and plan of care. I have reviewed and amended the resident's note. The documentation accurately reflects my clinical observations, diagnoses, treatment and follow-up plans.     Francisco Gama MD  Dermatology Staff      Dermatology Rooming Note    Naw [place]'s goals for this visit include:   Chief Complaint   Patient presents with    Derm Problem     Rash present on right shoulder blade      Dave Payan, EMT-B

## 2023-12-07 NOTE — NURSING NOTE
Dermatology Rooming Note    Chasity Gaspar's goals for this visit include:   Chief Complaint   Patient presents with    Derm Problem     Rash present on right shoulder blade      Dave Payan, [id number]

## 2023-12-12 ENCOUNTER — PATIENT OUTREACH (OUTPATIENT)
Dept: GERIATRIC MEDICINE | Facility: CLINIC | Age: 73
End: 2023-12-12
Payer: COMMERCIAL

## 2023-12-12 NOTE — PROGRESS NOTES
[facility] Care Coordination Contact    Received after visit chart from care coordinator.  Completed following tasks:   Mailed copy of care plan/support plan to member  Mailed Safe Medication Disposal   Uploaded consent to communicate form(s) to [facility]:  Emailed required PCA documents to [facility].  Faxed copy of PCA assessment to [facility] Agency and mailed copy to member.  Faxed MD Communication to PCP.     Van Brenner  Care Management Specialist  [facility]  [phone]

## 2023-12-12 NOTE — LETTER
December 12, 2023    CHASITY TERRAZAS  [address] [place] [address]        Dear Chasity:    At [facility], we re dedicated to improving your health and wellness. Enclosed is the Care Plan developed with you on 11/30/23. Please review the Care Plan carefully.    As a reminder, during your visit we talked about:  Ways to manage your physical and mental health  Using health care to maintain and improve your health   Your preventive care needs     Remember to contact your care coordinator if you:  Are hospitalized, or plan to be hospitalized   Have a fall    Have a change in your physical or mental health  Need help finding support or services    If you have questions, or don t agree with your Care Plan, call me at [phone]. You can also call me if your needs change. TTY users, call the [place] [employer] at (837) or 1-405.392.8128 (uzqgxp-jd-bnnrmo relay service).    Sincerely,        Savanna Landaverde RN  [phone]  [email or web address]    [id number] accepted    [id number] (07/2022)

## 2023-12-18 ENCOUNTER — TELEPHONE (OUTPATIENT)
Dept: DERMATOLOGY | Facility: CLINIC | Age: 73
End: 2023-12-18
Payer: COMMERCIAL

## 2023-12-18 NOTE — TELEPHONE ENCOUNTER
Lvm sent letter for patient to scheduled the following:    Appointment type: Return  Provider: Dr. Gama  Return date: 04-07-24  Specialty phone number: [phone]

## 2024-01-05 ENCOUNTER — OFFICE VISIT (OUTPATIENT)
Dept: AUDIOLOGY | Facility: CLINIC | Age: 74
End: 2024-01-05
Payer: COMMERCIAL

## 2024-01-05 DIAGNOSIS — H90.3 SENSORINEURAL HEARING LOSS (SNHL), BILATERAL: Primary | ICD-10-CM

## 2024-01-05 PROCEDURE — V5266 BATTERY FOR HEARING DEVICE: HCPCS | Mod: NU | Performed by: AUDIOLOGIST

## 2024-01-12 ENCOUNTER — TELEPHONE (OUTPATIENT)
Dept: FAMILY MEDICINE | Facility: CLINIC | Age: 74
End: 2024-01-12
Payer: COMMERCIAL

## 2024-01-12 NOTE — TELEPHONE ENCOUNTER
Patient Quality Outreach    Patient is due for the following:   Hypertension -  Hypertension follow-up visit    Next Steps:   Patient declined follow up at this time.    Type of outreach:    Phone, spoke to patient/parent.        Questions for provider review:    None           Vicente Johnson RN

## 2024-02-07 DIAGNOSIS — Z76.0 ENCOUNTER FOR MEDICATION REFILL: ICD-10-CM

## 2024-02-07 RX ORDER — METOPROLOL TARTRATE 25 MG/1
TABLET, FILM COATED ORAL
Qty: 180 TABLET | Refills: 3 | Status: SHIPPED | OUTPATIENT
Start: 2024-02-07

## 2024-02-07 RX ORDER — CLOPIDOGREL BISULFATE 75 MG/1
75 TABLET ORAL DAILY
Qty: 90 TABLET | Refills: 3 | Status: SHIPPED | OUTPATIENT
Start: 2024-02-07

## 2024-03-02 DIAGNOSIS — Z76.0 ENCOUNTER FOR MEDICATION REFILL: ICD-10-CM

## 2024-03-04 RX ORDER — TAMSULOSIN HYDROCHLORIDE 0.4 MG/1
0.4 CAPSULE ORAL DAILY
Qty: 30 CAPSULE | Refills: 11 | Status: SHIPPED | OUTPATIENT
Start: 2024-03-04

## 2024-03-04 RX ORDER — GABAPENTIN 300 MG/1
300 CAPSULE ORAL 3 TIMES DAILY
Qty: 270 CAPSULE | Refills: 3 | Status: SHIPPED | OUTPATIENT
Start: 2024-03-04 | End: 2024-05-22

## 2024-04-11 ENCOUNTER — OFFICE VISIT (OUTPATIENT)
Dept: OTOLARYNGOLOGY | Facility: CLINIC | Age: 74
End: 2024-04-11
Payer: COMMERCIAL

## 2024-04-11 VITALS — HEIGHT: 60 IN | BODY MASS INDEX: 23.16 KG/M2 | WEIGHT: 118 LBS

## 2024-04-11 DIAGNOSIS — H61.21 IMPACTED CERUMEN OF RIGHT EAR: Primary | ICD-10-CM

## 2024-04-11 DIAGNOSIS — H72.91 PERFORATION OF TYMPANIC MEMBRANE, RIGHT: ICD-10-CM

## 2024-04-11 PROCEDURE — 99213 OFFICE O/P EST LOW 20 MIN: CPT | Mod: 25 | Performed by: REGISTERED NURSE

## 2024-04-11 PROCEDURE — 92504 EAR MICROSCOPY EXAMINATION: CPT | Performed by: REGISTERED NURSE

## 2024-04-11 NOTE — PROGRESS NOTES
Otolaryngology Clinic  April 11, 2024    HPI:  Chasity Gaspar is here for a recheck of their right ear. Last seen in clinic 7/11/23 for hearing aid clearance.  History of complete hearing loss the left ear.  Right mixed profound hearing loss with a right TM perforation.  At clinic visit, patient was found to have significant debris in the right ear that was thoroughly cleaned under microscopy.  No evidence of infection.  Patient was medically cleared for a new right-sided hearing aid.  Recommended follow-up for repeat ear cleaning in 9 months.      Patient returns today for recheck.  Patient is accompanied by family member.  History is obtained using iPad [employer]  and family member.  Patient states that they are doing well.  Denies any otalgia or otorrhea.  Patient is wearing his old hearing aid today because it has a more comfortable fit.  Patient does not feel that hearing aids provide significant benefit and does have a difficult time understanding conversation.    Otologic microscope exam:    Patient's ear pathology required use of the binocular microscope for the purpose of cleaning and improving visualization in order to assure a more accurate diagnostic evaluation.    Right ear was examined under the microscope.  Ear canal is filled with dried ceruminous debris.  It was cleaned with right angle hook, suction, and alligator forceps. Once cleaned, TM visualized under microscope. TM perforation is stable, nicely aerated middle ear space.     Left ear was also examined under the microscope.  Small amount of cerumen at entrance of canal was cleaned with right angled hook and alligator forceps. Once cleaned, TM visualized under microscope. Normal appearing TM, nicely aerated middle ear space.     Assessment and Plan:    The patient presents for recheck of the right ear due to chronic perforation and excessive cerumen.  Right ear was cleaned under microscopy today.  Perforation appears stable with clean middle  Chief Complaint  Follow-up (labs), EGD, Colonoscopy, Diarrhea, and Heartburn    History of Present Illness  Airam Pool is a 61 y.o. female who presents to [facility] for follow-up    61-year-old female presenting the office today for a follow-up after recent EGD and colonoscopy.  Patient has history of reflux, diabetes, diarrhea and cholecystectomy.  Patient reports that her reflux has improved tremendously since starting pantoprazole 40 mg with added Carafate 1 g 4 times a day.  She reports that her diarrhea has improved with use of intermittent Imodium.  She does continue to have intermittent abdominal pain that resolves after having a bowel movement.  Insert denies    Endoscopy: Review of the patient's most recent EGD and colonoscopy performed by Dr. 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  Single polyp removed from the ascending colon normal esophagus stomach and duodenum.  Pathology reveals tubular adenoma.    CT scan of the abdomen and pelvis with contrast 5/27/2021 normal    Prom[employer] testing negative on 6/2121    Past Medical History:   Diagnosis Date   • Breast cancer (CMS/HCC)     LEFT BREAST   • Diabetes mellitus, type II (CMS/HCC)    • High cholesterol        Past Surgical History:   Procedure Laterality Date   • BREAST BIOPSY     • BREAST LUMPECTOMY     • SPINE SURGERY     • TUBAL ABDOMINAL LIGATION           Current Outpatient Medications:   •  amLODIPine (NORVASC) 5 MG tablet, Take 5 mg by mouth Daily., Disp: , Rfl:   •  anastrozole (ARIMIDEX) 1 MG tablet, anastrozole 1 mg tablet  Take 1 tablet every day by oral route., Disp: , Rfl:   •  aspirin (aspirin) 81 MG EC tablet, Take 81 mg by mouth Daily., Disp: , Rfl:   •  calcium citrate-vitamin d (CITRACAL) 200-250 MG-UNIT tablet tablet, Take  by mouth Daily., Disp: , Rfl:   •  carvedilol (COREG) 6.25 MG tablet, Take 6.25 mg by mouth 2 (Two) Times a Day With  "Meals., Disp: , Rfl:   •  gabapentin (NEURONTIN) 300 MG capsule, Take 600 mg by mouth 3 (Three) Times a Day., Disp: , Rfl:   •  glipizide (GLUCOTROL) 10 MG tablet, glipizide 10 mg tablet  Take 1 tablet every day by oral route., Disp: , Rfl:   •  insulin degludec (Tresiba FlexTouch) 100 UNIT/ML solution pen-injector injection, Tresiba FlexTouch U-100 100 unit/mL (3 mL) subcutaneous insulin pen inject by subcutaneous route as per insulin protocol   Active, Disp: , Rfl:   •  Insulin Syringe-Needle U-100 (BD Veo Insulin Syringe U/F) 31G X 15/64\" 1 ML misc, USE TO INJECT INTO THE SKIN AS NEEDED, Disp: , Rfl:   •  lisinopril-hydrochlorothiazide (PRINZIDE,ZESTORETIC) 20-25 MG per tablet, Take 1 tablet by mouth 2 (Two) Times a Day., Disp: , Rfl:   •  metFORMIN (GLUCOPHAGE) 1000 MG tablet, Every 12 (Twelve) Hours., Disp: , Rfl:   •  pantoprazole (PROTONIX) 40 MG EC tablet, Take 40 mg by mouth Daily., Disp: , Rfl:   •  pravastatin (PRAVACHOL) 80 MG tablet, Take 80 mg by mouth Daily., Disp: , Rfl:   •  sucralfate (CARAFATE) 1 g tablet, TAKE 1 TABLET BY MOUTH 4 TIMES DAILY ON AN EMPTY STOMACH ONE HOUR BEFORE EACH MEAL, Disp: , Rfl:   •  Tresiba 100 UNIT/ML solution injection, INJECT 0.9MLS SUBCUTANEOUSLY EVERY MORNING FOR 90 DAYS, Disp: , Rfl:   •  dicyclomine (BENTYL) 20 MG tablet, Take 1 tablet by mouth Every 6 (Six) Hours., Disp: 60 tablet, Rfl: 3  •  gemfibrozil (LOPID) 600 MG tablet, gemfibrozil 600 mg oral tablet take 1 tablet (600 mg) by oral route 2 times per day 30 minutes before morning and evening meal   Suspended, Disp: , Rfl:   •  omeprazole (priLOSEC) 20 MG capsule, omeprazole 20 mg oral capsule,delayed release(DR/EC) take 1 capsule by oral route 2 times a day for 10 days   Suspended, Disp: , Rfl:   •  sucralfate (CARAFATE) 1 GM/10ML suspension, Take 1 g by mouth 4 (Four) Times a Day., Disp: , Rfl:      No Known Allergies    Family History   Problem Relation Age of Onset   • Lung cancer Father         Social " ear space.  Recommend returning to clinic in 9 months for a repeat ear cleaning.  Sooner for any new otorrhea or otalgia.    Regarding patient's right hearing aid, recommend hearing aid check with audiology.  Instructed patient to bring both their old and new hearing aid to be able to find a more comfortable fit for the new aid.  Patient should continue to work with audiology to determine if a cochlear implant evaluation would ever be warranted due to profound hearing loss bilaterally.      Monica Helton DNP, APRN, CNP  Otolaryngology  Head & Neck Surgery  [phone]    20 minutes spent on the date of the encounter doing chart review, history and exam, documentation and further activities per the note excluding time spent examining and cleaning ears under microscopy.     "History     Social History Narrative   • Not on file       Objective     Review of Systems   Constitutional: Negative for fatigue, fever, unexpected weight gain and unexpected weight loss.   Gastrointestinal: Positive for abdominal pain, diarrhea and GERD. Negative for abdominal distention, nausea, rectal pain and vomiting.        Vital Signs:   BP (!) 183/56 (BP Location: Right arm, Patient Position: Sitting, Cuff Size: Adult)   Pulse 64   Resp 18   Ht 165.1 cm (65\")   Wt 90.4 kg (199 lb 3.2 oz)   SpO2 98% Comment: room air  BMI 33.15 kg/m²       Physical Exam  Constitutional:       General: She is not in acute distress.     Appearance: Normal appearance.   HENT:      Head: Normocephalic.   Eyes:      Conjunctiva/sclera: Conjunctivae normal.      Pupils: Pupils are equal, round, and reactive to light.      Visual Fields: Right eye visual fields normal and left eye visual fields normal.   Neck:      Trachea: Trachea normal.   Cardiovascular:      Rate and Rhythm: Normal rate and regular rhythm.      Heart sounds: Normal heart sounds.   Pulmonary:      Effort: Pulmonary effort is normal.      Breath sounds: Normal breath sounds and air entry.      Comments: Inspection of chest: normal appearance  Abdominal:      General: Abdomen is flat. Bowel sounds are normal.      Palpations: Abdomen is soft. There is no mass.      Tenderness: There is no guarding.   Musculoskeletal:      Right lower leg: No edema.      Left lower leg: No edema.   Skin:     Findings: No lesion.      Comments: Turgor is normal   Neurological:      Mental Status: She is alert and oriented to person, place, and time.   Psychiatric:         Mood and Affect: Mood and affect normal.         Result Review :                  Assessment and Plan    Diagnoses and all orders for this visit:    1. Fatty liver (Primary)    2. Gastroesophageal reflux disease without esophagitis    3. Irritable bowel syndrome with diarrhea    Other orders  -     SCANNED - " LABS  -     dicyclomine (BENTYL) 20 MG tablet; Take 1 tablet by mouth Every 6 (Six) Hours.  Dispense: 60 tablet; Refill: 3    61-year-old female presenting the office today for a follow-up after recent EGD and colonoscopy.  Patient has history of reflux, diabetes, diarrhea and cholecystectomy.  Patient has had much improvement after starting pantoprazole 40 mg as well as Carafate 1 g 4 times a day.  She will continue this regimen.  I have added dicyclomine to be used as needed up to 3 times a day for abdominal pain.  I suspect that her abdominal pain and diarrhea related to irritable bowel syndrome and we have discussed the and lifestyle to improve irritable bowel syndrome.  We discussed healthy lifestyle and dieting related to fatty liver.  Patient will follow up in the office in 6 months.  Patient is agreeable to this plan.          Follow Up   Return in about 6 months (around 1/27/2022).  Patient was given instructions and counseling regarding her condition or for health maintenance advice. Please see specific information pulled into the AVS if appropriate.

## 2024-04-11 NOTE — LETTER
4/11/2024       RE: Chasity Gaspar  [address] [place] [address]     Dear Colleague,    Thank you for referring your patient, Chasity Gaspar, to the [facility] [place] [facility]. Please see a copy of my visit note below.      Otolaryngology Clinic  April 11, 2024    HPI:  Chasity Gaspar is here for a recheck of their right ear. Last seen in clinic 7/11/23 for hearing aid clearance.  History of complete hearing loss the left ear.  Right mixed profound hearing loss with a right TM perforation.  At clinic visit, patient was found to have significant debris in the right ear that was thoroughly cleaned under microscopy.  No evidence of infection.  Patient was medically cleared for a new right-sided hearing aid.  Recommended follow-up for repeat ear cleaning in 9 months.      Patient returns today for recheck.  Patient is accompanied by family member.  History is obtained using iPad Jessica  and family member.  Patient states that they are doing well.  Denies any otalgia or otorrhea.  Patient is wearing his old hearing aid today because it has a more comfortable fit.  Patient does not feel that hearing aids provide significant benefit and does have a difficult time understanding conversation.    Otologic microscope exam:    Patient's ear pathology required use of the binocular microscope for the purpose of cleaning and improving visualization in order to assure a more accurate diagnostic evaluation.    Right ear was examined under the microscope.  Ear canal is filled with dried ceruminous debris.  It was cleaned with right angle hook, suction, and alligator forceps. Once cleaned, TM visualized under microscope. TM perforation is stable, nicely aerated middle ear space.     Left ear was also examined under the microscope.  Small amount of cerumen at entrance of canal was cleaned with right angled hook and alligator forceps. Once cleaned, TM  visualized under microscope. Normal appearing TM, nicely aerated middle ear space.     Assessment and Plan:    The patient presents for recheck of the right ear due to chronic perforation and excessive cerumen.  Right ear was cleaned under microscopy today.  Perforation appears stable with clean middle ear space.  Recommend returning to clinic in 9 months for a repeat ear cleaning.  Sooner for any new otorrhea or otalgia.    Regarding patient's right hearing aid, recommend hearing aid check with audiology.  Instructed patient to bring both their old and new hearing aid to be able to find a more comfortable fit for the new aid.  Patient should continue to work with audiology to determine if a cochlear implant evaluation would ever be warranted due to profound hearing loss bilaterally.      Monica Helton DNP, APRN, CNP  Otolaryngology  Head & Neck Surgery  [phone]    20 minutes spent on the date of the encounter doing chart review, history and exam, documentation and further activities per the note excluding time spent examining and cleaning ears under microscopy.      Again, thank you for allowing me to participate in the care of your patient.      Sincerely,    Elis Helton, NP

## 2024-04-29 ENCOUNTER — OFFICE VISIT (OUTPATIENT)
Dept: AUDIOLOGY | Facility: CLINIC | Age: 74
End: 2024-04-29
Payer: COMMERCIAL

## 2024-04-29 DIAGNOSIS — H90.3 SENSORINEURAL HEARING LOSS, BILATERAL: Primary | ICD-10-CM

## 2024-04-29 PROCEDURE — V5299 HEARING SERVICE: HCPCS | Performed by: AUDIOLOGIST

## 2024-04-29 PROCEDURE — 92592 PR HEARING AID CHECK, MONAURAL: CPT | Performed by: AUDIOLOGIST

## 2024-04-29 NOTE — PROGRESS NOTES
AUDIOLOGY REPORT    SUBJECTIVE: Chasity Gaspar is a 74 year old male who was seen in the Audiology Clinic at [facility] on 4/29/2024 for a hearing aid check. Previous results have revealed profound sensorineural hearing loss for the right ear and no measurable hearing for the left ear. He was accompanied by a family member and a Jessica  (ID #[id number]).The patient has been seen previously in this clinic and was fit with a right Phonak Nanci P70 - UP hearing aid with custom earmold on 8/15/2023. Chasity reports the earmold is uncomfortable. He would like his previous earmold put on the hearing aid. He would also like the volume decreased slightly.     OBJECTIVE: The tubing was replaced on his previous earmold and placed on the hearing aid. The feedback test was re-run. Overall gain was decreased by 5 dB. Chasity reported good volume and sound quality.     Briefly discussed a cochlear implant. New feels he is still doing well enough with a hearing aid for the time being.     ASSESSMENT: A hearing aid check was completed today. Changes made as outlined above.    PLAN: Chasity will return for follow-up as needed, or at least every 6-9 months for cleaning and assessment of the hearing aid. Please call this clinic with any questions regarding today s appointment.      Fer Aragon BJanisS.   Audiology Doctoral Student   [place] #[id number]      I was present with the patient for the entire Audiology appointment, including all procedures/testing performed by the [facility] student.    Ward Koroma, [facility]  Licensed Audiologist  [place] #[id number]

## 2024-05-22 ENCOUNTER — OFFICE VISIT (OUTPATIENT)
Dept: FAMILY MEDICINE | Facility: CLINIC | Age: 74
End: 2024-05-22
Payer: COMMERCIAL

## 2024-05-22 VITALS
TEMPERATURE: 98.3 F | HEART RATE: 92 BPM | BODY MASS INDEX: 24.17 KG/M2 | DIASTOLIC BLOOD PRESSURE: 82 MMHG | OXYGEN SATURATION: 96 % | RESPIRATION RATE: 16 BRPM | SYSTOLIC BLOOD PRESSURE: 138 MMHG | HEIGHT: 60 IN | WEIGHT: 123.12 LBS

## 2024-05-22 DIAGNOSIS — M10.9 GOUT, UNSPECIFIED CAUSE, UNSPECIFIED CHRONICITY, UNSPECIFIED SITE: ICD-10-CM

## 2024-05-22 DIAGNOSIS — I10 BENIGN ESSENTIAL HYPERTENSION: ICD-10-CM

## 2024-05-22 DIAGNOSIS — M54.12 CERVICAL RADICULAR PAIN: Primary | ICD-10-CM

## 2024-05-22 DIAGNOSIS — M54.16 RADICULOPATHY, LUMBAR REGION: ICD-10-CM

## 2024-05-22 DIAGNOSIS — E78.5 HYPERLIPIDEMIA LDL GOAL <100: ICD-10-CM

## 2024-05-22 DIAGNOSIS — Z76.0 ENCOUNTER FOR MEDICATION REFILL: ICD-10-CM

## 2024-05-22 DIAGNOSIS — M79.18 MYOFASCIAL PAIN: ICD-10-CM

## 2024-05-22 DIAGNOSIS — K21.00 GASTROESOPHAGEAL REFLUX DISEASE WITH ESOPHAGITIS WITHOUT HEMORRHAGE: ICD-10-CM

## 2024-05-22 DIAGNOSIS — K59.00 CONSTIPATION, UNSPECIFIED CONSTIPATION TYPE: ICD-10-CM

## 2024-05-22 LAB
BASOPHILS # BLD AUTO: 0 10E3/UL (ref 0–0.2)
BASOPHILS NFR BLD AUTO: 0 %
EOSINOPHIL # BLD AUTO: 0.5 10E3/UL (ref 0–0.7)
EOSINOPHIL NFR BLD AUTO: 6 %
ERYTHROCYTE [DISTWIDTH] IN BLOOD BY AUTOMATED COUNT: 13.4 % (ref 10–15)
HCT VFR BLD AUTO: 45.9 % (ref 40–53)
HGB BLD-MCNC: 15.3 G/DL (ref 13.3–17.7)
IMM GRANULOCYTES # BLD: 0 10E3/UL
IMM GRANULOCYTES NFR BLD: 0 %
LYMPHOCYTES # BLD AUTO: 2.1 10E3/UL (ref 0.8–5.3)
LYMPHOCYTES NFR BLD AUTO: 28 %
MCH RBC QN AUTO: 27.2 PG (ref 26.5–33)
MCHC RBC AUTO-ENTMCNC: 33.3 G/DL (ref 31.5–36.5)
MCV RBC AUTO: 82 FL (ref 78–100)
MONOCYTES # BLD AUTO: 1.1 10E3/UL (ref 0–1.3)
MONOCYTES NFR BLD AUTO: 14 %
NEUTROPHILS # BLD AUTO: 3.9 10E3/UL (ref 1.6–8.3)
NEUTROPHILS NFR BLD AUTO: 51 %
PLATELET # BLD AUTO: 207 10E3/UL (ref 150–450)
RBC # BLD AUTO: 5.63 10E6/UL (ref 4.4–5.9)
WBC # BLD AUTO: 7.7 10E3/UL (ref 4–11)

## 2024-05-22 PROCEDURE — 36415 COLL VENOUS BLD VENIPUNCTURE: CPT | Performed by: FAMILY MEDICINE

## 2024-05-22 PROCEDURE — 82248 BILIRUBIN DIRECT: CPT | Performed by: FAMILY MEDICINE

## 2024-05-22 PROCEDURE — 99214 OFFICE O/P EST MOD 30 MIN: CPT | Performed by: FAMILY MEDICINE

## 2024-05-22 PROCEDURE — 80053 COMPREHEN METABOLIC PANEL: CPT | Performed by: FAMILY MEDICINE

## 2024-05-22 PROCEDURE — 80061 LIPID PANEL: CPT | Performed by: FAMILY MEDICINE

## 2024-05-22 PROCEDURE — G2211 COMPLEX E/M VISIT ADD ON: HCPCS | Performed by: FAMILY MEDICINE

## 2024-05-22 PROCEDURE — 85025 COMPLETE CBC W/AUTO DIFF WBC: CPT | Performed by: FAMILY MEDICINE

## 2024-05-22 PROCEDURE — 83721 ASSAY OF BLOOD LIPOPROTEIN: CPT | Mod: 59 | Performed by: FAMILY MEDICINE

## 2024-05-22 RX ORDER — ATORVASTATIN CALCIUM 40 MG/1
TABLET, FILM COATED ORAL
Qty: 90 TABLET | Refills: 3 | Status: SHIPPED | OUTPATIENT
Start: 2024-05-22

## 2024-05-22 RX ORDER — BACLOFEN 10 MG/1
5-10 TABLET ORAL 3 TIMES DAILY PRN
Qty: 60 TABLET | Refills: 5 | Status: SHIPPED | OUTPATIENT
Start: 2024-05-22

## 2024-05-22 RX ORDER — POLYETHYLENE GLYCOL 3350 17 G/17G
17 POWDER, FOR SOLUTION ORAL DAILY
Qty: 510 G | Refills: 11 | Status: SHIPPED | OUTPATIENT
Start: 2024-05-22

## 2024-05-22 RX ORDER — METOPROLOL TARTRATE 25 MG/1
TABLET, FILM COATED ORAL
Qty: 180 TABLET | Refills: 3 | Status: SHIPPED | OUTPATIENT
Start: 2024-05-22

## 2024-05-22 RX ORDER — ATORVASTATIN CALCIUM 40 MG/1
TABLET, FILM COATED ORAL
Qty: 90 TABLET | Refills: 3 | Status: CANCELLED | OUTPATIENT
Start: 2024-05-22

## 2024-05-22 RX ORDER — GABAPENTIN 300 MG/1
300 CAPSULE ORAL 3 TIMES DAILY
Qty: 270 CAPSULE | Refills: 3 | Status: SHIPPED | OUTPATIENT
Start: 2024-05-22

## 2024-05-22 ASSESSMENT — PATIENT HEALTH QUESTIONNAIRE - PHQ9
SUM OF ALL RESPONSES TO PHQ QUESTIONS 1-9: 0
SUM OF ALL RESPONSES TO PHQ QUESTIONS 1-9: 0

## 2024-05-22 NOTE — PROGRESS NOTES
OFFICE VISIT    Assessment/Plan:     Patient Instructions:    - and continue the mediations as prescribed.     -Increase your water intake. Try to drink 64 ounces of water each day.   -Limit foods that are rich in purines/uric acid, such as meats and organ meats.     -The Shingles/Shingrix and RSV vaccine is generally better covered by insurance companies if the vaccine is received at a pharmacy.  Please speak with your pharmacist regarding this vaccination as it is recommended for you.      Please seek immediate medical attention (go to the emergency room or urgent care) for the following reasons: worsening symptoms, or any concerning changes.      Chasity was seen today for establish care.  Diagnoses and all orders for this visit:    Cervical radicular pain  Myofascial pain  Radiculopathy, lumbar region: : stable on medication. Continue mediation.   -     gabapentin (NEURONTIN) 300 MG capsule; Take 1 capsule (300 mg) by mouth 3 times daily  -     baclofen (LIORESAL) 10 MG tablet; Take 0.5-1 tablets (5-10 mg) by mouth 3 times daily as needed for muscle spasms    Benign essential hypertension: stable on medication. Continue mediation.   -     metoprolol tartrate (LOPRESSOR) 25 MG tablet; [METOPROLOL TARTRATE (LOPRESSOR) 25 MG TABLET] TAKE 1 TABLET BY MOUTH TWICE DAILY  -     Basic metabolic panel; Future  -     CBC with Platelets & Differential; Future  -     Hepatic function panel; Future  -     Lipid panel reflex to direct LDL Non-fasting; Future    Hyperlipidemia LDL goal <100: stable on medication. Continue mediation.   -     Basic metabolic panel; Future  -     CBC with Platelets & Differential; Future  -     Hepatic function panel; Future  -     Lipid panel reflex to direct LDL Non-fasting; Future  -     atorvastatin (LIPITOR) 40 MG tablet; TAKE 1 TABLET BY MOUTH EVERY NIGHT AT BEDTIME    Constipation, unspecified constipation type  -     polyethylene glycol (MIRALAX) 17 GM/Dose powder; Take 17 g by mouth  daily    Encounter for medication refill    Gout, unspecified cause, unspecified chronicity, unspecified site: stable on medication. Continue mediation.     Gastroesophageal reflux disease with esophagitis without hemorrhage: stable on medication. Continue mediation.  Recheck as below.   -     omeprazole (PRILOSEC) 20 MG DR capsule; Take 1 capsule (20 mg) by mouth daily as needed (reflux)  -     Helicobacter pylori Antigen Stool; Future        Return in about 5 months (around 10/22/2024) for Medicare Wellness Visit.    The diagnoses, treatment options, risk, benefits, and recommendations were reviewed with patient/guardian.  Questions were answered to patient's/guardian satisfaction.  Red flag signs were reviewed.  Patient/guardian is in agreement with above plan.      Subjective: 74 year old male with history of CVA with left hemiparesis, recurrent or stroke, hypertension, hyperlipidemia, cervical radicular pain, spinal stenosis and lumbar back pain (chronic) with radiculopathy, asymmetrical sensorineural hearing loss, constipation, dysthymia, elevated liver enzymes, GERD, hiatal hernia, depression who presents to clinic for the following complaints:   Patient presents with:  Establish Care      Answers submitted by the patient for this visit:  Patient Health Questionnaire (Submitted on 5/22/2024)  PHQ9 TOTAL SCORE: 0  General Questionnaire (Submitted on 5/22/2024)  Chief Complaint: Chronic problems general questions HPI Form  What is the reason for your visit today? : med  check    Medication refill: Medications that patient was requesting for refills were reviewed.  Denies any significant issues or side effects on the medications.  Reviewed indications for use of each medications and medication seems to be helpful for the patient.    Likely ran out of the cholesterol for a couple of months and they haven't picked up the atorvastatin that was sent in 04/02/2024.     Previous testing for H pylori was negative from  EGD in 2012. Has reflux symptoms still. Discussed retesting.     HM due was reviewed with patient/parent.  Recommendations, risk, benefits were reviewed.  Accepted recommendations were ordered.  Otherwise, patient/parent declined.    Health Maintenance Due   Topic Date Due    URINE DRUG SCREEN  Never done    DEPRESSION ACTION PLAN  Never done    ZOSTER IMMUNIZATION (1 of 2) Never done    RSV VACCINE (Pregnancy & 60+) (1 - 1-dose 60+ series) Never done    COVID-19 Vaccine (3 - Moderna risk series) 01/14/2022         Immunization History   Administered Date(s) Administered    COVID-19 Monovalent 18+ (Moderna) 04/18/2021, 05/16/2021    COVID-19 Monovalent Booster 18+ (Moderna) 12/17/2021    Flu, Unspecified 12/13/2010, 10/17/2011, 09/20/2016    HepB, Unspecified 08/15/2007    Hepatitis B, Adult 08/15/2007    Influenza (High Dose) 3 valent vaccine 09/12/2017, 10/24/2018    Influenza (IIV3) PF 12/13/2010, 10/17/2011, 10/12/2012, 09/24/2013, 09/29/2014    Influenza Vaccine 65+ (Fluzone HD) 09/30/2021, 11/25/2022    Influenza Vaccine, 6+MO IM (QUADRIVALENT W/PRESERVATIVES) 10/12/2012, 09/24/2013, 09/29/2014, 09/25/2015, 09/20/2016, 10/11/2020    Influenza, seasonal, injectable, PF 11/22/2009    MMR 08/15/2007, 05/29/2009    Pneumo Conj 13-V (2010&after) 04/09/2015, 09/12/2017    Pneumococcal 23 valent 10/17/2018    TD,PF 7+ (Tenivac) 08/01/2019    TDAP (Adacel,Boostrix) 08/04/2008    Td (Adult), Adsorbed 08/15/2007, 05/29/2009    Td, Absorbed, Pf, Adult, Lf Unspecified 08/01/2019    Td,adult,historic,unspecified 08/15/2007, 05/29/2009    Varicella 08/15/2007, 08/04/2008       Patient presents with daughter (reads some English).   A professional [employer] telephone  was utilized for the office visit.     The 10 point review of system is negative except as stated in the HPI.    Allergies were reviewed and updated.    Objective:   /82   Pulse 92   Temp 98.3  F (36.8  C) (Oral)   Resp 16   Ht 1.475 m  "(4' 10.07\")   Wt 55.8 kg (123 lb 1.9 oz)   SpO2 96%   BMI 25.67 kg/m    General: Active, alert, nontoxic-appearing.  No acute distress.  HEENT: Normocephalic, atraumatic.  Pupils are equal and round.  Sclera is clear.  Normal external ears. Nares patent.  Moist mucous membranes.    Cardiac: RRR.  S1, S2 present.  No murmurs, rubs, or gallops.  Respiratory/chest: Clear to auscultation bilaterally.  No wheezes, rales, rhonchi.  Breathing is not labored.  No accessory muscle usage.  Abdomen: Soft, nondistended, nontender.  No masses or organomegaly noted.  No guarding or rebound tenderness appreciated.  Extremities: Voluntary movements intact.  Integumentary: No concerning rash or skin changes appreciated.        Boston Edwards MD  [facility] [place] [address]  Phone: [phone]  Fax: [phone]    5/28/2024  7:15 AM          Current Outpatient Medications   Medication Sig Dispense Refill    allopurinol (ZYLOPRIM) 300 MG tablet TAKE 1 TABLET BY MOUTH DAILY 90 tablet 0    atorvastatin (LIPITOR) 40 MG tablet TAKE 1 TABLET BY MOUTH EVERY NIGHT AT BEDTIME 90 tablet 3    baclofen (LIORESAL) 10 MG tablet Take 0.5-1 tablets (5-10 mg) by mouth 3 times daily as needed for muscle spasms 60 tablet 5    clopidogrel (PLAVIX) 75 MG tablet TAKE 1 TABLET(75 MG) BY MOUTH DAILY 90 tablet 3    gabapentin (NEURONTIN) 300 MG capsule Take 1 capsule (300 mg) by mouth 3 times daily 270 capsule 3    Lidocaine (LIDOCARE) 4 % Patch Place 1 patch onto the skin every 24 hours To prevent lidocaine toxicity, patient should be patch free for 12 hrs daily. 15 patch 0    methocarbamol (ROBAXIN) 500 MG tablet Take 1 tablet (500 mg) by mouth At Bedtime 30 tablet 3    metoprolol tartrate (LOPRESSOR) 25 MG tablet [METOPROLOL TARTRATE (LOPRESSOR) 25 MG TABLET] TAKE 1 TABLET BY MOUTH TWICE DAILY 180 tablet 3    metoprolol tartrate (LOPRESSOR) 25 MG tablet TAKE 1 TABLET BY MOUTH TWICE DAILY 180 tablet 3    omeprazole " (PRILOSEC) 20 MG DR capsule Take 1 capsule (20 mg) by mouth daily as needed (reflux) 90 capsule 3    polyethylene glycol (MIRALAX) 17 GM/Dose powder Take 17 g by mouth daily 510 g 11    polyethylene glycol (MIRALAX) 17 GM/Dose powder Take 17 g by mouth daily 510 g 3    tamsulosin (FLOMAX) 0.4 MG capsule TAKE 1 CAPSULE(0.4 MG) BY MOUTH DAILY 30 capsule 11    acetaminophen 500 mg coapsule [ACETAMINOPHEN 500 MG COAPSULE] Take 2 tablets by mouth 3 (three) times a day as needed for fever or pain. (Patient not taking: Reported on 10/3/2023)      clobetasol (TEMOVATE) 0.05 % external ointment Apply topically 2 times daily To psoriasis rash on back until resolved. 60 g 1    metoprolol tartrate (LOPRESSOR) 25 MG tablet Take 1 tablet (25 mg) by mouth 2 times daily (Patient not taking: Reported on 10/3/2023) 180 tablet 3    predniSONE (DELTASONE) 20 MG tablet Take 1 tablet (20 mg) by mouth 2 times daily (Patient not taking: Reported on 10/3/2023) 14 tablet 1     No current facility-administered medications for this visit.       Allergies   Allergen Reactions    Aspirin Hives    Oxycodone Itching    Vicodin [Hydrocodone-Acetaminophen] Unknown       Patient Active Problem List    Diagnosis Date Noted    Hiatal hernia 12/17/2021     Priority: Medium     Formatting of this note might be different from the original.  Created by [facility] Annotation: Apr 18 2012 10:34PM -  ,  : small, seen on EGD    Replacement Utility updated for latest IMO load      Essential Hypertriglyceridemia      Priority: Medium     Created by [facility] Annotation: Dec 21 2010  7:47AM - David Fang: 6/2009:   [facility] /Trig 340/HDL 35/LDL 80.  10/11:  Dyslipidemia again.    Advised to   come in for visit to follow-up.        Esophageal reflux      Priority: Medium     Created by Conversion        Elevated liver enzymes      Priority: Medium    Gout      Priority: Medium     Created by Conversion        ASNHL  "(asymmetrical sensorineural hearing loss) 04/20/2018     Priority: Medium    Left hemiparesis (H) 04/28/2017     Priority: Medium    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) 04/27/2017     Priority: Medium    Radiculopathy, lumbar region      Priority: Medium     Created by Conversion        Hyperlipidemia      Priority: Medium     Created by [facility] Annotation: Nov 4 2011  8:39AM Mindy Belleil: high   triglycerides,   low HDL, mildly elevated LDL        Hearing Loss      Priority: Medium     Created by [facility] Annotation: Dec 21 2010  7:58AM - [facility], [place]: B   sensorineural loss, \"profound\" on L, \"severe\" on R.  [facility].  Has   hearing   aid.  Replacement Utility updated for latest IMO load        Lacunar Stroke      Priority: Medium     Created by Conversion  Replacement Utility updated for latest IMO load        Hiatal Hernia      Priority: Medium     Created by [facility] Annotation: Apr 18 2012 10:34PM -  ,  : small, seen on EGD  Replacement Utility updated for latest IMO load        Vitamin D Deficiency      Priority: Medium     Created by [facility] Annotation: Jan 17 2011 10:05AM Mindy Belleil: 8; vit D 50 K   rx  Replacement Utility updated for latest IMO load        Gastritis      Priority: Medium     Created by [facility] Annotation: Jun 13 2011 10:41AM Dave Todd: EGD 4/2012:   \"reactive gastropathy with chronic superimposed nonspecific chronic   inflammation (likely secondary to ASA, NSAIDS, EtOH or other irritants\"  Replacement Utility updated for latest IMO load        Constipation      Priority: Medium     Created by Conversion  Replacement Utility updated for latest IMO load        Elbow swelling, right 05/06/2016     Priority: Medium    Dermatitis 04/06/2016     Priority: Medium    Cervicalgia      Priority: Medium     Created by Conversion        Lumbar Canal Stenosis      " Priority: Medium     Created by Conversion        Lower Back Pain      Priority: Medium     Created by Conversion        Moderate Recurrent Major Depression      Priority: Medium     Created by Conversion        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      Priority: Medium     Created by Conversion  [facility] Annotation: Oct 17 2011 11:04Dave Umanzor: since lumbar   laminectomy        Abdominal Pain In The Right Lower Belly (RLQ)      Priority: Medium     Created by Conversion        Abdominal Pain In The Right Upper Belly (RUQ)      Priority: Medium     Created by Conversion        Memory Lapses Or Loss      Priority: Medium     Created by Conversion        Spinal Stenosis      Priority: Medium     Created by Conversion  [facility] Annotation: Jan 17 2011 10:05EARNEST Fang David: FUSION [id number]   RECURRED AT L3-L4 WITH LATERAL RECESS NARROWING 7/2012, see hospital notes        Dysthymia (Depressive Neurosis), Primary      Priority: Medium     Created by Conversion           Family History   Problem Relation Age of Onset    Coronary Artery Disease No family hx of     Hypertension No family hx of     Cerebrovascular Disease No family hx of        Past Surgical History:   Procedure Laterality Date    IR CEREBRAL ANGIOGRAM  4/27/2017    IR LUMBAR TRANSFORAMINAL EPIDURAL STRD INJ  7/2/2012    PICC  4/28/2017         RI ARTHRODESIS ANT INTERBODY MIN DISCECTOMY,LUMBAR      Description: Lumbar Vertebral Fusion;  Recorded: 07/16/2013;  Comments: 3/17/11 spinal decompression and fusion L4-5, L5-S1 for spinal stenosis, DDD, spondylolysis; Dr. Casillas [facility]    RI ESOPHAGOGASTRODUODENOSCOPY TRANSORAL DIAGNOSTIC      Description: Esophagogastroduodenoscopy;  Proc Date: 04/02/2012;  Comments: biosies:   reactive gastropathy with chronic superimposed nonspecific chronic inflammation (likely secondary to ASA, NSAIDS, EtOH or other irritants), NEG for h. pylori; small hiatal hernia    RI INJECT ANES/STEROID  FORAMEN LUMBAR/SACRAL W IMG GUIDE ,1 LEVEL      Description: Nerve Block Transforaminal Epidural Lumbar L3 - L4;  Recorded: 07/10/2012;  Comments: 7/2/2012 for severe low back pain, spinal stenosis and radiculopathy    THROMBECTOMY  04/24/2017    Rt CELIA    US ASPIRATION OR INJECTION MAJOR JOINT  10/23/2019    ZZC APPENDECTOMY      Description: Appendectomy;  Recorded: 07/12/2013;        Social History     Socioeconomic History    Marital status:      Spouse name: Not on file    Number of children: Not on file    Years of education: Not on file    Highest education level: Not on file   Occupational History    Not on file   Tobacco Use    Smoking status: Never     Passive exposure: Never    Smokeless tobacco: Never   Vaping Use    Vaping status: Never Used   Substance and Sexual Activity    Alcohol use: Never    Drug use: Never    Sexual activity: Not Currently     Partners: Female   Other Topics Concern    Not on file   Social History Narrative    ** Merged History Encounter **          Social Determinants of Health     Financial Resource Strain: Low Risk  (10/3/2023)    Financial Resource Strain     Within the past 12 months, have you or your family members you live with been unable to get utilities (heat, electricity) when it was really needed?: No   Food Insecurity: Low Risk  (10/3/2023)    Food Insecurity     Within the past 12 months, did you worry that your food would run out before you got money to buy more?: No     Within the past 12 months, did the food you bought just not last and you didn t have money to get more?: No   Transportation Needs: Low Risk  (10/3/2023)    Transportation Needs     Within the past 12 months, has lack of transportation kept you from medical appointments, getting your medicines, non-medical meetings or appointments, work, or from getting things that you need?: No   Physical Activity: Not on file   Stress: Not on file   Social Connections: Not on file   Interpersonal Safety:  Not on file   Housing Stability: Low Risk  (10/3/2023)    Housing Stability     Do you have housing? : Yes     Are you worried about losing your housing?: No

## 2024-05-22 NOTE — PATIENT INSTRUCTIONS
-Thank you for choosing the [facility].  -It was a pleasure to see you today.  -Please take a look at the information below for more specific details regarding the treatment plan and recommendations.  -In this after visit summary is a list of your medications and specific instructions.  Please review this carefully as there may be changes made to your medication list.  -If there are any particular questions or concerns, please feel free to reach out to Dr. Edwards.  -If any labs have been completed, we will reach out to you about results.  If the results are normal or not concerning, a letter or [employer] message will be sent to you.  If any follow-up is needed, either Dr. Edwards or the nurse will give you a call.  If you have not heard regarding results after 2 weeks, please reach out to the clinic.    Patient Instructions:    - and continue the mediations as prescribed.     -Increase your water intake. Try to drink 64 ounces of water each day.   -Limit foods that are rich in purines/uric acid, such as meats and organ meats.     -The Shingles/Shingrix and RSV vaccine is generally better covered by insurance companies if the vaccine is received at a pharmacy.  Please speak with your pharmacist regarding this vaccination as it is recommended for you.      Please seek immediate medical attention (go to the emergency room or urgent care) for the following reasons: worsening symptoms, or any concerning changes.      --------------------------------------------------------------------------------------------------------------------    -We are always looking for ways to improve.  You may be selected to receive a survey regarding your visit today.  We encourage you to complete the survey and provide specific, constructive feedback to help us improve our processes.  Thank you for your time!  -Please review the contact information listed on the after visit summary and in the electronic chart.  Below is  the phone number that we have on file.  If there are any changes that are needed to be made, please reach out to the clinic.  [phone] (home)

## 2024-05-23 LAB
ALBUMIN SERPL BCG-MCNC: 4.3 G/DL (ref 3.5–5.2)
ALP SERPL-CCNC: 113 U/L (ref 40–150)
ALT SERPL W P-5'-P-CCNC: 43 U/L (ref 0–70)
ANION GAP SERPL CALCULATED.3IONS-SCNC: 11 MMOL/L (ref 7–15)
AST SERPL W P-5'-P-CCNC: 32 U/L (ref 0–45)
BILIRUB DIRECT SERPL-MCNC: <0.2 MG/DL (ref 0–0.3)
BILIRUB SERPL-MCNC: 0.2 MG/DL
BUN SERPL-MCNC: 18.9 MG/DL (ref 8–23)
CALCIUM SERPL-MCNC: 9.9 MG/DL (ref 8.8–10.2)
CHLORIDE SERPL-SCNC: 103 MMOL/L (ref 98–107)
CHOLEST SERPL-MCNC: 258 MG/DL
CREAT SERPL-MCNC: 0.94 MG/DL (ref 0.67–1.17)
DEPRECATED HCO3 PLAS-SCNC: 23 MMOL/L (ref 22–29)
EGFRCR SERPLBLD CKD-EPI 2021: 86 ML/MIN/1.73M2
FASTING STATUS PATIENT QL REPORTED: NO
FASTING STATUS PATIENT QL REPORTED: NO
GLUCOSE SERPL-MCNC: 97 MG/DL (ref 70–99)
HDLC SERPL-MCNC: 32 MG/DL
LDLC SERPL CALC-MCNC: ABNORMAL MG/DL
LDLC SERPL DIRECT ASSAY-MCNC: 171 MG/DL
NONHDLC SERPL-MCNC: 226 MG/DL
POTASSIUM SERPL-SCNC: 4.4 MMOL/L (ref 3.4–5.3)
PROT SERPL-MCNC: 7.4 G/DL (ref 6.4–8.3)
SODIUM SERPL-SCNC: 137 MMOL/L (ref 135–145)
TRIGL SERPL-MCNC: 407 MG/DL

## 2024-06-04 PROCEDURE — 87338 HPYLORI STOOL AG IA: CPT | Performed by: FAMILY MEDICINE

## 2024-06-06 ENCOUNTER — TELEPHONE (OUTPATIENT)
Dept: FAMILY MEDICINE | Facility: CLINIC | Age: 74
End: 2024-06-06
Payer: COMMERCIAL

## 2024-06-06 LAB — H PYLORI AG STL QL IA: NEGATIVE

## 2024-06-06 NOTE — TELEPHONE ENCOUNTER
Call and spoke to patient and relayed the message below. Patient has no further questions or concerns at this time.

## 2024-06-06 NOTE — TELEPHONE ENCOUNTER
----- Message from Boston Edwards MD sent at 6/6/2024  1:29 PM CDT -----  Team - please call patient with results.     Toby Gaspar,    I hope you have been well since our last visit. Below are the results from the testing completed at the visit.     The stool test is normal.    Dr. Edwards recommends that you continue on the plan as discussed in clinic.    If there are any questions or concerns, please call the clinic or schedule an appointment for follow up.     Best wishes,           Boston Edwards MD  [facility]  6/6/2024  1:27 PM

## 2024-06-27 ENCOUNTER — PATIENT OUTREACH (OUTPATIENT)
Dept: GERIATRIC MEDICINE | Facility: CLINIC | Age: 74
End: 2024-06-27
Payer: COMMERCIAL

## 2024-06-27 NOTE — PROGRESS NOTES
[facility] Care Coordination Contact      [facility] Six-Month Telephone Assessment    6 month telephone assessment completed on 6/27/24.    ER visits: No  Hospitalizations: No  TCU stays: No  Significant health status changes: na  Falls/Injuries: No  ADL/IADL changes: No  Changes in services: No    Caregiver Assessment follow up:  na    Goals: See POC in chart for goal progress documentation.      Will see member in 6 months for an annual health risk assessment.   Encouraged member to call CC with any questions or concerns in the meantime.     Savanna Landaverde RN, PHN  [facility]  [phone]

## 2024-06-29 DIAGNOSIS — Z76.0 ENCOUNTER FOR MEDICATION REFILL: ICD-10-CM

## 2024-06-29 DIAGNOSIS — M1A.0390 IDIOPATHIC CHRONIC GOUT OF WRIST WITHOUT TOPHUS, UNSPECIFIED LATERALITY: ICD-10-CM

## 2024-07-01 RX ORDER — ALLOPURINOL 300 MG/1
TABLET ORAL
Qty: 90 TABLET | Refills: 3 | Status: SHIPPED | OUTPATIENT
Start: 2024-07-01

## 2024-07-03 ENCOUNTER — OFFICE VISIT (OUTPATIENT)
Dept: AUDIOLOGY | Facility: CLINIC | Age: 74
End: 2024-07-03
Payer: COMMERCIAL

## 2024-07-03 DIAGNOSIS — H90.3 SENSORINEURAL HEARING LOSS, BILATERAL: Primary | ICD-10-CM

## 2024-07-03 PROCEDURE — V5299 HEARING SERVICE: HCPCS | Performed by: AUDIOLOGIST

## 2024-07-03 PROCEDURE — V5266 BATTERY FOR HEARING DEVICE: HCPCS | Mod: NU | Performed by: AUDIOLOGIST

## 2024-07-03 NOTE — PROGRESS NOTES
AUDIOLOGY REPORT    SUBJECTIVE:Chasity Gaspar is a 74 year old male who was seen in the Audiology Clinic at the [facility] on 7/3/2024  for a hearing aid check. The patient was accompanied by a family member and a tatiana Lopez  (ID: [id number]). Previous results have revealed profound sensorineural hearing loss for the right ear and no measurable hearing for the left ear. The patient has been seen previously in this clinic and was fit with a right Phonak Nanci P70 - UP hearing aid with custom earmold on 8/15/2023. Today, Chasity reports that his hearing aid is not working and is intermittent. He has been using his old Phonak behind-the-ear hearing aid, but it is very quiet.     OBJECTIVE: Both patient's current and old hearing aids were cleaned and checked. Hearing aids were deep cleaned. Listening check for new device found sound to be cutting in and out with a lot of distortion. Tubing and tone hook on old hearing aid were replaced. Following cleaning, improvement in volume was noted for old hearing aid. Patient reported improvement in sound after devices were cleaned. Patient's new hearing aid will be sent in for in warranty repair. He would like it mailed to him when it returns from repair. He will continue using the old one in the meantime.     Per guidelines of patient's insurance, 24 units of size 675 batteries were dispensed today. Reviewed that patent is eligible for batteries once every 90 days, and how they can request new batteries.    ASSESSMENT: A cleaning and listening check were completed on both patient's current and previous hearing aids. Changes to hearing aid was completed as outlined above. Newer hearing aid was sent in for repair. Batteries provided as requested.     PLAN:Chasity will return for follow-up as needed, or at least every 6-9 months for cleaning and assessment of hearing aid.  Hearing aid will be mailed to patient when it is back from repair.  Please call this clinic with any questions regarding today s appointment.    SKY Villalta.  Audiology Doctoral Student    I was present with the patient for the entire Audiology appointment including all reporting/procedures/testing performed by the AuD student, and agree with the assessment and plan as documented.     Dante Guevara. CCC-A  Licensed Audiologist   [place] #[id number]

## 2024-10-08 ENCOUNTER — TELEPHONE (OUTPATIENT)
Dept: FAMILY MEDICINE | Facility: CLINIC | Age: 74
End: 2024-10-08
Payer: COMMERCIAL

## 2024-10-08 NOTE — TELEPHONE ENCOUNTER
Forms/Letter Request    Type of form/letter: DME (wheelchair, hospital bed)    Type of DME requested: PULL UP AND CHUCKS    Do we have the form/letter: Yes: incoming faxed    Who is the form from? [facility] (if other please explain)    Where did/will the form come from? form was faxed in    When is form/letter needed by: ASAP    How would you like the form/letter returned: Fax : [phone]

## 2024-10-09 ENCOUNTER — VIRTUAL VISIT (OUTPATIENT)
Dept: INTERPRETER SERVICES | Facility: CLINIC | Age: 74
End: 2024-10-09

## 2024-10-09 ENCOUNTER — OFFICE VISIT (OUTPATIENT)
Dept: FAMILY MEDICINE | Facility: CLINIC | Age: 74
End: 2024-10-09
Payer: COMMERCIAL

## 2024-10-09 VITALS
SYSTOLIC BLOOD PRESSURE: 124 MMHG | BODY MASS INDEX: 26.19 KG/M2 | HEIGHT: 60 IN | TEMPERATURE: 98.3 F | RESPIRATION RATE: 14 BRPM | DIASTOLIC BLOOD PRESSURE: 78 MMHG | OXYGEN SATURATION: 97 % | WEIGHT: 133.4 LBS | HEART RATE: 75 BPM

## 2024-10-09 DIAGNOSIS — I63.421 CEREBRAL INFARCTION DUE TO EMBOLISM OF RIGHT ANTERIOR CEREBRAL ARTERY (H): ICD-10-CM

## 2024-10-09 DIAGNOSIS — Z23 NEED FOR VACCINATION: ICD-10-CM

## 2024-10-09 DIAGNOSIS — L85.3 DRY SKIN: ICD-10-CM

## 2024-10-09 DIAGNOSIS — R52 PAIN: ICD-10-CM

## 2024-10-09 DIAGNOSIS — I65.8 OCCLUSION AND STENOSIS OF MULTIPLE AND BILATERAL PRECEREBRAL ARTERIES: ICD-10-CM

## 2024-10-09 DIAGNOSIS — E78.5 HYPERLIPIDEMIA LDL GOAL <100: ICD-10-CM

## 2024-10-09 DIAGNOSIS — Z86.0100 HISTORY OF COLONIC POLYPS: ICD-10-CM

## 2024-10-09 DIAGNOSIS — K21.00 GASTROESOPHAGEAL REFLUX DISEASE WITH ESOPHAGITIS WITHOUT HEMORRHAGE: ICD-10-CM

## 2024-10-09 DIAGNOSIS — Z00.00 ENCOUNTER FOR ANNUAL PHYSICAL EXAM: Primary | ICD-10-CM

## 2024-10-09 DIAGNOSIS — H91.90 HEARING LOSS, UNSPECIFIED HEARING LOSS TYPE, UNSPECIFIED LATERALITY: ICD-10-CM

## 2024-10-09 DIAGNOSIS — E78.41 ELEVATED LIPOPROTEIN(A): ICD-10-CM

## 2024-10-09 DIAGNOSIS — M1A.0390 IDIOPATHIC CHRONIC GOUT OF WRIST WITHOUT TOPHUS, UNSPECIFIED LATERALITY: ICD-10-CM

## 2024-10-09 DIAGNOSIS — Z12.11 COLON CANCER SCREENING: ICD-10-CM

## 2024-10-09 DIAGNOSIS — L29.9 ITCHY SKIN: ICD-10-CM

## 2024-10-09 DIAGNOSIS — G81.94 LEFT HEMIPARESIS (H): ICD-10-CM

## 2024-10-09 LAB
ALBUMIN SERPL BCG-MCNC: 4.3 G/DL (ref 3.5–5.2)
ALP SERPL-CCNC: 112 U/L (ref 40–150)
ALT SERPL W P-5'-P-CCNC: 37 U/L (ref 0–70)
AST SERPL W P-5'-P-CCNC: 25 U/L (ref 0–45)
BILIRUB DIRECT SERPL-MCNC: <0.2 MG/DL (ref 0–0.3)
BILIRUB SERPL-MCNC: 0.4 MG/DL
CHOLEST SERPL-MCNC: 251 MG/DL
FASTING STATUS PATIENT QL REPORTED: NO
HDLC SERPL-MCNC: 36 MG/DL
LDLC SERPL CALC-MCNC: 150 MG/DL
NONHDLC SERPL-MCNC: 215 MG/DL
PROT SERPL-MCNC: 7.7 G/DL (ref 6.4–8.3)
TRIGL SERPL-MCNC: 323 MG/DL

## 2024-10-09 PROCEDURE — 99397 PER PM REEVAL EST PAT 65+ YR: CPT | Mod: 25 | Performed by: FAMILY MEDICINE

## 2024-10-09 PROCEDURE — 99214 OFFICE O/P EST MOD 30 MIN: CPT | Mod: 25 | Performed by: FAMILY MEDICINE

## 2024-10-09 PROCEDURE — 80076 HEPATIC FUNCTION PANEL: CPT | Performed by: FAMILY MEDICINE

## 2024-10-09 PROCEDURE — 80061 LIPID PANEL: CPT | Performed by: FAMILY MEDICINE

## 2024-10-09 PROCEDURE — 36415 COLL VENOUS BLD VENIPUNCTURE: CPT | Performed by: FAMILY MEDICINE

## 2024-10-09 PROCEDURE — T1013 SIGN LANG/ORAL INTERPRETER: HCPCS

## 2024-10-09 PROCEDURE — 90471 IMMUNIZATION ADMIN: CPT | Performed by: FAMILY MEDICINE

## 2024-10-09 PROCEDURE — 90662 IIV NO PRSV INCREASED AG IM: CPT | Performed by: FAMILY MEDICINE

## 2024-10-09 RX ORDER — DUTASTERIDE 0.5 MG/1
1 CAPSULE, LIQUID FILLED ORAL DAILY
COMMUNITY
Start: 2024-09-16

## 2024-10-09 RX ORDER — MINERAL OIL/HYDROPHIL PETROLAT
OINTMENT (GRAM) TOPICAL PRN
Qty: 396 G | Refills: 5 | Status: SHIPPED | OUTPATIENT
Start: 2024-10-09

## 2024-10-09 RX ORDER — CETIRIZINE HYDROCHLORIDE 10 MG/1
10 TABLET ORAL DAILY PRN
Qty: 30 TABLET | Refills: 2 | Status: SHIPPED | OUTPATIENT
Start: 2024-10-09

## 2024-10-09 RX ORDER — ALLOPURINOL 300 MG/1
TABLET ORAL
Qty: 90 TABLET | Refills: 3 | Status: SHIPPED | OUTPATIENT
Start: 2024-10-09

## 2024-10-09 SDOH — HEALTH STABILITY: PHYSICAL HEALTH: ON AVERAGE, HOW MANY DAYS PER WEEK DO YOU ENGAGE IN MODERATE TO STRENUOUS EXERCISE (LIKE A BRISK WALK)?: 0 DAYS

## 2024-10-09 SDOH — HEALTH STABILITY: PHYSICAL HEALTH: ON AVERAGE, HOW MANY MINUTES DO YOU ENGAGE IN EXERCISE AT THIS LEVEL?: 0 MIN

## 2024-10-09 ASSESSMENT — PATIENT HEALTH QUESTIONNAIRE - PHQ9
SUM OF ALL RESPONSES TO PHQ QUESTIONS 1-9: 0
10. IF YOU CHECKED OFF ANY PROBLEMS, HOW DIFFICULT HAVE THESE PROBLEMS MADE IT FOR YOU TO DO YOUR WORK, TAKE CARE OF THINGS AT HOME, OR GET ALONG WITH OTHER PEOPLE: VERY DIFFICULT
SUM OF ALL RESPONSES TO PHQ QUESTIONS 1-9: 0

## 2024-10-09 ASSESSMENT — SOCIAL DETERMINANTS OF HEALTH (SDOH): HOW OFTEN DO YOU GET TOGETHER WITH FRIENDS OR RELATIVES?: ONCE A WEEK

## 2024-10-09 NOTE — PROGRESS NOTES
Preventive Care Visit  [facility] ROSALINE Edwards MD, Family Medicine  Oct 9, 2024      Assessment & Plan     Patient Instructions:    -Take the medications as prescribed.    -You are doing great.  -Continue to eat well.  Try to increase your servings of calcium as this can help your bones stay strong and healthy.  Follow a nutrition plan rich in fruits and vegetables and low in fats and cholesterol.    -Be sure to eat 5-7 servings of fruits and vegetables each day.  -Find ways to stay active.  Try to get 150 minutes of moderate activity (where you are breathing faster and slightly sweating) each week.  -Try to maintain a body mass index (BMI) of 18.5-25 as this is considered a healthier weight range.  -Brush your teeth twice daily.  See a dentist every 6-12 months.  -Be sure to use sunblock with SPF 15 or greater when going outside for extended periods of time.  Sunblock should be used even when it is a cloudy day.  Do intermittent skin checks for any concerning skin changes.  Wearing a wide brimmed hat and sunglasses can also be helpful to protect your skin from the sun.  -Monitor for any abnormal skin changes (such as new moles/spots, painful moles, changes in your old moles, wounds that will not heal, multiple colors noted in one lesion, lesions that are asymmetric or not circular, or anything that is concerning for you). If any of these are noted, please schedule an appointment to be seen.     -It is generally recommended for you to complete a health care directive or living will. These documents will be able to reflect your wishes and desire in the case that you are unable to express them yourself. Please let Dr. Edwards know if you would like some assistance with this process.    -For the preventive health procedure (such as colonoscopy, mammogram, etc) order from today, if you do not hear within a week's time from the specialist to schedule an appointment, please call the [facility]  and speak with the specialty  to help you schedule the appointment. Depending on the specialist availability, it may be a number of weeks prior to your scheduled appointment.    -The Shingles/Shingrix vaccine is generally better covered by insurance companies if the vaccine is received at a pharmacy.  Please speak with your pharmacist regarding this vaccination as it is recommended for you.    -It is important to moisturize the skin on a daily basis, especially during wintertime as the air is dry and can worsen the underlying skin dryness.  Common moisturizing over-the-counter options include: Aquaphor, Vaseline, Vanicream, night balms, various lotions.         -Topical steroid medications can be utilized to help control severe eczema or dry skin, though be cautious with usage of the medication as the topical steroids can cause the skin to become darker/lighter, thin the skin, melt/reduce the fat underneath, etc. Only apply the topical steroid medications on areas that have severely dried and thickened skin.  Avoid use of the medication on normal skin or on the face/groin/armpits unless directed otherwise.  -When bathing, try to limit bathing to 2-3 times a week (or when visibly dirty) and to 10 minutes or less with each bathing session.  It is best to use warm water when bathing as water can worsen the dry skin.  -Try oatmeal baths as this may help with moisturization as well.  -Right after bathing, it is recommended to apply the moisturizer.    Please seek immediate medical attention (go to the emergency room or urgent care) for the following reasons: worsening symptoms, or any concerning changes.      Chasity was seen today for wellness visit.    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 Atorvastatin increased from 40mg to 80 mg at bedtime.   -     Lipid panel reflex to direct LDL Fasting; Future  -     Hepatic function panel; Future    Gastroesophageal reflux disease with esophagitis without hemorrhage: stable. Refill as below.   -     omeprazole (PRILOSEC) 20 MG DR capsule; Take 1 capsule (20 mg) by mouth daily as needed (reflux).    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 Continue plan as below.   -     allopurinol (ZYLOPRIM) 300 MG tablet; TAKE 1 TABLET BY MOUTH DAILY    Dry skin  Itchy skin: plan as below.  -     cetirizine (ZYRTEC) 10 MG tablet; Take 1 tablet (10 mg) by mouth daily as needed for allergies (itching).  -     mineral oil-hydrophilic petrolatum (AQUAPHOR) external ointment; Apply topically as needed for dry skin (itchy skin).    Need for vaccination  -     INFLUENZA HIGH DOSE, TRIVALENT, PF (FLUZONE)    Hearing loss, unspecified hearing loss type, unspecified laterality: has hearing aid on right. Working well for him.     Pain  -     acetaminophen 500 MG CAPS; Take 2 tablets by mouth 3 times daily as needed (pain, fever, headaches).        Patient has been advised of split billing requirements and indicates understanding: Yes        BMI  Estimated body mass index is 27.29 kg/m  as calculated from the following:    Height as of this encounter: 1.489 m (4' 10.62\").    Weight as of this encounter: 60.5 kg (133 lb 6.4 oz).       Counseling  Appropriate preventive services were addressed with this patient via screening, questionnaire, or discussion as appropriate for fall prevention, nutrition, physical activity, Tobacco-use cessation, social engagement, weight loss and cognition.  Checklist reviewing preventive services available has been given to the patient.  Reviewed patient's diet, addressing concerns and/or questions.   The patient was " provided with written information regarding signs of hearing loss.         Subjective   Naw is a 74 year old, presenting for the following:  Wellness Visit        10/9/2024    11:01 AM   Additional Questions   Roomed by stoney [employer] professional Jessica  was utilized for the office visit.    Health Care Directive  Patient does not have a Health Care Directive or Living Will: Discussed advance care planning with patient; information given to patient to review.    HPI    Itchy skin: noted everywhere. Started a few days ago. A rash is note noted. Itchiness is noted mostly at night. He scratches his back a lot.     Hyperlipidemia: on atorvastatin 40mg nightly. Taking as prescribed.     -Reviewed healthy eating and exercise.  -Skin cancer screening: No concerning skin changes expressed by patient.  -Smoking status:   Tobacco Use      Smoking status: Never        Passive exposure: Never      Smokeless tobacco: Never        -Family history:   Family History   Problem Relation Age of Onset    Coronary Artery Disease No family hx of     Hypertension No family hx of     Cerebrovascular Disease No family hx of        Preventative health recommendations, evaluation options, and risk/benefits of each were discussed with patient. Accepted recommendations were ordered. Otherwise, patient declined.  Health Maintenance Due   Topic Date Due    URINE DRUG SCREEN  Never done    DEPRESSION ACTION PLAN  Never done    ZOSTER IMMUNIZATION (1 of 2) Never done    COVID-19 Vaccine (3 - Moderna risk series) 01/14/2022    ANNUAL REVIEW OF HM ORDERS  08/02/2024    INFLUENZA VACCINE (1) 09/01/2024    MEDICARE ANNUAL WELLNESS VISIT  10/03/2024       -Immunizations due were reviewed.    Immunization History   Administered Date(s) Administered    COVID-19 Monovalent 18+ (Moderna) 04/18/2021, 05/16/2021    COVID-19 Monovalent Booster 18+ (Moderna) 12/17/2021    Flu, Unspecified 12/13/2010, 10/17/2011, 09/20/2016    HepB,  Unspecified 08/15/2007    Hepatitis B, Adult 08/15/2007    Influenza (High Dose) Trivalent,PF (Fluzone) 09/12/2017, 10/24/2018    Influenza (IIV3) PF 12/13/2010, 10/17/2011, 10/12/2012, 09/24/2013, 09/29/2014    Influenza Vaccine 65+ (Fluzone HD) 09/30/2021, 11/25/2022    Influenza Vaccine, 6+MO IM (QUADRIVALENT W/PRESERVATIVES) 10/12/2012, 09/24/2013, 09/29/2014, 09/25/2015, 09/20/2016, 10/11/2020    Influenza, seasonal, injectable, PF 11/22/2009    MMR 08/15/2007, 05/29/2009    Pneumo Conj 13-V (2010&after) 04/09/2015, 09/12/2017    Pneumococcal 23 valent 10/17/2018    TD,PF 7+ (Tenivac) 08/01/2019    TDAP (Adacel,Boostrix) 08/04/2008    Td (Adult), Adsorbed 08/15/2007, 05/29/2009    Td, Absorbed, Pf, Adult, Lf Unspecified 08/01/2019    Td,adult,historic,unspecified 08/15/2007, 05/29/2009    Varicella 08/15/2007, 08/04/2008       -Labs: Laboratory recommendations reviewed with patient.    -Colon cancer screening: Last colonoscopy: 08/08/2019: noted to have colon polyps. Plan for repeat colonoscopy in 5 years. Due at this time. Discussed recommendations.             10/9/2024   General Health   How would you rate your overall physical health? (!) FAIR   Feel stress (tense, anxious, or unable to sleep) Patient declined            10/9/2024   Nutrition   Diet: Regular (no restrictions)            10/9/2024   Exercise   Days per week of moderate/strenous exercise 0 days   Average minutes spent exercising at this level 0 min      (!) EXERCISE CONCERN      10/9/2024   Social Factors   Frequency of gathering with friends or relatives Once a week   Worry food won't last until get money to buy more No   Food not last or not have enough money for food? No   Do you have housing? (Housing is defined as stable permanent housing and does not include staying ouside in a car, in a tent, in an abandoned building, in an overnight shelter, or couch-surfing.) Yes   Are you worried about losing your housing? No   Lack of  transportation? No   Unable to get utilities (heat,electricity)? No            10/9/2024   Fall Risk   Fallen 2 or more times in the past year? No   Trouble with walking or balance? Yes   Gait Speed Test (Document in seconds) 5.2             10/9/2024   Activities of Daily Living- Home Safety   Needs help with the following daily activites None of the above   Safety concerns in the home None of the above            10/9/2024   Dental   Dentist two times every year? (!) DECLINE            10/9/2024   Hearing Screening   Hearing concerns? (!) TROUBLE UNDESTANDING A SPEAKER IN A PUBLIC MEETING OR [employer] SERVICE.            10/9/2024   Driving Risk Screening   Patient/family members have concerns about driving No            10/9/2024   General Alertness/Fatigue Screening   Have you been more tired than usual lately? (!) DECLINE            10/9/2024   Urinary Incontinence Screening   Bothered by leaking urine in past 6 months No            10/9/2024   TB Screening   Were you born outside of the [place]? Yes          Today's PHQ-9 Score:       10/9/2024    10:26 AM   PHQ-9 SCORE   PHQ-9 Total Score MyChart 0   PHQ-9 Total Score 0         10/9/2024   Substance Use   Alcohol more than 3/day or more than 7/wk No   Do you have a current opioid prescription? No   How severe/bad is pain from 1 to 10? 7/10   Do you use any other substances recreationally? No    (!) DECLINE       Multiple values from one day are sorted in reverse-chronological order     Social History     Tobacco Use    Smoking status: Never     Passive exposure: Never    Smokeless tobacco: Never   Vaping Use    Vaping status: Never Used   Substance Use Topics    Alcohol use: Never    Drug use: Never           10/9/2024   AAA Screening   Family history of Abdominal Aortic Aneurysm (AAA)? No      ASCVD Risk   The ASCVD Risk score (Jodie DUNHAM, et al., 2019) failed to calculate for the following reasons:    The patient has a prior MI or stroke  diagnosis      Reviewed and updated as needed this visit by Provider                    Past Medical History:   Diagnosis Date    Cervicalgia     Depression     Dyslipidemia     Dysthymia     Gastritis     GERD (gastroesophageal reflux disease)     Hearing loss     Hiatal hernia     Hypertriglyceridemia     Insomnia     Lacunar stroke (H)     Lumbar canal stenosis     Lumbar radiculopathy     Myalgia and myositis      Past Surgical History:   Procedure Laterality Date    IR CEREBRAL ANGIOGRAM  4/27/2017    IR LUMBAR TRANSFORAMINAL EPIDURAL STRD INJ  7/2/2012    PICC  4/28/2017         IL ARTHRODESIS ANT INTERBODY MIN DISCECTOMY,LUMBAR      Description: Lumbar Vertebral Fusion;  Recorded: 07/16/2013;  Comments: 3/17/11 spinal decompression and fusion L4-5, L5-S1 for spinal stenosis, DDD, spondylolysis; Dr. Casillas [facility]    IL ESOPHAGOGASTRODUODENOSCOPY TRANSORAL DIAGNOSTIC      Description: Esophagogastroduodenoscopy;  Proc Date: 04/02/2012;  Comments: biosies:   reactive gastropathy with chronic superimposed nonspecific chronic inflammation (likely secondary to ASA, NSAIDS, EtOH or other irritants), NEG for h. pylori; small hiatal hernia    IL INJECT ANES/STEROID FORAMEN LUMBAR/SACRAL W IMG GUIDE ,1 LEVEL      Description: Nerve Block Transforaminal Epidural Lumbar L3 - L4;  Recorded: 07/10/2012;  Comments: 7/2/2012 for severe low back pain, spinal stenosis and radiculopathy    THROMBECTOMY  04/24/2017    Rt CELIA    US ASPIRATION OR INJECTION MAJOR JOINT  10/23/2019    ZC APPENDECTOMY      Description: Appendectomy;  Recorded: 07/12/2013;     Current providers sharing in care for this patient include:  Patient Care Team:  Boston Edwards MD as PCP - General (Family Medicine)  Savanna Landaverde RN as Lead Care Coordinator (Primary Care - CC)  Ben Capellan MD as Referring Physician (Family Medicine)  Leon Orlando MD as MD (Dermatology)  Thee Velarde DO as Assigned Neuroscience  "Provider  Adri Medel AuD as Audiologist (Audiology)  Elis Helton NP as Assigned Surgical Provider  Boston Edwards MD as Assigned PCP    The following health maintenance items are reviewed in Epic and correct as of today:  Health Maintenance   Topic Date Due    URINE DRUG SCREEN  Never done    DEPRESSION ACTION PLAN  Never done    ZOSTER IMMUNIZATION (1 of 2) Never done    COVID-19 Vaccine (3 - Moderna risk series) 01/14/2022    ANNUAL REVIEW OF HM ORDERS  08/02/2024    INFLUENZA VACCINE (1) 09/01/2024    MEDICARE ANNUAL WELLNESS VISIT  10/03/2024    PHQ-9  04/09/2025    BMP  05/22/2025    LIPID  05/22/2025    FALL RISK ASSESSMENT  10/09/2025    RSV VACCINE (1 - 1-dose 75+ series) 01/01/2027    GLUCOSE  05/22/2027    ADVANCE CARE PLANNING  10/03/2028    DTAP/TDAP/TD IMMUNIZATION (6 - Td or Tdap) 08/01/2029    COLORECTAL CANCER SCREENING  08/08/2029    HEPATITIS C SCREENING  Completed    Pneumococcal Vaccine: 65+ Years  Completed    HPV IMMUNIZATION  Aged Out    MENINGITIS IMMUNIZATION  Aged Out    RSV MONOCLONAL ANTIBODY  Aged Out       The 10 point review of system was negative unless otherwise stated in the HPI.       Objective    Exam  /78   Pulse 75   Temp 98.3  F (36.8  C) (Oral)   Resp 14   Ht 1.489 m (4' 10.62\")   Wt 60.5 kg (133 lb 6.4 oz)   SpO2 97%   BMI 27.29 kg/m     Estimated body mass index is 27.29 kg/m  as calculated from the following:    Height as of this encounter: 1.489 m (4' 10.62\").    Weight as of this encounter: 60.5 kg (133 lb 6.4 oz).    Physical Exam  GENERAL: alert and no distress  EYES: Eyes grossly normal to inspection, PERRL and conjunctivae and sclerae normal  HENT: ear canals and TM's normal, nose and mouth without ulcers or lesions  NECK: no adenopathy, no asymmetry, masses, or scars  RESP: lungs clear to auscultation - no rales, rhonchi or wheezes  CV: regular rate and rhythm, normal S1 S2, no S3 or S4, no murmur, click or rub, no peripheral " edema  ABDOMEN: soft, nontender, no hepatosplenomegaly, no masses and bowel sounds normal  MS: no gross musculoskeletal defects noted, no edema  SKIN: no suspicious lesions or rashes  NEURO: Normal strength and tone, mentation intact and speech normal  PSYCH: mentation appears normal, affect normal/bright        10/9/2024   Mini Cog   Mini-Cog Not Completed (choose reason) Deaf/hard of hearing   Clock Draw Score 0 Abnormal        Grew up in different culture.       Signed Electronically by:     Boston Edwards MD  [facility] [place] [address]  Phone: [phone]  Fax: [phone]    10/15/2024  5:48 AM      Answers submitted by the patient for this visit:  Patient Health Questionnaire (Submitted on 10/9/2024)  If you checked off any problems, how difficult have these problems made it for you to do your work, take care of things at home, or get along with other people?: Very difficult  PHQ9 TOTAL SCORE: 0

## 2024-10-09 NOTE — PATIENT INSTRUCTIONS
-Thank you for choosing the [facility].  -It was a pleasure to see you today.  -Please take a look at the information below for more specific details regarding the treatment plan and recommendations.  -In this after visit summary is a list of your medications and specific instructions.  Please review this carefully as there may be changes made to your medication list.  -If there are any particular questions or concerns, please feel free to reach out to Dr. Edwards.  -If any labs have been completed, we will reach out to you about results.  If the results are normal or not concerning, a letter or MyChart message will be sent to you.  If any follow-up is needed, either Dr. Edwards or the nurse will give you a call.  If you have not heard regarding results after 2 weeks, please reach out to the clinic.    Patient Instructions:    -Take the medications as prescribed.    -You are doing great.  -Continue to eat well.  Try to increase your servings of calcium as this can help your bones stay strong and healthy.  Follow a nutrition plan rich in fruits and vegetables and low in fats and cholesterol.    -Be sure to eat 5-7 servings of fruits and vegetables each day.  -Find ways to stay active.  Try to get 150 minutes of moderate activity (where you are breathing faster and slightly sweating) each week.  -Try to maintain a body mass index (BMI) of 18.5-25 as this is considered a healthier weight range.  -Brush your teeth twice daily.  See a dentist every 6-12 months.  -Be sure to use sunblock with SPF 15 or greater when going outside for extended periods of time.  Sunblock should be used even when it is a cloudy day.  Do intermittent skin checks for any concerning skin changes.  Wearing a wide brimmed hat and sunglasses can also be helpful to protect your skin from the sun.  -Monitor for any abnormal skin changes (such as new moles/spots, painful moles, changes in your old moles, wounds that will not heal,  multiple colors noted in one lesion, lesions that are asymmetric or not circular, or anything that is concerning for you). If any of these are noted, please schedule an appointment to be seen.     -It is generally recommended for you to complete a health care directive or living will. These documents will be able to reflect your wishes and desire in the case that you are unable to express them yourself. Please let Dr. Edwards know if you would like some assistance with this process.    -For the preventive health procedure (such as colonoscopy, mammogram, etc) order from today, if you do not hear within a week's time from the specialist to schedule an appointment, please call the [facility] and speak with the specialty  to help you schedule the appointment. Depending on the specialist availability, it may be a number of weeks prior to your scheduled appointment.    -The Shingles/Shingrix vaccine is generally better covered by insurance companies if the vaccine is received at a pharmacy.  Please speak with your pharmacist regarding this vaccination as it is recommended for you.    -It is important to moisturize the skin on a daily basis, especially during wintertime as the air is dry and can worsen the underlying skin dryness.  Common moisturizing over-the-counter options include: Aquaphor, Vaseline, Vanicream, night balms, various lotions.         -Topical steroid medications can be utilized to help control severe eczema or dry skin, though be cautious with usage of the medication as the topical steroids can cause the skin to become darker/lighter, thin the skin, melt/reduce the fat underneath, etc. Only apply the topical steroid medications on areas that have severely dried and thickened skin.  Avoid use of the medication on normal skin or on the face/groin/armpits unless directed otherwise.  -When bathing, try to limit bathing to 2-3 times a week (or when visibly dirty) and to 10 minutes or less with  each bathing session.  It is best to use warm water when bathing as water can worsen the dry skin.  -Try oatmeal baths as this may help with moisturization as well.  -Right after bathing, it is recommended to apply the moisturizer.    Please seek immediate medical attention (go to the emergency room or urgent care) for the following reasons: worsening symptoms, or any concerning changes.      --------------------------------------------------------------------------------------------------------------------    -We are always looking for ways to improve.  You may be selected to receive a survey regarding your visit today.  We encourage you to complete the survey and provide specific, constructive feedback to help us improve our processes.  Thank you for your time!  -Please review the contact information listed on the after visit summary and in the electronic chart.  Below is the phone number that we have on file.  If there are any changes that are needed to be made, please reach out to the clinic.  [phone] (home)

## 2024-10-10 RX ORDER — ATORVASTATIN CALCIUM 80 MG/1
80 TABLET, FILM COATED ORAL AT BEDTIME
Qty: 90 TABLET | Refills: 3 | Status: SHIPPED | OUTPATIENT
Start: 2024-10-10

## 2024-10-11 ENCOUNTER — TELEPHONE (OUTPATIENT)
Dept: FAMILY MEDICINE | Facility: CLINIC | Age: 74
End: 2024-10-11
Payer: COMMERCIAL

## 2024-10-11 NOTE — TELEPHONE ENCOUNTER
----- Message from Boston Edwards sent at 10/10/2024  7:43 AM CDT -----  Team - please call patient with results.    Toby Gaspar,    I hope you have been well since our last visit. Below are the results from the testing completed at the visit.     The following results were normal or not concerning: liver function.     The cholesterol is still high. Dr. Edwards would recommend increasing the atorvastatin medication from 40mg to 80mg once nightly. A new prescription has been sent for you. Please  and take the atorvastatin as prescribed.     Otherwise, Dr. Edwards recommends that you continue on the plan as discussed in clinic.    If there are any questions or concerns, please call the clinic or schedule an appointment for follow up.     Best wishes,           Boston Edwards MD  [facility]  10/10/2024  7:41 AM

## 2024-10-11 NOTE — TELEPHONE ENCOUNTER
Called patient and relayed the following message to the patient. Patient understood the message and did not have any further questions or concerns.      Patient already  the medication and take the atorvastatin as prescribed.

## 2024-10-14 ENCOUNTER — MEDICAL CORRESPONDENCE (OUTPATIENT)
Dept: HEALTH INFORMATION MANAGEMENT | Facility: CLINIC | Age: 74
End: 2024-10-14
Payer: COMMERCIAL

## 2024-10-15 ENCOUNTER — MEDICAL CORRESPONDENCE (OUTPATIENT)
Dept: HEALTH INFORMATION MANAGEMENT | Facility: CLINIC | Age: 74
End: 2024-10-15
Payer: COMMERCIAL

## 2024-10-16 NOTE — TELEPHONE ENCOUNTER
Form reviewed, completed, and signed by physician. Returned to sender as requested. Copied to EHR scanning.

## 2024-10-21 ENCOUNTER — PATIENT OUTREACH (OUTPATIENT)
Dept: GERIATRIC MEDICINE | Facility: CLINIC | Age: 74
End: 2024-10-21
Payer: COMMERCIAL

## 2024-10-21 NOTE — PROGRESS NOTES
[facility] Care Coordination Contact    Called member and adult daughter Thu  to schedule annual HRA home visit. HRA has been scheduled for 10/21/24.    Savanna Landaverde RN, PHN  [facility]  [phone]

## 2024-10-22 ENCOUNTER — PATIENT OUTREACH (OUTPATIENT)
Dept: GERIATRIC MEDICINE | Facility: CLINIC | Age: 74
End: 2024-10-22
Payer: COMMERCIAL

## 2024-10-22 ASSESSMENT — ACTIVITIES OF DAILY LIVING (ADL): DEPENDENT_IADLS:: CLEANING;COOKING;LAUNDRY;SHOPPING;MEAL PREPARATION;MEDICATION MANAGEMENT;MONEY MANAGEMENT;INCONTINENCE

## 2024-10-23 NOTE — PROGRESS NOTES
[facility] Care Coordination Contact    [facility] Home Visit Assessment     Home visit for Health Risk Assessment with Chasity Gaspar completed on October 22, 2024    Type of residence:: Private home - stairs  Current living arrangement:: I live in a private home with family     Assessment completed with:: Children    Current Care Plan  Member currently receiving the following home care services:     Member currently receiving the following community resources: PCA      Medication Review  Medication reconciliation completed in Epic: Yes  Medication set-up & administration: Family/informal caregiver sets up weekly.  Self-administers medications.  Medication Risk Assessment Medication (1 or more, place referral to MTM): N/A: No risk factors identified  MTM Referral Placed: No: No risk factors idenified    Mental/Behavioral Health   Depression Screening:           Mental health DX:: No        Falls Assessment:       Any fall with injury in the past year?: No    ADL/IADL Dependencies:   Dependent ADLs:: Ambulation-walker, Bathing, Dressing, Eating, Grooming, Incontinence, Positioning, Transfers, Wheelchair-with assist, Toileting  Dependent IADLs:: Cleaning, Cooking, Laundry, Shopping, Meal Preparation, Medication Management, Money Management, Incontinence    Health Plan sponsored benefits: [employer]: Shared information regarding preventative health screening and health plan supplemental benefits/incentives. Reviewed medication disposal form.    PCA Assessment completed at visit: Yes Annual PCA assessment indicated 11 hours per day of PCA. This is the same as the previous assessment.     Elderly Waiver Eligibility: Yes, but member declines EW services; will not open to EW    Care Plan & Recommendations:     [place].    Follow-Up Plan: Member informed of future contact, plan to f/u with member with a 6 month telephone assessment.  Contact information shared  with member and family, encouraged member to call with any questions or concerns at any time.    [place] care continuum providers: Please see Snapshot and Care Management Flowsheets for Specific details of care plan.    This CC note routed to PCP, Boston Edwards.    Savanna Landaverde RN, PHN  [facility]  [phone]

## 2024-11-04 ENCOUNTER — PATIENT OUTREACH (OUTPATIENT)
Dept: GERIATRIC MEDICINE | Facility: CLINIC | Age: 74
End: 2024-11-04
Payer: COMMERCIAL

## 2024-11-04 NOTE — PROGRESS NOTES
[facility] Care Coordination Contact    [facility]:  Emailed required PCA documents plus PCA.CFSS Comm Forms  to [facility].  Faxed copy of PCA assessment to PCA Agency and mailed copy to member.  Faxed MD Communication to PCP.     Van Brenner  Care Management Specialist  [facility]  [phone]

## 2024-11-18 ENCOUNTER — PATIENT OUTREACH (OUTPATIENT)
Dept: GERIATRIC MEDICINE | Facility: CLINIC | Age: 74
End: 2024-11-18
Payer: COMMERCIAL

## 2024-11-18 NOTE — PROGRESS NOTES
[facility] Care Coordination Contact    Received after visit chart from care coordinator.  Completed following tasks: Mailed copy of support plan to member, Mailed [place] Cho[place] signature sheet pages 3-4, Mailed Safe Medication Disposal , and Updated services in Database. Added consultation agency to Communication Form and sent to [facility].    and Provider Signature - No Support Plan Shared:  Member indicates that they do not want their support plan shared with any EW providers.    Van Brenner  Care Management Specialist  [facility]  [phone]

## 2024-11-18 NOTE — LETTER
November 18, 2024       CHASITY TERRAZAS  [address] [place] [address]      Dear Chasity,    At [facility], we re dedicated to improving your health and wellness. Enclosed is the Support Plan developed with you on 10/22/2024. Please review the Support Plan carefully.    As a reminder, during your visit we talked about:   Ways to manage your physical and mental health   Using health care to maintain and improve your health    Your preventive care needs      Remember to contact your care coordinator if you:   Are hospitalized or plan to be hospitalized    Have a fall     Have a change in your physical or mental health   Need help finding support or services    If you have questions or don t agree with your Support Plan, call me at [phone]. You can also call me if your needs change. TTY users call the [place] Relay at 833 or 1-844.496.6561 (sjcpfv-er-rnfvjg relay service).    Sincerely,       Savanna Landaverde RN  [phone]  [email or web address]                [id number] accepted     (06/2024)                [address] [place] [address]  [phone]  fax [phone]  [facility]

## 2024-11-21 ENCOUNTER — OFFICE VISIT (OUTPATIENT)
Dept: AUDIOLOGY | Facility: CLINIC | Age: 74
End: 2024-11-21
Payer: COMMERCIAL

## 2024-11-21 DIAGNOSIS — H90.3 SENSORINEURAL HEARING LOSS, BILATERAL: Primary | ICD-10-CM

## 2024-11-21 NOTE — PROGRESS NOTES
AUDIOLOGY REPORT    SUBJECTIVE: Chasity Gaspar, 74 year old year old male, was seen at the Audiology Clinic at the [facility] on 11/21/24 for a hearing aid check. He was accompanied by a Jessica iPad  ID #:[id number]. Previous results have revealed profound sensorineural hearing loss in the right ear and no measurable hearing in the left ear. The patient has been seen previously in this clinic and was fit with a right Phonak Nanci P70 - UP hearing aid with custom earmold on 8/15/2023.     Today, Chasity reports he hears rattling and whistling sounds in his right hearing aid. He also states he cannot hear words clearly and hears only sounds.    Patient also would like more batteries for his hearing aid.    OBJECTIVE: Based on patient report, the following changes were made:     1) A new tone hook was replaced and new feedback test was completed. Patient reported improved sound quality and speech clarity.    2) Per guidelines of patient's insurance, 24 units of size 675 batteries were dispensed today. Reviewed that patent is eligible for batteries once every 90 days, and how they can request new batteries.     Chasity reports satisfaction with today's adjustments.     ASSESSMENT: Hearing aid check completed. Changes to hearing aid was completed as outlined above. Monaural hearing aid check and clean and check.      PLAN: It is recommended that Chasity return for follow-up as needed, or at least every 6-9 months for cleaning and assessment of hearing aid.   Please call this clinic with any questions regarding today s appointment.      MEME ArmandoA  Audiology Doctoral Extern  [place] #[id number]    I was present with the patient for the entire Audiology appointment, including all procedures/testing performed by the Ward student, and agree with the student s assessment and plan as documented.     Ward Moran, CCC-A  Clinical Audiologist   [place] #[id number]

## 2025-01-14 ENCOUNTER — OFFICE VISIT (OUTPATIENT)
Dept: OTOLARYNGOLOGY | Facility: CLINIC | Age: 75
End: 2025-01-14
Payer: COMMERCIAL

## 2025-01-14 VITALS — HEIGHT: 60 IN | WEIGHT: 133 LBS | BODY MASS INDEX: 26.11 KG/M2

## 2025-01-14 DIAGNOSIS — L30.9 DERMATITIS: ICD-10-CM

## 2025-01-14 DIAGNOSIS — L40.9 PSORIASIS: ICD-10-CM

## 2025-01-14 DIAGNOSIS — H61.23 EXCESSIVE CERUMEN IN BOTH EAR CANALS: Primary | ICD-10-CM

## 2025-01-14 PROCEDURE — 99213 OFFICE O/P EST LOW 20 MIN: CPT | Mod: 25 | Performed by: REGISTERED NURSE

## 2025-01-14 PROCEDURE — 92504 EAR MICROSCOPY EXAMINATION: CPT | Performed by: REGISTERED NURSE

## 2025-01-14 NOTE — PROGRESS NOTES
Otolaryngology Clinic  January 14, 2025     History of Present Illness:   Chasity Gaspar is a 75 year old male who presents today for scheduled ear cleaning. Patient has a history of right TM perforation and hearing loss. Wears hearing aids in the right ear. Complete hearing loss in the left ear. Last seen in clinic for an ear cleaning on 4/11/24.     Today, patient is accompanied by family member and in-person Jessica . Patient states that ears feel good. Bothersome itchy, skin patches on right elbow and on right scalp.    Patient denies any otalgia and otorrhea.     Past Medical History:  Past Medical History:   Diagnosis Date    Cervicalgia     Depression     Dyslipidemia     Dysthymia     Gastritis     GERD (gastroesophageal reflux disease)     Hearing loss     Hiatal hernia     Hypertriglyceridemia     Insomnia     Lacunar stroke (H)     Lumbar canal stenosis     Lumbar radiculopathy     Myalgia and myositis        Past Surgical History:  Past Surgical History:   Procedure Laterality Date    IR CEREBRAL ANGIOGRAM  4/27/2017    IR LUMBAR TRANSFORAMINAL EPIDURAL STRD INJ  7/2/2012    PICC  4/28/2017         [place] ARTHRODESIS ANT INTERBODY MIN DISCECTOMY,LUMBAR      Description: Lumbar Vertebral Fusion;  Recorded: 07/16/2013;  Comments: 3/17/11 spinal decompression and fusion L4-5, L5-S1 for spinal stenosis, DDD, spondylolysis; Dr. [facility]illas [facility]    CA ESOPHAGOGASTRODUODENOSCOPY TRANSORAL DIAGNOSTIC      Description: Esophagogastroduodenoscopy;  Proc Date: 04/02/2012;  Comments: biosies:   reactive gastropathy with chronic superimposed nonspecific chronic inflammation (likely secondary to ASA, NSAIDS, EtOH or other irritants), NEG for h. pylori; small hiatal hernia    CA INJECT ANES/STEROID FORAMEN LUMBAR/SACRAL W IMG GUIDE ,1 LEVEL      Description: Nerve Block Transforaminal Epidural Lumbar L3 - L4;  Recorded: 07/10/2012;  Comments: 7/2/2012 for severe low back pain, spinal stenosis and radiculopathy     THROMBECTOMY  04/24/2017    Rt CELIA    US ASPIRATION OR INJECTION MAJOR JOINT  10/23/2019    [facility] APPENDECTOMY      Description: Appendectomy;  Recorded: 07/12/2013;       Medications:  Current Outpatient Medications   Medication Sig Dispense Refill    acetaminophen 500 MG CAPS Take 2 tablets by mouth 3 times daily as needed (pain, fever, headaches). 100 capsule 3    allopurinol (ZYLOPRIM) 300 MG tablet TAKE 1 TABLET BY MOUTH DAILY 90 tablet 3    atorvastatin (LIPITOR) 80 MG tablet Take 1 tablet (80 mg) by mouth at bedtime. TAKE 1 TABLET BY MOUTH EVERY NIGHT AT BEDTIME 90 tablet 3    baclofen (LIORESAL) 10 MG tablet Take 0.5-1 tablets (5-10 mg) by mouth 3 times daily as needed for muscle spasms 60 tablet 5    cetirizine (ZYRTEC) 10 MG tablet Take 1 tablet (10 mg) by mouth daily as needed for allergies (itching). 30 tablet 2    clobetasol (TEMOVATE) 0.05 % external ointment Apply topically 2 times daily To psoriasis rash on back until resolved. 60 g 1    clopidogrel (PLAVIX) 75 MG tablet TAKE 1 TABLET(75 MG) BY MOUTH DAILY 90 tablet 3    dutasteride (AVODART) 0.5 MG capsule Take 1 capsule by mouth daily.      gabapentin (NEURONTIN) 300 MG capsule Take 1 capsule (300 mg) by mouth 3 times daily 270 capsule 3    Lidocaine (LIDOCARE) 4 % Patch Place 1 patch onto the skin every 24 hours To prevent lidocaine toxicity, patient should be patch free for 12 hrs daily. 15 patch 0    methocarbamol (ROBAXIN) 500 MG tablet Take 1 tablet (500 mg) by mouth At Bedtime 30 tablet 3    metoprolol tartrate (LOPRESSOR) 25 MG tablet [METOPROLOL TARTRATE (LOPRESSOR) 25 MG TABLET] TAKE 1 TABLET BY MOUTH TWICE DAILY 180 tablet 3    mineral oil-hydrophilic petrolatum (AQUAPHOR) external ointment Apply topically as needed for dry skin (itchy skin). 396 g 5    omeprazole (PRILOSEC) 20 MG DR capsule Take 1 capsule (20 mg) by mouth daily as needed (reflux). 90 capsule 3    polyethylene glycol (MIRALAX) 17 GM/Dose powder Take 17 g by mouth daily 510  "g 11    tamsulosin (FLOMAX) 0.4 MG capsule TAKE 1 CAPSULE(0.4 MG) BY MOUTH DAILY 30 capsule 11       Allergies:  Allergies   Allergen Reactions    Aspirin Hives    Oxycodone Itching    Vicodin [Hydrocodone-Acetaminophen] Unknown        Social History:  Social History     Tobacco Use    Smoking status: Never     Passive exposure: Never    Smokeless tobacco: Never   Vaping Use    Vaping status: Never Used   Substance Use Topics    Alcohol use: Never    Drug use: Never       ROS: 10 point ROS neg other than the symptoms noted above in the HPI.    Physical Exam:    Ht 1.499 m (4' 11\")   Wt 60.3 kg (133 lb)   BMI 26.86 kg/m       Constitutional:  The patient was accompanied, well-groomed, and in no acute distress.     Skin: White, scaly patched on right elbow and right scalp without bleeding or ulceration.   Neurologic: Alert and oriented x 3.     Psychiatric: The patient's affect was calm, cooperative, and appropriate.     Communication:  Normal; communicates verbally, normal voice quality.    Respiratory: Breathing comfortably without stridor or exertion of accessory muscles.       Otologic microscope exam:    Right ear was examined under the microscope.  Ear canal with dried ceruminous debris.  It was cleaned with suction and alligator forceps. Once cleaned, TM visualized under microscope. TM perforation is stable, nicely aerated middle ear space.      Left ear was also examined under the microscope.  Small amount of cerumen on inferior canal was cleaned with suction and alligator forceps. Once cleaned, TM visualized under microscope. Normal appearing TM, nicely aerated middle ear space.          Assessment and Plan:  1. Excessive cerumen in both ear canals (Primary)  Bilateral ears cleaned under microscopy today.  Ear exam is stable.  Patient can return in 1 year for repeat ear cleaning.  Sooner for any new otalgia, otorrhea or change in hearing.    2. Dermatitis  Patient with patches of dermatitis of the skin.  " Per note review, patient has a history of psoriasis. Patient has not seen dermatology since 2022. Recommend follow up with dermatology. Will send new referral for consult.     Patient will follow up in 1 year with repeat ear cleaning.    Monica Helton DNP, APRN, CNP  Otolaryngology  Head & Neck Surgery  [phone]    20 minutes spent by me on the date of the encounter doing chart review, patient visit, and documentation

## 2025-01-14 NOTE — LETTER
1/14/2025       RE: Chasity Gaspar  [address] [place] [address]     Dear Colleague,    Thank you for referring your patient, Chasity Gaspar, to the [facility] [place] [facility]. Please see a copy of my visit note below.      Otolaryngology Clinic  January 14, 2025     History of Present Illness:   Chasity Gaspar is a 75 year old male who presents today for scheduled ear cleaning. Patient has a history of right TM perforation and hearing loss. Wears hearing aids in the right ear. Complete hearing loss in the left ear. Last seen in clinic for an ear cleaning on 4/11/24.     Today, patient is accompanied by family member and in-person Jessica . Patient states that ears feel good. Bothersome itchy, skin patches on right elbow and on right scalp.    Patient denies any otalgia and otorrhea.     Past Medical History:  Past Medical History:   Diagnosis Date     Cervicalgia      Depression      Dyslipidemia      Dysthymia      Gastritis      GERD (gastroesophageal reflux disease)      Hearing loss      Hiatal hernia      Hypertriglyceridemia      Insomnia      Lacunar stroke (H)      Lumbar canal stenosis      Lumbar radiculopathy      Myalgia and myositis        Past Surgical History:  Past Surgical History:   Procedure Laterality Date     IR CEREBRAL ANGIOGRAM  4/27/2017     IR LUMBAR TRANSFORAMINAL EPIDURAL STRD INJ  7/2/2012     PICC  4/28/2017          [place] ARTHRODESIS ANT INTERBODY MIN DISCECTOMY,LUMBAR      Description: Lumbar Vertebral Fusion;  Recorded: 07/16/2013;  Comments: 3/17/11 spinal decompression and fusion L4-5, L5-S1 for spinal stenosis, DDD, spondylolysis; Dr. Casillas [facility]     [place] ESOPHAGOGASTRODUODENOSCOPY TRANSORAL DIAGNOSTIC      Description: Esophagogastroduodenoscopy;  Proc Date: 04/02/2012;  Comments: biosies:   reactive gastropathy with chronic superimposed nonspecific chronic inflammation (likely secondary to  ASA, NSAIDS, EtOH or other irritants), NEG for h. pylori; small hiatal hernia     [place] INJECT ANES/STEROID FORAMEN LUMBAR/SACRAL W IMG GUIDE ,1 LEVEL      Description: Nerve Block Transforaminal Epidural Lumbar L3 - L4;  Recorded: 07/10/2012;  Comments: 7/2/2012 for severe low back pain, spinal stenosis and radiculopathy     THROMBECTOMY  04/24/2017    Rt CELIA     US ASPIRATION OR INJECTION MAJOR JOINT  10/23/2019     [facility] APPENDECTOMY      Description: Appendectomy;  Recorded: 07/12/2013;       Medications:  Current Outpatient Medications   Medication Sig Dispense Refill     acetaminophen 500 MG CAPS Take 2 tablets by mouth 3 times daily as needed (pain, fever, headaches). 100 capsule 3     allopurinol (ZYLOPRIM) 300 MG tablet TAKE 1 TABLET BY MOUTH DAILY 90 tablet 3     atorvastatin (LIPITOR) 80 MG tablet Take 1 tablet (80 mg) by mouth at bedtime. TAKE 1 TABLET BY MOUTH EVERY NIGHT AT BEDTIME 90 tablet 3     baclofen (LIORESAL) 10 MG tablet Take 0.5-1 tablets (5-10 mg) by mouth 3 times daily as needed for muscle spasms 60 tablet 5     cetirizine (ZYRTEC) 10 MG tablet Take 1 tablet (10 mg) by mouth daily as needed for allergies (itching). 30 tablet 2     clobetasol (TEMOVATE) 0.05 % external ointment Apply topically 2 times daily To psoriasis rash on back until resolved. 60 g 1     clopidogrel (PLAVIX) 75 MG tablet TAKE 1 TABLET(75 MG) BY MOUTH DAILY 90 tablet 3     dutasteride (AVODART) 0.5 MG capsule Take 1 capsule by mouth daily.       gabapentin (NEURONTIN) 300 MG capsule Take 1 capsule (300 mg) by mouth 3 times daily 270 capsule 3     Lidocaine (LIDOCARE) 4 % Patch Place 1 patch onto the skin every 24 hours To prevent lidocaine toxicity, patient should be patch free for 12 hrs daily. 15 patch 0     methocarbamol (ROBAXIN) 500 MG tablet Take 1 tablet (500 mg) by mouth At Bedtime 30 tablet 3     metoprolol tartrate (LOPRESSOR) 25 MG tablet [METOPROLOL TARTRATE (LOPRESSOR) 25 MG TABLET] TAKE 1 TABLET BY MOUTH TWICE  "DAILY 180 tablet 3     mineral oil-hydrophilic petrolatum (AQUAPHOR) external ointment Apply topically as needed for dry skin (itchy skin). 396 g 5     omeprazole (PRILOSEC) 20 MG DR capsule Take 1 capsule (20 mg) by mouth daily as needed (reflux). 90 capsule 3     polyethylene glycol (MIRALAX) 17 GM/Dose powder Take 17 g by mouth daily 510 g 11     tamsulosin (FLOMAX) 0.4 MG capsule TAKE 1 CAPSULE(0.4 MG) BY MOUTH DAILY 30 capsule 11       Allergies:  Allergies   Allergen Reactions     Aspirin Hives     Oxycodone Itching     Vicodin [Hydrocodone-Acetaminophen] Unknown        Social History:  Social History     Tobacco Use     Smoking status: Never     Passive exposure: Never     Smokeless tobacco: Never   Vaping Use     Vaping status: Never Used   Substance Use Topics     Alcohol use: Never     Drug use: Never       ROS: 10 point ROS neg other than the symptoms noted above in the HPI.    Physical Exam:    Ht 1.499 m (4' 11\")   Wt 60.3 kg (133 lb)   BMI 26.86 kg/m       Constitutional:  The patient was accompanied, well-groomed, and in no acute distress.     Skin: White, scaly patched on right elbow and right scalp without bleeding or ulceration.   Neurologic: Alert and oriented x 3.     Psychiatric: The patient's affect was calm, cooperative, and appropriate.     Communication:  Normal; communicates verbally, normal voice quality.    Respiratory: Breathing comfortably without stridor or exertion of accessory muscles.       Otologic microscope exam:    Right ear was examined under the microscope.  Ear canal with dried ceruminous debris.  It was cleaned with suction and alligator forceps. Once cleaned, TM visualized under microscope. TM perforation is stable, nicely aerated middle ear space.      Left ear was also examined under the microscope.  Small amount of cerumen on inferior canal was cleaned with suction and alligator forceps. Once cleaned, TM visualized under microscope. Normal appearing TM, nicely aerated " middle ear space.          Assessment and Plan:  1. Excessive cerumen in both ear canals (Primary)  Bilateral ears cleaned under microscopy today.  Ear exam is stable.  Patient can return in 1 year for repeat ear cleaning.  Sooner for any new otalgia, otorrhea or change in hearing.    2. Dermatitis  Patient with patches of dermatitis of the skin.  Per note review, patient has a history of psoriasis. Patient has not seen dermatology since 2022. Recommend follow up with dermatology. Will send new referral for consult.     Patient will follow up in 1 year with repeat ear cleaning.    Monica Helton DNP, APRN, CNP  Otolaryngology  Head & Neck Surgery  [phone]    20 minutes spent by me on the date of the encounter doing chart review, patient visit, and documentation       Again, thank you for allowing me to participate in the care of your patient.      Sincerely,    Elis Helton, NP

## 2025-03-03 ENCOUNTER — OFFICE VISIT (OUTPATIENT)
Dept: FAMILY MEDICINE | Facility: CLINIC | Age: 75
End: 2025-03-03
Payer: COMMERCIAL

## 2025-03-03 VITALS
SYSTOLIC BLOOD PRESSURE: 160 MMHG | OXYGEN SATURATION: 97 % | WEIGHT: 128.4 LBS | HEART RATE: 86 BPM | TEMPERATURE: 97.6 F | DIASTOLIC BLOOD PRESSURE: 100 MMHG | BODY MASS INDEX: 25.93 KG/M2 | RESPIRATION RATE: 16 BRPM

## 2025-03-03 DIAGNOSIS — K21.00 GASTROESOPHAGEAL REFLUX DISEASE WITH ESOPHAGITIS WITHOUT HEMORRHAGE: ICD-10-CM

## 2025-03-03 DIAGNOSIS — M54.2 CERVICALGIA: ICD-10-CM

## 2025-03-03 DIAGNOSIS — M48.02 SPINAL STENOSIS IN CERVICAL REGION: Primary | ICD-10-CM

## 2025-03-03 DIAGNOSIS — G81.94 LEFT HEMIPARESIS (H): ICD-10-CM

## 2025-03-03 PROCEDURE — 99214 OFFICE O/P EST MOD 30 MIN: CPT

## 2025-03-03 PROCEDURE — T1013 SIGN LANG/ORAL INTERPRETER: HCPCS | Mod: U4

## 2025-03-03 PROCEDURE — 3077F SYST BP >= 140 MM HG: CPT

## 2025-03-03 PROCEDURE — 3080F DIAST BP >= 90 MM HG: CPT

## 2025-03-03 PROCEDURE — G2211 COMPLEX E/M VISIT ADD ON: HCPCS

## 2025-03-03 RX ORDER — OMEPRAZOLE 20 MG/1
20 CAPSULE, DELAYED RELEASE ORAL DAILY PRN
Qty: 90 CAPSULE | Refills: 3 | Status: SHIPPED | OUTPATIENT
Start: 2025-03-03

## 2025-03-03 RX ORDER — TIZANIDINE 2 MG/1
2 TABLET ORAL
Qty: 30 TABLET | Refills: 0 | Status: SHIPPED | OUTPATIENT
Start: 2025-03-03 | End: 2025-04-02

## 2025-03-03 NOTE — PROGRESS NOTES
Assessment & Plan     Cervicalgia  Left hemiparesis  Spinal stenosis of the cervical region this has been ongoing for the last  2 to 3 years ago patient has been having significant pain in his left arm and leg pain  This is a tense and pulling pain.  There is no numbness or tingling or the area  No incontinence   MRI as below  He believes he saw a spine surgeon in the past however he has not recently  And physical exam there is no apparent weakness or changes  DTR  Sensation is intact throughout  Strength is equal on both sides  I suspect that the patient is having worsening spinal stenosis of the cervical region  I would like him to see a spinal medicine doctor for evaluation for possible pain control versus surgery  He has already been on multiple muscle x-rays but we will attempt another as he has hypertonic muscles in the left trapezius  - Spine  Referral; Future    Narrative & Impression   EXAM: MR CERVICAL SPINE W/O and W CONTRAST  LOCATION: [facility]  DATE/TIME: 12/26/2022 7:23 AM     INDICATION: Neck pain.  COMPARISON: Cervical spine MRI 11/05/2022.  CONTRAST: 6 mL Gadavist  TECHNIQUE: MRI Cervical Spine without and with IV contrast.     FINDINGS:   Mild degenerative anterolisthesis of C3 upon C4 again noted. Alignment of the cervical vertebrae is otherwise normal; however, there is straightening of normal cervical lordosis. Vertebral body heights normal. Marrow signal normal. No evidence for   fracture or pathologic bony lesion. No abnormal cord signal. No extraspinal abnormality.     Craniovertebral junction and C1-C2: Normal.     C2-C3: Facet arthropathy bilaterally, uncinate hypertrophy bilaterally and a posterior broad-based disc osteophyte complex. No spinal canal stenosis. Mild right foraminal narrowing. No left foraminal narrowing.      C3-C4: Facet arthropathy bilaterally, uncinate hypertrophy bilaterally and a posterior broad-based disc osteophyte complex with a  superimposed small posterior central disc herniation (protrusion). Moderate spinal canal stenosis with mild indentation of   the anterior central aspect of the cervical spinal cord. Moderate left foraminal stenosis. Mild right foraminal narrowing.      C4-C5: Facet arthropathy bilaterally, uncinate hypertrophy bilaterally and a posterior broad-based disc osteophyte complex with a superimposed small posterior central disc herniation (protrusion). Moderate spinal canal stenosis with mild indentation of   the anterior central aspect of the cervical spinal cord. Moderate right foraminal stenosis. Mild left foraminal narrowing.      C5-C6: Facet arthropathy bilaterally, uncinate hypertrophy bilaterally and a posterior broad-based disc osteophyte complex with a superimposed tiny posterior central disc herniation (protrusion). Moderate spinal canal stenosis with mild flattening of the   cervical spinal cord. Moderate neural foraminal stenosis bilaterally.      C6-C7: Facet arthropathy bilaterally, uncinate hypertrophy bilaterally and a posterior broad-based disc osteophyte complex. No spinal canal or neural foraminal stenosis.      C7-T1: Normal disc height and contour. No herniation. Mild facet arthropathy bilaterally. No spinal canal or neural foraminal stenosis on either side.                                                                      IMPRESSION:  1.  Diffuse degenerative change of the cervical spine as detailed above without appreciable change from the recent comparison study.  2.  Moderate degenerative spinal canal stenosis at C3-C4, C4-C5 and C5-C6 resulting in varying degrees of deformity/compression of the cervical spinal cord as detailed above.  3.  Moderate neural foraminal stenosis on the left at C3-C4, on the right at C4-C5 and bilaterally at C5-C6.  4.  Overall, no appreciable change from the comparison study other than a decrease in the amount of fluid signal intensity in the right facet joints at  "C2-C3, C3-C4 and C4-C5 suggesting an interval decrease in acute facet arthropathy/synovitis.       Gastroesophageal reflux disease with esophagitis without hemorrhage  Used as needed  Would like a referral  Stable  - omeprazole (PRILOSEC) 20 MG DR capsule; Take 1 capsule (20 mg) by mouth daily as needed (reflux).    The longitudinal plan of care for the diagnosis(es)/condition(s) as documented were addressed during this visit. Due to the added complexity in care, I will continue to support Chasity in the subsequent management and with ongoing continuity of care.      BMI  Estimated body mass index is 25.93 kg/m  as calculated from the following:    Height as of 1/14/25: 1.499 m (4' 11\").    Weight as of this encounter: 58.2 kg (128 lb 6.4 oz).             Subjective   Chasity is a 75 year old, presenting for the following health issues:  Pain (Pain radiates from neck to leg on left side x 2/23/25 )      3/3/2025     1:14 PM   Additional Questions   Roomed by Jacqueline GOLDEN   Accompanied by nephew     History of Present Illness       Reason for visit:  Left side neck pain down to leg   He is taking medications regularly.      Is having pain on left side of his body  This has been on going for many years  When he moves he has a tense feeling sensation pain  The whole side from the head to the foot  Described pain as a tense and less flexible. Stretching pain.  There is no burning sensation   The pain is more in the leg than the arm  It affects his walking  No numbness   No incontinence    History of stroke in 2017 of the anterior cerebral artery on the right    2011 Spine surgery  \"Spine was rubbing against each other\"        Objective    BP (!) 160/100   Pulse 86   Temp 97.6  F (36.4  C) (Oral)   Resp 16   Wt 58.2 kg (128 lb 6.4 oz)   SpO2 97%   BMI 25.93 kg/m    Body mass index is 25.93 kg/m .      Physical Exam  Vitals reviewed.   Constitutional:       Appearance: Normal appearance.   HENT:      Head: Normocephalic and " atraumatic.      Right Ear: External ear normal.      Left Ear: External ear normal.      Nose: Nose normal.      Mouth/Throat:      Mouth: Mucous membranes are moist.   Eyes:      Extraocular Movements: Extraocular movements intact.      Pupils: Pupils are equal, round, and reactive to light.   Abdominal:      General: Abdomen is flat. Bowel sounds are normal.      Palpations: Abdomen is soft.   Musculoskeletal:      Cervical back: Normal range of motion and neck supple.      Comments: Appropriate strength in tested fields  Hypertonic left trapezius   Skin:     General: Skin is warm and dry.   Neurological:      General: No focal deficit present.      Mental Status: He is alert.      Motor: No weakness.      Gait: Gait abnormal.      Deep Tendon Reflexes: Reflexes normal.   Psychiatric:         Mood and Affect: Mood normal.         Behavior: Behavior normal.              Signed Electronically by: Casey Cárdenas DO

## 2025-03-04 ENCOUNTER — PATIENT OUTREACH (OUTPATIENT)
Dept: CARE COORDINATION | Facility: CLINIC | Age: 75
End: 2025-03-04
Payer: COMMERCIAL

## 2025-03-06 ENCOUNTER — PATIENT OUTREACH (OUTPATIENT)
Dept: CARE COORDINATION | Facility: CLINIC | Age: 75
End: 2025-03-06
Payer: COMMERCIAL

## 2025-03-06 ENCOUNTER — TELEPHONE (OUTPATIENT)
Dept: FAMILY MEDICINE | Facility: CLINIC | Age: 75
End: 2025-03-06
Payer: COMMERCIAL

## 2025-03-25 ENCOUNTER — OFFICE VISIT (OUTPATIENT)
Dept: FAMILY MEDICINE | Facility: CLINIC | Age: 75
End: 2025-03-25
Payer: COMMERCIAL

## 2025-03-25 VITALS
HEIGHT: 60 IN | HEART RATE: 69 BPM | DIASTOLIC BLOOD PRESSURE: 88 MMHG | OXYGEN SATURATION: 98 % | WEIGHT: 129 LBS | RESPIRATION RATE: 22 BRPM | BODY MASS INDEX: 25.32 KG/M2 | TEMPERATURE: 97.6 F | SYSTOLIC BLOOD PRESSURE: 144 MMHG

## 2025-03-25 DIAGNOSIS — M54.12 CERVICAL RADICULAR PAIN: ICD-10-CM

## 2025-03-25 DIAGNOSIS — K59.00 CONSTIPATION, UNSPECIFIED CONSTIPATION TYPE: ICD-10-CM

## 2025-03-25 DIAGNOSIS — M54.16 RADICULOPATHY, LUMBAR REGION: ICD-10-CM

## 2025-03-25 DIAGNOSIS — M79.18 MYOFASCIAL PAIN: ICD-10-CM

## 2025-03-25 DIAGNOSIS — I65.8 OCCLUSION AND STENOSIS OF MULTIPLE AND BILATERAL PRECEREBRAL ARTERIES: ICD-10-CM

## 2025-03-25 DIAGNOSIS — M54.50 ACUTE ON CHRONIC LOW BACK PAIN: Primary | ICD-10-CM

## 2025-03-25 DIAGNOSIS — E78.5 HYPERLIPIDEMIA, UNSPECIFIED HYPERLIPIDEMIA TYPE: ICD-10-CM

## 2025-03-25 DIAGNOSIS — K21.00 GASTROESOPHAGEAL REFLUX DISEASE WITH ESOPHAGITIS WITHOUT HEMORRHAGE: ICD-10-CM

## 2025-03-25 DIAGNOSIS — N40.0 BENIGN PROSTATIC HYPERPLASIA, UNSPECIFIED WHETHER LOWER URINARY TRACT SYMPTOMS PRESENT: ICD-10-CM

## 2025-03-25 DIAGNOSIS — M1A.0390 IDIOPATHIC CHRONIC GOUT OF WRIST WITHOUT TOPHUS, UNSPECIFIED LATERALITY: ICD-10-CM

## 2025-03-25 DIAGNOSIS — G89.29 ACUTE ON CHRONIC LOW BACK PAIN: Primary | ICD-10-CM

## 2025-03-25 DIAGNOSIS — I63.421 CEREBRAL INFARCTION DUE TO EMBOLISM OF RIGHT ANTERIOR CEREBRAL ARTERY (H): ICD-10-CM

## 2025-03-25 RX ORDER — CLOPIDOGREL BISULFATE 75 MG/1
75 TABLET ORAL DAILY
Qty: 90 TABLET | Refills: 3 | Status: SHIPPED | OUTPATIENT
Start: 2025-03-25

## 2025-03-25 RX ORDER — ALLOPURINOL 300 MG/1
TABLET ORAL
Qty: 90 TABLET | Refills: 3 | Status: SHIPPED | OUTPATIENT
Start: 2025-03-25

## 2025-03-25 RX ORDER — TAMSULOSIN HYDROCHLORIDE 0.4 MG/1
0.4 CAPSULE ORAL DAILY
Qty: 30 CAPSULE | Refills: 11 | Status: SHIPPED | OUTPATIENT
Start: 2025-03-25

## 2025-03-25 RX ORDER — GABAPENTIN 300 MG/1
300 CAPSULE ORAL 3 TIMES DAILY
Qty: 270 CAPSULE | Refills: 3 | Status: SHIPPED | OUTPATIENT
Start: 2025-03-25

## 2025-03-25 RX ORDER — POLYETHYLENE GLYCOL 3350 17 G/17G
17 POWDER, FOR SOLUTION ORAL DAILY
Qty: 510 G | Refills: 11 | Status: SHIPPED | OUTPATIENT
Start: 2025-03-25

## 2025-03-25 RX ORDER — OMEPRAZOLE 20 MG/1
20 CAPSULE, DELAYED RELEASE ORAL DAILY PRN
Qty: 90 CAPSULE | Refills: 3 | Status: SHIPPED | OUTPATIENT
Start: 2025-03-25

## 2025-03-25 RX ORDER — PREDNISONE 50 MG/1
50 TABLET ORAL DAILY
Qty: 5 TABLET | Refills: 0 | Status: SHIPPED | OUTPATIENT
Start: 2025-03-25 | End: 2025-03-30

## 2025-03-25 ASSESSMENT — PATIENT HEALTH QUESTIONNAIRE - PHQ9
SUM OF ALL RESPONSES TO PHQ QUESTIONS 1-9: 0
SUM OF ALL RESPONSES TO PHQ QUESTIONS 1-9: 0
10. IF YOU CHECKED OFF ANY PROBLEMS, HOW DIFFICULT HAVE THESE PROBLEMS MADE IT FOR YOU TO DO YOUR WORK, TAKE CARE OF THINGS AT HOME, OR GET ALONG WITH OTHER PEOPLE: NOT DIFFICULT AT ALL

## 2025-03-25 NOTE — PROGRESS NOTES
OFFICE VISIT    Assessment/Plan:     Patient Instructions:    -Continue medications as prescribed.  -Find ways to stay active.  -Schedule an appointment to see the spine specialist.  Please reach out to the clinic if you need help with this process.    -Rest and limit usage of the affected area.  -Try to remain active and limit your activity based on discomfort.  -Apply a cold pack to the affected area for a maximum of 20 minutes at a time, once an hour as needed for pain and swelling.  Application of the cold pack for more than 20 minutes can increase risk of injuries to surrounding tissue.  -Apply a warm pack to the affected area as needed for discomforts.  The warm pack will help to improve blood flow and relax surrounding tissues.  You may make your own warm pack by doing the following: Take a sock, fill the sock with uncooked rice, and tie it off at the opened end.  You may place the sock and rice in the microwave for 30-60 seconds at a time or until warm.  Be cautious that the sock/rice is not too hot.  -Do stretches to help relax the surrounding muscles.  When doing stretches, be sure to hold your body in that position (avoid moving) and hold the stretch for 30 seconds.       Please seek immediate medical attention (go to the emergency room or urgent care) for the following reasons: worsening symptoms, urine/stool incontinence, sensation changes in the anal area, fevers, vomiting, unable to walk, or any concerning changes.      Chasity was seen today for back pain.  Diagnoses and all orders for this visit:    Acute on chronic low back pain  Radiculopathy, lumbar region  Cervical radicular pain  Myofascial pain: Acute worsening of underlying chronic back discomforts.  Continue gabapentin as below.  Plan for prednisone burst as well.  -     gabapentin (NEURONTIN) 300 MG capsule; Take 1 capsule (300 mg) by mouth 3 times daily.  -     predniSONE (DELTASONE) 50 MG tablet; Take 1 tablet (50 mg) by mouth daily for 5  days.    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  Continue medication as prescribed.  Check labs.  -     Uric acid; Future  -     allopurinol (ZYLOPRIM) 300 MG tablet; TAKE 1 TABLET BY MOUTH DAILY  -     Basic metabolic panel; Future    Constipation, unspecified constipation type: Stable.  Refills below.  -     polyethylene glycol (MIRALAX) 17 GM/Dose powder; Take 17 g by mouth daily.    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  Refills below.  -     clopidogrel (PLAVIX) 75 MG tablet; Take 1 tablet (75 mg) by mouth daily.    Gastroesophageal reflux disease with esophagitis without hemorrhage: Stable.  Refills below.  -     omeprazole (PRILOSEC) 20 MG DR capsule; Take 1 capsule (20 mg) by mouth daily as needed (reflux).    Benign prostatic hyperplasia, unspecified whether lower urinary tract symptoms present: Stable.  Refills below.  -     tamsulosin (FLOMAX) 0.4 MG capsule; Take 1 capsule (0.4 mg) by mouth daily.        Return for As scheduled on 4/9/2025..    The diagnoses, treatment options, risk, benefits, and recommendations were reviewed with patient/guardian.  Questions were answered to patient's/guardian satisfaction.  Red flag signs were reviewed.  Patient/guardian is in agreement with above plan.      Subjective: 75 year old male with history of lumbar canal stenosis with radiculopathy, with back surgeries listed as below, cerebral infarction (embolism of RCA) with residual left-sided hemiparesis, lacunar stroke, hypertriglyceridemia, gout, hyperlipidemia, asymmetrical sensorineural hearing loss who presents to clinic for the following complaints:   Patient presents with:  Back Pain: Back pain radiated down to Left leg getting worse for 2 weeks    Answers submitted by the patient for this visit:  Patient  Health Questionnaire (Submitted on 3/25/2025)  If you checked off any problems, how difficult have these problems made it for you to do your work, take care of things at home, or get along with other people?: Not difficult at all  PHQ9 TOTAL SCORE: 0  Back Pain Visit Questionnaire (Submitted on 3/25/2025)  Your back pain is: chronic  Chronic or Recurring Back Pain Visit Questionnaire (Submitted on 3/25/2025)  Where is your back pain located? : left lower back  How would you describe your back pain? : cramping, dull ache  Where does your back pain spread? : left thigh  Since you noticed your back pain, how has it changed? : gradually worsening  Does your back pain interfere with your job?: Not applicable  General Questionnaire (Submitted on 3/25/2025)  Chief Complaint: Chronic problems general questions HPI Form  How many servings of fruits and vegetables do you eat daily?: 2-3  On average, how many sweetened beverages do you drink each day (Examples: soda, juice, sweet tea, etc.  Do NOT count diet or artificially sweetened beverages)?: 2  How many minutes a day do you exercise enough to make your heart beat faster?: 9 or less  How many days a week do you exercise enough to make your heart beat faster?: 3 or less  How many days per week do you miss taking your medication?: 0  Questionnaire about: Chronic problems general questions HPI Form (Submitted on 3/25/2025)  Chief Complaint: Chronic problems general questions HPI Form      They had called the [facility] and were told that the spine clinic is going to call him, but they never heard from the spine clinic. The medications he has hasn't been hasn't helped much, so he was told to see the spine clinic. The discomfort in the low back goes down the leg all the way to the foot. He also has left shoulder pains. Patient wears a back brace denies symptoms to help reduce the discomforts. The pain now is similar to the back pains he has had before for which he has  received injections and surgery. He has back pains at baseline, though this seems to be worse than usual. Denies falls, injuries, urinary/stool incontinence, perianal sensation changes, fevers, chills, or other changes from baseline.       Ran out of clopidorgrel.  Requesting refill.      Elevated blood pressure without diagnosis of hypertension: Blood pressure noted to be 159/90 today.  On recheck, blood pressure 140/88.  Patient currently having pain noted as above.  When last seen about 3 weeks ago, blood pressure was slightly elevated as well. Blood pressure was noted to be normal from 10/2024.      Hyperlipidemia: Patient has been prescribed atorvastatin 40 mg nightly, though when cholesterol test was completed 5 months ago and 10/10/2024, the cholesterol was still fairly elevated.  Provider recommended that patient increase the dose up to 80 mg nightly.  There is about 25 pills left in the bottle from 12/17/2024 (90 day supply sent). Due for labs today.    Allopurinol: has an old prescription bottle from almost a year ago that is empty. No other bottles for allopurinol. When he runs out of the gout medication, the joints flare up.     Omeprazole: was sent on 03/03/2025, but they haven't gotten the medication yet. Would like a refill of the medication.     Reviewed medications for medications, risk and benefits.  Questions answered.  Medications seem to still be helpful for the indications prescribed.    Past Surgical History:   Procedure Laterality Date    IR CEREBRAL ANGIOGRAM  4/27/2017    IR LUMBAR TRANSFORAMINAL EPIDURAL STRD INJ  7/2/2012    PICC  4/28/2017         OH ARTHRODESIS ANT INTERBODY MIN DISCECTOMY,LUMBAR      Description: Lumbar Vertebral Fusion;  Recorded: 07/16/2013;  Comments: 3/17/11 spinal decompression and fusion L4-5, L5-S1 for spinal stenosis, DDD, spondylolysis; Dr. Casillas [facility]    OH ESOPHAGOGASTRODUODENOSCOPY TRANSORAL DIAGNOSTIC      Description: Esophagogastroduodenoscopy;   Proc Date: 04/02/2012;  Comments: biosies:   reactive gastropathy with chronic superimposed nonspecific chronic inflammation (likely secondary to ASA, NSAIDS, EtOH or other irritants), NEG for h. pylori; small hiatal hernia    SD INJECT ANES/STEROID FORAMEN LUMBAR/SACRAL W IMG GUIDE ,1 LEVEL      Description: Nerve Block Transforaminal Epidural Lumbar L3 - L4;  Recorded: 07/10/2012;  Comments: 7/2/2012 for severe low back pain, spinal stenosis and radiculopathy    THROMBECTOMY  04/24/2017    Rt CELIA    US ASPIRATION OR INJECTION MAJOR JOINT  10/23/2019    Z APPENDECTOMY      Description: Appendectomy;  Recorded: 07/12/2013;         A professional [employer] telephone  was utilized for the office visit.     The 10 point review of system is negative except as stated in the HPI.    Allergies were reviewed and updated.    Narrative & Impression   EXAM: CT LUMBAR SPINE W/O CONTRAST  LOCATION: [facility]  DATE/TIME: 9/4/2021 6:38 PM     INDICATION: Back pain. Radiculopathy.  COMPARISON: None.  TECHNIQUE: Routine CT Lumbar Spine without IV contrast. Multiplanar reformats. Dose reduction techniques were used.      FINDINGS:  VERTEBRA:  L4 S1 360 degrees fusion with instrumentation. L4-L5 and L5-S1 interbody grafts with associated solid bony fusion. L4-L5 S1 laminectomy. Bone graft material surrounds the posterior lateral aspects bilateral posterior elements. Surgical hardware appears   intact. No hardware complication.       No acute fracture.  No acute post traumatic subluxations.   Normal vertebral body heights. Diffuse bony demineralization.     Left L3 vertebral body demonstrates a small sclerotic lesion nonspecific likely incidental bone island in the absence of a known malignancy.     CANAL/FORAMINA: Multilevel spondylosis result in various levels and degrees of central canal stenosis and neural foraminal stenosis. Multiple bulges predominantly noted superior to the spinal  "fusion. L2-L3 and L3-L4 severe central canal stenosis. Severe   neural foraminal stenosis within the left L5-S1, bilateral L4-L5, bilateral L3-L4, bilateral left greater than right L2-L3.   L3-L4 mild grade 1 retrolisthesis measuring 2 mm. No additional subluxations.     PARASPINAL: Vascular calcifications.                                                                          IMPRESSION:  1.  No acute fracture.  2.  L4 S1 360 degrees fusion with instrumentation described above.  3.  Degenerative changes described above.  4.  Diffuse bony demineralization.         Objective:   BP (!) 144/88 (BP Location: Right arm)   Pulse 69   Temp 97.6  F (36.4  C) (Oral)   Resp 22   Ht 1.499 m (4' 11\")   Wt 58.5 kg (129 lb)   SpO2 98%   BMI 26.05 kg/m    General: Active, alert, nontoxic-appearing.  No acute distress.  HEENT: Normocephalic, atraumatic.  Pupils are equal and round.  Sclera is clear.  Normal external ears. Nares patent.  Moist mucous membranes.    Cardiac: Normal skin tone.  Respiratory/chest:  Breathing is not labored.  No accessory muscle usage.  Back: Muscle tightness and discomfort is noted to palpation on the paraspinal muscles from L2-L4.  Some midline tenderness noted to palpation along the same area.  No SI joint tenderness to palpation.  Extremities: Voluntary movements intact.  Integumentary: No concerning rash or skin changes appreciated.        Boston Edwards MD  [facility] [place] [address]  Phone: [phone]  Fax: [phone]    3/27/2025  6:04 PM            Current Outpatient Medications   Medication Sig Dispense Refill    acetaminophen 500 MG CAPS Take 2 tablets by mouth 3 times daily as needed (pain, fever, headaches). 100 capsule 3    allopurinol (ZYLOPRIM) 300 MG tablet TAKE 1 TABLET BY MOUTH DAILY 90 tablet 3    atorvastatin (LIPITOR) 80 MG tablet Take 1 tablet (80 mg) by mouth at bedtime. TAKE 1 TABLET BY MOUTH EVERY NIGHT AT BEDTIME 90 tablet 3    " baclofen (LIORESAL) 10 MG tablet Take 0.5-1 tablets (5-10 mg) by mouth 3 times daily as needed for muscle spasms 60 tablet 5    cetirizine (ZYRTEC) 10 MG tablet Take 1 tablet (10 mg) by mouth daily as needed for allergies (itching). 30 tablet 2    clobetasol (TEMOVATE) 0.05 % external ointment Apply topically 2 times daily To psoriasis rash on back until resolved. 60 g 1    clopidogrel (PLAVIX) 75 MG tablet Take 1 tablet (75 mg) by mouth daily. 90 tablet 3    gabapentin (NEURONTIN) 300 MG capsule Take 1 capsule (300 mg) by mouth 3 times daily. 270 capsule 3    metoprolol tartrate (LOPRESSOR) 25 MG tablet [METOPROLOL TARTRATE (LOPRESSOR) 25 MG TABLET] TAKE 1 TABLET BY MOUTH TWICE DAILY 180 tablet 3    omeprazole (PRILOSEC) 20 MG DR capsule Take 1 capsule (20 mg) by mouth daily as needed (reflux). 90 capsule 3    polyethylene glycol (MIRALAX) 17 GM/Dose powder Take 17 g by mouth daily. 510 g 11    predniSONE (DELTASONE) 50 MG tablet Take 1 tablet (50 mg) by mouth daily for 5 days. 5 tablet 0    tamsulosin (FLOMAX) 0.4 MG capsule Take 1 capsule (0.4 mg) by mouth daily. 30 capsule 11    tiZANidine (ZANAFLEX) 2 MG tablet Take 1 tablet (2 mg) by mouth nightly as needed for muscle spasms. 30 tablet 0    dutasteride (AVODART) 0.5 MG capsule Take 1 capsule by mouth daily.      mineral oil-hydrophilic petrolatum (AQUAPHOR) external ointment Apply topically as needed for dry skin (itchy skin). 396 g 5     No current facility-administered medications for this visit.       Allergies   Allergen Reactions    Aspirin Hives    Oxycodone Itching    Vicodin [Hydrocodone-Acetaminophen] Unknown       Patient Active Problem List    Diagnosis Date Noted    Hiatal hernia 12/17/2021     Priority: Medium     Formatting of this note might be different from the original.  Created by [facility]  [employer] [facility] Annotation: Apr 18 2012 10:34PM -  ,  : small, seen on EGD    Replacement Utility updated for latest IMO load      Essential  "Hypertriglyceridemia      Priority: Medium     Created by [facility] Annotation: Dec 21 2010  7:47AM Mindy Belleil: 6/2009:   [facility] /Trig 340/HDL 35/LDL 80.  10/11:  Dyslipidemia again.    Advised to   come in for visit to follow-up.        Esophageal reflux      Priority: Medium     Created by Conversion        Elevated liver enzymes      Priority: Medium    Gout      Priority: Medium     Created by Conversion        ASNHL (asymmetrical sensorineural hearing loss) 04/20/2018     Priority: Medium    Left hemiparesis (H) 04/28/2017     Priority: Medium    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) 04/27/2017     Priority: Medium    Radiculopathy, lumbar region      Priority: Medium     Created by Conversion        Hyperlipidemia      Priority: Medium     Created by [facility] Annotation: Nov 4 2011  8:39AM Mindy Belleil: high   triglycerides,   low HDL, mildly elevated LDL        Hearing Loss      Priority: Medium     Created by [facility] Annotation: Dec 21 2010  7:58AM - Service, Navdeep: B   sensorineural loss, \"profound\" on L, \"severe\" on R.  [facility].  Has   hearing   aid.  Replacement Utility updated for latest IMO load        Lacunar Stroke      Priority: Medium     Created by Conversion  Replacement Utility updated for latest IMO load        Hiatal Hernia      Priority: Medium     Created by [facility] Annotation: Apr 18 2012 10:34PM -  ,  : small, seen on EGD  Replacement Utility updated for latest IMO load        Vitamin D Deficiency      Priority: Medium     Created by [facility] Annotation: Jan 17 2011 10:05AM Mindy Belleil: 8; vit D 50 K   rx  Replacement Utility updated for latest IMO load        Gastritis      Priority: Medium     Created by [facility] Annotation: Jun 13 2011 10:41AM Dave Todd: EGD 4/2012:   \"reactive gastropathy with chronic superimposed nonspecific " "chronic   inflammation (likely secondary to ASA, NSAIDS, EtOH or other irritants\"  Replacement Utility updated for latest IMO load        Constipation      Priority: Medium     Created by Conversion  Replacement Utility updated for latest IMO load        Elbow swelling, right 05/06/2016     Priority: Medium    Dermatitis 04/06/2016     Priority: Medium    Cervicalgia      Priority: Medium     Created by Conversion        Lumbar Canal Stenosis      Priority: Medium     Created by Conversion        Lower Back Pain      Priority: Medium     Created by Conversion        Moderate Recurrent Major Depression      Priority: Medium     Created by Conversion        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      Priority: Medium     Created by Conversion  [facility] Annotation: Oct 17 2011 11:04Dave Umanzor: since lumbar   laminectomy        Abdominal Pain In The Right Lower Belly (RLQ)      Priority: Medium     Created by Conversion        Abdominal Pain In The Right Upper Belly (RUQ)      Priority: Medium     Created by Conversion        Memory Lapses Or Loss      Priority: Medium     Created by Conversion        Spinal Stenosis      Priority: Medium     Created by Conversion  [facility] Annotation: Jan 17 2011 10:05Mindy Dentonil: FUSION 2011L   RECURRED AT L3-L4 WITH LATERAL RECESS NARROWING 7/2012, see hospital notes        Dysthymia (Depressive Neurosis), Primary      Priority: Medium     Created by Conversion           Family History   Problem Relation Age of Onset    Coronary Artery Disease No family hx of     Hypertension No family hx of     Cerebrovascular Disease No family hx of        Past Surgical History:   Procedure Laterality Date    IR CEREBRAL ANGIOGRAM  4/27/2017    IR LUMBAR TRANSFORAMINAL EPIDURAL STRD INJ  7/2/2012    PICC  4/28/2017         [place] ARTHRODESIS ANT INTERBODY MIN DISCECTOMY,LUMBAR      Description: Lumbar Vertebral Fusion;  Recorded: 07/16/2013;  Comments: 3/17/11 spinal " decompression and fusion L4-5, L5-S1 for spinal stenosis, DDD, spondylolysis; Dr. Casillas [facility]    ID ESOPHAGOGASTRODUODENOSCOPY TRANSORAL DIAGNOSTIC      Description: Esophagogastroduodenoscopy;  Proc Date: 04/02/2012;  Comments: biosies:   reactive gastropathy with chronic superimposed nonspecific chronic inflammation (likely secondary to ASA, NSAIDS, EtOH or other irritants), NEG for h. pylori; small hiatal hernia    ID INJECT ANES/STEROID FORAMEN LUMBAR/SACRAL W IMG GUIDE ,1 LEVEL      Description: Nerve Block Transforaminal Epidural Lumbar L3 - L4;  Recorded: 07/10/2012;  Comments: 7/2/2012 for severe low back pain, spinal stenosis and radiculopathy    THROMBECTOMY  04/24/2017    Rt CELIA    US ASPIRATION OR INJECTION MAJOR JOINT  10/23/2019    ZZC APPENDECTOMY      Description: Appendectomy;  Recorded: 07/12/2013;        Social History     Socioeconomic History    Marital status:      Spouse name: Not on file    Number of children: Not on file    Years of education: Not on file    Highest education level: Not on file   Occupational History    Not on file   Tobacco Use    Smoking status: Never     Passive exposure: Never    Smokeless tobacco: Never   Vaping Use    Vaping status: Never Used   Substance and Sexual Activity    Alcohol use: Never    Drug use: Never    Sexual activity: Not Currently     Partners: Female   Other Topics Concern    Not on file   Social History Narrative    ** Merged History Encounter **          Social Drivers of Health     Financial Resource Strain: Low Risk  (10/9/2024)    Financial Resource Strain     Within the past 12 months, have you or your family members you live with been unable to get utilities (heat, electricity) when it was really needed?: No   Food Insecurity: Low Risk  (10/9/2024)    Food Insecurity     Within the past 12 months, did you worry that your food would run out before you got money to buy more?: No     Within the past 12 months, did the food you  bought just not last and you didn t have money to get more?: No   Transportation Needs: Low Risk  (10/9/2024)    Transportation Needs     Within the past 12 months, has lack of transportation kept you from medical appointments, getting your medicines, non-medical meetings or appointments, work, or from getting things that you need?: No   Physical Activity: Inactive (10/9/2024)    Exercise Vital Sign     Days of Exercise per Week: 0 days     Minutes of Exercise per Session: 0 min   Stress: Patient Declined (10/9/2024)    [place] [employer] - Occupational Stress Questionnaire     Feeling of Stress : Patient declined   Social Connections: Unknown (10/9/2024)    Social Connection and Isolation Panel [NHANES]     Frequency of Communication with Friends and Family: Not on file     Frequency of Social Gatherings with Friends and Family: Once a week     Attends Che[employer] Services: Not on file     Active Member of Clubs or Organizations: Not on file     Attends Club or Organization Meetings: Not on file     Marital Status: Not on file   Interpersonal Safety: Low Risk  (3/25/2025)    Interpersonal Safety     Do you feel physically and emotionally safe where you currently live?: Yes     Within the past 12 months, have you been hit, slapped, kicked or otherwise physically hurt by someone?: No     Within the past 12 months, have you been humiliated or emotionally abused in other ways by your partner or ex-partner?: No   Housing Stability: Low Risk  (10/9/2024)    Housing Stability     Do you have housing? : Yes     Are you worried about losing your housing?: No

## 2025-03-25 NOTE — PATIENT INSTRUCTIONS
-Thank you for choosing the [facility].  -It was a pleasure to see you today.  -Please take a look at the information below for more specific details regarding the treatment plan and recommendations.  -In this after visit summary is a list of your medications and specific instructions.  Please review this carefully as there may be changes made to your medication list.  -If there are any particular questions or concerns, please feel free to reach out to Dr. Edwards.  -If any labs have been completed, we will reach out to you about results.  If the results are normal or not concerning, a letter or ByAllAccountshart message will be sent to you.  If any follow-up is needed, either Dr. Edwards or the nurse will give you a call.  If you have not heard regarding results after 2 weeks, please reach out to the clinic.    Patient Instructions:    -Continue medications as prescribed.  -Find ways to stay active.  -Schedule an appointment to see the spine specialist.  Please reach out to the clinic if you need help with this process.    -Rest and limit usage of the affected area.  -Try to remain active and limit your activity based on discomfort.  -Apply a cold pack to the affected area for a maximum of 20 minutes at a time, once an hour as needed for pain and swelling.  Application of the cold pack for more than 20 minutes can increase risk of injuries to surrounding tissue.  -Apply a warm pack to the affected area as needed for discomforts.  The warm pack will help to improve blood flow and relax surrounding tissues.  You may make your own warm pack by doing the following: Take a sock, fill the sock with uncooked rice, and tie it off at the opened end.  You may place the sock and rice in the microwave for 30-60 seconds at a time or until warm.  Be cautious that the sock/rice is not too hot.  -Do stretches to help relax the surrounding muscles.  When doing stretches, be sure to hold your body in that position (avoid  moving) and hold the stretch for 30 seconds.       Please seek immediate medical attention (go to the emergency room or urgent care) for the following reasons: worsening symptoms, urine/stool incontinence, sensation changes in the anal area, fevers, vomiting, unable to walk, or any concerning changes.      --------------------------------------------------------------------------------------------------------------------    -We are always looking for ways to improve.  You may be selected to receive a survey regarding your visit today.  We encourage you to complete the survey and provide specific, constructive feedback to help us improve our processes.  Thank you for your time!  -Please review the contact information listed on the after visit summary and in the electronic chart.  Below is the phone number that we have on file.  If there are any changes that are needed to be made, please reach out to the clinic.  [phone] (home)

## 2025-04-05 DIAGNOSIS — M54.2 CERVICALGIA: ICD-10-CM

## 2025-04-05 DIAGNOSIS — M48.02 SPINAL STENOSIS IN CERVICAL REGION: ICD-10-CM

## 2025-04-05 DIAGNOSIS — G81.94 LEFT HEMIPARESIS (H): ICD-10-CM

## 2025-04-08 RX ORDER — TIZANIDINE 2 MG/1
TABLET ORAL
Qty: 30 TABLET | Refills: 5 | Status: SHIPPED | OUTPATIENT
Start: 2025-04-08

## 2025-04-09 ENCOUNTER — OFFICE VISIT (OUTPATIENT)
Dept: FAMILY MEDICINE | Facility: CLINIC | Age: 75
End: 2025-04-09
Payer: COMMERCIAL

## 2025-04-09 ENCOUNTER — PATIENT OUTREACH (OUTPATIENT)
Dept: GERIATRIC MEDICINE | Facility: CLINIC | Age: 75
End: 2025-04-09

## 2025-04-09 ENCOUNTER — VIRTUAL VISIT (OUTPATIENT)
Dept: INTERPRETER SERVICES | Facility: CLINIC | Age: 75
End: 2025-04-09

## 2025-04-09 VITALS
BODY MASS INDEX: 24.76 KG/M2 | OXYGEN SATURATION: 96 % | SYSTOLIC BLOOD PRESSURE: 159 MMHG | HEIGHT: 60 IN | RESPIRATION RATE: 16 BRPM | WEIGHT: 126.12 LBS | DIASTOLIC BLOOD PRESSURE: 92 MMHG | HEART RATE: 91 BPM | TEMPERATURE: 97.6 F

## 2025-04-09 DIAGNOSIS — E78.5 HYPERLIPIDEMIA, UNSPECIFIED HYPERLIPIDEMIA TYPE: ICD-10-CM

## 2025-04-09 DIAGNOSIS — F33.1 MAJOR DEPRESSIVE DISORDER, RECURRENT EPISODE, MODERATE (H): ICD-10-CM

## 2025-04-09 DIAGNOSIS — M10.9 ACUTE GOUT OF LEFT FOOT, UNSPECIFIED CAUSE: Primary | ICD-10-CM

## 2025-04-09 DIAGNOSIS — R21 RASH: ICD-10-CM

## 2025-04-09 DIAGNOSIS — M1A.0390 IDIOPATHIC CHRONIC GOUT OF WRIST WITHOUT TOPHUS, UNSPECIFIED LATERALITY: ICD-10-CM

## 2025-04-09 DIAGNOSIS — Z60.3 LANGUAGE BARRIER: ICD-10-CM

## 2025-04-09 DIAGNOSIS — M54.16 RADICULOPATHY, LUMBAR REGION: ICD-10-CM

## 2025-04-09 DIAGNOSIS — I10 BENIGN ESSENTIAL HYPERTENSION: ICD-10-CM

## 2025-04-09 DIAGNOSIS — M54.2 CERVICALGIA: ICD-10-CM

## 2025-04-09 DIAGNOSIS — Z75.8 LANGUAGE BARRIER: ICD-10-CM

## 2025-04-09 LAB
ANION GAP SERPL CALCULATED.3IONS-SCNC: 11 MMOL/L (ref 7–15)
BUN SERPL-MCNC: 10 MG/DL (ref 8–23)
CALCIUM SERPL-MCNC: 9.7 MG/DL (ref 8.8–10.4)
CHLORIDE SERPL-SCNC: 103 MMOL/L (ref 98–107)
CHOLEST SERPL-MCNC: 203 MG/DL
CREAT SERPL-MCNC: 0.87 MG/DL (ref 0.67–1.17)
EGFRCR SERPLBLD CKD-EPI 2021: >90 ML/MIN/1.73M2
FASTING STATUS PATIENT QL REPORTED: YES
FASTING STATUS PATIENT QL REPORTED: YES
GLUCOSE SERPL-MCNC: 104 MG/DL (ref 70–99)
HCO3 SERPL-SCNC: 25 MMOL/L (ref 22–29)
HDLC SERPL-MCNC: 38 MG/DL
LDLC SERPL CALC-MCNC: 124 MG/DL
NONHDLC SERPL-MCNC: 165 MG/DL
POTASSIUM SERPL-SCNC: 4.1 MMOL/L (ref 3.4–5.3)
SODIUM SERPL-SCNC: 139 MMOL/L (ref 135–145)
TRIGL SERPL-MCNC: 207 MG/DL
URATE SERPL-MCNC: 5.8 MG/DL (ref 3.4–7)

## 2025-04-09 PROCEDURE — 84550 ASSAY OF BLOOD/URIC ACID: CPT | Performed by: FAMILY MEDICINE

## 2025-04-09 PROCEDURE — G2211 COMPLEX E/M VISIT ADD ON: HCPCS | Performed by: FAMILY MEDICINE

## 2025-04-09 PROCEDURE — 96127 BRIEF EMOTIONAL/BEHAV ASSMT: CPT | Performed by: FAMILY MEDICINE

## 2025-04-09 PROCEDURE — 80061 LIPID PANEL: CPT | Performed by: FAMILY MEDICINE

## 2025-04-09 PROCEDURE — 3077F SYST BP >= 140 MM HG: CPT | Performed by: FAMILY MEDICINE

## 2025-04-09 PROCEDURE — 80048 BASIC METABOLIC PNL TOTAL CA: CPT | Performed by: FAMILY MEDICINE

## 2025-04-09 PROCEDURE — 3080F DIAST BP >= 90 MM HG: CPT | Performed by: FAMILY MEDICINE

## 2025-04-09 PROCEDURE — 36415 COLL VENOUS BLD VENIPUNCTURE: CPT | Performed by: FAMILY MEDICINE

## 2025-04-09 PROCEDURE — 99215 OFFICE O/P EST HI 40 MIN: CPT | Performed by: FAMILY MEDICINE

## 2025-04-09 RX ORDER — CLOTRIMAZOLE AND BETAMETHASONE DIPROPIONATE 10; .64 MG/G; MG/G
CREAM TOPICAL 2 TIMES DAILY PRN
Qty: 90 G | Refills: 1 | Status: SHIPPED | OUTPATIENT
Start: 2025-04-09

## 2025-04-09 RX ORDER — INDOMETHACIN 50 MG/1
50 CAPSULE ORAL 2 TIMES DAILY PRN
Qty: 60 CAPSULE | Refills: 5 | Status: SHIPPED | OUTPATIENT
Start: 2025-04-09

## 2025-04-09 SDOH — SOCIAL STABILITY - SOCIAL INSECURITY: ACCULTURATION DIFFICULTY: Z60.3

## 2025-04-09 ASSESSMENT — PATIENT HEALTH QUESTIONNAIRE - PHQ9
SUM OF ALL RESPONSES TO PHQ QUESTIONS 1-9: 0
10. IF YOU CHECKED OFF ANY PROBLEMS, HOW DIFFICULT HAVE THESE PROBLEMS MADE IT FOR YOU TO DO YOUR WORK, TAKE CARE OF THINGS AT HOME, OR GET ALONG WITH OTHER PEOPLE: NOT DIFFICULT AT ALL
SUM OF ALL RESPONSES TO PHQ QUESTIONS 1-9: 0

## 2025-04-09 NOTE — PROGRESS NOTES
[facility] Care Coordination Contact      [facility] Six-Month Telephone Assessment    6 month telephone assessment completed on 4/9/25.    ER visits: No  Hospitalizations: No  TCU stays: No  Significant health status changes: Chasity's gout has been bothering him more lately. He saw his PCP today to address it.   Falls/Injuries: No  ADL/IADL changes: No  Changes in services: No    Caregiver Assessment follow up:  Chasity's daughter Thu feels things are going okay. Her and her brother continue to provide the formal PCA care and support dad.   No needs were presented at this time.     Goals: See Support Plan for goal progress documentation.      Will see member in 6 months for an annual health risk assessment.   Encouraged member to call CC with any questions or concerns in the meantime.       HELEN Hurst, Manager   [facility]  [phone]

## 2025-04-09 NOTE — PATIENT INSTRUCTIONS
-Thank you for choosing the [facility].  -It was a pleasure to see you today.  -Please take a look at the information below for more specific details regarding the treatment plan and recommendations.  -In this after visit summary is a list of your medications and specific instructions.  Please review this carefully as there may be changes made to your medication list.  -If there are any particular questions or concerns, please feel free to reach out to Dr. Edwards.  -If any labs have been completed, we will reach out to you about results.  If the results are normal or not concerning, a letter or CEL-SCIhart message will be sent to you.  If any follow-up is needed, either Dr. Edwards or the nurse will give you a call.  If you have not heard regarding results after 2 weeks, please reach out to the clinic.    -Dr. Edwards will be leaving the [facility] in May 2025. It has been a pleasure to be a part of your care team.  Thank you for trusting me with your health care. Dr. Edwards recommends that you schedule an appointment to establish care with a new doctor at the clinic.     Patient Instructions:    -Take medications as prescribed.   -Start the indomethacin as prescribed.   -Continue the gabapentin  -You were referred to the skin specialist.   -If you do not hear from the specialist to schedule an appointment within a week's time from today, please call the [facility] and speak with the specialty  to help you schedule the appointment to see the specialist.  Depending on the specialist availability, it may be a number of weeks prior to your scheduled appointment.    Please seek immediate medical attention (go to the emergency room or urgent care) for the following reasons: worsening symptoms, or any concerning changes.      --------------------------------------------------------------------------------------------------------------------    -We are always looking for ways to improve.  You  may be selected to receive a survey regarding your visit today.  We encourage you to complete the survey and provide specific, constructive feedback to help us improve our processes.  Thank you for your time!  -Please review the contact information listed on the after visit summary and in the electronic chart.  Below is the phone number that we have on file.  If there are any changes that are needed to be made, please reach out to the clinic.  [phone] (home)

## 2025-04-09 NOTE — PROGRESS NOTES
OFFICE VISIT    Assessment/Plan:     Patient Instructions:    -Take medications as prescribed.   -Start the indomethacin as prescribed.   -Continue the gabapentin  -You were referred to the skin specialist.   -If you do not hear from the specialist to schedule an appointment within a week's time from today, please call the [facility] and speak with the specialty  to help you schedule the appointment to see the specialist.  Depending on the specialist availability, it may be a number of weeks prior to your scheduled appointment.    Please seek immediate medical attention (go to the emergency room or urgent care) for the following reasons: worsening symptoms, or any concerning changes.          Chasity was seen today for recheck medication, hypertension, back pain and arthritis.  Diagnoses and all orders for this visit:    Acute gout of left foot, unspecified cause  Language barrier: present for 1-2 weeks now and still very sore. Initiate indomethacin below. Has allergy to aspirin listed (rash/hives), though has tolerated ibuprofen. Discussed monitoring for side effects. Continue allopurinol. Complete labs from 30/25/2025.  -     indomethacin (INDOCIN) 50 MG capsule; Take 1 capsule (50 mg) by mouth 2 times daily as needed (Pain, gout flare-up).    Radiculopathy, lumbar region  Cervicalgia: overall improved, though still hurts similar to baseline. Plan for indomethacin as below. Continue gabapentin. Has both tizanidine and baclofen, which helps about the same. Plan to discontinue baclofen. Tizanidine prescription refill just sent yesterday.   -     indomethacin (INDOCIN) 50 MG capsule; Take 1 capsule (50 mg) by mouth 2 times daily as needed (Pain, gout flare-up).    Major depressive disorder, recurrent episode, moderate (H): stable. Not on medications. Continue to monitor.     Benign essential hypertension: continues to be elevated, which is attributed to gout flare and back pains. On metoprolol 25mg BID at  this time. Recheck at next visit. Consider starting losartan (BP control, and uric acid lowering).     Rash: off and on. Noted on the back. Plan to initiate lotrisone as below. Patient referred back to dermatology.   -     Adult Dermatology  Referral; Future  -     clotrimazole-betamethasone (LOTRISONE) 1-0.05 % external cream; Apply topically 2 times daily as needed (rash, itching).        Return in about 3 months (around 7/9/2025) for Medication follow up, Establish Care with new doctor.    The diagnoses, treatment options, risk, benefits, and recommendations were reviewed with patient/guardian.  Questions were answered to patient's/guardian satisfaction.  Red flag signs were reviewed.  Patient/guardian is in agreement with above plan.      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, lacunar stroke, hypertriglyceridemia, gout, hyperlipidemia, asymmetrical sensorineural hearing loss who presents to clinic for the following complaints:   Patient presents with:  Recheck Medication  Hypertension  Back Pain  Arthritis: gout    Answers submitted by the patient for this visit:  Lipid Visit (Submitted on 4/9/2025)  Chief Complaint: Chronic problems general questions HPI Form  Are you regularly taking any medication or supplement to lower your cholesterol?: Yes  Are you having muscle aches or other side effects that you think could be caused by your cholesterol lowering medication?: No  Patient Health Questionnaire (Submitted on 4/9/2025)  If you checked off any problems, how difficult have these problems made it for you to do your work, take care of things at home, or get along with other people?: Not difficult at all  PHQ9 TOTAL SCORE: 0  Hypertension Visit (Submitted on 4/9/2025)  Chief Complaint: Chronic problems general questions HPI Form  Do you check  your blood pressure regularly outside of the clinic?: Yes  Are your blood pressures ever more than 140 on the top number (systolic) OR more than 90 on the bottom number (diastolic)? (For example, greater than 140/90): Yes  Are you following a low salt diet?: Yes  Back Pain Visit Questionnaire (Submitted on 4/9/2025)  Your back pain is: chronic  Chronic or Recurring Back Pain Visit Questionnaire (Submitted on 4/9/2025)  Where is your back pain located? : left middle of back  How would you describe your back pain? : shooting  Where does your back pain spread? : right knee, left knee, right shoulder, left shoulder  Since you noticed your back pain, how has it changed? : always present, but gets better and worse  Does your back pain interfere with your job?: Not applicable  General Questionnaire (Submitted on 4/9/2025)  Chief Complaint: Chronic problems general questions HPI Form  How many servings of fruits and vegetables do you eat daily?: 2-3  On average, how many sweetened beverages do you drink each day (Examples: soda, juice, sweet tea, etc.  Do NOT count diet or artificially sweetened beverages)?: 2  How many minutes a day do you exercise enough to make your heart beat faster?: 9 or less  How many days a week do you exercise enough to make your heart beat faster?: 3 or less  How many days per week do you miss taking your medication?: 2  What makes it hard for you to take your medication every day?: remembering to take  Questionnaire about: Chronic problems general questions HPI Form (Submitted on 4/9/2025)  Chief Complaint: Chronic problems general questions HPI Form      Seen on 03/25/2025 for acute on chronic low back pain with lumbar and cervical radiculopathy. Treated with prednisone 50mg once daily x5 days. Continued on gabapentin 300mg TID, tizanidine 2mg tablets. Had been given baclofen from about 10 months ago now. Conservative management also recommended.     Since then, the back pain has been better. He  still has pains in the back, though at baseline. The prednisone was helpful. Denies any dysuria, fevers, chills, nausea, vomiting, or other changes from baseline.     Has not tried indomethacin or nabumetone before. Able to tolerate ibuprofen. Rash noted just with aspirin.       HTN: still mildly elevated today, though patient has been having back pains. On recheck, BP is 159/92. Asymptomatic otherwise.     Gout: gout attack started about 1-2 weeks ago now. Pain noted in the left first MTP. Painful, red, swollen. He may have eaten some meat that triggered the symptoms like pork. Discussed dietary recommendations. Due for uric acid testing.     Itchiness: noted on the back. Saw dermatology before in 12/2023. Got clobetasol ointment 0.05% and it didn't help. Wants to see dermatology again.     Depression: stable. Not on medications. Continue to monitor.       10/9/2024    10:26 AM 3/25/2025     4:13 PM 4/9/2025    10:01 AM   PHQ   PHQ-9 Total Score 0 0  0    Q9: Thoughts of better off dead/self-harm past 2 weeks Not at all Not at all Not at all       Patient-reported       A professional [employer] telephone  was utilized for the office visit.     The 10 point review of system is negative except as stated in the HPI.    Allergies were reviewed and updated.    Narrative & Impression   EXAM: CT LUMBAR SPINE W/O CONTRAST  LOCATION: [facility]  DATE/TIME: 9/4/2021 6:38 PM     INDICATION: Back pain. Radiculopathy.  COMPARISON: None.  TECHNIQUE: Routine CT Lumbar Spine without IV contrast. Multiplanar reformats. Dose reduction techniques were used.      FINDINGS:  VERTEBRA:  L4 S1 360 degrees fusion with instrumentation. L4-L5 and L5-S1 interbody grafts with associated solid bony fusion. L4-L5 S1 laminectomy. Bone graft material surrounds the posterior lateral aspects bilateral posterior elements. Surgical hardware appears   intact. No hardware complication.       No acute fracture.   "No acute post traumatic subluxations.   Normal vertebral body heights. Diffuse bony demineralization.     Left L3 vertebral body demonstrates a small sclerotic lesion nonspecific likely incidental bone island in the absence of a known malignancy.     CANAL/FORAMINA: Multilevel spondylosis result in various levels and degrees of central canal stenosis and neural foraminal stenosis. Multiple bulges predominantly noted superior to the spinal fusion. L2-L3 and L3-L4 severe central canal stenosis. Severe   neural foraminal stenosis within the left L5-S1, bilateral L4-L5, bilateral L3-L4, bilateral left greater than right L2-L3.   L3-L4 mild grade 1 retrolisthesis measuring 2 mm. No additional subluxations.     PARASPINAL: Vascular calcifications.                                                                          IMPRESSION:  1.  No acute fracture.  2.  L4 S1 360 degrees fusion with instrumentation described above.  3.  Degenerative changes described above.  4.  Diffuse bony demineralization.         Objective:   BP (!) 159/92 (BP Location: Left arm, Patient Position: Sitting, Cuff Size: Adult Regular)   Pulse 91   Temp 97.6  F (36.4  C) (Oral)   Resp 16   Ht 1.499 m (4' 11\")   Wt 57.2 kg (126 lb 1.9 oz)   SpO2 96%   BMI 25.47 kg/m    General: Active, alert, nontoxic-appearing.  No acute distress.  HEENT: Normocephalic, atraumatic.  Pupils are equal and round.  Sclera is clear.  Normal external ears. Nares patent.  Moist mucous membranes.    Cardiac: RRR.  S1, S2 present.  No murmurs, rubs, or gallops.  Respiratory/chest: Clear to auscultation bilaterally.  No wheezes, rales, rhonchi.  Breathing is not labored.  No accessory muscle usage.  Extremities: Left foot: the first MTP is swollen, warm, and slightly red. Tender to palpation and movement at the joint. Surrounding foot is otherwise normal. Voluntary movements intact. RLE: within normal limits.  Integumentary: erythematous, papular rash with diffuse skin " flaking and white discoloration with an erythematous base. Rash is scattered on the upper back from T4-T10 on the left paraspinal muscle region and from T1-T4 on the right paraspinal muscle region. Otherwise, no concerning rash or skin changes appreciated.    Amount of time spent in chart review, direct patient contact, care coordination, and related activities to patient care on the day of appointment: 40 minutes.       Boston Edwards MD  [facility] [place] [address]  Phone: [phone]  Fax: [phone]    4/9/2025  10:56 AM            Current Outpatient Medications   Medication Sig Dispense Refill    acetaminophen 500 MG CAPS Take 2 tablets by mouth 3 times daily as needed (pain, fever, headaches). 100 capsule 3    allopurinol (ZYLOPRIM) 300 MG tablet TAKE 1 TABLET BY MOUTH DAILY 90 tablet 3    atorvastatin (LIPITOR) 80 MG tablet Take 1 tablet (80 mg) by mouth at bedtime. TAKE 1 TABLET BY MOUTH EVERY NIGHT AT BEDTIME 90 tablet 3    cetirizine (ZYRTEC) 10 MG tablet Take 1 tablet (10 mg) by mouth daily as needed for allergies (itching). 30 tablet 2    clopidogrel (PLAVIX) 75 MG tablet Take 1 tablet (75 mg) by mouth daily. 90 tablet 3    clotrimazole-betamethasone (LOTRISONE) 1-0.05 % external cream Apply topically 2 times daily as needed (rash, itching). 90 g 1    dutasteride (AVODART) 0.5 MG capsule Take 1 capsule by mouth daily.      gabapentin (NEURONTIN) 300 MG capsule Take 1 capsule (300 mg) by mouth 3 times daily. 270 capsule 3    indomethacin (INDOCIN) 50 MG capsule Take 1 capsule (50 mg) by mouth 2 times daily as needed (Pain, gout flare-up). 60 capsule 5    metoprolol tartrate (LOPRESSOR) 25 MG tablet [METOPROLOL TARTRATE (LOPRESSOR) 25 MG TABLET] TAKE 1 TABLET BY MOUTH TWICE DAILY 180 tablet 3    mineral oil-hydrophilic petrolatum (AQUAPHOR) external ointment Apply topically as needed for dry skin (itchy skin). 396 g 5    omeprazole (PRILOSEC) 20 MG DR capsule Take 1  capsule (20 mg) by mouth daily as needed (reflux). 90 capsule 3    polyethylene glycol (MIRALAX) 17 GM/Dose powder Take 17 g by mouth daily. 510 g 11    tamsulosin (FLOMAX) 0.4 MG capsule Take 1 capsule (0.4 mg) by mouth daily. 30 capsule 11    tiZANidine (ZANAFLEX) 2 MG tablet TAKE 1 TABLET(2 MG) BY MOUTH EVERY NIGHT AS NEEDED FOR MUSCLE SPASMS 30 tablet 5     No current facility-administered medications for this visit.       Allergies   Allergen Reactions    Aspirin Hives    Oxycodone Itching    Vicodin [Hydrocodone-Acetaminophen] Unknown       Patient Active Problem List    Diagnosis Date Noted    Hiatal hernia 12/17/2021     Priority: Medium     Formatting of this note might be different from the original.  Created by [facility] Annotation: Apr 18 2012 10:34PM -  ,  : small, seen on EGD    Replacement Utility updated for latest IMO load      Essential Hypertriglyceridemia      Priority: Medium     Created by [facility] Annotation: Dec 21 2010  7:47Mindy Dentonil: 6/2009:   [facility] /Trig 340/HDL 35/LDL 80.  10/11:  Dyslipidemia again.    Advised to   come in for visit to follow-up.        Esophageal reflux      Priority: Medium     Created by Conversion        Elevated liver enzymes      Priority: Medium    Gout      Priority: Medium     Created by Conversion        ASNHL (asymmetrical sensorineural hearing loss) 04/20/2018     Priority: Medium    Left hemiparesis (H) 04/28/2017     Priority: Medium    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) 04/27/2017     Priority: Medium    Radiculopathy, lumbar region      Priority: Medium     Created by Conversion        Hyperlipidemia      Priority: Medium     Created by [facility] Annotation: Nov 4 2011  8:39AM Mindy Belleil: high   triglycerides,   low HDL, mildly elevated LDL        Hearing Loss      Priority: Medium     Created by [facility] Annotation: Dec 21 2010   "7:58AM - Service, Navdeep: B   sensorineural loss, \"profound\" on L, \"severe\" on R.  [facility].  Has   hearing   aid.  Replacement Utility updated for latest IMO load        Lacunar Stroke      Priority: Medium     Created by Conversion  Replacement Utility updated for latest IMO load        Hiatal Hernia      Priority: Medium     Created by Conversion  [facility] Annotation: Apr 18 2012 10:34PM -  ,  : small, seen on EGD  Replacement Utility updated for latest IMO load        Vitamin D Deficiency      Priority: Medium     Created by Conversion  [facility] Annotation: Jan 17 2011 10:05AM Mindy Belleil: 8; vit D 50 K   rx  Replacement Utility updated for latest IMO load        Gastritis      Priority: Medium     Created by Conversion  [facility] Annotation: Jun 13 2011 10:41AM Dave Todd: EGD 4/2012:   \"reactive gastropathy with chronic superimposed nonspecific chronic   inflammation (likely secondary to ASA, NSAIDS, EtOH or other irritants\"  Replacement Utility updated for latest IMO load        Constipation      Priority: Medium     Created by Conversion  Replacement Utility updated for latest IMO load        Elbow swelling, right 05/06/2016     Priority: Medium    Dermatitis 04/06/2016     Priority: Medium    Cervicalgia      Priority: Medium     Created by Conversion        Lumbar Canal Stenosis      Priority: Medium     Created by Conversion        Lower Back Pain      Priority: Medium     Created by Conversion        Moderate Recurrent Major Depression      Priority: Medium     Created by Conversion        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      Priority: Medium     Created by Conversion  [facility] Annotation: Oct 17 2011 11:04Dave Umanzor: since lumbar   laminectomy        Abdominal Pain In The Right Lower Belly (RLQ)      Priority: Medium     Created by Conversion        Abdominal Pain In The Right Upper Belly (RUQ)      Priority: Medium     Created by Conversion        Memory " Lapses Or Loss      Priority: Medium     Created by Conversion        Spinal Stenosis      Priority: Medium     Created by Conversion  [facility] Annotation: Jan 17 2011 10:05EARNEST - Mindy Fangil: FUSION 2011L   RECURRED AT L3-L4 WITH LATERAL RECESS NARROWING 7/2012, see hospital notes        Dysthymia (Depressive Neurosis), Primary      Priority: Medium     Created by Conversion           Family History   Problem Relation Age of Onset    Coronary Artery Disease No family hx of     Hypertension No family hx of     Cerebrovascular Disease No family hx of        Past Surgical History:   Procedure Laterality Date    IR CEREBRAL ANGIOGRAM  4/27/2017    IR LUMBAR TRANSFORAMINAL EPIDURAL STRD INJ  7/2/2012    PICC  4/28/2017         IN ARTHRODESIS ANT INTERBODY MIN DISCECTOMY,LUMBAR      Description: Lumbar Vertebral Fusion;  Recorded: 07/16/2013;  Comments: 3/17/11 spinal decompression and fusion L4-5, L5-S1 for spinal stenosis, DDD, spondylolysis; Dr. Casillas [facility]    IN ESOPHAGOGASTRODUODENOSCOPY TRANSORAL DIAGNOSTIC      Description: Esophagogastroduodenoscopy;  Proc Date: 04/02/2012;  Comments: biosies:   reactive gastropathy with chronic superimposed nonspecific chronic inflammation (likely secondary to ASA, NSAIDS, EtOH or other irritants), NEG for h. pylori; small hiatal hernia    IN INJECT ANES/STEROID FORAMEN LUMBAR/SACRAL W IMG GUIDE ,1 LEVEL      Description: Nerve Block Transforaminal Epidural Lumbar L3 - L4;  Recorded: 07/10/2012;  Comments: 7/2/2012 for severe low back pain, spinal stenosis and radiculopathy    THROMBECTOMY  04/24/2017    Rt CELIA    US ASPIRATION OR INJECTION MAJOR JOINT  10/23/2019    ZZC APPENDECTOMY      Description: Appendectomy;  Recorded: 07/12/2013;        Social History     Socioeconomic History    Marital status:      Spouse name: Not on file    Number of children: Not on file    Years of education: Not on file    Highest education level: Not on file   Occupational  History    Not on file   Tobacco Use    Smoking status: Never     Passive exposure: Current    Smokeless tobacco: Never    Tobacco comments:     Son in-law smokes outside   Vaping Use    Vaping status: Never Used   Substance and Sexual Activity    Alcohol use: Never    Drug use: Never    Sexual activity: Not Currently     Partners: Female   Other Topics Concern    Not on file   Social History Narrative    ** Merged History Encounter **          Social Drivers of Health     Financial Resource Strain: Low Risk  (10/9/2024)    Financial Resource Strain     Within the past 12 months, have you or your family members you live with been unable to get utilities (heat, electricity) when it was really needed?: No   Food Insecurity: Low Risk  (10/9/2024)    Food Insecurity     Within the past 12 months, did you worry that your food would run out before you got money to buy more?: No     Within the past 12 months, did the food you bought just not last and you didn t have money to get more?: No   Transportation Needs: Low Risk  (10/9/2024)    Transportation Needs     Within the past 12 months, has lack of transportation kept you from medical appointments, getting your medicines, non-medical meetings or appointments, work, or from getting things that you need?: No   Physical Activity: Inactive (10/9/2024)    Exercise Vital Sign     Days of Exercise per Week: 0 days     Minutes of Exercise per Session: 0 min   Stress: Patient Declined (10/9/2024)    [employer] - Occupational Stress Questionnaire     Feeling of Stress : Patient declined   Social Connections: Unknown (10/9/2024)    Social Connection and Isolation Panel [NHANES]     Frequency of Communication with Friends and Family: Not on file     Frequency of Social Gatherings with Friends and Family: Once a week     Attends Temple Services: Not on file     Active Member of Clubs or Organizations: Not on file     Attends Club or Organization  Meetings: Not on file     Marital Status: Not on file   Interpersonal Safety: Low Risk  (3/25/2025)    Interpersonal Safety     Do you feel physically and emotionally safe where you currently live?: Yes     Within the past 12 months, have you been hit, slapped, kicked or otherwise physically hurt by someone?: No     Within the past 12 months, have you been humiliated or emotionally abused in other ways by your partner or ex-partner?: No   Housing Stability: Low Risk  (10/9/2024)    Housing Stability     Do you have housing? : Yes     Are you worried about losing your housing?: No

## 2025-05-21 ENCOUNTER — OFFICE VISIT (OUTPATIENT)
Dept: AUDIOLOGY | Facility: CLINIC | Age: 75
End: 2025-05-21
Payer: COMMERCIAL

## 2025-05-21 DIAGNOSIS — H90.3 SENSORINEURAL HEARING LOSS, BILATERAL: Primary | ICD-10-CM

## 2025-05-21 NOTE — PROGRESS NOTES
AUDIOLOGY REPORT    SUBJECTIVE:Chasity Gaspar is a 75 year old male who was seen in the Audiology Clinic at the [facility] on 5/21/2025  for a hearing aid check. Previous results have revealed a profound sensorineural hearing loss in the right ear and no measurable hearing in the left ear. The patient has been seen previously in this clinic and was fit with a right Phonak Nanci P70 - UP hearing aid with custom earmold on 8/15/2023. Today, Chasity reports that his hearing aid is intermittent and quiet. He was accompanied by another individual and a phone  (ID: [id number]).    OBJECTIVE: Otoscopy revealed both ears are clear of cerumen. Hearing aid was cleaned and checked. Tube was very hard with moisture present. Hearing aid was deep cleaned; microphone port was vacuumed and tubing was changed. Following cleaning, listening check was good. Patient reported good volume after device was cleaned. He did not wish to make any programming changes at this time.     Per guidelines of patient's insurance, 24 units of size 675 batteries were dispensed today. Reviewed that patent is eligible for batteries once every 90 days, and how they can request new batteries.    ASSESSMENT: A hearing aid clean and check was completed today. Changes to hearing aid was completed as outlined above. Batteries provided.     PLAN:Chasity will return for follow-up as needed, or at least every 4-6 months for cleaning and assessment of hearing aid.  Assisted patient with scheduling this today. Please call this clinic with any questions regarding today s appointment.    Dante Guevara. CCC-A  Licensed Audiologist   [place] #[id number]

## 2025-07-30 ENCOUNTER — OFFICE VISIT (OUTPATIENT)
Dept: FAMILY MEDICINE | Facility: CLINIC | Age: 75
End: 2025-07-30
Payer: COMMERCIAL

## 2025-07-30 VITALS
DIASTOLIC BLOOD PRESSURE: 82 MMHG | RESPIRATION RATE: 20 BRPM | HEART RATE: 76 BPM | SYSTOLIC BLOOD PRESSURE: 152 MMHG | WEIGHT: 124.4 LBS | TEMPERATURE: 97.5 F | OXYGEN SATURATION: 98 % | BODY MASS INDEX: 24.42 KG/M2 | HEIGHT: 60 IN

## 2025-07-30 DIAGNOSIS — I10 BENIGN ESSENTIAL HYPERTENSION: Primary | ICD-10-CM

## 2025-07-30 DIAGNOSIS — M54.12 CERVICAL RADICULAR PAIN: ICD-10-CM

## 2025-07-30 DIAGNOSIS — M10.9 ACUTE GOUT OF LEFT FOOT, UNSPECIFIED CAUSE: ICD-10-CM

## 2025-07-30 DIAGNOSIS — M79.18 MYOFASCIAL PAIN: ICD-10-CM

## 2025-07-30 DIAGNOSIS — M54.16 RADICULOPATHY, LUMBAR REGION: ICD-10-CM

## 2025-07-30 DIAGNOSIS — L30.9 DERMATITIS: ICD-10-CM

## 2025-07-30 PROCEDURE — 3079F DIAST BP 80-89 MM HG: CPT | Performed by: FAMILY MEDICINE

## 2025-07-30 PROCEDURE — G2211 COMPLEX E/M VISIT ADD ON: HCPCS | Performed by: FAMILY MEDICINE

## 2025-07-30 PROCEDURE — 99214 OFFICE O/P EST MOD 30 MIN: CPT | Performed by: FAMILY MEDICINE

## 2025-07-30 PROCEDURE — 3077F SYST BP >= 140 MM HG: CPT | Performed by: FAMILY MEDICINE

## 2025-07-30 PROCEDURE — T1013 SIGN LANG/ORAL INTERPRETER: HCPCS

## 2025-07-30 RX ORDER — METOPROLOL SUCCINATE 100 MG/1
100 TABLET, EXTENDED RELEASE ORAL DAILY
Qty: 90 TABLET | Refills: 3 | Status: SHIPPED | OUTPATIENT
Start: 2025-07-30

## 2025-07-30 RX ORDER — TRIAMCINOLONE ACETONIDE 1 MG/G
CREAM TOPICAL 2 TIMES DAILY
Qty: 80 G | Refills: 2 | Status: SHIPPED | OUTPATIENT
Start: 2025-07-30

## 2025-07-30 RX ORDER — GABAPENTIN 300 MG/1
300 CAPSULE ORAL 3 TIMES DAILY
Qty: 270 CAPSULE | Refills: 3 | Status: SHIPPED | OUTPATIENT
Start: 2025-07-30

## 2025-07-30 NOTE — PROGRESS NOTES
ASSESMENT AND PLAN:  Diagnoses and all orders for this visit:  Benign essential hypertension  Not under ideal control.  Patient has been taking metoprolol 25 mg twice daily.  We decided to switch after discussion today with the patient and his family with the help of a professional .  Patient would like to establish care with myself as his ongoing primary care physician, close follow-up closely with me in the clinic.  -     metoprolol succinate ER (TOPROL XL) 100 MG 24 hr tablet; Take 1 tablet (100 mg) by mouth daily.  Acute gout of left foot, unspecified cause  Resolved.  Patient counseled to only use the indomethacin on an as-needed basis for gout flares.  Continue allopurinol.  Radiculopathy, lumbar region  Chronic, refill-     gabapentin (NEURONTIN) 300 MG capsule; Take 1 capsule (300 mg) by mouth 3 times daily.  Cervical radicular pain  Chronic, refill-     gabapentin (NEURONTIN) 300 MG capsule; Take 1 capsule (300 mg) by mouth 3 times daily.  Myofascial pain  Chronic, refill-     gabapentin (NEURONTIN) 300 MG capsule; Take 1 capsule (300 mg) by mouth 3 times daily.  Dermatitis of the left upper back  -     triamcinolone (KENALOG) 0.1 % external cream; Apply topically 2 times daily.      Reviewed the risks and benefits of the treatment plan with the patient and/or caregiver and we discussed indications for routine and emergent follow-up.  The longitudinal plan of care for the diagnosis(es)/condition(s) as documented were addressed during this visit. Due to the added complexity in care, I will continue to support Naw in the subsequent management and with ongoing continuity of care.        SUBJECTIVE: Patient in to establish care, previously a patient of Dr. Edwards.  Blood pressure has been running mildly elevated the last 2 visits.  Patient has longstanding chronic pain issues of the neck and back.  He has a new concern about a patch of itchy red rash on his left upper back that has been very itchy over  the last 2 weeks.  No other areas on the body that are having similar rash.    Past Medical History:   Diagnosis Date    Cervicalgia     Depression     Dyslipidemia     Dysthymia     Gastritis     GERD (gastroesophageal reflux disease)     Hearing loss     Hiatal hernia     Hypertriglyceridemia     Insomnia     Lacunar stroke (H)     Lumbar canal stenosis     Lumbar radiculopathy     Myalgia and myositis      Patient Active Problem List   Diagnosis    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    Abdominal Pain In The Right Lower Belly (RLQ)    Abdominal Pain In The Right Upper Belly (RUQ)    Memory Lapses Or Loss    Hyperlipidemia    Hearing Loss    Lacunar Stroke    Spinal Stenosis    Radiculopathy, lumbar region    Cervicalgia    Dysthymia (Depressive Neurosis), Primary    Essential Hypertriglyceridemia    Hiatal Hernia    Lumbar Canal Stenosis    Lower Back Pain    Vitamin D Deficiency    Moderate Recurrent Major Depression    Esophageal reflux    Gastritis    Constipation    Gout    Dermatitis    Elbow swelling, right    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 TAKE 1 TABLET BY MOUTH EVERY NIGHT AT BEDTIME 90 tablet 3    clopidogrel (PLAVIX) 75 MG tablet Take 1 tablet (75 mg) by mouth daily. 90 tablet 3    clotrimazole-betamethasone (LOTRISONE) 1-0.05 % external cream Apply topically 2 times daily as needed (rash, itching). 90 g 1    gabapentin (NEURONTIN) 300 MG capsule Take 1 capsule (300 mg) by mouth 3 times daily. 270 capsule 3    metoprolol succinate ER (TOPROL XL) 100 MG 24 hr tablet Take 1 tablet (100 mg) by mouth daily. 90  "tablet 3    mineral oil-hydrophilic petrolatum (AQUAPHOR) external ointment Apply topically as needed for dry skin (itchy skin). 396 g 5    omeprazole (PRILOSEC) 20 MG DR capsule Take 1 capsule (20 mg) by mouth daily as needed (reflux). 90 capsule 3    tamsulosin (FLOMAX) 0.4 MG capsule Take 1 capsule (0.4 mg) by mouth daily. 30 capsule 11    tiZANidine (ZANAFLEX) 2 MG tablet TAKE 1 TABLET(2 MG) BY MOUTH EVERY NIGHT AS NEEDED FOR MUSCLE SPASMS 30 tablet 5    triamcinolone (KENALOG) 0.1 % external cream Apply topically 2 times daily. 80 g 2    acetaminophen 500 MG CAPS Take 2 tablets by mouth 3 times daily as needed (pain, fever, headaches). 100 capsule 3    cetirizine (ZYRTEC) 10 MG tablet Take 1 tablet (10 mg) by mouth daily as needed for allergies (itching). 30 tablet 2    dutasteride (AVODART) 0.5 MG capsule Take 1 capsule by mouth daily.      indomethacin (INDOCIN) 50 MG capsule Take 1 capsule (50 mg) by mouth 2 times daily as needed (Pain, gout flare-up). 60 capsule 5    metoprolol tartrate (LOPRESSOR) 25 MG tablet [METOPROLOL TARTRATE (LOPRESSOR) 25 MG TABLET] TAKE 1 TABLET BY MOUTH TWICE DAILY (Patient not taking: Reported on 7/30/2025) 180 tablet 3    polyethylene glycol (MIRALAX) 17 GM/Dose powder Take 17 g by mouth daily. 510 g 11     History   Smoking Status    Never   Smokeless Tobacco    Never       OBJECTICE: BP (!) 152/82   Pulse 76   Temp 97.5  F (36.4  C) (Oral)   Resp 20   Ht 1.499 m (4' 11\")   Wt 56.4 kg (124 lb 6.4 oz)   SpO2 98%   BMI 25.13 kg/m       No results found for this or any previous visit (from the past 24 hours).     CV-regular rate and rhythm with no murmur   RESP-lungs clear to auscultation   SKIN-6 cm patch of maculopapular rash without ulcers or vesicles of the upper back on the left side.         Signed Electronically by: Heri Willson MD    "